# Patient Record
Sex: MALE | Race: WHITE | NOT HISPANIC OR LATINO
[De-identification: names, ages, dates, MRNs, and addresses within clinical notes are randomized per-mention and may not be internally consistent; named-entity substitution may affect disease eponyms.]

---

## 2017-05-20 ENCOUNTER — TRANSCRIPTION ENCOUNTER (OUTPATIENT)
Age: 57
End: 2017-05-20

## 2017-09-18 ENCOUNTER — RX RENEWAL (OUTPATIENT)
Age: 57
End: 2017-09-18

## 2017-09-19 ENCOUNTER — RX RENEWAL (OUTPATIENT)
Age: 57
End: 2017-09-19

## 2017-09-20 ENCOUNTER — TRANSCRIPTION ENCOUNTER (OUTPATIENT)
Age: 57
End: 2017-09-20

## 2017-09-23 ENCOUNTER — TRANSCRIPTION ENCOUNTER (OUTPATIENT)
Age: 57
End: 2017-09-23

## 2017-10-22 ENCOUNTER — TRANSCRIPTION ENCOUNTER (OUTPATIENT)
Age: 57
End: 2017-10-22

## 2018-01-31 DIAGNOSIS — Z84.0 FAMILY HISTORY OF DISEASES OF THE SKIN AND SUBCUTANEOUS TISSUE: ICD-10-CM

## 2018-01-31 DIAGNOSIS — Z80.8 FAMILY HISTORY OF MALIGNANT NEOPLASM OF OTHER ORGANS OR SYSTEMS: ICD-10-CM

## 2018-01-31 DIAGNOSIS — Z82.49 FAMILY HISTORY OF ISCHEMIC HEART DISEASE AND OTHER DISEASES OF THE CIRCULATORY SYSTEM: ICD-10-CM

## 2018-01-31 DIAGNOSIS — Z83.49 FAMILY HISTORY OF OTHER ENDOCRINE, NUTRITIONAL AND METABOLIC DISEASES: ICD-10-CM

## 2018-01-31 DIAGNOSIS — Z86.61 PERSONAL HISTORY OF INFECTIONS OF THE CENTRAL NERVOUS SYSTEM: ICD-10-CM

## 2018-01-31 DIAGNOSIS — Z82.0 FAMILY HISTORY OF EPILEPSY AND OTHER DISEASES OF THE NERVOUS SYSTEM: ICD-10-CM

## 2018-01-31 DIAGNOSIS — Z81.8 FAMILY HISTORY OF OTHER MENTAL AND BEHAVIORAL DISORDERS: ICD-10-CM

## 2018-01-31 DIAGNOSIS — Z86.19 PERSONAL HISTORY OF OTHER INFECTIOUS AND PARASITIC DISEASES: ICD-10-CM

## 2018-01-31 DIAGNOSIS — Z87.448 PERSONAL HISTORY OF OTHER DISEASES OF URINARY SYSTEM: ICD-10-CM

## 2018-01-31 DIAGNOSIS — R91.1 SOLITARY PULMONARY NODULE: ICD-10-CM

## 2018-01-31 RX ORDER — CETIRIZINE HYDROCHLORIDE 10 MG/1
10 TABLET, FILM COATED ORAL DAILY
Refills: 0 | Status: ACTIVE | COMMUNITY
Start: 2018-01-31

## 2018-01-31 RX ORDER — ASPIRIN 81 MG/1
81 TABLET ORAL DAILY
Qty: 90 | Refills: 3 | Status: ACTIVE | COMMUNITY
Start: 2018-01-31

## 2018-02-20 ENCOUNTER — NON-APPOINTMENT (OUTPATIENT)
Age: 58
End: 2018-02-20

## 2018-02-20 ENCOUNTER — APPOINTMENT (OUTPATIENT)
Dept: INTERNAL MEDICINE | Facility: CLINIC | Age: 58
End: 2018-02-20
Payer: COMMERCIAL

## 2018-02-20 VITALS
DIASTOLIC BLOOD PRESSURE: 78 MMHG | SYSTOLIC BLOOD PRESSURE: 120 MMHG | BODY MASS INDEX: 27.49 KG/M2 | HEIGHT: 70 IN | WEIGHT: 192 LBS

## 2018-02-20 DIAGNOSIS — R82.90 UNSPECIFIED ABNORMAL FINDINGS IN URINE: ICD-10-CM

## 2018-02-20 LAB
BILIRUB UR QL STRIP: NEGATIVE
CLARITY UR: CLEAR
COLLECTION METHOD: NORMAL
GLUCOSE UR-MCNC: NEGATIVE
HCG UR QL: 0.2 EU/DL
HGB UR QL STRIP.AUTO: ABNORMAL
KETONES UR-MCNC: NEGATIVE
LEUKOCYTE ESTERASE UR QL STRIP: NEGATIVE
NITRITE UR QL STRIP: NEGATIVE
PH UR STRIP: 5.5
PROT UR STRIP-MCNC: NEGATIVE
SP GR UR STRIP: 1.02

## 2018-02-20 PROCEDURE — 99396 PREV VISIT EST AGE 40-64: CPT | Mod: 25

## 2018-02-20 PROCEDURE — 90732 PPSV23 VACC 2 YRS+ SUBQ/IM: CPT

## 2018-02-20 PROCEDURE — G0009: CPT

## 2018-02-20 PROCEDURE — 82270 OCCULT BLOOD FECES: CPT

## 2018-02-20 PROCEDURE — 81003 URINALYSIS AUTO W/O SCOPE: CPT | Mod: QW

## 2018-02-20 PROCEDURE — 93000 ELECTROCARDIOGRAM COMPLETE: CPT

## 2018-02-20 RX ORDER — OSELTAMIVIR PHOSPHATE 75 MG/1
75 CAPSULE ORAL
Qty: 10 | Refills: 0 | Status: DISCONTINUED | COMMUNITY
Start: 2017-09-17 | End: 2018-02-20

## 2018-02-20 RX ORDER — LORAZEPAM 1 MG/1
1 TABLET ORAL
Refills: 0 | Status: ACTIVE | COMMUNITY
Start: 2018-01-31

## 2018-02-20 RX ORDER — DOXYCYCLINE HYCLATE 100 MG/1
100 CAPSULE ORAL
Qty: 14 | Refills: 0 | Status: DISCONTINUED | COMMUNITY
Start: 2017-10-22 | End: 2018-02-20

## 2018-02-20 RX ORDER — LAMOTRIGINE 100 MG/1
100 TABLET ORAL
Qty: 270 | Refills: 0 | Status: DISCONTINUED | COMMUNITY
Start: 2017-10-14 | End: 2018-02-20

## 2018-02-20 RX ORDER — PREDNISONE 50 MG/1
50 TABLET ORAL
Qty: 7 | Refills: 0 | Status: DISCONTINUED | COMMUNITY
Start: 2017-09-17 | End: 2018-02-20

## 2018-02-20 RX ORDER — METHYLPREDNISOLONE 4 MG/1
4 TABLET ORAL
Qty: 21 | Refills: 0 | Status: DISCONTINUED | COMMUNITY
Start: 2017-10-22 | End: 2018-02-20

## 2018-02-21 LAB
APPEARANCE: CLEAR
BACTERIA: NEGATIVE
BILIRUBIN URINE: NEGATIVE
BLOOD URINE: NEGATIVE
COLOR: YELLOW
GLUCOSE QUALITATIVE U: NEGATIVE MG/DL
KETONES URINE: NEGATIVE
LEUKOCYTE ESTERASE URINE: NEGATIVE
MICROSCOPIC-UA: NORMAL
NITRITE URINE: NEGATIVE
PH URINE: 5.5
PROTEIN URINE: NEGATIVE MG/DL
RED BLOOD CELLS URINE: 2 /HPF
SPECIFIC GRAVITY URINE: 1.02
SQUAMOUS EPITHELIAL CELLS: 0 /HPF
UROBILINOGEN URINE: NEGATIVE MG/DL
WHITE BLOOD CELLS URINE: 0 /HPF

## 2018-02-21 RX ORDER — FLUTICASONE PROPIONATE 50 UG/1
50 SPRAY, METERED NASAL
Qty: 1 | Refills: 1 | Status: ACTIVE | COMMUNITY
Start: 2017-10-22

## 2018-02-21 RX ORDER — ALBUTEROL SULFATE 90 UG/1
108 (90 BASE) AEROSOL, METERED RESPIRATORY (INHALATION)
Qty: 8.5 | Refills: 1 | Status: ACTIVE | COMMUNITY
Start: 2017-10-22

## 2018-02-21 RX ORDER — IPRATROPIUM BROMIDE AND ALBUTEROL SULFATE 2.5; .5 MG/3ML; MG/3ML
0.5-2.5 (3) SOLUTION RESPIRATORY (INHALATION)
Refills: 0 | Status: ACTIVE | COMMUNITY
Start: 2017-10-22

## 2018-02-22 ENCOUNTER — RX RENEWAL (OUTPATIENT)
Age: 58
End: 2018-02-22

## 2018-02-22 LAB — BACTERIA UR CULT: NORMAL

## 2018-04-19 ENCOUNTER — TRANSCRIPTION ENCOUNTER (OUTPATIENT)
Age: 58
End: 2018-04-19

## 2018-04-20 ENCOUNTER — APPOINTMENT (OUTPATIENT)
Dept: INTERNAL MEDICINE | Facility: CLINIC | Age: 58
End: 2018-04-20
Payer: COMMERCIAL

## 2018-04-20 VITALS
WEIGHT: 193 LBS | BODY MASS INDEX: 27.63 KG/M2 | HEART RATE: 69 BPM | SYSTOLIC BLOOD PRESSURE: 130 MMHG | OXYGEN SATURATION: 98 % | DIASTOLIC BLOOD PRESSURE: 80 MMHG | HEIGHT: 70 IN | TEMPERATURE: 98.1 F

## 2018-04-20 VITALS — TEMPERATURE: 98.4 F | SYSTOLIC BLOOD PRESSURE: 128 MMHG | DIASTOLIC BLOOD PRESSURE: 82 MMHG

## 2018-04-20 LAB
BASOPHILS # BLD AUTO: 0.03 K/UL
BASOPHILS NFR BLD AUTO: 0.5 %
EOSINOPHIL # BLD AUTO: 0.14 K/UL
EOSINOPHIL NFR BLD AUTO: 2.3 %
HCT VFR BLD CALC: 42.1 %
HGB BLD-MCNC: 13.5 G/DL
IMM GRANULOCYTES NFR BLD AUTO: 0.2 %
LYMPHOCYTES # BLD AUTO: 1.69 K/UL
LYMPHOCYTES NFR BLD AUTO: 28.1 %
MAN DIFF?: NORMAL
MCHC RBC-ENTMCNC: 29.7 PG
MCHC RBC-ENTMCNC: 32.1 GM/DL
MCV RBC AUTO: 92.7 FL
MONOCYTES # BLD AUTO: 0.54 K/UL
MONOCYTES NFR BLD AUTO: 9 %
NEUTROPHILS # BLD AUTO: 3.6 K/UL
NEUTROPHILS NFR BLD AUTO: 59.9 %
PLATELET # BLD AUTO: 313 K/UL
RBC # BLD: 4.54 M/UL
RBC # FLD: 13 %
WBC # FLD AUTO: 6.01 K/UL

## 2018-04-20 PROCEDURE — 99213 OFFICE O/P EST LOW 20 MIN: CPT | Mod: 25

## 2018-04-20 PROCEDURE — 36415 COLL VENOUS BLD VENIPUNCTURE: CPT

## 2018-04-23 LAB
ALBUMIN SERPL ELPH-MCNC: 4.6 G/DL
ALP BLD-CCNC: 66 U/L
ALT SERPL-CCNC: 18 U/L
ANION GAP SERPL CALC-SCNC: 14 MMOL/L
AST SERPL-CCNC: 18 U/L
BILIRUB SERPL-MCNC: 0.3 MG/DL
BUN SERPL-MCNC: 25 MG/DL
CALCIUM SERPL-MCNC: 9.7 MG/DL
CHLORIDE SERPL-SCNC: 104 MMOL/L
CO2 SERPL-SCNC: 24 MMOL/L
CREAT SERPL-MCNC: 1.28 MG/DL
CRP SERPL-MCNC: <0.2 MG/DL
ERYTHROCYTE [SEDIMENTATION RATE] IN BLOOD BY WESTERGREN METHOD: 12 MM/HR
GLUCOSE SERPL-MCNC: 94 MG/DL
POTASSIUM SERPL-SCNC: 4.7 MMOL/L
PROT SERPL-MCNC: 7.7 G/DL
SODIUM SERPL-SCNC: 142 MMOL/L
URATE SERPL-MCNC: 4.9 MG/DL

## 2018-07-22 PROBLEM — Z80.8 FAMILY HISTORY OF SKIN CANCER: Status: ACTIVE | Noted: 2018-01-31

## 2019-02-19 ENCOUNTER — RX RENEWAL (OUTPATIENT)
Age: 59
End: 2019-02-19

## 2019-02-19 ENCOUNTER — APPOINTMENT (OUTPATIENT)
Dept: INTERNAL MEDICINE | Facility: CLINIC | Age: 59
End: 2019-02-19
Payer: COMMERCIAL

## 2019-02-19 VITALS
DIASTOLIC BLOOD PRESSURE: 78 MMHG | WEIGHT: 198 LBS | HEIGHT: 70 IN | BODY MASS INDEX: 28.35 KG/M2 | SYSTOLIC BLOOD PRESSURE: 132 MMHG

## 2019-02-19 DIAGNOSIS — M79.644 PAIN IN RIGHT FINGER(S): ICD-10-CM

## 2019-02-19 PROCEDURE — 99396 PREV VISIT EST AGE 40-64: CPT | Mod: 25

## 2019-02-19 PROCEDURE — 82270 OCCULT BLOOD FECES: CPT

## 2019-02-19 RX ORDER — COLCHICINE 0.6 MG/1
0.6 TABLET ORAL
Qty: 30 | Refills: 1 | Status: COMPLETED | COMMUNITY
Start: 2018-04-20 | End: 2019-02-19

## 2019-02-19 RX ORDER — CEFADROXIL 500 MG/1
500 CAPSULE ORAL
Qty: 14 | Refills: 0 | Status: COMPLETED | COMMUNITY
Start: 2018-04-19 | End: 2019-02-19

## 2019-02-19 NOTE — HEALTH RISK ASSESSMENT
[Excellent] : ~his/her~ current health as excellent [Very Good] : ~his/her~  mood as very good [No falls in past year] : Patient reported no falls in the past year [0] : 2) Feeling down, depressed, or hopeless: Not at all (0) [None] : None [With Family] : lives with family [# of Members in Household ___] :  household currently consist of [unfilled] member(s) [Employed] : employed [Graduate School] : graduate school [] :  [# Of Children ___] : has [unfilled] children [Feels Safe at Home] : Feels safe at home [Neglect Or Abandonment] : neglect or abandonment [Fully functional (bathing, dressing, toileting, transferring, walking, feeding)] : Fully functional (bathing, dressing, toileting, transferring, walking, feeding) [Fully functional (using the telephone, shopping, preparing meals, housekeeping, doing laundry, using] : Fully functional and needs no help or supervision to perform IADLs (using the telephone, shopping, preparing meals, housekeeping, doing laundry, using transportation, managing medications and managing finances) [Smoke Detector] : smoke detector [Carbon Monoxide Detector] : carbon monoxide detector [Seat Belt] :  uses seat belt [] : No [Change in mental status noted] : No change in mental status noted [Reports changes in hearing] : Reports no changes in hearing [Reports changes in vision] : Reports no changes in vision [Reports changes in dental health] : Reports no changes in dental health [ColonoscopyDate] : 7/2014 [de-identified] : eye exam - 11/2018 [de-identified] : dentist - 7/2018 [AdvancecareDate] : 02/18

## 2019-02-19 NOTE — PHYSICAL EXAM
[No Acute Distress] : no acute distress [Well Nourished] : well nourished [Well Developed] : well developed [Normal Sclera/Conjunctiva] : normal sclera/conjunctiva [PERRL] : pupils equal round and reactive to light [EOMI] : extraocular movements intact [Normal Oropharynx] : the oropharynx was normal [Normal TMs] : both tympanic membranes were normal [Normal Nasal Mucosa] : the nasal mucosa was normal [No JVD] : no jugular venous distention [Supple] : supple [No Lymphadenopathy] : no lymphadenopathy [No Respiratory Distress] : no respiratory distress  [Clear to Auscultation] : lungs were clear to auscultation bilaterally [Normal Rate] : normal rate  [Regular Rhythm] : with a regular rhythm [Normal S1, S2] : normal S1 and S2 [No Carotid Bruits] : no carotid bruits [Pedal Pulses Present] : the pedal pulses are present [No Edema] : there was no peripheral edema [No Extremity Clubbing/Cyanosis] : no extremity clubbing/cyanosis [Normal Appearance] : normal in appearance [No Masses] : no palpable masses [No Nipple Discharge] : no nipple discharge [No Axillary Lymphadenopathy] : no axillary lymphadenopathy [Soft] : abdomen soft [Non Tender] : non-tender [Non-distended] : non-distended [Normal Bowel Sounds] : normal bowel sounds [Normal Sphincter Tone] : normal sphincter tone [No Mass] : no mass [Stool Occult Blood] : stool negative for occult blood [Prostate Enlargement] : the prostate was not enlarged [Prostate Tenderness] : the prostate was not tender [No Prostate Nodules] : no prostate nodules [Normal Posterior Cervical Nodes] : no posterior cervical lymphadenopathy [Normal Anterior Cervical Nodes] : no anterior cervical lymphadenopathy [No CVA Tenderness] : no CVA  tenderness [No Spinal Tenderness] : no spinal tenderness [No Joint Swelling] : no joint swelling [Grossly Normal Strength/Tone] : grossly normal strength/tone [No Rash] : no rash [Normal Gait] : normal gait [Coordination Grossly Intact] : coordination grossly intact [No Focal Deficits] : no focal deficits [Speech Grossly Normal] : speech grossly normal [Normal Affect] : the affect was normal [Alert and Oriented x3] : oriented to person, place, and time [Normal Mood] : the mood was normal [de-identified] : Overweight male in stated age,

## 2019-02-19 NOTE — ASSESSMENT
[FreeTextEntry1] : Patient is up-to-date with colonoscopy, but will be due for a repeat exam in a few months. He was advised to followup with GI for that. He was also reminded to have routine eye exam, dental care and skin exam with a dermatologist.

## 2019-02-19 NOTE — DATA REVIEWED
[FreeTextEntry1] : Derm - never,\par \par EKG - deferred, done with cardio every year, did it last spring, and has apt to go soon, he will have EKG forward here.\par \par Labs 2/9/2019 reviewed - \par * Chol - 208, HDL - 51, LD - 137, TG - 100,\par * vitamin D - 15\par * the rest of labs were unremarkable.

## 2019-02-19 NOTE — REVIEW OF SYSTEMS
[Fever] : fever [Discharge] : discharge [Dyspnea on Exertion] : dyspnea on exertion [Nocturia] : nocturia [Negative] : Heme/Lymph [Chills] : no chills [Fatigue] : no fatigue [Chest Pain] : no chest pain [Palpitations] : no palpitations [Lower Ext Edema] : no lower extremity edema [Shortness Of Breath] : no shortness of breath [Wheezing] : no wheezing [Cough] : no cough [Abdominal Pain] : no abdominal pain [Nausea] : no nausea [Constipation] : no constipation [Diarrhea] : no diarrhea [Vomiting] : no vomiting [Heartburn] : no heartburn [Melena] : no melena [Dysuria] : no dysuria [Incontinence] : no incontinence [Hematuria] : no hematuria [Joint Pain] : no joint pain [Joint Stiffness] : no joint stiffness [Muscle Pain] : no muscle pain [Back Pain] : no back pain [Joint Swelling] : no joint swelling [Itching] : no itching [Mole Changes] : no mole changes [Skin Rash] : no skin rash [Headache] : no headache [Dizziness] : no dizziness [Fainting] : no fainting [Unsteady Walk] : no ataxia [Insomnia] : no insomnia [Anxiety] : no anxiety [Depression] : no depression [Easy Bleeding] : no easy bleeding [Easy Bruising] : no easy bruising [Swollen Glands] : no swollen glands [FreeTextEntry2] : Gained 5-6 pounds in the past years, [FreeTextEntry3] : wears reading glasses, [FreeTextEntry6] : 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

## 2019-02-19 NOTE — HISTORY OF PRESENT ILLNESS
[Spouse] : spouse [de-identified] : Pt presented for PE.  Last PE was 2/2019.  His health was uneventful since his last visit, he has no new complaint.   Except.[t had pain on his finger in 4/2018, and was diagnosed with gout, but labs were unremarkable.  He was treated and resolved.  This occurred during a time when he was fasting to lose weight.\par \par Pt was not able to lose weight, but is very active.  He has not been compliant with a healthy diet.\par \par He got flu vaccine with allergist.

## 2019-03-18 ENCOUNTER — RX RENEWAL (OUTPATIENT)
Age: 59
End: 2019-03-18

## 2020-02-18 ENCOUNTER — APPOINTMENT (OUTPATIENT)
Dept: INTERNAL MEDICINE | Facility: CLINIC | Age: 60
End: 2020-02-18
Payer: COMMERCIAL

## 2020-02-18 ENCOUNTER — RX RENEWAL (OUTPATIENT)
Age: 60
End: 2020-02-18

## 2020-02-18 ENCOUNTER — NON-APPOINTMENT (OUTPATIENT)
Age: 60
End: 2020-02-18

## 2020-02-18 VITALS — SYSTOLIC BLOOD PRESSURE: 130 MMHG | DIASTOLIC BLOOD PRESSURE: 74 MMHG

## 2020-02-18 VITALS
HEART RATE: 77 BPM | HEIGHT: 70 IN | SYSTOLIC BLOOD PRESSURE: 140 MMHG | BODY MASS INDEX: 27.92 KG/M2 | DIASTOLIC BLOOD PRESSURE: 84 MMHG | OXYGEN SATURATION: 96 % | WEIGHT: 195 LBS | TEMPERATURE: 99 F

## 2020-02-18 DIAGNOSIS — E66.3 OVERWEIGHT: ICD-10-CM

## 2020-02-18 PROCEDURE — 93000 ELECTROCARDIOGRAM COMPLETE: CPT

## 2020-02-18 PROCEDURE — 82270 OCCULT BLOOD FECES: CPT

## 2020-02-18 PROCEDURE — 99396 PREV VISIT EST AGE 40-64: CPT | Mod: 25

## 2020-02-18 RX ORDER — MELOXICAM 15 MG/1
15 TABLET ORAL
Qty: 90 | Refills: 0 | Status: COMPLETED | COMMUNITY
Start: 2019-12-22 | End: 2020-02-18

## 2020-02-18 RX ORDER — LAMOTRIGINE 150 MG/1
150 TABLET ORAL TWICE DAILY
Refills: 0 | Status: COMPLETED | COMMUNITY
Start: 2018-01-31 | End: 2020-02-18

## 2020-02-18 NOTE — REVIEW OF SYSTEMS
[Dyspnea on Exertion] : dyspnea on exertion [Nocturia] : nocturia [Joint Pain] : joint pain [Negative] : Heme/Lymph [Fever] : no fever [Chills] : no chills [Fatigue] : no fatigue [Recent Change In Weight] : ~T no recent weight change [Chest Pain] : no chest pain [Palpitations] : no palpitations [Lower Ext Edema] : no lower extremity edema [Shortness Of Breath] : no shortness of breath [Wheezing] : no wheezing [Cough] : no cough [Abdominal Pain] : no abdominal pain [Nausea] : no nausea [Constipation] : no constipation [Diarrhea] : no diarrhea [Vomiting] : no vomiting [Heartburn] : no heartburn [Melena] : no melena [Dysuria] : no dysuria [Incontinence] : no incontinence [Hematuria] : no hematuria [Joint Stiffness] : no joint stiffness [Muscle Pain] : no muscle pain [Back Pain] : no back pain [Joint Swelling] : no joint swelling [Itching] : no itching [Mole Changes] : no mole changes [Skin Rash] : no skin rash [Headache] : no headache [Dizziness] : no dizziness [Fainting] : no fainting [Unsteady Walk] : no ataxia [Insomnia] : no insomnia [Anxiety] : no anxiety [Depression] : no depression [Easy Bleeding] : no easy bleeding [Easy Bruising] : no easy bruising [Swollen Glands] : no swollen glands [FreeTextEntry3] : wears reading glasses, [FreeTextEntry6] : 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

## 2020-02-18 NOTE — DATA REVIEWED
[FreeTextEntry1] : Derm - never\par \par EKG - NSR, R  - 68, axis - 0, no interval change. \par \par Labs 2/8/2020 reviewed - \par * Chol - 185, HDL - 50, LDL - 106, TG - 146, \par * uric acid - 5.2,\par * Vitamin D - 14.6,\par * the rest of labs were unremarkable.

## 2020-02-18 NOTE — HISTORY OF PRESENT ILLNESS
[de-identified] : Pt presented for PE.  Last PE was 1 year ago.  \par \par Pt had some groin pain on R since school started in the fall.  He saw ortho and was diagnosed with OA of the hip.  He was told he may need THR at some point.  Pt was treated with Meloxicam with some improvement.   He took it for about 2 months.  Pt also was recommended to have PT, but hasn't started it yet.\par \par Pt thinks it has a small umbilical hernia.  It hasn't caused any discomfort yet.  \par \par Pt got his flu vaccine with allergist.

## 2020-02-18 NOTE — PHYSICAL EXAM
[No Acute Distress] : no acute distress [Well Nourished] : well nourished [Well Developed] : well developed [Normal Sclera/Conjunctiva] : normal sclera/conjunctiva [PERRL] : pupils equal round and reactive to light [EOMI] : extraocular movements intact [Normal Outer Ear/Nose] : the outer ears and nose were normal in appearance [Normal Oropharynx] : the oropharynx was normal [Normal TMs] : both tympanic membranes were normal [Normal Nasal Mucosa] : the nasal mucosa was normal [No JVD] : no jugular venous distention [No Lymphadenopathy] : no lymphadenopathy [Supple] : supple [No Respiratory Distress] : no respiratory distress  [Clear to Auscultation] : lungs were clear to auscultation bilaterally [Normal Rate] : normal rate  [Regular Rhythm] : with a regular rhythm [Normal S1, S2] : normal S1 and S2 [No Carotid Bruits] : no carotid bruits [Pedal Pulses Present] : the pedal pulses are present [No Edema] : there was no peripheral edema [No Extremity Clubbing/Cyanosis] : no extremity clubbing/cyanosis [Normal Appearance] : normal in appearance [No Masses] : no palpable masses [No Nipple Discharge] : no nipple discharge [No Axillary Lymphadenopathy] : no axillary lymphadenopathy [Soft] : abdomen soft [Non Tender] : non-tender [Non-distended] : non-distended [Normal Bowel Sounds] : normal bowel sounds [Normal Sphincter Tone] : normal sphincter tone [No Mass] : no mass [Prostate Enlargement] : the prostate was not enlarged [Prostate Tenderness] : the prostate was not tender [No Prostate Nodules] : no prostate nodules [Normal Supraclavicular Nodes] : no supraclavicular lymphadenopathy [Normal Axillary Nodes] : no axillary lymphadenopathy [Normal Posterior Cervical Nodes] : no posterior cervical lymphadenopathy [Normal Anterior Cervical Nodes] : no anterior cervical lymphadenopathy [No CVA Tenderness] : no CVA  tenderness [No Spinal Tenderness] : no spinal tenderness [No Joint Swelling] : no joint swelling [Grossly Normal Strength/Tone] : grossly normal strength/tone [No Rash] : no rash [Coordination Grossly Intact] : coordination grossly intact [No Focal Deficits] : no focal deficits [Normal Gait] : normal gait [Speech Grossly Normal] : speech grossly normal [Normal Affect] : the affect was normal [Alert and Oriented x3] : oriented to person, place, and time [Normal Mood] : the mood was normal [Stool Occult Blood] : stool negative for occult blood [de-identified] : Overweight male in stated age,  [de-identified] : R hip was painful with external rotation,

## 2020-02-18 NOTE — HEALTH RISK ASSESSMENT
[Very Good] : ~his/her~  mood as very good [1 or 2 (0 pts)] : 1 or 2 (0 points) [Monthly or less (1 pt)] : Monthly or less (1 point) [Yes] : Yes [No] : In the past 12 months have you used drugs other than those required for medical reasons? No [No falls in past year] : Patient reported no falls in the past year [Never (0 pts)] : Never (0 points) [0] : 2) Feeling down, depressed, or hopeless: Not at all (0) [No Retinopathy] : No retinopathy [None] : None [With Family] : lives with family [# of Members in Household ___] :  household currently consist of [unfilled] member(s) [Employed] : employed [] :  [Graduate School] : graduate school [# Of Children ___] : has [unfilled] children [Feels Safe at Home] : Feels safe at home [Fully functional (using the telephone, shopping, preparing meals, housekeeping, doing laundry, using] : Fully functional and needs no help or supervision to perform IADLs (using the telephone, shopping, preparing meals, housekeeping, doing laundry, using transportation, managing medications and managing finances) [Fully functional (bathing, dressing, toileting, transferring, walking, feeding)] : Fully functional (bathing, dressing, toileting, transferring, walking, feeding) [Carbon Monoxide Detector] : carbon monoxide detector [Smoke Detector] : smoke detector [Seat Belt] :  uses seat belt [With Patient/Caregiver] : With Patient/Caregiver [] : No [de-identified] : No formal exercise, active at work, less since he has R hip pain, [[id number]] : 0 [EyeExamDate] : 11/19 [Change in mental status noted] : No change in mental status noted [Reports changes in hearing] : Reports no changes in hearing [Reports changes in vision] : Reports no changes in vision [Reports changes in dental health] : Reports no changes in dental health [ColonoscopyDate] : 07/14 [de-identified] : dentist - 02/19 [AdvancecareDate] : 02/20

## 2020-02-18 NOTE — ASSESSMENT
[FreeTextEntry1] : Patient is overdue for colonoscopy, he was advised to follow-up with GI.  He was also reminded to have routine eye exam, dental care and skin exam with dermatologist.\par \par Given that the patient is on polypharmacy, he was reminded to have 6 months follow-up with repeat labs.

## 2020-05-19 ENCOUNTER — APPOINTMENT (OUTPATIENT)
Dept: GASTROENTEROLOGY | Facility: CLINIC | Age: 60
End: 2020-05-19
Payer: COMMERCIAL

## 2020-05-19 DIAGNOSIS — Z80.7 FAMILY HISTORY OF OTHER MALIGNANT NEOPLASMS OF LYMPHOID, HEMATOPOIETIC AND RELATED TISSUES: ICD-10-CM

## 2020-05-19 DIAGNOSIS — Z83.71 ENCOUNTER FOR SCREENING FOR MALIGNANT NEOPLASM OF COLON: ICD-10-CM

## 2020-05-19 DIAGNOSIS — Z12.11 ENCOUNTER FOR SCREENING FOR MALIGNANT NEOPLASM OF COLON: ICD-10-CM

## 2020-05-19 DIAGNOSIS — Z83.71 FAMILY HISTORY OF COLONIC POLYPS: ICD-10-CM

## 2020-05-19 PROCEDURE — 99203 OFFICE O/P NEW LOW 30 MIN: CPT | Mod: 95

## 2020-05-19 RX ORDER — FLUOXETINE HYDROCHLORIDE 40 MG/1
40 CAPSULE ORAL
Qty: 90 | Refills: 1 | Status: DISCONTINUED | COMMUNITY
Start: 2018-01-31 | End: 2020-05-19

## 2020-05-19 NOTE — PHYSICAL EXAM
[General Appearance - Alert] : alert [Oriented To Time, Place, And Person] : oriented to person, place, and time [General Appearance - In No Acute Distress] : in no acute distress [Impaired Insight] : insight and judgment were intact [Affect] : the affect was normal

## 2020-05-19 NOTE — CONSULT LETTER
[FreeTextEntry1] : Dear Dr. Sofía Ewing,\[facility] \[facility] I had the pleasure of seeing your patient AMELIA SHARMA in the office today.  My office note is attached. PLEASE READ THE "ASSESSMENT" SECTION OF THE NOTE TO SEE MY IMPRESSION AND PLAN.\par \[facility] Thank you very much for allowing me to participate in the care of your patient.\[facility] \[facility] Sincerely,\[facility] \[facility] Yung Burns M.D., FAC, [facility] Director, Celiac Program at [facility]  of Medicine\[facility] [employer] [facility] Practice Director,[facility] - Gastroenterology/Internal Medicine at [place]\[facility] [address]\[place] [address]\[facility] Tel: [phone]\[facility] Email: [email or web address] \[facility] \[facility] The attached note has been created using a voice recognition system (Dragon).  There may be some misspellings and typos.  Please call my office if you have any issues or questions.

## 2020-05-19 NOTE — ASSESSMENT
[FreeTextEntry1] : Patient was brother had a precancerous colonic polyp who is in need of a screening colonoscopy.\par \par Once the COVID-19 pandemic allows, a colonoscopy will be scheduled. The risks, benefits, alternatives, and limitations of the procedure, including the possibility of missed lesions, were explained.

## 2020-07-20 ENCOUNTER — APPOINTMENT (OUTPATIENT)
Dept: DISASTER EMERGENCY | Facility: CLINIC | Age: 60
End: 2020-07-20

## 2020-07-20 LAB — SARS-COV-2 N GENE NPH QL NAA+PROBE: NOT DETECTED

## 2020-07-23 ENCOUNTER — APPOINTMENT (OUTPATIENT)
Dept: GASTROENTEROLOGY | Facility: AMBULATORY MEDICAL SERVICES | Age: 60
End: 2020-07-23
Payer: COMMERCIAL

## 2020-07-23 PROCEDURE — G0105: CPT

## 2020-12-23 PROBLEM — Z12.11 ENCOUNTER FOR COLONOSCOPY IN PATIENT WITH FAMILY HISTORY OF COLON POLYPS: Status: RESOLVED | Noted: 2020-05-19 | Resolved: 2020-12-23

## 2021-01-05 RX ORDER — SODIUM SULFATE, POTASSIUM SULFATE, MAGNESIUM SULFATE 17.5; 3.13; 1.6 G/ML; G/ML; G/ML
17.5-3.13-1.6 SOLUTION, CONCENTRATE ORAL
Qty: 1 | Refills: 0 | Status: COMPLETED | COMMUNITY
Start: 2020-05-19 | End: 2021-01-05

## 2021-02-08 ENCOUNTER — RX RENEWAL (OUTPATIENT)
Age: 61
End: 2021-02-08

## 2021-02-15 LAB
25(OH)D3 SERPL-MCNC: 40.4 NG/ML
ALBUMIN SERPL ELPH-MCNC: 4.5 G/DL
ALP BLD-CCNC: 77 U/L
ALT SERPL-CCNC: 16 U/L
ANION GAP SERPL CALC-SCNC: 12 MMOL/L
APPEARANCE: CLEAR
AST SERPL-CCNC: 15 U/L
BASOPHILS # BLD AUTO: 0.06 K/UL
BASOPHILS NFR BLD AUTO: 1.1 %
BILIRUB SERPL-MCNC: 0.6 MG/DL
BILIRUBIN URINE: NEGATIVE
BLOOD URINE: NEGATIVE
BUN SERPL-MCNC: 22 MG/DL
CALCIUM SERPL-MCNC: 9.9 MG/DL
CHLORIDE SERPL-SCNC: 104 MMOL/L
CHOLEST SERPL-MCNC: 206 MG/DL
CK SERPL-CCNC: 121 U/L
CO2 SERPL-SCNC: 23 MMOL/L
COLOR: NORMAL
CREAT SERPL-MCNC: 1.42 MG/DL
EOSINOPHIL # BLD AUTO: 0.16 K/UL
EOSINOPHIL NFR BLD AUTO: 3 %
ESTIMATED AVERAGE GLUCOSE: 105 MG/DL
GLUCOSE QUALITATIVE U: NEGATIVE
GLUCOSE SERPL-MCNC: 94 MG/DL
HBA1C MFR BLD HPLC: 5.3 %
HCT VFR BLD CALC: 43.5 %
HDLC SERPL-MCNC: 49 MG/DL
HGB BLD-MCNC: 14 G/DL
IMM GRANULOCYTES NFR BLD AUTO: 0.2 %
KETONES URINE: NEGATIVE
LDLC SERPL CALC-MCNC: 120 MG/DL
LEUKOCYTE ESTERASE URINE: NEGATIVE
LYMPHOCYTES # BLD AUTO: 1.41 K/UL
LYMPHOCYTES NFR BLD AUTO: 26.3 %
MAN DIFF?: NORMAL
MCHC RBC-ENTMCNC: 29.7 PG
MCHC RBC-ENTMCNC: 32.2 GM/DL
MCV RBC AUTO: 92.2 FL
MONOCYTES # BLD AUTO: 0.52 K/UL
MONOCYTES NFR BLD AUTO: 9.7 %
NEUTROPHILS # BLD AUTO: 3.21 K/UL
NEUTROPHILS NFR BLD AUTO: 59.7 %
NITRITE URINE: NEGATIVE
NONHDLC SERPL-MCNC: 157 MG/DL
PH URINE: 5.5
PLATELET # BLD AUTO: 295 K/UL
POTASSIUM SERPL-SCNC: 4.7 MMOL/L
PROT SERPL-MCNC: 6.7 G/DL
PROTEIN URINE: NORMAL
PSA SERPL-MCNC: 0.72 NG/ML
RBC # BLD: 4.72 M/UL
RBC # FLD: 12.3 %
SODIUM SERPL-SCNC: 139 MMOL/L
SPECIFIC GRAVITY URINE: 1.03
TRIGL SERPL-MCNC: 181 MG/DL
URATE SERPL-MCNC: 5.8 MG/DL
UROBILINOGEN URINE: NORMAL
WBC # FLD AUTO: 5.37 K/UL

## 2021-02-16 ENCOUNTER — APPOINTMENT (OUTPATIENT)
Dept: INTERNAL MEDICINE | Facility: CLINIC | Age: 61
End: 2021-02-16
Payer: COMMERCIAL

## 2021-02-16 VITALS
SYSTOLIC BLOOD PRESSURE: 134 MMHG | HEIGHT: 70 IN | OXYGEN SATURATION: 95 % | HEART RATE: 74 BPM | BODY MASS INDEX: 29.06 KG/M2 | WEIGHT: 203 LBS | DIASTOLIC BLOOD PRESSURE: 80 MMHG | TEMPERATURE: 99.3 F

## 2021-02-16 VITALS — DIASTOLIC BLOOD PRESSURE: 76 MMHG | SYSTOLIC BLOOD PRESSURE: 122 MMHG

## 2021-02-16 PROCEDURE — 99396 PREV VISIT EST AGE 40-64: CPT

## 2021-02-16 PROCEDURE — 99072 ADDL SUPL MATRL&STAF TM PHE: CPT

## 2021-02-16 RX ORDER — QUETIAPINE 50 MG/1
50 TABLET, FILM COATED, EXTENDED RELEASE ORAL
Qty: 90 | Refills: 0 | Status: COMPLETED | COMMUNITY
Start: 2020-11-17 | End: 2021-02-16

## 2021-02-16 RX ORDER — BUDESONIDE AND FORMOTEROL FUMARATE DIHYDRATE 160; 4.5 UG/1; UG/1
160-4.5 AEROSOL RESPIRATORY (INHALATION)
Qty: 1 | Refills: 3 | Status: COMPLETED | COMMUNITY
Start: 2017-09-23 | End: 2021-02-16

## 2021-02-16 RX ORDER — QUETIAPINE FUMARATE 50 MG/1
50 TABLET ORAL
Refills: 0 | Status: COMPLETED | COMMUNITY
Start: 2021-02-16 | End: 2021-02-16

## 2021-02-16 NOTE — REVIEW OF SYSTEMS
[Dyspnea on Exertion] : dyspnea on exertion [Nocturia] : nocturia [Negative] : Heme/Lymph [Heartburn] : heartburn [Joint Pain] : joint pain [Joint Stiffness] : joint stiffness [Fever] : no fever [Chills] : no chills [Fatigue] : no fatigue [Recent Change In Weight] : ~T no recent weight change [Chest Pain] : no chest pain [Palpitations] : no palpitations [Lower Ext Edema] : no lower extremity edema [Shortness Of Breath] : no shortness of breath [Wheezing] : no wheezing [Cough] : no cough [Abdominal Pain] : no abdominal pain [Nausea] : no nausea [Constipation] : no constipation [Diarrhea] : no diarrhea [Vomiting] : no vomiting [Melena] : no melena [Dysuria] : no dysuria [Incontinence] : no incontinence [Hematuria] : no hematuria [Muscle Pain] : no muscle pain [Back Pain] : no back pain [Joint Swelling] : no joint swelling [Itching] : no itching [Mole Changes] : no mole changes [Skin Rash] : no skin rash [Headache] : no headache [Dizziness] : no dizziness [Fainting] : no fainting [Unsteady Walk] : no ataxia [Insomnia] : no insomnia [Anxiety] : no anxiety [Depression] : no depression [Easy Bleeding] : no easy bleeding [Easy Bruising] : no easy bruising [Swollen Glands] : no swollen glands [FreeTextEntry2] : Gained a few lbs in the past year, [FreeTextEntry3] : wears reading glasses, [FreeTextEntry6] : 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

## 2021-02-16 NOTE — HEALTH RISK ASSESSMENT
[Never (0 pts)] : Never (0 points) [No] : In the past 12 months have you used drugs other than those required for medical reasons? No [No falls in past year] : Patient reported no falls in the past year [0] : 2) Feeling down, depressed, or hopeless: Not at all (0) [None] : None [With Family] : lives with family [# of Members in Household ___] :  household currently consist of [unfilled] member(s) [] :  [# Of Children ___] : has [unfilled] children [Feels Safe at Home] : Feels safe at home [Fully functional (bathing, dressing, toileting, transferring, walking, feeding)] : Fully functional (bathing, dressing, toileting, transferring, walking, feeding) [Fully functional (using the telephone, shopping, preparing meals, housekeeping, doing laundry, using] : Fully functional and needs no help or supervision to perform IADLs (using the telephone, shopping, preparing meals, housekeeping, doing laundry, using transportation, managing medications and managing finances) [Smoke Detector] : smoke detector [Carbon Monoxide Detector] : carbon monoxide detector [Seat Belt] :  uses seat belt [Very Good] : ~his/her~  mood as very good [Employed] : employed [Graduate School] : graduate school [With Patient/Caregiver] : With Patient/Caregiver [Relationship: ___] : Relationship: [unfilled] [] : No [Audit-CScore] : 0 [de-identified] : No formal exercise, still works, but is sedentary, [[id number]] : 0 [EyeExamDate] : 11/19 [Change in mental status noted] : No change in mental status noted [Reports changes in hearing] : Reports no changes in hearing [Reports changes in vision] : Reports no changes in vision [Reports changes in dental health] : Reports no changes in dental health [ColonoscopyDate] : 07/20 [de-identified] : dentist - 1/2020 [AdvancecareDate] : 02/21

## 2021-02-16 NOTE — PHYSICAL EXAM
[No Acute Distress] : no acute distress [Well Nourished] : well nourished [Well Developed] : well developed [Normal Sclera/Conjunctiva] : normal sclera/conjunctiva [PERRL] : pupils equal round and reactive to light [EOMI] : extraocular movements intact [Normal Outer Ear/Nose] : the outer ears and nose were normal in appearance [Normal Oropharynx] : the oropharynx was normal [No JVD] : no jugular venous distention [No Lymphadenopathy] : no lymphadenopathy [Supple] : supple [No Respiratory Distress] : no respiratory distress  [Clear to Auscultation] : lungs were clear to auscultation bilaterally [Normal Rate] : normal rate  [Regular Rhythm] : with a regular rhythm [Normal S1, S2] : normal S1 and S2 [No Carotid Bruits] : no carotid bruits [Pedal Pulses Present] : the pedal pulses are present [No Edema] : there was no peripheral edema [No Extremity Clubbing/Cyanosis] : no extremity clubbing/cyanosis [Soft] : abdomen soft [Non Tender] : non-tender [Non-distended] : non-distended [Normal Bowel Sounds] : normal bowel sounds [Normal Posterior Cervical Nodes] : no posterior cervical lymphadenopathy [Normal Anterior Cervical Nodes] : no anterior cervical lymphadenopathy [No CVA Tenderness] : no CVA  tenderness [No Spinal Tenderness] : no spinal tenderness [No Joint Swelling] : no joint swelling [Grossly Normal Strength/Tone] : grossly normal strength/tone [No Rash] : no rash [Coordination Grossly Intact] : coordination grossly intact [No Focal Deficits] : no focal deficits [Normal Gait] : normal gait [Normal Affect] : the affect was normal [Normal TMs] : both tympanic membranes were normal [Normal Nasal Mucosa] : the nasal mucosa was normal [No Masses] : no palpable masses [Normal Appearance] : normal in appearance [No Nipple Discharge] : no nipple discharge [No Axillary Lymphadenopathy] : no axillary lymphadenopathy [Declined Rectal Exam] : declined rectal exam [Normal Supraclavicular Nodes] : no supraclavicular lymphadenopathy [Normal Axillary Nodes] : no axillary lymphadenopathy [Speech Grossly Normal] : speech grossly normal [Alert and Oriented x3] : oriented to person, place, and time [Normal Mood] : the mood was normal [de-identified] : Overweight male in stated age,  [FreeTextEntry1] : deferred, had colonoscopy a few months ago,

## 2021-02-16 NOTE — DATA REVIEWED
[FreeTextEntry1] : Derm - never\par \par EKG - deferred, pt declined, he is considering follow up with cardiologist soon,

## 2021-02-16 NOTE — HISTORY OF PRESENT ILLNESS
[de-identified] : Pt presented for PE.  Last PE was 1 year ago.  His/Her health was uneventful since last visit, he/she has no new complaint. \par \par Pt was well and did not get sick throughout the COVID pandemic. \par \par Pt is followed by psych and was started on Seroquel a few months ago for anxiety with good response.\par \par His brother passed away in 4/2020 from COVID infection, pt is still missing and grieving for his brother.\par \par Pt got flu vaccine with allergist in 10/12/2020.

## 2022-02-24 ENCOUNTER — NON-APPOINTMENT (OUTPATIENT)
Age: 62
End: 2022-02-24

## 2022-02-24 ENCOUNTER — APPOINTMENT (OUTPATIENT)
Dept: INTERNAL MEDICINE | Facility: CLINIC | Age: 62
End: 2022-02-24
Payer: COMMERCIAL

## 2022-02-24 VITALS — SYSTOLIC BLOOD PRESSURE: 144 MMHG | DIASTOLIC BLOOD PRESSURE: 78 MMHG

## 2022-02-24 VITALS
DIASTOLIC BLOOD PRESSURE: 88 MMHG | HEIGHT: 70 IN | SYSTOLIC BLOOD PRESSURE: 155 MMHG | WEIGHT: 205 LBS | RESPIRATION RATE: 15 BRPM | HEART RATE: 70 BPM | TEMPERATURE: 98 F | BODY MASS INDEX: 29.35 KG/M2 | OXYGEN SATURATION: 98 %

## 2022-02-24 DIAGNOSIS — Z83.518 FAMILY HISTORY OF OTHER SPECIFIED EYE DISORDER: ICD-10-CM

## 2022-02-24 DIAGNOSIS — Z00.00 ENCOUNTER FOR GENERAL ADULT MEDICAL EXAMINATION W/OUT ABNORMAL FINDINGS: ICD-10-CM

## 2022-02-24 DIAGNOSIS — Z82.3 FAMILY HISTORY OF STROKE: ICD-10-CM

## 2022-02-24 PROCEDURE — 99396 PREV VISIT EST AGE 40-64: CPT | Mod: 25

## 2022-02-24 PROCEDURE — 93000 ELECTROCARDIOGRAM COMPLETE: CPT

## 2022-02-24 PROCEDURE — 82270 OCCULT BLOOD FECES: CPT

## 2022-02-24 RX ORDER — PRAVASTATIN SODIUM 40 MG/1
40 TABLET ORAL
Qty: 90 | Refills: 2 | Status: DISCONTINUED | COMMUNITY
Start: 2018-01-31 | End: 2022-02-24

## 2022-02-24 RX ORDER — QUETIAPINE 50 MG/1
50 TABLET, FILM COATED, EXTENDED RELEASE ORAL TWICE DAILY
Refills: 0 | Status: ACTIVE | COMMUNITY
Start: 2020-11-17

## 2022-02-24 NOTE — DATA REVIEWED
[FreeTextEntry1] : Derm - never\par \par EKG - SB, R - 58, axis - 15, no interval change. \par \par Labs from 2/17/2022 reviewed - \par * Chol - 227, HDL - 43, LDL - 149, TG - 173, \par * Glucose - 87, HbA1c - 5.3,\par * the rest of labs were unremarkable.

## 2022-02-24 NOTE — HEALTH RISK ASSESSMENT
[Very Good] : ~his/her~  mood as very good [Never] : Never [No] : In the past 12 months have you used drugs other than those required for medical reasons? No [One fall no injury in past year] : Patient reported one fall in the past year without injury [0] : 2) Feeling down, depressed, or hopeless: Not at all (0) [PHQ-2 Negative - No further assessment needed] : PHQ-2 Negative - No further assessment needed [HIV test declined] : HIV test declined [Hepatitis C test declined] : Hepatitis C test declined [None] : None [With Family] : lives with family [# of Members in Household ___] :  household currently consist of [unfilled] member(s) [Retired] : retired [Graduate School] : graduate school [] :  [# Of Children ___] : has [unfilled] children [Feels Safe at Home] : Feels safe at home [Fully functional (bathing, dressing, toileting, transferring, walking, feeding)] : Fully functional (bathing, dressing, toileting, transferring, walking, feeding) [Fully functional (using the telephone, shopping, preparing meals, housekeeping, doing laundry, using] : Fully functional and needs no help or supervision to perform IADLs (using the telephone, shopping, preparing meals, housekeeping, doing laundry, using transportation, managing medications and managing finances) [Smoke Detector] : smoke detector [Carbon Monoxide Detector] : carbon monoxide detector [Seat Belt] :  uses seat belt [With Patient/Caregiver] : , with patient/caregiver [Relationship: ___] : Relationship: [unfilled] [de-identified] : sedentary, no formal exercise since senior living, [[id number]] : 0 [EyeExamDate] : 04/21 [Change in mental status noted] : No change in mental status noted [Reports changes in hearing] : Reports no changes in hearing [Reports changes in vision] : Reports no changes in vision [Reports changes in dental health] : Reports no changes in dental health [ColonoscopyDate] : 07/21 [de-identified] : dentist - 2/2019 [AdvancecareDate] : 02/22

## 2022-02-24 NOTE — REVIEW OF SYSTEMS
[Dyspnea on Exertion] : dyspnea on exertion [Heartburn] : heartburn [Nocturia] : nocturia [Joint Pain] : joint pain [Joint Stiffness] : joint stiffness [Negative] : Heme/Lymph [Fever] : no fever [Chills] : no chills [Fatigue] : no fatigue [Recent Change In Weight] : ~T no recent weight change [Chest Pain] : no chest pain [Palpitations] : no palpitations [Lower Ext Edema] : no lower extremity edema [Shortness Of Breath] : no shortness of breath [Wheezing] : no wheezing [Cough] : no cough [Abdominal Pain] : no abdominal pain [Nausea] : no nausea [Constipation] : no constipation [Diarrhea] : no diarrhea [Vomiting] : no vomiting [Melena] : no melena [Dysuria] : no dysuria [Incontinence] : no incontinence [Hematuria] : no hematuria [Muscle Pain] : no muscle pain [Back Pain] : no back pain [Joint Swelling] : no joint swelling [Itching] : no itching [Mole Changes] : no mole changes [Skin Rash] : no skin rash [Headache] : no headache [Dizziness] : no dizziness [Fainting] : no fainting [Unsteady Walk] : no ataxia [Insomnia] : no insomnia [Anxiety] : no anxiety [Depression] : no depression [Easy Bleeding] : no easy bleeding [Easy Bruising] : no easy bruising [Swollen Glands] : no swollen glands [FreeTextEntry2] : Gained another 2 lbs in the past year, [FreeTextEntry3] : wears reading glasses, [FreeTextEntry6] : chronic XIE with no change, [FreeTextEntry7] : gets heartburn at night occ,  [FreeTextEntry8] : Nocturia of 0-1 X per night, [FreeTextEntry9] : Has joint stiffness and pain in hips and knees, but not interfering with function,

## 2022-02-24 NOTE — PHYSICAL EXAM
[No Acute Distress] : no acute distress [Well Nourished] : well nourished [Well Developed] : well developed [Normal Sclera/Conjunctiva] : normal sclera/conjunctiva [PERRL] : pupils equal round and reactive to light [EOMI] : extraocular movements intact [Normal Outer Ear/Nose] : the outer ears and nose were normal in appearance [Normal Oropharynx] : the oropharynx was normal [Normal TMs] : both tympanic membranes were normal [Normal Nasal Mucosa] : the nasal mucosa was normal [No Lymphadenopathy] : no lymphadenopathy [No JVD] : no jugular venous distention [Supple] : supple [No Respiratory Distress] : no respiratory distress  [Clear to Auscultation] : lungs were clear to auscultation bilaterally [Normal Rate] : normal rate  [Regular Rhythm] : with a regular rhythm [Normal S1, S2] : normal S1 and S2 [No Carotid Bruits] : no carotid bruits [Pedal Pulses Present] : the pedal pulses are present [No Edema] : there was no peripheral edema [No Extremity Clubbing/Cyanosis] : no extremity clubbing/cyanosis [Normal Appearance] : normal in appearance [No Masses] : no palpable masses [No Nipple Discharge] : no nipple discharge [No Axillary Lymphadenopathy] : no axillary lymphadenopathy [Soft] : abdomen soft [Non Tender] : non-tender [Non-distended] : non-distended [Normal Bowel Sounds] : normal bowel sounds [Normal Supraclavicular Nodes] : no supraclavicular lymphadenopathy [Normal Axillary Nodes] : no axillary lymphadenopathy [Normal Posterior Cervical Nodes] : no posterior cervical lymphadenopathy [Normal Anterior Cervical Nodes] : no anterior cervical lymphadenopathy [No CVA Tenderness] : no CVA  tenderness [No Spinal Tenderness] : no spinal tenderness [No Joint Swelling] : no joint swelling [No Rash] : no rash [Grossly Normal Strength/Tone] : grossly normal strength/tone [Coordination Grossly Intact] : coordination grossly intact [No Focal Deficits] : no focal deficits [Normal Gait] : normal gait [Speech Grossly Normal] : speech grossly normal [Normal Affect] : the affect was normal [Alert and Oriented x3] : oriented to person, place, and time [Normal Mood] : the mood was normal [Normal Sphincter Tone] : normal sphincter tone [No Mass] : no mass [Prostate Enlargement] : the prostate was not enlarged [No Prostate Nodules] : no prostate nodules [Prostate Tenderness] : the prostate was not tender [Stool Occult Blood] : stool negative for occult blood [de-identified] : Overweight male in stated age,

## 2022-02-24 NOTE — HISTORY OF PRESENT ILLNESS
[FreeTextEntry1] : Pt presented for PE.  Last PE was 1 year ago. [de-identified] : His health was uneventful since last visit, he has no new complaint. \johan chambers Pt was well and did not get sick throughout the COVID pandemic.  He had 3 doses of COVID vaccine.  He already had flu vaccine at the pharmacy.\johan chambers Pt retired over a year ago.  He keeps busy helping his father an elderly cousin with doctor's appointment.  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  However, there is very little exercise and he admitted to dietary indiscretion, ice cream every night, etc.  He's been gaining a couple of lbs per years, but at  least 10 lbs over the past 4 years.\par \par He is concerned that his father is in the hospital now for a CVA.\johan chambers Pt is still seein psych every 2 months, and Seroquel doses increased to 50 mg BID.\johan cahmbers Pt stopped his statin since 9/2021, and he felt that it was causing muscle pain.  He felt better off Pravastatin.  He is willing to try a different statin.  He also admitted to dietary indiscretion.

## 2022-06-09 ENCOUNTER — APPOINTMENT (OUTPATIENT)
Dept: INTERNAL MEDICINE | Facility: CLINIC | Age: 62
End: 2022-06-09
Payer: COMMERCIAL

## 2022-06-09 VITALS — DIASTOLIC BLOOD PRESSURE: 82 MMHG | SYSTOLIC BLOOD PRESSURE: 138 MMHG

## 2022-06-09 VITALS
HEIGHT: 70 IN | WEIGHT: 203 LBS | OXYGEN SATURATION: 97 % | DIASTOLIC BLOOD PRESSURE: 78 MMHG | TEMPERATURE: 97.8 F | BODY MASS INDEX: 29.06 KG/M2 | HEART RATE: 74 BPM | SYSTOLIC BLOOD PRESSURE: 144 MMHG

## 2022-06-09 PROCEDURE — 99214 OFFICE O/P EST MOD 30 MIN: CPT

## 2022-06-09 NOTE — PHYSICAL EXAM
[No Acute Distress] : no acute distress [Well Nourished] : well nourished [Well Developed] : well developed [Supple] : supple [No Respiratory Distress] : no respiratory distress  [Clear to Auscultation] : lungs were clear to auscultation bilaterally [Normal Rate] : normal rate  [Regular Rhythm] : with a regular rhythm [Normal S1, S2] : normal S1 and S2 [No Edema] : there was no peripheral edema [Soft] : abdomen soft [Non Tender] : non-tender [Normal Bowel Sounds] : normal bowel sounds [No CVA Tenderness] : no CVA  tenderness [No Spinal Tenderness] : no spinal tenderness [No Joint Swelling] : no joint swelling [Grossly Normal Strength/Tone] : grossly normal strength/tone [Normal Gait] : normal gait [Speech Grossly Normal] : speech grossly normal [Normal Affect] : the affect was normal [Alert and Oriented x3] : oriented to person, place, and time [Normal Mood] : the mood was normal [de-identified] : overweight male in stated age,

## 2022-06-09 NOTE — DATA REVIEWED
[FreeTextEntry1] : Labs from 5/31/2022 reviewed - \par * Chol - 149, HDL - 51, LDL - 63, TG - 174, \par * CPK - 86,\par * Cr - 1.32, BUN - 21, the rest of CMP were normal.

## 2022-06-09 NOTE — HISTORY OF PRESENT ILLNESS
[FreeTextEntry1] : Pt presented for f/u of HLD & HTN. [de-identified] : Pt c/o pain in multiple joints.worse in the morning.  The joint pain is worse in the morning, and worse when he first get up after he sits for a while.  He has seen ortho and advised that he may need joint replacement, but he has been busy with taking care of his father over the past 2 years.  He has not taken time to care for him.  His father just passed away last month.

## 2022-07-25 ENCOUNTER — RX RENEWAL (OUTPATIENT)
Age: 62
End: 2022-07-25

## 2023-01-25 ENCOUNTER — RX RENEWAL (OUTPATIENT)
Age: 63
End: 2023-01-25

## 2023-03-02 ENCOUNTER — APPOINTMENT (OUTPATIENT)
Dept: INTERNAL MEDICINE | Facility: CLINIC | Age: 63
End: 2023-03-02
Payer: COMMERCIAL

## 2023-03-02 ENCOUNTER — NON-APPOINTMENT (OUTPATIENT)
Age: 63
End: 2023-03-02

## 2023-03-02 VITALS
OXYGEN SATURATION: 97 % | HEIGHT: 70 IN | WEIGHT: 200 LBS | DIASTOLIC BLOOD PRESSURE: 80 MMHG | HEART RATE: 77 BPM | SYSTOLIC BLOOD PRESSURE: 150 MMHG | BODY MASS INDEX: 28.63 KG/M2 | TEMPERATURE: 98 F

## 2023-03-02 VITALS — SYSTOLIC BLOOD PRESSURE: 148 MMHG | DIASTOLIC BLOOD PRESSURE: 84 MMHG

## 2023-03-02 DIAGNOSIS — U07.1 COVID-19: ICD-10-CM

## 2023-03-02 DIAGNOSIS — R74.8 ABNORMAL LEVELS OF OTHER SERUM ENZYMES: ICD-10-CM

## 2023-03-02 DIAGNOSIS — J30.9 ALLERGIC RHINITIS, UNSPECIFIED: ICD-10-CM

## 2023-03-02 PROCEDURE — 82270 OCCULT BLOOD FECES: CPT

## 2023-03-02 PROCEDURE — 99396 PREV VISIT EST AGE 40-64: CPT | Mod: 25

## 2023-03-02 PROCEDURE — 93000 ELECTROCARDIOGRAM COMPLETE: CPT | Mod: 59

## 2023-03-02 NOTE — HEALTH RISK ASSESSMENT
[Good] : ~his/her~ current health as good [Very Good] : ~his/her~  mood as very good [No] : In the past 12 months have you used drugs other than those required for medical reasons? No [No falls in past year] : Patient reported no falls in the past year [0] : 2) Feeling down, depressed, or hopeless: Not at all (0) [PHQ-2 Negative - No further assessment needed] : PHQ-2 Negative - No further assessment needed [HIV test declined] : HIV test declined [Hepatitis C test declined] : Hepatitis C test declined [None] : None [With Family] : lives with family [# of Members in Household ___] :  household currently consist of [unfilled] member(s) [Retired] : retired [Graduate School] : graduate school [] :  [# Of Children ___] : has [unfilled] children [Feels Safe at Home] : Feels safe at home [Fully functional (bathing, dressing, toileting, transferring, walking, feeding)] : Fully functional (bathing, dressing, toileting, transferring, walking, feeding) [Fully functional (using the telephone, shopping, preparing meals, housekeeping, doing laundry, using] : Fully functional and needs no help or supervision to perform IADLs (using the telephone, shopping, preparing meals, housekeeping, doing laundry, using transportation, managing medications and managing finances) [Smoke Detector] : smoke detector [Carbon Monoxide Detector] : carbon monoxide detector [Seat Belt] :  uses seat belt [With Patient/Caregiver] : , with patient/caregiver [Relationship: ___] : Relationship: [unfilled] [Never] : Never [Audit-CScore] : 0 [de-identified] : sedentary, no formal exercise since intermediate, some walking 2x per week, [[id number]] : 0 [EyeExamDate] : 04/21 [Change in mental status noted] : No change in mental status noted [Reports changes in hearing] : Reports no changes in hearing [Reports changes in vision] : Reports no changes in vision [Reports changes in dental health] : Reports no changes in dental health [ColonoscopyDate] : 07/20 [de-identified] : dentist - 1/2023 [AdvancecareDate] : 03/23

## 2023-03-02 NOTE — REVIEW OF SYSTEMS
[Dyspnea on Exertion] : dyspnea on exertion [Heartburn] : heartburn [Nocturia] : nocturia [Joint Pain] : joint pain [Joint Stiffness] : joint stiffness [Negative] : Heme/Lymph [Fever] : no fever [Chills] : no chills [Fatigue] : no fatigue [Recent Change In Weight] : ~T no recent weight change [Chest Pain] : no chest pain [Palpitations] : no palpitations [Lower Ext Edema] : no lower extremity edema [Shortness Of Breath] : no shortness of breath [Wheezing] : no wheezing [Cough] : no cough [Abdominal Pain] : no abdominal pain [Nausea] : no nausea [Constipation] : no constipation [Diarrhea] : no diarrhea [Vomiting] : no vomiting [Melena] : no melena [Dysuria] : no dysuria [Incontinence] : no incontinence [Hematuria] : no hematuria [Muscle Pain] : no muscle pain [Back Pain] : no back pain [Joint Swelling] : no joint swelling [Itching] : no itching [Mole Changes] : no mole changes [Skin Rash] : no skin rash [Headache] : no headache [Dizziness] : no dizziness [Fainting] : no fainting [Unsteady Walk] : no ataxia [Insomnia] : no insomnia [Anxiety] : no anxiety [Depression] : no depression [Easy Bleeding] : no easy bleeding [Easy Bruising] : no easy bruising [Swollen Glands] : no swollen glands [FreeTextEntry2] : Lost about 5 lbs in the past year, [FreeTextEntry3] : wears reading glasses, [FreeTextEntry6] : chronic XIE with no change, [FreeTextEntry7] : gets heartburn at night occ,  [FreeTextEntry9] : R hip and L knee pain as in HPI,  [FreeTextEntry8] : Nocturia of 0-1 X per night,

## 2023-03-02 NOTE — HISTORY OF PRESENT ILLNESS
[FreeTextEntry1] : Pt presented for PE.  Last PE was 1 year ago. [de-identified] : Pt c/o worsening R hip pain, he is ready to see ortho to discuss hip surgery.  He also has L knee pain probably due to favoring the RLE.\par \par Pt also stopped low dose ASA, and last cardiac eval was 5 years ago.  He is very sedentary, not doing exercise, not able to climb stairs due to hip pain.\par \par Pt c/o increase heartburn lately and occ reflux when he lies down at night.\par \par He had COVID infection in 8/2022, it was mild and he recovered completely.  He also had stomach virus a couple of weeks ago.\par \par He had 3 doses of COVID vaccine.  He got flu vaccine at the immunologist.

## 2023-03-02 NOTE — PHYSICAL EXAM
[No Acute Distress] : no acute distress [Well Nourished] : well nourished [Well Developed] : well developed [Normal Sclera/Conjunctiva] : normal sclera/conjunctiva [PERRL] : pupils equal round and reactive to light [EOMI] : extraocular movements intact [Normal Outer Ear/Nose] : the outer ears and nose were normal in appearance [Normal Oropharynx] : the oropharynx was normal [Normal TMs] : both tympanic membranes were normal [Normal Nasal Mucosa] : the nasal mucosa was normal [No JVD] : no jugular venous distention [No Lymphadenopathy] : no lymphadenopathy [Supple] : supple [No Respiratory Distress] : no respiratory distress  [Clear to Auscultation] : lungs were clear to auscultation bilaterally [Normal Rate] : normal rate  [Regular Rhythm] : with a regular rhythm [Normal S1, S2] : normal S1 and S2 [No Carotid Bruits] : no carotid bruits [Pedal Pulses Present] : the pedal pulses are present [No Edema] : there was no peripheral edema [No Extremity Clubbing/Cyanosis] : no extremity clubbing/cyanosis [Normal Appearance] : normal in appearance [No Masses] : no palpable masses [No Nipple Discharge] : no nipple discharge [No Axillary Lymphadenopathy] : no axillary lymphadenopathy [Soft] : abdomen soft [Non Tender] : non-tender [Non-distended] : non-distended [Normal Bowel Sounds] : normal bowel sounds [Normal Sphincter Tone] : normal sphincter tone [No Mass] : no mass [Prostate Enlargement] : the prostate was not enlarged [Prostate Tenderness] : the prostate was not tender [No Prostate Nodules] : no prostate nodules [No CVA Tenderness] : no CVA  tenderness [No Spinal Tenderness] : no spinal tenderness [No Joint Swelling] : no joint swelling [Grossly Normal Strength/Tone] : grossly normal strength/tone [No Rash] : no rash [Coordination Grossly Intact] : coordination grossly intact [No Focal Deficits] : no focal deficits [Normal Gait] : normal gait [Speech Grossly Normal] : speech grossly normal [Normal Affect] : the affect was normal [Alert and Oriented x3] : oriented to person, place, and time [Normal Mood] : the mood was normal [Normal Supraclavicular Nodes] : no supraclavicular lymphadenopathy [Normal Axillary Nodes] : no axillary lymphadenopathy [Normal Posterior Cervical Nodes] : no posterior cervical lymphadenopathy [Normal Anterior Cervical Nodes] : no anterior cervical lymphadenopathy [Stool Occult Blood] : stool negative for occult blood [de-identified] : Overweight male in stated age,  [de-identified] : R hip with liimitied ROM due to pain,

## 2023-03-02 NOTE — DATA REVIEWED
[FreeTextEntry1] : Derm - never\par \par EKG - NSR, R - 62, axis - 10, no interval change. \par \par Labs from 2/23/2023 reviewed - \par * Chol - 145, HDL - 42, LDL - 67, TG - 179,\par * AST/ALT - 74/245, AP - 237, TB - 0.7,\par * Glucose - 95, BUN/Cr - 20/1.29,\par * the rest of labs were unremarkable.

## 2023-04-06 ENCOUNTER — APPOINTMENT (OUTPATIENT)
Dept: GASTROENTEROLOGY | Facility: CLINIC | Age: 63
End: 2023-04-06
Payer: COMMERCIAL

## 2023-04-06 VITALS
TEMPERATURE: 96.9 F | DIASTOLIC BLOOD PRESSURE: 80 MMHG | WEIGHT: 198 LBS | HEIGHT: 70 IN | BODY MASS INDEX: 28.35 KG/M2 | OXYGEN SATURATION: 98 % | HEART RATE: 84 BPM | SYSTOLIC BLOOD PRESSURE: 130 MMHG

## 2023-04-06 DIAGNOSIS — K64.4 RESIDUAL HEMORRHOIDAL SKIN TAGS: ICD-10-CM

## 2023-04-06 DIAGNOSIS — K64.8 OTHER HEMORRHOIDS: ICD-10-CM

## 2023-04-06 PROCEDURE — 99214 OFFICE O/P EST MOD 30 MIN: CPT

## 2023-04-07 PROBLEM — K64.8 HEMORRHOIDS, INTERNAL: Status: ACTIVE | Noted: 2023-04-07

## 2023-04-07 PROBLEM — K64.4 HEMORRHOIDS, EXTERNAL: Status: ACTIVE | Noted: 2023-04-07

## 2023-04-07 NOTE — ASSESSMENT
[FreeTextEntry1] : Patient with complaints of heartburn, belching, and bloating.  Multiple etiologies need to be considered.\par \par An EGD has been scheduled. The risks, benefits, alternatives, and limitations of the procedure were explained.\par \par A list of dietary and lifestyle modifications in the treatment of GERD was given to the patient.  We discussed this in depth.\par \par Patient is due for colonoscopy in July 2025 given the family history of a brother with an advanced colonic polyp.\par \par \par Plan from 5/19/2020 - Patient was brother had a precancerous colonic polyp who is in need of a screening colonoscopy.\par \par Once the COVID-19 pandemic allows, a colonoscopy will be scheduled. The risks, benefits, alternatives, and limitations of the procedure, including the possibility of missed lesions, were explained.

## 2023-04-07 NOTE — HISTORY OF PRESENT ILLNESS
[FreeTextEntry1] : The patient is seen for the first time since he underwent colonoscopy on 2020.  The exam was normal other than internal and external hemorrhoids which are asymptomatic.  The patient now complains of heartburn and belching that occurs about twice a week over the past year.  He denies dysphagia, nausea, vomiting.  He notes abdominal bloating with associated discomfort.  He reports 1-3 solid bowel movements a day without melena or bright red blood per rectum.  The patient has had mild weight gain.  The patient has not been admitted to the hospital in the past year and denies any cardiac issues.  The patient has a family history of a brother with advanced colonic polyp.\par \par \par Note from 2020 - The patient is a 59-year-old man whose brother had precancerous colonic polyp which could not be removed by colonoscopy.  Unfortunately, the brother recently  of COVID-19 before the polyp could be resected.  The patient feels well denying abdominal pain, diarrhea, constipation.  He has 2-3 solid bowel movements a day without melena or bright red blood per rectum.  He denies heartburn or dysphasia.  The patient's weight is stable.  The patient has not been admitted to the hospital in the past year and denies any cardiac issues.

## 2023-04-07 NOTE — CONSULT LETTER
[FreeTextEntry1] : Dear Dr. Sofía Ewing,\par \par I had the pleasure of seeing your patient AMELIA SHARMA in the office today.  My office note is attached. PLEASE READ THE "ASSESSMENT" SECTION OF THE NOTE TO SEE MY IMPRESSION AND PLAN.\par \par Thank you very much for allowing me to participate in the care of your patient.\par \par Sincerely,\par \par Yung Burns M.D., FAC, FACP\par Director, Celiac Program at [facility]\par  of Medicine, [facility] [employer] at [facility] Adjunct  of Medicine, [facility] Practice Director,  [facility] [place]\[facility] [address]\[place] [address]\par Tel: [phone]\par Email: [email or web address]\par \par \par The attached note has been created using a voice recognition system (Dragon).  There may be some misspellings and typos.  Please call my office if you have any issues or questions.

## 2023-04-24 ENCOUNTER — RX RENEWAL (OUTPATIENT)
Age: 63
End: 2023-04-24

## 2023-04-28 ENCOUNTER — APPOINTMENT (OUTPATIENT)
Dept: GASTROENTEROLOGY | Facility: AMBULATORY MEDICAL SERVICES | Age: 63
End: 2023-04-28
Payer: COMMERCIAL

## 2023-04-28 PROCEDURE — 43239 EGD BIOPSY SINGLE/MULTIPLE: CPT

## 2023-06-02 ENCOUNTER — APPOINTMENT (OUTPATIENT)
Dept: GASTROENTEROLOGY | Facility: CLINIC | Age: 63
End: 2023-06-02
Payer: COMMERCIAL

## 2023-06-02 VITALS
SYSTOLIC BLOOD PRESSURE: 128 MMHG | HEART RATE: 77 BPM | DIASTOLIC BLOOD PRESSURE: 70 MMHG | TEMPERATURE: 98.6 F | HEIGHT: 70 IN | OXYGEN SATURATION: 96 % | WEIGHT: 204.6 LBS | BODY MASS INDEX: 29.29 KG/M2

## 2023-06-02 PROCEDURE — 99213 OFFICE O/P EST LOW 20 MIN: CPT

## 2023-06-04 NOTE — CONSULT LETTER
[FreeTextEntry1] : Dear Dr. Sofía Ewing,\par \par I had the pleasure of seeing your patient AMELIA SHARMA in the office today.  My office note is attached. PLEASE READ THE "ASSESSMENT" SECTION OF THE NOTE TO SEE MY IMPRESSION AND PLAN.\par \par Thank you very much for allowing me to participate in the care of your patient.\par \par Sincerely,\par \par Yung Burns M.D., FAC, FACP\par Director, Celiac Program at [facility]\par  of Medicine, [facility] [employer] [employer] [employer] at [facility] Adjunct  of Medicine, [facility] Practice Director, [facility] at [place]\[facility] [address]\[place] [address]\par Tel: [phone]\par Email: [email or web address]\par \par \par The attached note has been created using a voice recognition system (Dragon).  There may be some misspellings and typos.  Please call my office if you have any issues or questions.

## 2023-06-04 NOTE — ASSESSMENT
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  He is doing well on pantoprazole 40 mg a day.\par \par Patient will continue pantoprazole 40 mg a day.\par \par We will repeat an EGD in April 2026.  We will also perform a colonoscopy at that time given the patient's family history of a brother with an advanced polyp.\par \par Patient will return to see me in 1 year or sooner if needed.\par \par \par Plan from 4/6/2023 - Patient with complaints of heartburn, belching, and bloating.  Multiple etiologies need to be considered.\par \par An EGD has been scheduled. The risks, benefits, alternatives, and limitations of the procedure were explained.\par \par A list of dietary and lifestyle modifications in the treatment of GERD was given to the patient.  We discussed this in depth.\par \par Patient is due for colonoscopy in July 2025 given the family history of a brother with an advanced colonic polyp.\par \par \par Plan from 5/19/2020 - Patient was brother had a precancerous colonic polyp who is in need of a screening colonoscopy.\par \par Once the COVID-19 pandemic allows, a colonoscopy will be scheduled. The risks, benefits, alternatives, and limitations of the procedure, including the possibility of missed lesions, were explained.

## 2023-06-04 NOTE — HISTORY OF PRESENT ILLNESS
[FreeTextEntry1] : We reviewed the patient's EGD performed on 2023.  The exam was significant for a 2 cm hiatal hernia with grade 3 reflux esophagitis and an irregular Z-line.  Biopsies showed intestinal metaplasia consistent with Spivey's esophagus.  The patient also has a large diverticulum in the second portion of the duodenum.  Patient is on pantoprazole 40 mg a day.  He feels much better with no heartburn, belching, nausea, vomiting, dysphagia, abdominal pain.  Bowel movements are normal without melena or bright red blood per rectum.\par \par \par Note from 2023 - The patient is seen for the first time since he underwent colonoscopy on 2020.  The exam was normal other than internal and external hemorrhoids which are asymptomatic.  The patient now complains of heartburn and belching that occurs about twice a week over the past year.  He denies dysphagia, nausea, vomiting.  He notes abdominal bloating with associated discomfort.  He reports 1-3 solid bowel movements a day without melena or bright red blood per rectum.  The patient has had mild weight gain.  The patient has not been admitted to the hospital in the past year and denies any cardiac issues.  The patient has a family history of a brother with advanced colonic polyp.\par \par \par Note from 2020 - The patient is a 59-year-old man whose brother had precancerous colonic polyp which could not be removed by colonoscopy.  Unfortunately, the brother recently  of COVID-19 before the polyp could be resected.  The patient feels well denying abdominal pain, diarrhea, constipation.  He has 2-3 solid bowel movements a day without melena or bright red blood per rectum.  He denies heartburn or dysphasia.  The patient's weight is stable.  The patient has not been admitted to the hospital in the past year and denies any cardiac issues.

## 2023-06-14 ENCOUNTER — INPATIENT (INPATIENT)
Facility: HOSPITAL | Age: 63
LOS: 5 days | Discharge: HOME | DRG: 444 | End: 2023-06-20
Attending: INTERNAL MEDICINE | Admitting: INTERNAL MEDICINE
Payer: COMMERCIAL

## 2023-06-14 VITALS
OXYGEN SATURATION: 99 % | HEART RATE: 101 BPM | DIASTOLIC BLOOD PRESSURE: 100 MMHG | SYSTOLIC BLOOD PRESSURE: 185 MMHG | RESPIRATION RATE: 25 BRPM | WEIGHT: 160.06 LBS | TEMPERATURE: 98 F

## 2023-06-14 DIAGNOSIS — R50.9 FEVER, UNSPECIFIED: ICD-10-CM

## 2023-06-14 LAB
ALBUMIN SERPL ELPH-MCNC: 4.2 G/DL — SIGNIFICANT CHANGE UP (ref 3.3–5)
ALP SERPL-CCNC: 208 U/L — HIGH (ref 40–120)
ALT FLD-CCNC: 486 U/L — HIGH (ref 12–78)
ANION GAP SERPL CALC-SCNC: 7 MMOL/L — SIGNIFICANT CHANGE UP (ref 5–17)
APPEARANCE UR: CLEAR — SIGNIFICANT CHANGE UP
APTT BLD: 26 SEC — LOW (ref 27.5–35.5)
AST SERPL-CCNC: 787 U/L — HIGH (ref 15–37)
BASOPHILS # BLD AUTO: 0.03 K/UL — SIGNIFICANT CHANGE UP (ref 0–0.2)
BASOPHILS NFR BLD AUTO: 0.3 % — SIGNIFICANT CHANGE UP (ref 0–2)
BILIRUB SERPL-MCNC: 1.7 MG/DL — HIGH (ref 0.2–1.2)
BILIRUB UR-MCNC: NEGATIVE — SIGNIFICANT CHANGE UP
BUN SERPL-MCNC: 24 MG/DL — HIGH (ref 7–23)
CALCIUM SERPL-MCNC: 10.4 MG/DL — HIGH (ref 8.5–10.1)
CHLORIDE SERPL-SCNC: 107 MMOL/L — SIGNIFICANT CHANGE UP (ref 96–108)
CO2 SERPL-SCNC: 25 MMOL/L — SIGNIFICANT CHANGE UP (ref 22–31)
COLOR SPEC: SIGNIFICANT CHANGE UP
CREAT SERPL-MCNC: 1.5 MG/DL — HIGH (ref 0.5–1.3)
DIFF PNL FLD: NEGATIVE — SIGNIFICANT CHANGE UP
EGFR: 52 ML/MIN/1.73M2 — LOW
EOSINOPHIL # BLD AUTO: 0.03 K/UL — SIGNIFICANT CHANGE UP (ref 0–0.5)
EOSINOPHIL NFR BLD AUTO: 0.3 % — SIGNIFICANT CHANGE UP (ref 0–6)
GLUCOSE SERPL-MCNC: 153 MG/DL — HIGH (ref 70–99)
GLUCOSE UR QL: NEGATIVE MG/DL — SIGNIFICANT CHANGE UP
HCT VFR BLD CALC: 42.1 % — SIGNIFICANT CHANGE UP (ref 39–50)
HGB BLD-MCNC: 14.1 G/DL — SIGNIFICANT CHANGE UP (ref 13–17)
IMM GRANULOCYTES NFR BLD AUTO: 0.4 % — SIGNIFICANT CHANGE UP (ref 0–0.9)
INR BLD: 1.14 RATIO — SIGNIFICANT CHANGE UP (ref 0.88–1.16)
KETONES UR-MCNC: NEGATIVE MG/DL — SIGNIFICANT CHANGE UP
LACTATE SERPL-SCNC: 2 MMOL/L — SIGNIFICANT CHANGE UP (ref 0.7–2)
LACTATE SERPL-SCNC: 3 MMOL/L — HIGH (ref 0.7–2)
LEUKOCYTE ESTERASE UR-ACNC: NEGATIVE — SIGNIFICANT CHANGE UP
LIDOCAIN IGE QN: 280 U/L — SIGNIFICANT CHANGE UP (ref 73–393)
LYMPHOCYTES # BLD AUTO: 0.81 K/UL — LOW (ref 1–3.3)
LYMPHOCYTES # BLD AUTO: 6.9 % — LOW (ref 13–44)
MCHC RBC-ENTMCNC: 30.2 PG — SIGNIFICANT CHANGE UP (ref 27–34)
MCHC RBC-ENTMCNC: 33.5 GM/DL — SIGNIFICANT CHANGE UP (ref 32–36)
MCV RBC AUTO: 90.1 FL — SIGNIFICANT CHANGE UP (ref 80–100)
MONOCYTES # BLD AUTO: 0.64 K/UL — SIGNIFICANT CHANGE UP (ref 0–0.9)
MONOCYTES NFR BLD AUTO: 5.5 % — SIGNIFICANT CHANGE UP (ref 2–14)
NEUTROPHILS # BLD AUTO: 10.15 K/UL — HIGH (ref 1.8–7.4)
NEUTROPHILS NFR BLD AUTO: 86.6 % — HIGH (ref 43–77)
NITRITE UR-MCNC: NEGATIVE — SIGNIFICANT CHANGE UP
NRBC # BLD: 0 /100 WBCS — SIGNIFICANT CHANGE UP (ref 0–0)
NT-PROBNP SERPL-SCNC: 207 PG/ML — HIGH (ref 0–125)
PH UR: 8.5 (ref 5–8)
PLATELET # BLD AUTO: 300 K/UL — SIGNIFICANT CHANGE UP (ref 150–400)
POTASSIUM SERPL-MCNC: 4.2 MMOL/L — SIGNIFICANT CHANGE UP (ref 3.5–5.3)
POTASSIUM SERPL-SCNC: 4.2 MMOL/L — SIGNIFICANT CHANGE UP (ref 3.5–5.3)
PROT SERPL-MCNC: 7.9 G/DL — SIGNIFICANT CHANGE UP (ref 6–8.3)
PROT UR-MCNC: SIGNIFICANT CHANGE UP MG/DL
PROTHROM AB SERPL-ACNC: 13.3 SEC — SIGNIFICANT CHANGE UP (ref 10.5–13.4)
RBC # BLD: 4.67 M/UL — SIGNIFICANT CHANGE UP (ref 4.2–5.8)
RBC # FLD: 12.5 % — SIGNIFICANT CHANGE UP (ref 10.3–14.5)
SODIUM SERPL-SCNC: 139 MMOL/L — SIGNIFICANT CHANGE UP (ref 135–145)
SP GR SPEC: 1.02 — SIGNIFICANT CHANGE UP (ref 1–1.03)
TROPONIN I, HIGH SENSITIVITY RESULT: 10.3 NG/L — SIGNIFICANT CHANGE UP
TROPONIN I, HIGH SENSITIVITY RESULT: 6 NG/L — SIGNIFICANT CHANGE UP
UROBILINOGEN FLD QL: 1 MG/DL — SIGNIFICANT CHANGE UP (ref 0.2–1)
WBC # BLD: 11.71 K/UL — HIGH (ref 3.8–10.5)
WBC # FLD AUTO: 11.71 K/UL — HIGH (ref 3.8–10.5)

## 2023-06-14 PROCEDURE — 99285 EMERGENCY DEPT VISIT HI MDM: CPT

## 2023-06-14 PROCEDURE — 71045 X-RAY EXAM CHEST 1 VIEW: CPT | Mod: 26

## 2023-06-14 PROCEDURE — 93010 ELECTROCARDIOGRAM REPORT: CPT

## 2023-06-14 PROCEDURE — 76705 ECHO EXAM OF ABDOMEN: CPT | Mod: 26

## 2023-06-14 PROCEDURE — 74177 CT ABD & PELVIS W/CONTRAST: CPT | Mod: 26,MA

## 2023-06-14 PROCEDURE — 71260 CT THORAX DX C+: CPT | Mod: 26,MA

## 2023-06-14 RX ORDER — ACETAMINOPHEN 500 MG
1000 TABLET ORAL ONCE
Refills: 0 | Status: COMPLETED | OUTPATIENT
Start: 2023-06-14 | End: 2023-06-14

## 2023-06-14 RX ORDER — PIPERACILLIN AND TAZOBACTAM 4; .5 G/20ML; G/20ML
3.38 INJECTION, POWDER, LYOPHILIZED, FOR SOLUTION INTRAVENOUS ONCE
Refills: 0 | Status: COMPLETED | OUTPATIENT
Start: 2023-06-14 | End: 2023-06-14

## 2023-06-14 RX ORDER — SODIUM CHLORIDE 9 MG/ML
500 INJECTION INTRAMUSCULAR; INTRAVENOUS; SUBCUTANEOUS ONCE
Refills: 0 | Status: COMPLETED | OUTPATIENT
Start: 2023-06-14 | End: 2023-06-14

## 2023-06-14 RX ORDER — FAMOTIDINE 10 MG/ML
20 INJECTION INTRAVENOUS ONCE
Refills: 0 | Status: COMPLETED | OUTPATIENT
Start: 2023-06-14 | End: 2023-06-14

## 2023-06-14 RX ORDER — SODIUM CHLORIDE 9 MG/ML
1000 INJECTION INTRAMUSCULAR; INTRAVENOUS; SUBCUTANEOUS ONCE
Refills: 0 | Status: COMPLETED | OUTPATIENT
Start: 2023-06-14 | End: 2023-06-14

## 2023-06-14 RX ADMIN — PIPERACILLIN AND TAZOBACTAM 3.38 GRAM(S): 4; .5 INJECTION, POWDER, LYOPHILIZED, FOR SOLUTION INTRAVENOUS at 16:56

## 2023-06-14 RX ADMIN — SODIUM CHLORIDE 1000 MILLILITER(S): 9 INJECTION INTRAMUSCULAR; INTRAVENOUS; SUBCUTANEOUS at 16:38

## 2023-06-14 RX ADMIN — Medication 1000 MILLIGRAM(S): at 16:56

## 2023-06-14 RX ADMIN — SODIUM CHLORIDE 500 MILLILITER(S): 9 INJECTION INTRAMUSCULAR; INTRAVENOUS; SUBCUTANEOUS at 19:43

## 2023-06-14 RX ADMIN — Medication 1000 MILLIGRAM(S): at 17:31

## 2023-06-14 RX ADMIN — Medication 400 MILLIGRAM(S): at 16:56

## 2023-06-14 RX ADMIN — FAMOTIDINE 20 MILLIGRAM(S): 10 INJECTION INTRAVENOUS at 16:10

## 2023-06-14 RX ADMIN — PIPERACILLIN AND TAZOBACTAM 200 GRAM(S): 4; .5 INJECTION, POWDER, LYOPHILIZED, FOR SOLUTION INTRAVENOUS at 16:56

## 2023-06-14 RX ADMIN — SODIUM CHLORIDE 500 MILLILITER(S): 9 INJECTION INTRAMUSCULAR; INTRAVENOUS; SUBCUTANEOUS at 19:00

## 2023-06-14 RX ADMIN — Medication 1 MILLIGRAM(S): at 15:45

## 2023-06-14 NOTE — ED ADULT NURSE NOTE - OBJECTIVE STATEMENT
Spoke w/ patient A & O x 4. Patient came to ED w/ daughter @ bedside for chest pain that feels like pressure in the mid chest that started around 11AM. Patient states he takes baby aspirin for pain and has history of HTN, anxiety & takes ativan for his temper as per daughter. Patient presents weepy eyed, w/ tears & shaking @ bedside, no temp (oral 98). Patient states he doesn't know if he took his HTN meds this AM. Patient states he also has gastro history and had an endoscopy where they told him he has cher's esophagus & that his diverticula was very large. Patient declines any alcohol or drug use.

## 2023-06-14 NOTE — H&P ADULT - PROBLEM SELECTOR PLAN 4
Presenting Cr 1.50   - Per outpatient records, most recent Cr 1.29, with prior levels at 1.32   - Technically no an KIRA at this time, however more elevated likely due to decreased PO intake of fluids   - S/p 1.5L NS in ED  - Encouraged to drink more fluids   - Follow up AM labs Presenting Cr 1.50   - Per outpatient records, most recent Cr 1.29, with prior levels at 1.32   - Technically no an KIRA at this time, however more elevated likely due to decreased PO intake of fluids   - S/p 1.5L NS in ED  - Continue IVF NS @ 75cc/hr for 24 hr   - Encouraged PO intake   - Follow up AM labs Presenting with epigastric pain worse after eating   - Known hx of GERD with recent diagnosis of Jhon's Esophagus   - EGD performed 4/28/23: significant for a 2 cm hiatal hernia with grade 3 reflux esophagitis and an irregular Z-line intestinal metaplasia consistent with Spivey's esophagus. No dysplasia. Large diverticulum in the second portion of the duodenum  - Home dose of Pantoprazole 40 mg qD   - S/p Pepcid in ED  - Continue Pantoprazole 40 mg BID while inpatient   - Troponin 103  - Continue to monitor   - GI consulted, Dr. Mclaughlin, follow up recommendations

## 2023-06-14 NOTE — H&P ADULT - PROBLEM SELECTOR PLAN 7
Hx of Transaminitis since 2/23: AST/ALT - 74/245, AP - 237  -   Alkphos 208  - No hx of alcohol abuse  - Prior elevations attributed to recent viral illness  - Hepatitis C sent, follow up results   - US Abdomen: Focal hyperechoic right hepatic lobe lesion measures 1.0 x 0.9 x 0.8 cm, possibility is a hepatic hemangioma  - Continue to monitor   - GI consulted, Dr. Mclaughlin, follow up recommendations Noted prior intolerance to unspecified statin in outpatient notes   - Transitioned to Rosuvastatin 20 mg qD with improvement of myalgias   - Will hold statin given worsening LFTs and hx of myalgias on statin other than Rosuvastatin -HOLD statin   - Monitor for signs of myalgias   - AM Lipid panel

## 2023-06-14 NOTE — ED PROVIDER NOTE - CLINICAL SUMMARY MEDICAL DECISION MAKING FREE TEXT BOX
61yo M ho anxiety, bipolar do, htn, hld, pw chest pain x 4-5 hours. midsternal chest prssue and discomfort assoc with nausea. pt unabel tor provide history is breathing heavily and crying, when asked if pain is severe or radiating says no and he is unsure why he is crying. ekg with no acute ischemic changes, abd tender in ruq, will chck labs, lipase, ro acs, reassess 61yo M ho anxiety, bipolar do, htn, hld, pw chest pain x 4-5 hours. midsternal chest pressure and discomfort assoc with nausea. pt unabel tor provide history is breathing heavily and crying, when asked if pain is severe or radiating says no and he is unsure why he is crying. ekg with no acute ischemic changes, abd tender in ruq, will chck labs, lipase, ro acs, reassess    pt reevaluated after getting ativan (home med prn) pt states he was in bathroom earlier and felt epigastric discomfort, nausea and then had  bowel movement and then felt lightheaded, diaphoretic, later in day had another episode fo vomiting, still feels epigastric discomfrot. pt states hes had this in past and had endoscopy showing barretts esophaus and a large diverticulum, abd nontender

## 2023-06-14 NOTE — H&P ADULT - PROBLEM SELECTOR PLAN 6
Noted prior intolerance to unspecified statin in outpatient notes   - Transitioned to Rosuvastatin 20 mg qD with improvement of myalgias   - Therapeutic interchange of Atorvastatin 80mg   - Monitor for signs of myalgias   - AM Lipid panel Noted prior intolerance to unspecified statin in outpatient notes   - Transitioned to Rosuvastatin 20 mg qD with improvement of myalgias   - Will hold statin given worsening LFTs and hx of myalgias on statin other than Rosuvastatin   - Monitor for signs of myalgias   - AM Lipid panel Well controlled on outpatient regimen  - Presenting with elevated pressure of 185/100, likely reactive to anxiety  - S/p Ativan 1mg with drop in BP to 157/75  - Continue home medications of Losartan and Doxazosin with hold parameter

## 2023-06-14 NOTE — H&P ADULT - PROBLEM SELECTOR PLAN 5
Well controlled on outpatient regimen  - Presenting with elevated pressure of 185/100, likely reactive to anxiety  - S/p Ativan 1mg with drop in BP to 157/75  - Continue home medications of Losartan and Doxazosin with hold parameter Well controlled at on current regimen   - Home medications of Lamictal, Seroquel, Ativan and Prozac   - Continue home medications

## 2023-06-14 NOTE — H&P ADULT - PROBLEM SELECTOR PLAN 3
Well controlled at on current regimen   - Home medications of Lamictal, Serquel, Ativan and Prozac   - Continue home medications Well controlled at on current regimen   - Home medications of Lamictal, Seroquel, Ativan and Prozac   - Continue home medications Presenting Cr 1.50   - Per outpatient records, most recent Cr 1.29, with prior levels at 1.32   - Technically no an KIRA at this time, however more elevated likely due to decreased PO intake of fluids   - S/p 1.5L NS in ED  - Continue IVF NS @ 75cc/hr for 24 hr   - Encouraged PO intake   - Follow up AM labs-BMP

## 2023-06-14 NOTE — H&P ADULT - PROBLEM SELECTOR PLAN 2
Temperature 100.6   - Found to be tachycardic to 101, tachypneic to 25   - WBC 11.71 and Lactate 2.0 < 3.0   - EKG: NSR 85 bpm with prolonged /509   - UA: Negative   - CXR: Grossly normal as per personal read, official read pending   - CT: Minimal periportal edema, nonspecific. Correlate with liver function tests. Otherwise, no acute abdominopelvic findings. No acute thoracic findings. Right lower lobe pulmonary nodule measuring 4 mm.  - US Abdomen: Layering gallbladder sludge. No wall thickening or pericholecystic fluid. Focal hyperechoic right hepatic lobe lesion measures 1.0 x 0.9 x 0.8 cm, possibility is a hepatic hemangioma  - Blood and urine cultures sent, follow up results   - S/p 1.5 L NS bolus and Zosyn in ED   - Blood and urine cultures sent, follow up results   - Infectious Disease consulted, Dr. Bahena, follow up recommendations Temperature 100.6   - Found to be tachycardic to 101, tachypneic to 25   - WBC 11.71 and Lactate 2.0 < 3.0   - EKG: NSR 85 bpm with prolonged /509   - UA: Negative   - CXR: Grossly normal as per personal read, official read pending   - CT: Minimal periportal edema, nonspecific. Correlate with liver function tests. Otherwise, no acute abdominopelvic findings. No acute thoracic findings. Right lower lobe pulmonary nodule measuring 4 mm.  - US Abdomen: Layering gallbladder sludge. No wall thickening or pericholecystic fluid. Focal hyperechoic right hepatic lobe lesion measures 1.0 x 0.9 x 0.8 cm, possibility is a hepatic hemangioma  - Blood and urine cultures sent, follow up results   - S/p 1.5 L NS bolus and Zosyn in ED   - Blood and urine cultures sent, follow up results   - Will monitor off Abx, but if pt spikes additional fever restart Zosyn   - Continue IVF NS @ 75cc/hr for 24 hr   - Infectious Disease consulted, Dr. Bahena, follow up recommendations Hx of Transaminitis since 2/23: AST/ALT - 74/245, AP - 237  -   Alkphos 208  - No hx of alcohol abuse  - Prior elevations attributed to recent viral illness  - Hepatitis C sent, follow up results   -CT A/P with IV Contrast  -periportal edema, No Ac findings   - US Abdomen: Focal hyperechoic right hepatic lobe lesion measures 1.0 x 0.9 x 0.8 cm, possibility is a hepatic hemangioma  - Continue to monitor   - GI consulted, Dr. Mclaughlin, follow up recommendations  HOLD Statin, LFT's in AM

## 2023-06-14 NOTE — H&P ADULT - ASSESSMENT
61 yo M with PMHx of HTN, HLD, Bipolar, GERD, Jhon's Esphagous, Asthma, and Abnormal LFTs presents to the ED with 1x day hx of epigastric pain and new onset fever, admitted for workup.      63 yo M with PMHx of HTN, HLD, Bipolar, GERD, Jhon's  Esphagous, Asthma, and Abnormal LFTs presents to the ED with 1x day hx of epigastric pain , abnormal LFT's  and  fever, admitted for workup.

## 2023-06-14 NOTE — ED ADULT NURSE NOTE - NSFALLRISKFACTORS_ED_ALL_ED
baby aspirin/Coagulation: Bleeding disorder either through use of anticoagulants or underlying clinical condition(s)

## 2023-06-14 NOTE — H&P ADULT - GASTROINTESTINAL
details… Epigastric discomfort,/normal/soft/nontender/nondistended/normal active bowel sounds/no guarding/no rigidity/no palpable dory/no masses palpable

## 2023-06-14 NOTE — H&P ADULT - PROBLEM SELECTOR PLAN 1
Presenting with epigastric pain worse after eating   - Known hx of GERD with recent diagnosis of Jhon's Esophagus   - EGD performed 4/28/23: significant for a 2 cm hiatal hernia with grade 3 reflux esophagitis and an irregular Z-line intestinal metaplasia consistent with Spivey's esophagus. No dysplasia. Large diverticulum in the second portion of the duodenum  - Home dose of Pantoprazole 40 mg qD   - S/p Pepcid in ED  - Continue Pantoprazole 40 mg BID while inpatient   - Troponin 10.3  - Continue to monitor   - GI consulted, Dr. Mclaughlin, follow up recommendations Temperature 100.6   - Found to be tachycardic to 101,   - WBC 11.71 and Lactate 2.0 < 3.0   - EKG: NSR 85 bpm with prolonged /509   - UA: Negative   - CXR: Grossly normal as per personal read, official read pending   - CT: Minimal periportal edema, nonspecific. Correlate with liver function tests. Otherwise, no acute abdominopelvic findings. No acute thoracic findings. Right lower lobe pulmonary nodule measuring 4 mm.  - US Abdomen: Layering gallbladder sludge. No wall thickening or pericholecystic fluid. Focal hyperechoic right hepatic lobe lesion measures 1.0 x 0.9 x 0.8 cm, possibility is a hepatic hemangioma  - Blood and urine cultures sent, follow up results  Follow RVP   - S/p 1.5 L NS bolus and Zosyn in ED   - Blood c/s x 2  and urine cultures sent, follow up results   - Will monitor off Abx, but if pt spikes additional fever restart Zosyn q 8 hrs   - Continue IVF NS @ 75cc/hr for 24 hr   - Infectious Disease consulted, Dr. Bahena, follow up recommendations

## 2023-06-14 NOTE — ED PROVIDER NOTE - OBJECTIVE STATEMENT
62-year-old male with history of hypertension, hyperlipidemia, bipolar disorder, anxiety presents with complaint of epigastric/midsternal chest discomfort since this morning.  States that he started having nonradiating midsternal chest pressure/epigastric pressure since 10:30 AM this morning with associated nausea.  States that he had diarrhea this morning and when he was in the bathroom, he felt nauseous and lightheaded, became diaphoretic.  States that he felt nauseous again later in the day and still has epigastric discomfort.  States that he has had similar symptoms in the past and had endoscopy which showed Spivey's esophagus and large diverticulum.  Denies fever, back pain, recent travel, sick contacts, syncope, leg pain/swelling or other symptoms.

## 2023-06-14 NOTE — H&P ADULT - HISTORY OF PRESENT ILLNESS
61 yo M with PMHx of HTN, HLD, Bipolar, GERD, Jhon's Esphagous, Asthma, and Abnormal LFTs presents to the ED with 1x day hx of epigastric pain and new onset fever. Pt endorses feeling ache and heaviness in the epigastric region after he ate breakfast this morning. Noted laying down on his left side with improvement of symptoms, but persisted throughout the day. Pt states that pain worsened when he had a nonbloody bowel movement, with associated nausea, lightheadedness, and diaphoresis. Following this event pt was brought to the ED by his daughter in fear he was having a heart attack. In the ED, pt was found to be tachypneic, tachycardic, and febrile to 100.6. Pt was given IVF, Ativan, and Ofirmev with improvement of symptoms. Pt states that he no longer has the epigastric pain, but has experienced these symptoms in the past. Recently diagnosed with Jhon's Esophagus following EGD due to worsening acid reflux. Pt also noted to have large duodenal diverticulum during EGD. Endorses improving headache, dizziness, SOB, nausea  and distended abdomen throughout event. Pt has no complaints at this time, though appears anxious throughout discussion. Denies any recent sick contacts.     Of note, pt found to have elevated LFTs and Alkphos in outpatient workup since February. Dr. Ewing (PCP) attributed this to recent viral illness, but subsequent labs not checked.     ED Course:   Vitals: BP: 157/75, HR: 77, Temp: 100.6, RR: 20, SpO2: 97% on RA    Labs:  WBC 11.71, Lactate 2.0 < 3.0, Cr 1.50, Alkphos 208, ,    UA: Negative   CXR: Grossly normal as per personal read, official read pending   CT: Minimal periportal edema, nonspecific. Correlate with liver function tests. Otherwise, no acute abdominopelvic findings. No acute thoracic findings. Right lower lobe pulmonary nodule measuring 4 mm.  US Abdomen: Layering gallbladder sludge. No wall thickening or pericholecystic fluid. Focal hyperechoic right hepatic lobe lesion measures 1.0 x 0.9 x 0.8 cm, possibility is a hepatic hemangioma  EKG: NSR 85 bpm with prolonged /509   Received in the ED: Ofirmev, Pepcid 20 mg IVP, Ativan 1mg IVP, Zosyn, 1.5L NS bolus    61 yo M with PMHx of HTN, HLD, Bipolar, GERD, Jhon's Esophagus Asthma, and Abnormal LFTs presents to the ED with 1x day hx of epigastric pain and new onset fever. Pt endorses feeling ache and heaviness in the epigastric region after he ate breakfast this morning. Noted laying down on his left side with improvement of symptoms, but persisted throughout the day. Pt states that pain worsened when he had a nonbloody bowel movement, with associated nausea, lightheadedness, and diaphoresis. Following this event pt was brought to the ED by his daughter in fear he was having a heart attack. In the ED, pt was found to be tachypneic, tachycardic, and febrile to 100.6. Pt was given IVF, Ativan, and Ofirmev with improvement of symptoms. Pt states that he no longer has the epigastric pain, but has experienced these symptoms in the past. Recently diagnosed with Jhon's Esophagus following EGD due to worsening acid reflux. Pt also noted to have large duodenal diverticulum during EGD. Endorses improving headache, dizziness, SOB, nausea  and distended abdomen throughout event. Pt has no complaints at this time, though appears anxious throughout discussion. Denies any recent sick contacts.     Of note, pt found to have elevated LFTs and Alkphos in outpatient workup since February. Dr. Ewing (PCP) attributed this to recent viral illness, but subsequent labs not checked.     ED Course:   Vitals: BP: 157/75, HR: 77, Temp: 100.6, RR: 20, SpO2: 97% on RA    Labs:  WBC 11.71, Lactate 2.0 < 3.0, Cr 1.50, Alkphos 208, ,    UA: Negative   CXR: Grossly normal as per personal read, official read pending   CT: Minimal periportal edema, nonspecific. Correlate with liver function tests. Otherwise, no acute abdominopelvic findings. No acute thoracic findings. Right lower lobe pulmonary nodule measuring 4 mm.  US Abdomen: Layering gallbladder sludge. No wall thickening or pericholecystic fluid. Focal hyperechoic right hepatic lobe lesion measures 1.0 x 0.9 x 0.8 cm, possibility is a hepatic hemangioma  EKG: NSR 85 bpm with prolonged /509   Received in the ED: Ofirmev, Pepcid 20 mg IVP, Ativan 1mg IVP, Zosyn, 1.5L NS bolus

## 2023-06-14 NOTE — H&P ADULT - NSICDXFAMILYHX_GEN_ALL_CORE_FT
FAMILY HISTORY:  Father  Still living? No  FH: CVA (cerebrovascular accident), Age at diagnosis: Age Unknown  FH: prostate cancer, Age at diagnosis: Age Unknown    Mother  Still living? Unknown  FH: Alzheimers disease, Age at diagnosis: Age Unknown

## 2023-06-14 NOTE — ED PROVIDER NOTE - NONTENDER LOCATION
no rebound or guarding/left upper quadrant/left lower quadrant/right lower quadrant/periumbilical/umbilical/suprapubic/left costovertebral angle/right costovertebral angle

## 2023-06-14 NOTE — H&P ADULT - ATTENDING COMMENTS
62 y male with PMHx HTN, Dyslipidemia, Anxiety , GERD- Spivey's esophagus   came to Hospital with epigastric / Abdominal pain after eating food this Breakfast/  Pt seen, examined, case & care plan d/w pt , residents at detail.  Consults:  GI -Dr Mclaughlin- Abdominal pain , elevated LFT's  ID Dr le for fever   IVF,   SCD,   AM labs 62 y male with PMHx HTN, Dyslipidemia, Anxiety , GERD- Spivey's esophagus   came to Hospital with epigastric / Abdominal pain after eating food this Breakfast/ with fever, Abnormal LFT's   Pt seen, examined, case & care plan d/w pt , residents at detail.  Consults:  GI -Dr Mclaughlin- Abdominal pain , elevated LFT's  ID Dr le for fever - Follow Blood c/sx 2 , Urine c/s   IVF, PO diet   SCD,   AM labs  -OOB to  chair assist

## 2023-06-14 NOTE — H&P ADULT - NSICDXPASTMEDICALHX_GEN_ALL_CORE_FT
PAST MEDICAL HISTORY:  KIRA (acute kidney injury)     Asthma     Bipolar disorder     GERD (gastroesophageal reflux disease)     HLD (hyperlipidemia)     HTN (hypertension)     Need for prophylactic measure     Transaminitis

## 2023-06-14 NOTE — ED PROVIDER NOTE - PROGRESS NOTE DETAILS
Pt re-assessed after ativan, breathing improved and denies cp, states that he still has epigastric discomfort. Spoke to Dr. Moises Lopez who accepted admission.

## 2023-06-14 NOTE — H&P ADULT - NSHPSOCIALHISTORY_GEN_ALL_CORE
Lives: At home with Wife and Daughter   ADLs: Independent, walks independently however becoming more painful given osteoarthritis   Diet: Avoid carbonated beverages, spicy foods, and salt   Vaccination: COVID vaccinatedx3   Alcohol Use: Denies   Tobacco Use: Denies   Recreational Drug Use: Denies

## 2023-06-15 ENCOUNTER — NON-APPOINTMENT (OUTPATIENT)
Age: 63
End: 2023-06-15

## 2023-06-15 DIAGNOSIS — K21.9 GASTRO-ESOPHAGEAL REFLUX DISEASE WITHOUT ESOPHAGITIS: ICD-10-CM

## 2023-06-15 DIAGNOSIS — Z78.9 OTHER SPECIFIED HEALTH STATUS: Chronic | ICD-10-CM

## 2023-06-15 DIAGNOSIS — R50.9 FEVER, UNSPECIFIED: ICD-10-CM

## 2023-06-15 DIAGNOSIS — J45.909 UNSPECIFIED ASTHMA, UNCOMPLICATED: ICD-10-CM

## 2023-06-15 DIAGNOSIS — Z29.9 ENCOUNTER FOR PROPHYLACTIC MEASURES, UNSPECIFIED: ICD-10-CM

## 2023-06-15 DIAGNOSIS — F31.9 BIPOLAR DISORDER, UNSPECIFIED: ICD-10-CM

## 2023-06-15 DIAGNOSIS — N17.9 ACUTE KIDNEY FAILURE, UNSPECIFIED: ICD-10-CM

## 2023-06-15 DIAGNOSIS — E78.5 HYPERLIPIDEMIA, UNSPECIFIED: ICD-10-CM

## 2023-06-15 DIAGNOSIS — R74.01 ELEVATION OF LEVELS OF LIVER TRANSAMINASE LEVELS: ICD-10-CM

## 2023-06-15 DIAGNOSIS — I10 ESSENTIAL (PRIMARY) HYPERTENSION: ICD-10-CM

## 2023-06-15 LAB
ALBUMIN SERPL ELPH-MCNC: 3.3 G/DL — SIGNIFICANT CHANGE UP (ref 3.3–5)
ALBUMIN SERPL ELPH-MCNC: 3.9 G/DL — SIGNIFICANT CHANGE UP (ref 3.3–5)
ALP SERPL-CCNC: 201 U/L — HIGH (ref 40–120)
ALP SERPL-CCNC: 228 U/L — HIGH (ref 40–120)
ALT FLD-CCNC: 804 U/L — HIGH (ref 12–78)
ALT FLD-CCNC: 868 U/L — HIGH (ref 12–78)
ANION GAP SERPL CALC-SCNC: 4 MMOL/L — LOW (ref 5–17)
ANION GAP SERPL CALC-SCNC: 8 MMOL/L — SIGNIFICANT CHANGE UP (ref 5–17)
APTT BLD: 31.9 SEC — SIGNIFICANT CHANGE UP (ref 27.5–35.5)
AST SERPL-CCNC: 415 U/L — HIGH (ref 15–37)
AST SERPL-CCNC: 690 U/L — HIGH (ref 15–37)
BASOPHILS # BLD AUTO: 0.03 K/UL — SIGNIFICANT CHANGE UP (ref 0–0.2)
BASOPHILS NFR BLD AUTO: 0.4 % — SIGNIFICANT CHANGE UP (ref 0–2)
BILIRUB SERPL-MCNC: 1.7 MG/DL — HIGH (ref 0.2–1.2)
BILIRUB SERPL-MCNC: 3.3 MG/DL — HIGH (ref 0.2–1.2)
BUN SERPL-MCNC: 18 MG/DL — SIGNIFICANT CHANGE UP (ref 7–23)
BUN SERPL-MCNC: 21 MG/DL — SIGNIFICANT CHANGE UP (ref 7–23)
CALCIUM SERPL-MCNC: 8.5 MG/DL — SIGNIFICANT CHANGE UP (ref 8.5–10.1)
CALCIUM SERPL-MCNC: 9.8 MG/DL — SIGNIFICANT CHANGE UP (ref 8.5–10.1)
CHLORIDE SERPL-SCNC: 106 MMOL/L — SIGNIFICANT CHANGE UP (ref 96–108)
CHLORIDE SERPL-SCNC: 110 MMOL/L — HIGH (ref 96–108)
CHOLEST SERPL-MCNC: 92 MG/DL — SIGNIFICANT CHANGE UP
CO2 SERPL-SCNC: 24 MMOL/L — SIGNIFICANT CHANGE UP (ref 22–31)
CO2 SERPL-SCNC: 25 MMOL/L — SIGNIFICANT CHANGE UP (ref 22–31)
CREAT SERPL-MCNC: 1.3 MG/DL — SIGNIFICANT CHANGE UP (ref 0.5–1.3)
CREAT SERPL-MCNC: 1.4 MG/DL — HIGH (ref 0.5–1.3)
EGFR: 56 ML/MIN/1.73M2 — LOW
EGFR: 61 ML/MIN/1.73M2 — SIGNIFICANT CHANGE UP
EOSINOPHIL # BLD AUTO: 0.06 K/UL — SIGNIFICANT CHANGE UP (ref 0–0.5)
EOSINOPHIL NFR BLD AUTO: 0.7 % — SIGNIFICANT CHANGE UP (ref 0–6)
GLUCOSE SERPL-MCNC: 104 MG/DL — HIGH (ref 70–99)
GLUCOSE SERPL-MCNC: 90 MG/DL — SIGNIFICANT CHANGE UP (ref 70–99)
HCT VFR BLD CALC: 35.6 % — LOW (ref 39–50)
HCT VFR BLD CALC: 38.7 % — LOW (ref 39–50)
HCV AB S/CO SERPL IA: 0.12 S/CO — SIGNIFICANT CHANGE UP (ref 0–0.99)
HCV AB SERPL-IMP: SIGNIFICANT CHANGE UP
HDLC SERPL-MCNC: 45 MG/DL — SIGNIFICANT CHANGE UP
HGB BLD-MCNC: 12.1 G/DL — LOW (ref 13–17)
HGB BLD-MCNC: 13.1 G/DL — SIGNIFICANT CHANGE UP (ref 13–17)
IMM GRANULOCYTES NFR BLD AUTO: 0.5 % — SIGNIFICANT CHANGE UP (ref 0–0.9)
INR BLD: 1.17 RATIO — HIGH (ref 0.88–1.16)
LACTATE SERPL-SCNC: 2 MMOL/L — SIGNIFICANT CHANGE UP (ref 0.7–2)
LIPID PNL WITH DIRECT LDL SERPL: 32 MG/DL — SIGNIFICANT CHANGE UP
LYMPHOCYTES # BLD AUTO: 0.75 K/UL — LOW (ref 1–3.3)
LYMPHOCYTES # BLD AUTO: 9.1 % — LOW (ref 13–44)
MCHC RBC-ENTMCNC: 30.3 PG — SIGNIFICANT CHANGE UP (ref 27–34)
MCHC RBC-ENTMCNC: 30.7 PG — SIGNIFICANT CHANGE UP (ref 27–34)
MCHC RBC-ENTMCNC: 33.9 GM/DL — SIGNIFICANT CHANGE UP (ref 32–36)
MCHC RBC-ENTMCNC: 34 GM/DL — SIGNIFICANT CHANGE UP (ref 32–36)
MCV RBC AUTO: 89.6 FL — SIGNIFICANT CHANGE UP (ref 80–100)
MCV RBC AUTO: 90.4 FL — SIGNIFICANT CHANGE UP (ref 80–100)
MONOCYTES # BLD AUTO: 0.75 K/UL — SIGNIFICANT CHANGE UP (ref 0–0.9)
MONOCYTES NFR BLD AUTO: 9.1 % — SIGNIFICANT CHANGE UP (ref 2–14)
NEUTROPHILS # BLD AUTO: 6.57 K/UL — SIGNIFICANT CHANGE UP (ref 1.8–7.4)
NEUTROPHILS NFR BLD AUTO: 80.2 % — HIGH (ref 43–77)
NON HDL CHOLESTEROL: 47 MG/DL — SIGNIFICANT CHANGE UP
NRBC # BLD: 0 /100 WBCS — SIGNIFICANT CHANGE UP (ref 0–0)
NRBC # BLD: 0 /100 WBCS — SIGNIFICANT CHANGE UP (ref 0–0)
PLATELET # BLD AUTO: 228 K/UL — SIGNIFICANT CHANGE UP (ref 150–400)
PLATELET # BLD AUTO: 261 K/UL — SIGNIFICANT CHANGE UP (ref 150–400)
POTASSIUM SERPL-MCNC: 3.5 MMOL/L — SIGNIFICANT CHANGE UP (ref 3.5–5.3)
POTASSIUM SERPL-MCNC: 3.7 MMOL/L — SIGNIFICANT CHANGE UP (ref 3.5–5.3)
POTASSIUM SERPL-SCNC: 3.5 MMOL/L — SIGNIFICANT CHANGE UP (ref 3.5–5.3)
POTASSIUM SERPL-SCNC: 3.7 MMOL/L — SIGNIFICANT CHANGE UP (ref 3.5–5.3)
PROT SERPL-MCNC: 6.3 G/DL — SIGNIFICANT CHANGE UP (ref 6–8.3)
PROT SERPL-MCNC: 7.3 G/DL — SIGNIFICANT CHANGE UP (ref 6–8.3)
PROTHROM AB SERPL-ACNC: 13.7 SEC — HIGH (ref 10.5–13.4)
RAPID RVP RESULT: SIGNIFICANT CHANGE UP
RBC # BLD: 3.94 M/UL — LOW (ref 4.2–5.8)
RBC # BLD: 4.32 M/UL — SIGNIFICANT CHANGE UP (ref 4.2–5.8)
RBC # FLD: 12.6 % — SIGNIFICANT CHANGE UP (ref 10.3–14.5)
RBC # FLD: 12.6 % — SIGNIFICANT CHANGE UP (ref 10.3–14.5)
SARS-COV-2 RNA SPEC QL NAA+PROBE: SIGNIFICANT CHANGE UP
SODIUM SERPL-SCNC: 138 MMOL/L — SIGNIFICANT CHANGE UP (ref 135–145)
SODIUM SERPL-SCNC: 139 MMOL/L — SIGNIFICANT CHANGE UP (ref 135–145)
TRIGL SERPL-MCNC: 75 MG/DL — SIGNIFICANT CHANGE UP
WBC # BLD: 7.97 K/UL — SIGNIFICANT CHANGE UP (ref 3.8–10.5)
WBC # BLD: 8.2 K/UL — SIGNIFICANT CHANGE UP (ref 3.8–10.5)
WBC # FLD AUTO: 7.97 K/UL — SIGNIFICANT CHANGE UP (ref 3.8–10.5)
WBC # FLD AUTO: 8.2 K/UL — SIGNIFICANT CHANGE UP (ref 3.8–10.5)

## 2023-06-15 PROCEDURE — 74018 RADEX ABDOMEN 1 VIEW: CPT | Mod: 26

## 2023-06-15 PROCEDURE — 74174 CTA ABD&PLVS W/CONTRAST: CPT | Mod: 26

## 2023-06-15 RX ORDER — FLUOXETINE HCL 10 MG
40 CAPSULE ORAL
Refills: 0 | Status: DISCONTINUED | OUTPATIENT
Start: 2023-06-15 | End: 2023-06-15

## 2023-06-15 RX ORDER — SODIUM CHLORIDE 9 MG/ML
1000 INJECTION INTRAMUSCULAR; INTRAVENOUS; SUBCUTANEOUS
Refills: 0 | Status: DISCONTINUED | OUTPATIENT
Start: 2023-06-15 | End: 2023-06-19

## 2023-06-15 RX ORDER — ATORVASTATIN CALCIUM 80 MG/1
80 TABLET, FILM COATED ORAL AT BEDTIME
Refills: 0 | Status: DISCONTINUED | OUTPATIENT
Start: 2023-06-15 | End: 2023-06-15

## 2023-06-15 RX ORDER — LANOLIN ALCOHOL/MO/W.PET/CERES
3 CREAM (GRAM) TOPICAL AT BEDTIME
Refills: 0 | Status: DISCONTINUED | OUTPATIENT
Start: 2023-06-15 | End: 2023-06-20

## 2023-06-15 RX ORDER — PIPERACILLIN AND TAZOBACTAM 4; .5 G/20ML; G/20ML
3.38 INJECTION, POWDER, LYOPHILIZED, FOR SOLUTION INTRAVENOUS EVERY 8 HOURS
Refills: 0 | Status: DISCONTINUED | OUTPATIENT
Start: 2023-06-16 | End: 2023-06-17

## 2023-06-15 RX ORDER — DOXAZOSIN MESYLATE 4 MG
4 TABLET ORAL DAILY
Refills: 0 | Status: DISCONTINUED | OUTPATIENT
Start: 2023-06-15 | End: 2023-06-20

## 2023-06-15 RX ORDER — ASPIRIN/CALCIUM CARB/MAGNESIUM 324 MG
81 TABLET ORAL DAILY
Refills: 0 | Status: DISCONTINUED | OUTPATIENT
Start: 2023-06-15 | End: 2023-06-20

## 2023-06-15 RX ORDER — ONDANSETRON 8 MG/1
4 TABLET, FILM COATED ORAL EVERY 8 HOURS
Refills: 0 | Status: DISCONTINUED | OUTPATIENT
Start: 2023-06-15 | End: 2023-06-15

## 2023-06-15 RX ORDER — HYDROMORPHONE HYDROCHLORIDE 2 MG/ML
0.5 INJECTION INTRAMUSCULAR; INTRAVENOUS; SUBCUTANEOUS ONCE
Refills: 0 | Status: DISCONTINUED | OUTPATIENT
Start: 2023-06-15 | End: 2023-06-15

## 2023-06-15 RX ORDER — PANTOPRAZOLE SODIUM 20 MG/1
40 TABLET, DELAYED RELEASE ORAL ONCE
Refills: 0 | Status: COMPLETED | OUTPATIENT
Start: 2023-06-15 | End: 2023-06-15

## 2023-06-15 RX ORDER — PANTOPRAZOLE SODIUM 20 MG/1
40 TABLET, DELAYED RELEASE ORAL
Refills: 0 | Status: DISCONTINUED | OUTPATIENT
Start: 2023-06-16 | End: 2023-06-20

## 2023-06-15 RX ORDER — PIPERACILLIN AND TAZOBACTAM 4; .5 G/20ML; G/20ML
3.38 INJECTION, POWDER, LYOPHILIZED, FOR SOLUTION INTRAVENOUS ONCE
Refills: 0 | Status: COMPLETED | OUTPATIENT
Start: 2023-06-15 | End: 2023-06-15

## 2023-06-15 RX ORDER — FLUTICASONE PROPIONATE 50 MCG
1 SPRAY, SUSPENSION NASAL
Refills: 0 | Status: DISCONTINUED | OUTPATIENT
Start: 2023-06-15 | End: 2023-06-20

## 2023-06-15 RX ORDER — FLUOXETINE HCL 10 MG
40 CAPSULE ORAL DAILY
Refills: 0 | Status: DISCONTINUED | OUTPATIENT
Start: 2023-06-15 | End: 2023-06-15

## 2023-06-15 RX ORDER — CHOLECALCIFEROL (VITAMIN D3) 125 MCG
2000 CAPSULE ORAL DAILY
Refills: 0 | Status: DISCONTINUED | OUTPATIENT
Start: 2023-06-15 | End: 2023-06-20

## 2023-06-15 RX ORDER — LAMOTRIGINE 25 MG/1
150 TABLET, ORALLY DISINTEGRATING ORAL
Refills: 0 | Status: DISCONTINUED | OUTPATIENT
Start: 2023-06-15 | End: 2023-06-20

## 2023-06-15 RX ORDER — LOSARTAN POTASSIUM 100 MG/1
100 TABLET, FILM COATED ORAL DAILY
Refills: 0 | Status: DISCONTINUED | OUTPATIENT
Start: 2023-06-15 | End: 2023-06-20

## 2023-06-15 RX ORDER — ENOXAPARIN SODIUM 100 MG/ML
40 INJECTION SUBCUTANEOUS EVERY 24 HOURS
Refills: 0 | Status: DISCONTINUED | OUTPATIENT
Start: 2023-06-15 | End: 2023-06-18

## 2023-06-15 RX ORDER — PANTOPRAZOLE SODIUM 20 MG/1
40 TABLET, DELAYED RELEASE ORAL
Refills: 0 | Status: DISCONTINUED | OUTPATIENT
Start: 2023-06-15 | End: 2023-06-15

## 2023-06-15 RX ORDER — PIPERACILLIN AND TAZOBACTAM 4; .5 G/20ML; G/20ML
3.38 INJECTION, POWDER, LYOPHILIZED, FOR SOLUTION INTRAVENOUS ONCE
Refills: 0 | Status: COMPLETED | OUTPATIENT
Start: 2023-06-16 | End: 2023-06-16

## 2023-06-15 RX ORDER — QUETIAPINE FUMARATE 200 MG/1
50 TABLET, FILM COATED ORAL
Refills: 0 | Status: DISCONTINUED | OUTPATIENT
Start: 2023-06-15 | End: 2023-06-20

## 2023-06-15 RX ORDER — ALBUTEROL 90 UG/1
2 AEROSOL, METERED ORAL EVERY 6 HOURS
Refills: 0 | Status: DISCONTINUED | OUTPATIENT
Start: 2023-06-15 | End: 2023-06-20

## 2023-06-15 RX ORDER — ACETAMINOPHEN 500 MG
650 TABLET ORAL EVERY 6 HOURS
Refills: 0 | Status: DISCONTINUED | OUTPATIENT
Start: 2023-06-15 | End: 2023-06-20

## 2023-06-15 RX ADMIN — SODIUM CHLORIDE 75 MILLILITER(S): 9 INJECTION INTRAMUSCULAR; INTRAVENOUS; SUBCUTANEOUS at 02:21

## 2023-06-15 RX ADMIN — ENOXAPARIN SODIUM 40 MILLIGRAM(S): 100 INJECTION SUBCUTANEOUS at 06:18

## 2023-06-15 RX ADMIN — Medication 2000 UNIT(S): at 11:18

## 2023-06-15 RX ADMIN — PANTOPRAZOLE SODIUM 40 MILLIGRAM(S): 20 TABLET, DELAYED RELEASE ORAL at 06:18

## 2023-06-15 RX ADMIN — Medication 1 MILLIGRAM(S): at 18:22

## 2023-06-15 RX ADMIN — Medication 650 MILLIGRAM(S): at 02:21

## 2023-06-15 RX ADMIN — Medication 1 MILLIGRAM(S): at 06:18

## 2023-06-15 RX ADMIN — LAMOTRIGINE 150 MILLIGRAM(S): 25 TABLET, ORALLY DISINTEGRATING ORAL at 19:38

## 2023-06-15 RX ADMIN — QUETIAPINE FUMARATE 50 MILLIGRAM(S): 200 TABLET, FILM COATED ORAL at 06:19

## 2023-06-15 RX ADMIN — PANTOPRAZOLE SODIUM 40 MILLIGRAM(S): 20 TABLET, DELAYED RELEASE ORAL at 18:23

## 2023-06-15 RX ADMIN — HYDROMORPHONE HYDROCHLORIDE 0.5 MILLIGRAM(S): 2 INJECTION INTRAMUSCULAR; INTRAVENOUS; SUBCUTANEOUS at 14:26

## 2023-06-15 RX ADMIN — Medication 30 MILLILITER(S): at 14:26

## 2023-06-15 RX ADMIN — QUETIAPINE FUMARATE 50 MILLIGRAM(S): 200 TABLET, FILM COATED ORAL at 18:23

## 2023-06-15 RX ADMIN — PIPERACILLIN AND TAZOBACTAM 200 GRAM(S): 4; .5 INJECTION, POWDER, LYOPHILIZED, FOR SOLUTION INTRAVENOUS at 22:17

## 2023-06-15 RX ADMIN — Medication 650 MILLIGRAM(S): at 17:06

## 2023-06-15 RX ADMIN — PANTOPRAZOLE SODIUM 40 MILLIGRAM(S): 20 TABLET, DELAYED RELEASE ORAL at 14:23

## 2023-06-15 NOTE — PROGRESS NOTE ADULT - PROBLEM SELECTOR PLAN 6
Noted prior intolerance to unspecified statin in outpatient notes   - Transitioned to Rosuvastatin 20 mg qD with improvement of myalgias   - Will hold statin given worsening LFTs and hx of myalgias on statin other than Rosuvastatin   - Monitor for signs of myalgias   - AM Lipid panel Well controlled at on current regimen   - Home medications of Lamictal, Seroquel, Ativan and Prozac   - Continue home medications-wife to bring all meds from Home d/w clinical Pharmacy  - patient refusing some inpatient meds including Lamictal as he does not take generic because they don't work  reports he will have his wife bring in home meds to be verified

## 2023-06-15 NOTE — CONSULT NOTE ADULT - SUBJECTIVE AND OBJECTIVE BOX
[facility]  Osman Chao PA-C  [address] [place] [id number]  [phone]      Chief Complaint:  Patient is a 62y old  Male who presents with a chief complaint of Fever workup (15 Robert 2023 12:56)      HPI: 61 yo M with PMHx of HTN, HLD, Bipolar, GERD, Jhon's Esophagus Asthma, and Abnormal LFTs presents to the ED with 1x day hx of epigastric pain and new onset fever. Pt endorses feeling ache and heaviness in the epigastric region after he ate breakfast this morning. Noted laying down on his left side with improvement of symptoms, but persisted throughout the day. Pt states that pain worsened when he had a nonbloody bowel movement, with associated nausea, lightheadedness, and diaphoresis. Following this event pt was brought to the ED by his daughter in fear he was having a heart attack. In the ED, pt was found to be tachypneic, tachycardic, and febrile to 100.6. Pt was given IVF, Ativan, and Ofirmev with improvement of symptoms. Pt states that he no longer has the epigastric pain, but has experienced these symptoms in the past. Recently diagnosed with Jhon's Esophagus following EGD due to worsening acid reflux. Pt also noted to have large duodenal diverticulum during EGD. Endorses improving headache, dizziness, SOB, nausea  and distended abdomen throughout event. Pt has no complaints at this time, though appears anxious throughout discussion. Denies any recent sick contacts    Allergies:  No Known Allergies      Medications:  acetaminophen     Tablet .. 650 milliGRAM(s) Oral every 6 hours PRN  albuterol    90 MICROgram(s) HFA Inhaler 2 Puff(s) Inhalation every 6 hours PRN  aluminum hydroxide/magnesium hydroxide/simethicone Suspension 30 milliLiter(s) Oral once PRN  aluminum hydroxide/magnesium hydroxide/simethicone Suspension 30 milliLiter(s) Oral every 4 hours PRN  aspirin  chewable 81 milliGRAM(s) Oral daily  cholecalciferol 2000 Unit(s) Oral daily  doxazosin 4 milliGRAM(s) Oral daily  enoxaparin Injectable 40 milliGRAM(s) SubCutaneous every 24 hours  FLUoxetine 40 milliGRAM(s) Oral <User Schedule>  FLUoxetine 40 milliGRAM(s) Oral <User Schedule>  fluticasone propionate 50 MICROgram(s)/spray Nasal Spray 1 Spray(s) Both Nostrils two times a day PRN  lamoTRIgine 150 milliGRAM(s) Oral two times a day  LORazepam     Tablet 1 milliGRAM(s) Oral two times a day  losartan 100 milliGRAM(s) Oral daily  melatonin 3 milliGRAM(s) Oral at bedtime PRN  pantoprazole   Suspension 40 milliGRAM(s) Oral two times a day  pantoprazole  Injectable 40 milliGRAM(s) IV Push once  QUEtiapine 50 milliGRAM(s) Oral two times a day  sodium chloride 0.9%. 1000 milliLiter(s) IV Continuous <Continuous>      PMHX/PSHX:  GERD (gastroesophageal reflux disease)    Spivey's esophagus with esophagitis    HTN (hypertension)    HLD (hyperlipidemia)    Transaminitis    Bipolar disorder    Asthma    Need for prophylactic measure    Fever of unknown origin    KIRA (acute kidney injury)    No pertinent past surgical history        Family history:  FH: Alzheimers disease (Mother)    FH: prostate cancer (Father)    FH: CVA (cerebrovascular accident) (Father)        Social History:     ROS:     General:  no fevers, chills, night sweats, fatigue,   Eyes:  Good vision, no reported pain  ENT:  No sore throat, pain, runny nose, dysphagia  CV:  No pain, palpitations, hypo/hypertension  Resp:  No dyspnea, cough, tachypnea, wheezing  GI:  No pain, No nausea, No vomiting, No diarrhea, No constipation, No weight loss, No fever, No pruritis, No rectal bleeding, No tarry stools, No dysphagia,  :  No pain, bleeding, incontinence, nocturia  Muscle:  No pain, weakness  Neuro:  No weakness, tingling, memory problems  Psych:  No fatigue, insomnia, mood problems, depression  Endocrine:  No polyuria, polydipsia, cold/heat intolerance  Heme:  No petechiae, ecchymosis, easy bruisability  Skin:  No rash, tattoos, scars, edema      PHYSICAL EXAM:   Vital Signs:  Vital Signs Last 24 Hrs  T(C): 36.8 (15 Robert 2023 11:00), Max: 38.1 (2023 18:10)  T(F): 98.3 (15 Robert 2023 11:00), Max: 100.6 (2023 18:10)  HR: 57 (15 Robert 2023 11:00) (57 - 77)  BP: 119/75 (15 Robert 2023 11:00) (119/75 - 157/75)  BP(mean): --  RR: 18 (15 Robert 2023 10:15) (17 - 20)  SpO2: 95% (15 Robert 2023 11:00) (93% - 97%)    Parameters below as of 15 Robert 2023 11:00  Patient On (Oxygen Delivery Method): room air      Daily     Daily     GENERAL:  Appears stated age,   HEENT:  NC/AT,    CHEST:  Full & symmetric excursion,   HEART:  Regular rhythm  ABDOMEN:  Soft, non-tender, non-distended,   EXTEREMITIES:  no cyanosis,clubbing or edema  SKIN:  No rash  NEURO:  Alert,    LABS:                        13.1   7.97  )-----------( 261      ( 15 Robert 2023 14:45 )             38.7     06-15    138  |  106  |  18  ----------------------------<  104<H>  3.5   |  24  |  1.40<H>    Ca    9.8      15 Robert 2023 14:45    TPro  7.3  /  Alb  3.9  /  TBili  3.3<H>  /  DBili  x   /  AST  415<H>  /  ALT  804<H>  /  AlkPhos  228<H>  -15    LIVER FUNCTIONS - ( 15 Robert 2023 14:45 )  Alb: 3.9 g/dL / Pro: 7.3 g/dL / ALK PHOS: 228 U/L / ALT: 804 U/L / AST: 415 U/L / GGT: x           PT/INR - ( 15 Robert 2023 14:45 )   PT: 13.7 sec;   INR: 1.17 ratio         PTT - ( 15 Robert 2023 14:45 )  PTT:31.9 sec  Urinalysis Basic - ( 2023 18:00 )    Color: Dark Yellow / Appearance: Clear / S.019 / pH: x  Gluc: x / Ketone: Negative mg/dL  / Bili: Negative / Urobili: 1.0 mg/dL   Blood: x / Protein: Trace mg/dL / Nitrite: Negative   Leuk Esterase: Negative / RBC: x / WBC x   Sq Epi: x / Non Sq Epi: x / Bacteria: x          Imaging:          
[facility]  Loyd Bahena MD PhD, Dede Pina MD, Briana Lopez MD, Diogenes Valentin MD, Devaughn Becerril MD  and providing coverage with Han Bowser MD  Providing Infectious Disease Consultations at [facility], [facility]    Office# [phone] to schedule follow up appointments  Answering Service for urgent calls or New Consults [phone]  Cell# to text for urgent issues Loyd Josef [phone]     HPI:  63 yo M with PMHx of HTN, HLD, Bipolar, GERD, Spivey's Esophagus Asthma, and Abnormal LFTs presented to the ED with 1x day hx of epigastric pain and new onset fever. Pt endorses feeling ache and heaviness in the epigastric region after he ate. Noted laying down on his left side with improvement of symptoms, but persisted throughout the day. Pt states that pain worsened when he had a nonbloody bowel movement, with associated nausea, lightheadedness, and diaphoresis. Following this event pt was brought to the ED by his daughter in fear he was having a heart attack. In the ED, pt was found to be tachypneic, tachycardic, and febrile to 100.6. Pt was given IVF, Ativan, and Ofirmev with improvement of symptoms. Recently diagnosed with Spivey's Esophagus following EGD due to worsening acid reflux. Pt also noted to have large duodenal diverticulum during EGD.     Of note, pt found to have elevated LFTs and Alk phos in outpatient workup since February. Dr. Ewing (PCP) attributed this to recent viral illness, but subsequent labs not checked.     ED Course:   Vitals: BP: 157/75, HR: 77, Temp: 100.6, RR: 20, SpO2: 97% on RA    Labs:  WBC 11.71, Lactate 2.0 < 3.0, Cr 1.50, Alk phos 208, ,    CXR: Grossly normal as per personal read, official read pending   CT: Minimal periportal edema, nonspecific. Correlate with liver function tests. Otherwise, no acute abdominopelvic findings. No acute thoracic findings. Right lower lobe pulmonary nodule measuring 4 mm.  US Abdomen: Layering gallbladder sludge. No wall thickening or pericholecystic fluid. Focal hyperechoic right hepatic lobe lesion measures 1.0 x 0.9 x 0.8 cm, possibility is a hepatic hemangioma      PAST MEDICAL & SURGICAL HISTORY:  GERD (gastroesophageal reflux disease)  HTN (hypertension)  HLD (hyperlipidemia)  Transaminitis  Bipolar disorder  Asthma  KIRA (acute kidney injury)      No pertinent past surgical history      Antimicrobials      Immunological      Other  acetaminophen     Tablet .. 650 milliGRAM(s) Oral every 6 hours PRN  albuterol    90 MICROgram(s) HFA Inhaler 2 Puff(s) Inhalation every 6 hours PRN  aluminum hydroxide/magnesium hydroxide/simethicone Suspension 30 milliLiter(s) Oral every 4 hours PRN  aspirin  chewable 81 milliGRAM(s) Oral daily  cholecalciferol 2000 Unit(s) Oral daily  doxazosin 4 milliGRAM(s) Oral daily  enoxaparin Injectable 40 milliGRAM(s) SubCutaneous every 24 hours  FLUoxetine 40 milliGRAM(s) Oral <User Schedule>  FLUoxetine 40 milliGRAM(s) Oral <User Schedule>  fluticasone propionate 50 MICROgram(s)/spray Nasal Spray 1 Spray(s) Both Nostrils two times a day PRN  lamoTRIgine 150 milliGRAM(s) Oral two times a day  LORazepam     Tablet 1 milliGRAM(s) Oral two times a day  losartan 100 milliGRAM(s) Oral daily  melatonin 3 milliGRAM(s) Oral at bedtime PRN  pantoprazole   Suspension 40 milliGRAM(s) Oral two times a day  QUEtiapine 50 milliGRAM(s) Oral two times a day  sodium chloride 0.9%. 1000 milliLiter(s) IV Continuous <Continuous>      Allergies    No Known Allergies    Intolerances        SOCIAL HISTORY:  Lives: At home with Wife and Daughter   ADLs: Independent, walks independently however becoming more painful given osteoarthritis   Diet: Avoid carbonated beverages, spicy foods, and salt   Vaccination: COVID vaccinated x 3   Alcohol Use: Denies   Tobacco Use: Denies   Recreational Drug Use: Denies (2023 23:41)      FAMILY HISTORY:  FH: Alzheimers disease (Mother)  FH: prostate cancer (Father)  FH: CVA (cerebrovascular accident) (Father)        ROS:    EYES:  Negative  blurry vision or double vision  GASTROINTESTINAL:  Negative for nausea, vomiting, diarrhea  -otherwise negative except for subjective    Vital Signs Last 24 Hrs  T(C): 36.8 (15 Robert 2023 11:00), Max: 38.1 (2023 18:10)  T(F): 98.3 (15 Robert 2023 11:00), Max: 100.6 (2023 18:10)  HR: 57 (15 Robert 2023 11:00) (57 - 101)  BP: 119/75 (15 Robert 2023 11:00) (119/75 - 185/100)  BP(mean): 122 (2023 15:37) (122 - 122)  RR: 18 (15 Robert 2023 10:15) (17 - 25)  SpO2: 95% (15 Robert 2023 11:00) (93% - 100%)    Parameters below as of 15 Robert 2023 11:00  Patient On (Oxygen Delivery Method): room air        PE:  In no distress  HEENT:  NC, PERRL, sclerae anicteric, conjunctivae clear, EOMI.  Sinuses nontender, no nasal exudate.  No buccal or pharyngeal lesions, erythema or exudate  Neck:  Supple, no adenopathy  Lungs:  No accessory muscle use, bilaterally clear to auscultation  Cor:  distant  Abd:  Symmetric, normoactive BS.  Soft, epigastric area is tender, no masses, guarding or rebound.  Liver and spleen not enlarged  Extrem:  No cyanosis or edema  Skin:  No rashes.  Neuro: grossly intact  Musc: moving all limbs freely, no focal deficits        LABS:                        12.1   8.20  )-----------( 228      ( 15 Robert 2023 05:15 )             35.6       WBC Count: 8.20 K/uL (06-15-23 @ 05:15)  WBC Count: 11.71 K/uL (23 @ 15:33)      06-15    139  |  110<H>  |  21  ----------------------------<  90  3.7   |  25  |  1.30    Ca    8.5      15 Robert 2023 05:15    TPro  6.3  /  Alb  3.3  /  TBili  1.7<H>  /  DBili  x   /  AST  690<H>  /  ALT  868<H>  /  AlkPhos  201<H>  06-15        Creatinine, Serum: 1.30 mg/dL (06-15-23 @ 05:15)  Creatinine, Serum: 1.50 mg/dL (23 @ 15:33)      Urinalysis Basic - ( 2023 18:00 )    Color: Dark Yellow / Appearance: Clear / S.019 / pH: x  Gluc: x / Ketone: Negative mg/dL  / Bili: Negative / Urobili: 1.0 mg/dL   Blood: x / Protein: Trace mg/dL / Nitrite: Negative   Leuk Esterase: Negative / RBC: x / WBC x   Sq Epi: x / Non Sq Epi: x / Bacteria: x    MICROBIOLOGY:      RADIOLOGY & ADDITIONAL STUDIES:    --< from: CT Chest w/ IV Cont (23 @ 19:34) >    ACC: [id number] EXAM:  CT ABDOMEN AND PELVIS IC   ORDERED BY: KEDAR TAYLOR     ACC: [id number] EXAM:  CT CHEST IC   ORDERED BY: KEDAR TAYLOR     PROCEDURE DATE:  2023          INTERPRETATION:  CLINICAL INFORMATION: Chest and abdominal pain. Diarrhea.    COMPARISON: None.    CONTRAST/COMPLICATIONS:  IV Contrast: Omnipaque 350  90 cc administered   10 cc discarded  Oral Contrast: NONE  Complications: None reported at time of study completion    PROCEDURE:  CT of the Chest, Abdomen and Pelvis was performed.  Sagittal and coronal reformats were performed.    FINDINGS:  CHEST:  LUNGS AND LARGE AIRWAYS: Patent central airways. Right lower lobe   pulmonary nodule measuring 4 mm (series 4 image 97).  PLEURA: No pleural effusion.  VESSELS: Mild atherosclerotic changes.  HEART: Heart size is normal. No pericardial effusion.  MEDIASTINUM AND TENISHA: No lymphadenopathy.  CHEST WALL AND LOWER NECK: Right infraspinatus intramuscular lipoma.   Bilateral gynecomastia.    ABDOMEN AND PELVIS:  LIVER: Minimalperiportal edema.  BILE DUCTS: Normal caliber.  GALLBLADDER: Within normal limits.  SPLEEN: Within normal limits.  PANCREAS: Within normal limits.  ADRENALS: Within normal limits.  KIDNEYS/URETERS: Bilateral renal cortical and renal sinus cysts. No   hydronephrosis.    BLADDER: Small right posterior bladder diverticulum.  REPRODUCTIVE ORGANS: Borderline enlarged prostate.    BOWEL: Duodenal diverticulum. Fat density focus measuring 1.3 cm in a   loop of small bowel in the midabdomen, likely a small bowel lipoma. No   bowel obstruction. The appendix is not definitively visualized. No   evidence of inflammation in the pericecal region.  PERITONEUM: No ascites.  VESSELS: Mild atherosclerotic changes.  RETROPERITONEUM/LYMPH NODES: No lymphadenopathy.  ABDOMINAL WALL: Small fat-containing umbilical hernia.  BONES: Degenerative changes. Multilevel vertebral body hemangiomas.    IMPRESSION:  Minimal periportal edema, nonspecific. Correlate with liver function   tests. Otherwise, no acute abdominopelvic findings.    No acute thoracic findings.    Right lower lobe pulmonary nodule measuring 4 mm.

## 2023-06-15 NOTE — PHYSICAL THERAPY INITIAL EVALUATION ADULT - GENERAL OBSERVATIONS, REHAB EVAL
Patient chart reviewed, events noted. Patient received semi-reclined in bed, +urinary catheter, +IV L hand, in NAD. Agreeable to PT at this time.

## 2023-06-15 NOTE — PROGRESS NOTE ADULT - PROBLEM SELECTOR PLAN 1
Presenting with epigastric pain worse after eating   - Known hx of GERD with recent diagnosis of Jhon's Esophagus   - EGD performed 4/28/23: significant for a 2 cm hiatal hernia with grade 3 reflux esophagitis and an irregular Z-line intestinal metaplasia consistent with Spivey's esophagus. No dysplasia. Large diverticulum in the second portion of the duodenum  - Home dose of Pantoprazole 40 mg qD   - S/p Pepcid in ED  - Continue Pantoprazole 40 mg BID while inpatient   - Troponin 10.3  - Continue to monitor   - GI consulted, Dr. Mclaughlin, follow up recommendations Presenting with epigastric pain worse after eating   - Known hx of GERD with recent diagnosis of Jhon's Esophagus   - EGD performed 4/28/23: significant for a 2 cm hiatal hernia with grade 3 reflux esophagitis and an irregular Z-line intestinal metaplasia consistent with Spivey's esophagus. No dysplasia. Large diverticulum in the second portion of the duodenum  - Continue Pantoprazole 40 mg BID while inpatient   - GI consulted, Dr. Mclaughlin, follow up recommendations fever in ER  100.6, ---> 101 F   - No obvious signs/source of infection - WBC WNL    - Blood  c/s x 2 and urine cultures to  follow  - Will monitor off Abx as per Dr le ,Viral serology sent   - if pt spikes additional fever restart Zosyn q 8 hrs   - Continue IVF NS @ 75cc/hr for 24 hr   - Infectious Disease consulted, Dr. Le, D/W at detail.  Pt had temp 103.6   Repeat Blood c/sx 2 , IVF, CT Angio A/P -Neg , NL Lactate, Nl WBC  Plan for MRCP -R/O Cholangitis/ CBD stone

## 2023-06-15 NOTE — PATIENT PROFILE ADULT - FUNCTIONAL ASSESSMENT - BASIC MOBILITY 6.
2-calculated by average/Not able to assess (calculate score using [facility] averaging method)

## 2023-06-15 NOTE — CHART NOTE - NSCHARTNOTEFT_GEN_A_CORE
Resident Rapid Response Note    Patient is a 62y old  Male who presents with a chief complaint of Fever workup (15 Robert 2023 12:56)      Rapid response was called at on this 62y Male patient for abdominal pain.     Patient was seen and examined at the bedside by the rapid response team and primary team. Dr. Alcantar and ICU NP Demetri Gudino at bedside. Patient reports that he was eating salad and fruits brought by his wife for lunch and 15-20 minutes later began to have severe midepigastric pain. Patient reported the pain feels like "pressure" and wishes to have a BM. Denies chest pain, HA, dizziness. During rapid response, patient reported his GI outpatient is Dr. Diallo and he had an EGD 2023. Patient speaking in full sentences during RRT call and expressed frustration that he has not been getting his "brand name medications". Patient was tearful and reported that he is supposed be getting his Lamictal, Prozac, Quetiapine. Reported having 2 normal BM yesterday. Patient has been passing gas. His initial blood pressure systolic 190s --> 200/100 --> 138/48---> 201/98. Patient received STAT dose of Dilaudid 0.5mg IVP x1.     Rapid Response Vital Signs:  BP: systolic 190s --> 200/100 --> 138/48---> 201/98  HR: 72  RR: 20  SpO2: 95% on 4L   Temp: 97.8F  FS: 95    Vital Signs Last 24 Hrs  T(C): 36.8 (15 Robert 2023 11:00), Max: 38.1 (2023 18:10)  T(F): 98.3 (15 Robert 2023 11:00), Max: 100.6 (2023 18:10)  HR: 57 (15 Robert 2023 11:00) (57 - 101)  BP: 119/75 (15 Robert 2023 11:00) (119/75 - 185/100)  BP(mean): 122 (2023 15:37) (122 - 122)  RR: 18 (15 Robert 2023 10:15) (17 - 25)  SpO2: 95% (15 Robert 2023 11:00) (93% - 100%)    Parameters below as of 15 Robert 2023 11:00  Patient On (Oxygen Delivery Method): room air        Physical Exam:  General: anxious appearing male in distress, speaking in full sentences,   HEENT: NCAT, moist mucous membranes   Neck: Supple  Neurology: A&Ox3, nonfocal, sensation intact, no focal deficits  Respiratory: CTA B/L, on 4L NC, No wheezing, rales, or rhonchi  CV: RRR, S1/S2 present, no murmurs, rubs, or gallops  Abdominal: Soft, +distended, +TTP epigastric region  Extremities: No C/C/E, peripheral pulses present  MSK: Normal ROM, no joint erythema or warmth, no joint swelling   Skin: warm, dry, normal color, no obvious rash or abnormal lesions    LABS:                        12.1   8.20  )-----------( 228      ( 15 Robert 2023 05:15 )             35.6     15 Robert 2023 05:15    139    |  110    |  21     ----------------------------<  90     3.7     |  25     |  1.30     Ca    8.5        15 Robert 2023 05:15    TPro  6.3    /  Alb  3.3    /  TBili  1.7    /  DBili  x      /  AST  690    /  ALT  868    /  AlkPhos  201    15 Robert 2023 05:15    PT/INR - ( 2023 19:11 )   PT: 13.3 sec;   INR: 1.14 ratio         PTT - ( 2023 19:11 )  PTT:26.0 sec  Urinalysis Basic - ( 2023 18:00 )    Color: Dark Yellow / Appearance: Clear / S.019 / pH: x  Gluc: x / Ketone: Negative mg/dL  / Bili: Negative / Urobili: 1.0 mg/dL   Blood: x / Protein: Trace mg/dL / Nitrite: Negative   Leuk Esterase: Negative / RBC: x / WBC x   Sq Epi: x / Non Sq Epi: x / Bacteria: x      CAPILLARY BLOOD GLUCOSE      POCT Blood Glucose.: 95 mg/dL (15 Robert 2023 14:24)        RADIOLOGY & ADDITIONAL TESTS:      Assessment/Plan: 61 yo M with PMHx of HTN, HLD, Bipolar, GERD, Spivey's Esphagous, Asthma, and Abnormal LFTs presents to the ED with 1x day hx of epigastric pain and new onset fever, admitted for workup.   #R/o bowel ischemia  -GI Dr. Mclaughlin at bedside. ICU Intensivist Dr. Alcantar and NP Demetri Gudino present  -CT Angio STAT with IV contrast  -XR Abdomen STAT  -CMP, CBC, Lactate  -After 30 mins, unremarkable abdominal exam, no tenderness to palpation in all regions  -Will continue to follow, RN to call if any changes.  -D/w Dr. Lopez, agrees with plan  -Wife updated, via phone. Will bring in patient's specific medications from home: Lamictal, Prozac, Quetiapine Resident Rapid Response Note    Patient is a 62y old  Male who presents with a chief complaint of Fever workup (15 Robert 2023 12:56)      Rapid response was called at on this 62y Male patient for abdominal pain.     Patient was seen and examined at the bedside by the rapid response team and primary team. Dr. Alcantar and ICU NP Demetri Gudino at bedside. Patient reports that he was eating salad and fruits brought by his wife for lunch and 15-20 minutes later began to have severe midepigastric pain. Patient reported the pain feels like "pressure" and wishes to have a BM. Denies chest pain, HA, dizziness. During rapid response, patient reported his GI outpatient is Dr. Diallo and he had an EGD 2023. Patient speaking in full sentences during RRT call and expressed frustration that he has not been getting his "brand name medications". Patient was tearful and reported that he is supposed be getting his Lamictal, Prozac, Quetiapine. Reported having 2 normal BM yesterday. Patient has been passing gas. His initial blood pressure systolic 190s --> 200/100 --> 138/48---> 201/98. Patient received STAT dose of Dilaudid 0.5mg IVP x1.     Rapid Response Vital Signs:  BP: systolic 190s --> 200/100 --> 138/48---> 201/98  HR: 72  RR: 20  SpO2: 95% on 4L   Temp: 97.8F  FS: 95    Vital Signs Last 24 Hrs  T(C): 36.8 (15 Robert 2023 11:00), Max: 38.1 (2023 18:10)  T(F): 98.3 (15 Robert 2023 11:00), Max: 100.6 (2023 18:10)  HR: 57 (15 Robert 2023 11:00) (57 - 101)  BP: 119/75 (15 Robert 2023 11:00) (119/75 - 185/100)  BP(mean): 122 (2023 15:37) (122 - 122)  RR: 18 (15 Robert 2023 10:15) (17 - 25)  SpO2: 95% (15 Robert 2023 11:00) (93% - 100%)    Parameters below as of 15 Robert 2023 11:00  Patient On (Oxygen Delivery Method): room air        Physical Exam:  General: anxious appearing male in distress, speaking in full sentences,   HEENT: NCAT, moist mucous membranes   Neck: Supple  Neurology: A&Ox3, nonfocal, sensation intact, no focal deficits  Respiratory: CTA B/L, on 4L NC, No wheezing, rales, or rhonchi  CV: RRR, S1/S2 present, no murmurs, rubs, or gallops  Abdominal: Soft, +distended, +TTP epigastric region  Extremities: No C/C/E, peripheral pulses present  MSK: Normal ROM, no joint erythema or warmth, no joint swelling   Skin: warm, dry, normal color, no obvious rash or abnormal lesions    LABS:                        12.1   8.20  )-----------( 228      ( 15 Robert 2023 05:15 )             35.6     15 Robert 2023 05:15    139    |  110    |  21     ----------------------------<  90     3.7     |  25     |  1.30     Ca    8.5        15 Robert 2023 05:15    TPro  6.3    /  Alb  3.3    /  TBili  1.7    /  DBili  x      /  AST  690    /  ALT  868    /  AlkPhos  201    15 Robert 2023 05:15    PT/INR - ( 2023 19:11 )   PT: 13.3 sec;   INR: 1.14 ratio         PTT - ( 2023 19:11 )  PTT:26.0 sec  Urinalysis Basic - ( 2023 18:00 )    Color: Dark Yellow / Appearance: Clear / S.019 / pH: x  Gluc: x / Ketone: Negative mg/dL  / Bili: Negative / Urobili: 1.0 mg/dL   Blood: x / Protein: Trace mg/dL / Nitrite: Negative   Leuk Esterase: Negative / RBC: x / WBC x   Sq Epi: x / Non Sq Epi: x / Bacteria: x      CAPILLARY BLOOD GLUCOSE      POCT Blood Glucose.: 95 mg/dL (15 Robert 2023 14:24)        RADIOLOGY & ADDITIONAL TESTS:      Assessment/Plan: 63 yo M with PMHx of HTN, HLD, Bipolar, GERD, Spivey's Esphagous, Asthma, and Abnormal LFTs presents to the ED with 1x day hx of epigastric pain and new onset fever, admitted for workup.   #R/o bowel ischemia  -GI Dr. Mclaughlin at bedside. ICU Intensivist Dr. Alcantar and NP Demetri Gudino present  -CT Angio STAT with IV contrast  -XR Abdomen STAT  -CMP, CBC, Lactate  - STAT IV Protonix , 1x Maalox   -After 30 mins, unremarkable abdominal exam, no tenderness to palpation in all regions  -Will continue to follow, RN to call if any changes.  -D/w Dr. Lopez, agrees with plan  -Wife updated, via phone. Will bring in patient's specific medications from home: Lamictal, Prozac, Quetiapine Resident Rapid Response Note    Patient is a 62y old  Male who presents with a chief complaint of Fever workup (15 Robert 2023 12:56)      Rapid response was called at on this 62y Male patient for abdominal pain.     Patient was seen and examined at the bedside by the rapid response team and primary team. Dr. Alcantar and ICU NP Demetri Gudino at bedside. Patient reports that he was eating salad and fruits brought by his wife for lunch and 15-20 minutes later began to have severe midepigastric pain. Patient reported the pain feels like "pressure" and wishes to have a BM. Denies chest pain, HA, dizziness. During rapid response, patient reported his GI outpatient is Dr. Diallo and he had an EGD 2023. Patient speaking in full sentences during RRT call and expressed frustration that he has not been getting his "brand name medications". Patient was tearful and reported that he is supposed be getting his Lamictal, Prozac, Quetiapine. Reported having 2 normal BM yesterday. Patient has been passing gas. His initial blood pressure systolic 190s --> 200/100 --> 138/48---> 201/98. Patient received STAT dose of Dilaudid 0.5mg IVP x1.     Rapid Response Vital Signs:  BP: systolic 190s --> 200/100 --> 138/48---> 201/98  HR: 72  RR: 20  SpO2: 95% on 4L   Temp: 97.8F  FS: 95    Vital Signs Last 24 Hrs  T(C): 36.8 (15 Robert 2023 11:00), Max: 38.1 (2023 18:10)  T(F): 98.3 (15 Robert 2023 11:00), Max: 100.6 (2023 18:10)  HR: 57 (15 Robert 2023 11:00) (57 - 101)  BP: 119/75 (15 Robert 2023 11:00) (119/75 - 185/100)  BP(mean): 122 (2023 15:37) (122 - 122)  RR: 18 (15 Robert 2023 10:15) (17 - 25)  SpO2: 95% (15 Robert 2023 11:00) (93% - 100%)    Parameters below as of 15 Robert 2023 11:00  Patient On (Oxygen Delivery Method): room air        Physical Exam:  General: anxious appearing male in distress, speaking in full sentences,   HEENT: NCAT, moist mucous membranes   Neck: Supple  Neurology: A&Ox3, nonfocal, sensation intact, no focal deficits  Respiratory: CTA B/L, on 4L NC, No wheezing, rales, or rhonchi  CV: RRR, S1/S2 present, no murmurs, rubs, or gallops  Abdominal: Soft, +distended, +TTP epigastric region  Extremities: No C/C/E, peripheral pulses present  MSK: Normal ROM, no joint erythema or warmth, no joint swelling   Skin: warm, dry, normal color, no obvious rash or abnormal lesions    LABS:                        12.1   8.20  )-----------( 228      ( 15 Robert 2023 05:15 )             35.6     15 Robert 2023 05:15    139    |  110    |  21     ----------------------------<  90     3.7     |  25     |  1.30     Ca    8.5        15 Robert 2023 05:15    TPro  6.3    /  Alb  3.3    /  TBili  1.7    /  DBili  x      /  AST  690    /  ALT  868    /  AlkPhos  201    15 Robert 2023 05:15    PT/INR - ( 2023 19:11 )   PT: 13.3 sec;   INR: 1.14 ratio         PTT - ( 2023 19:11 )  PTT:26.0 sec  Urinalysis Basic - ( 2023 18:00 )    Color: Dark Yellow / Appearance: Clear / S.019 / pH: x  Gluc: x / Ketone: Negative mg/dL  / Bili: Negative / Urobili: 1.0 mg/dL   Blood: x / Protein: Trace mg/dL / Nitrite: Negative   Leuk Esterase: Negative / RBC: x / WBC x   Sq Epi: x / Non Sq Epi: x / Bacteria: x      CAPILLARY BLOOD GLUCOSE      POCT Blood Glucose.: 95 mg/dL (15 Robert 2023 14:24)        RADIOLOGY & ADDITIONAL TESTS:      Assessment/Plan: 61 yo M with PMHx of HTN, HLD, Bipolar, GERD, Spivey's Esphagous, Asthma, and Abnormal LFTs presents to the ED with 1x day hx of epigastric pain and new onset fever, admitted for workup.   #R/o bowel ischemia  -GI Dr. Mclaughlin at bedside. ICU Intensivist Dr. Alcantar and NP Demetri Gudino present  -CT Angio STAT with IV contrast  -XR Abdomen STAT  -CMP, CBC, Lactate  - STAT IV Protonix , 1x Maalox   -After 30 mins, unremarkable abdominal exam, no tenderness to palpation in all regions  -Will continue to follow, RN to call if any changes.  -D/w Dr. Lopez, agrees with plan  -Wife updated, via phone. Will bring in patient's specific medications from home: Lamictal, Prozac, Quetiapine      2 hour follow up: Pt with improved pain, now with fever >103. Stat cultures and zosyn ordered, MRCP for morning. NPO at midnight. John in agreement. day team to follow

## 2023-06-15 NOTE — CARE COORDINATION ASSESSMENT. - NSCAREPROVIDERS_GEN_ALL_CORE_FT
CARE PROVIDERS:  Accepting Physician: Moises Lopez  Administration: Silvano Gavin  Administration: Mey Posada  Admitting: Moises Lopez  Attending: Moises Lopez  Covering Team: Jermaine Fairbanks  ED ACP: Elena Bauer  ED Attending: Lois Galo  ED Attending2: Tee Lyman  ED Nurse: Catrachito Jackson  Nurse: Cheryl Joseph  Nurse: Yumi Beaulieu  Nurse: Celia Moody  Nurse: Juancarlos Singh  Nurse: Eduin Mcduffie  Nurse: Corine Aldana  Ordered: ADM, User  Ordered: ServiceAccount, SCMMLM  Override: Corine Aldana  PCA/Nursing Assistant: Kerline Verdin  Physical Therapy: Chester Brewer  Primary Team: Loyd Shaikh  Primary Team: Maribeth Puentes  Primary Team: Demetri Garcia  Registered Dietitian: Bess Martini  Respiratory Therapy: Wyatt Sanabria  : Sharonda Robin  Student: Linda Bear// Supp. Assoc.: Patricio Hobbs   CARE PROVIDERS:  Accepting Physician: Moises Lopez  Administration: Silvano Gavin  Administration: Mey Posada  Admitting: Moises Lopez  Attending: Moises Lopez  Consultant: Ben Mclaughlin  Consultant: Loyd Bahena  Covering Team: Jermaine Fairbanks  ED ACP: Elena Bauer  ED Attending: Lois Galo  ED Attending2: Tee Lyman  ED Nurse: Catrachito Jackson  Nurse: Corine Aldana  Nurse: Eduin Mcduffie  Nurse: Juancarlos Singh  Nurse: Cheryl Joseph  Nurse: Yumi Beaulieu  Nurse: Celia Moody  Ordered: ADM, User  Ordered: ServiceAccount, SCMMLM  Override: Corine Aldana  PCA/Nursing Assistant: Kerline Verdin  Physical Therapy: Chester Brewer  Primary Team: Loyd Shiakh  Primary Team: Maribeth Puentes  Primary Team: Demetri Garcia  Registered Dietitian: Bess Martini  Respiratory Therapy: Wyatt Sanabria  : Sharonda Robin  Student: Linda Bear// Supp. Assoc.: Patricio Hobbs

## 2023-06-15 NOTE — PROGRESS NOTE ADULT - PROBLEM SELECTOR PLAN 2
Temperature 100.6   - Found to be tachycardic to 101, tachypneic to 25   - WBC 11.71 and Lactate 2.0 < 3.0   - EKG: NSR 85 bpm with prolonged /509   - UA: Negative   - CXR: Grossly normal as per personal read, official read pending   - CT: Minimal periportal edema, nonspecific. Correlate with liver function tests. Otherwise, no acute abdominopelvic findings. No acute thoracic findings. Right lower lobe pulmonary nodule measuring 4 mm.  - US Abdomen: Layering gallbladder sludge. No wall thickening or pericholecystic fluid. Focal hyperechoic right hepatic lobe lesion measures 1.0 x 0.9 x 0.8 cm, possibility is a hepatic hemangioma  - Blood and urine cultures sent, follow up results   - S/p 1.5 L NS bolus and Zosyn in ED   - Blood and urine cultures sent, follow up results   - Will monitor off Abx, but if pt spikes additional fever restart Zosyn   - Continue IVF NS @ 75cc/hr for 24 hr   - Infectious Disease consulted, Dr. Bahena, follow up recommendations Temperature 100.6, Found to be tachycardic to 101, tachypneic to 25   - No obvious signs/source of infection - WBC WNL    - Blood and urine cultures sent, follow up results   - Will monitor off Abx, but if pt spikes additional fever restart Zosyn   - Continue IVF NS @ 75cc/hr for 24 hr   - Infectious Disease consulted, Dr. Bahena, follow up recommendations Hx of Transaminitis since 2/23: AST/ALT - 74/245, AP - 237-   Alkphos 208  - US Abdomen: Focal hyperechoic right hepatic lobe lesion measures 1.0 x 0.9 x 0.8 cm, possibility is a hepatic hemangioma  -CT A/P with IVC - periportal edema   - Plan for MRCP -R/O Cholangitis/ CBD stone   - GI , Dr. Mclaughlin, d/w at detail.

## 2023-06-15 NOTE — PHYSICAL THERAPY INITIAL EVALUATION ADULT - TRANSFER TRAINING, PT EVAL
Patient will perform stand<>sit independently to be able to get up and use the bathroom, within 3 to 5 sessions.

## 2023-06-15 NOTE — PATIENT PROFILE ADULT - FALL HARM RISK - HARM RISK INTERVENTIONS

## 2023-06-15 NOTE — ED ADULT NURSE REASSESSMENT NOTE - NS ED NURSE REASSESS COMMENT FT1
Patient is a hard stick. Multiple attempts made. Still pending 2nd troponin & PT/INR labs. Lab made aware.
Spoke w/ lab. Patient is a hard stick. Stuck multiple times earlier to get labs, w/o success from RN. Lab came down to get some labs earlier. Patient does not want to be stuck again & requesting phlebotomy. Lab to come down to draw repeat lactic.
Received Pt resting in bed no signs of acute distress noted, plan of care reviewed with Pt verbalizing understanding.

## 2023-06-15 NOTE — PROGRESS NOTE ADULT - PROBLEM SELECTOR PLAN 4
Presenting Cr 1.50   - Per outpatient records, most recent Cr 1.29, with prior levels at 1.32   - Technically no an KIRA at this time, however more elevated likely due to decreased PO intake of fluids   - S/p 1.5L NS in ED  - Continue IVF NS @ 75cc/hr for 24 hr   - Encouraged PO intake   - Follow up AM labs Presenting Cr 1.50 - 1.3 this AM at baseline - RESOLVED   - Per outpatient records, most recent Cr 1.29, with prior levels at 1.32   - Encouraged PO intake Presenting with epigastric pain worse after eating   - Known hx of GERD with recent diagnosis of Jhon's Esophagus   - EGD performed 4/28/23: significant for a 2 cm hiatal hernia with grade 3 reflux esophagitis and an irregular Z-line intestinal metaplasia consistent with Spivey's esophagus. No dysplasia. Large diverticulum in the second portion of the duodenum  - Continue Pantoprazole 40 mg BID while inpatient   - GI consulted, Dr. Mclaughlin,   Repeat CT scan angio  A/P-    IMPRESSION:  No evidence of acute mesenteric ischemia.

## 2023-06-15 NOTE — PROGRESS NOTE ADULT - SUBJECTIVE AND OBJECTIVE BOX
Patient is a 62y old  Male who presents with a chief complaint of Fever workup (2023 23:41)    HPI:  61 yo M with PMHx of HTN, HLD, Bipolar, GERD, Jhon's Esophagus Asthma, and Abnormal LFTs presents to the ED with 1x day hx of epigastric pain and new onset fever. Pt endorses feeling ache and heaviness in the epigastric region after he ate breakfast this morning. Noted laying down on his left side with improvement of symptoms, but persisted throughout the day. Pt states that pain worsened when he had a nonbloody bowel movement, with associated nausea, lightheadedness, and diaphoresis. Following this event pt was brought to the ED by his daughter in fear he was having a heart attack. In the ED, pt was found to be tachypneic, tachycardic, and febrile to 100.6. Pt was given IVF, Ativan, and Ofirmev with improvement of symptoms. Pt states that he no longer has the epigastric pain, but has experienced these symptoms in the past. Recently diagnosed with Jhon's Esophagus following EGD due to worsening acid reflux. Pt also noted to have large duodenal diverticulum during EGD. Endorses improving headache, dizziness, SOB, nausea  and distended abdomen throughout event. Pt has no complaints at this time, though appears anxious throughout discussion. Denies any recent sick contacts.     Of note, pt found to have elevated LFTs and Alkphos in outpatient workup since February. Dr. Ewing (PCP) attributed this to recent viral illness, but subsequent labs not checked.     ED Course:   Vitals: BP: 157/75, HR: 77, Temp: 100.6, RR: 20, SpO2: 97% on RA    Labs:  WBC 11.71, Lactate 2.0 < 3.0, Cr 1.50, Alkphos 208, ,    UA: Negative   CXR: Grossly normal as per personal read, official read pending   CT: Minimal periportal edema, nonspecific. Correlate with liver function tests. Otherwise, no acute abdominopelvic findings. No acute thoracic findings. Right lower lobe pulmonary nodule measuring 4 mm.  US Abdomen: Layering gallbladder sludge. No wall thickening or pericholecystic fluid. Focal hyperechoic right hepatic lobe lesion measures 1.0 x 0.9 x 0.8 cm, possibility is a hepatic hemangioma  EKG: NSR 85 bpm with prolonged /509   Received in the ED: Ofirmev, Pepcid 20 mg IVP, Ativan 1mg IVP, Zosyn, 1.5L NS bolus    (2023 23:41)    INTERVAL HPI:  ________  TODAY- Pt was seen and examined at bedside. Pt states that ___. Pt denies headache, dizziness, lightheadedness, fever, chills, body aches, CP, SOB, palpitations, abdominal pain, n/v, numbness/tingling. No other complaints at this time.     OVERNIGHT EVENTS: No acute overnight events.     Home Medications:  aspirin 81 mg oral capsule: 1 orally (15 Robert 2023 00:)  Ativan 1 mg oral tablet: 1 orally 2 times a day (15 Robert 2023 00:43)  cholecalciferol 50 mcg (2000 intl units) oral tablet: 1 orally once a day (15 Robert 2023 00:)  Cozaar 100 mg oral tablet: 1 orally once a day (15 Robert 2023 00:43)  doxazosin 4 mg oral tablet: 1 orally (15 Robert 2023 00:43)  fluticasone 50 mcg/inh nasal spray: 2 spray(s) in each nostril once a day as needed for  congestion (15 Robert 2023 01:45)  ipratropium-albuterol 0.5 mg-2.5 mg/3 mL inhalation solution: 1 unit(s) by nebulizer every 4 hours as needed for  bronchospasm (15 Robert 2023 01:45)  lamoTRIgine 200 mg oral tablet: 0.75 tab(s) orally 2 times a day (15 Robert 2023 01:45)  ProAir HFA 90 mcg/inh inhalation aerosol: 2 inhaled every 6 hours as needed for  bronchospasm (15 Robert 2023 01:45)  Protonix 40 mg oral delayed release tablet: 1 orally once a day (15 Robert 2023 01:41)  PROzac 40 mg oral capsule: 1 orally once a day Alternates qD and BID every other day (15 Robert 2023 01:45)  QUEtiapine 50 mg oral tablet, extended release: 1 orally 2 times a day (15 Robert 2023 01:45)  rosuvastatin 20 mg oral tablet: 1 orally once a day (15 Robert 2023 01:45)  ZyrTEC 10 mg oral tablet: 1 orally once a day (15 Roebrt 2023 01:45)      MEDICATIONS  (STANDING):  aspirin  chewable 81 milliGRAM(s) Oral daily  cholecalciferol 2000 Unit(s) Oral daily  doxazosin 4 milliGRAM(s) Oral daily  enoxaparin Injectable 40 milliGRAM(s) SubCutaneous every 24 hours  FLUoxetine 40 milliGRAM(s) Oral <User Schedule>  FLUoxetine 40 milliGRAM(s) Oral <User Schedule>  lamoTRIgine 150 milliGRAM(s) Oral two times a day  LORazepam     Tablet 1 milliGRAM(s) Oral two times a day  losartan 100 milliGRAM(s) Oral daily  pantoprazole   Suspension 40 milliGRAM(s) Oral two times a day  QUEtiapine 50 milliGRAM(s) Oral two times a day  sodium chloride 0.9%. 1000 milliLiter(s) (75 mL/Hr) IV Continuous <Continuous>    MEDICATIONS  (PRN):  acetaminophen     Tablet .. 650 milliGRAM(s) Oral every 6 hours PRN Temp greater or equal to 38C (100.4F), Mild Pain (1 - 3)  albuterol    90 MICROgram(s) HFA Inhaler 2 Puff(s) Inhalation every 6 hours PRN for bronchospasm  aluminum hydroxide/magnesium hydroxide/simethicone Suspension 30 milliLiter(s) Oral every 4 hours PRN Dyspepsia  fluticasone propionate 50 MICROgram(s)/spray Nasal Spray 1 Spray(s) Both Nostrils two times a day PRN Congestion  melatonin 3 milliGRAM(s) Oral at bedtime PRN Insomnia      No Known Allergies      Social History:  Lives: At home with Wife and Daughter   ADLs: Independent, walks independently however becoming more painful given osteoarthritis   Diet: Avoid carbonated beverages, spicy foods, and salt   Vaccination: COVID vaccinatedx3   Alcohol Use: Denies   Tobacco Use: Denies   Recreational Drug Use: Denies (2023 23:41)      REVIEW OF SYSTEMS:  CONSTITUTIONAL: No fever, No chills, No fatigue, No myalgia, No Body ache, No Weakness  EYES: No eye pain,  No visual disturbances, No discharge, No Redness  ENMT: No ear pain, No nose bleed, No vertigo; No sinus pain, No throat pain, No Congestion  NECK: No pain, No stiffness  RESPIRATORY: No cough, No wheezing, No hemoptysis, No shortness of breath  CARDIOVASCULAR: No chest pain, No palpitations  GASTROINTESTINAL: No abdominal pain, No epigastric pain. No nausea, No vomiting, No diarrhea, No constipation; [ x ] BM-  GENITOURINARY: No dysuria, No frequency, No urgency, No hematuria, No incontinence  NEUROLOGICAL: No headaches, No dizziness, No numbness, No tingling, No tremors, No weakness  EXTREMITIES: No Swelling, No Pain, No Edema  SKIN: [ x ] No itching, burning, rashes, or lesions   MUSCULOSKELETAL: No joint pain, No joint swelling; No muscle pain, No back pain, No extremity pain  PSYCHIATRIC: No depression, No anxiety, No mood swings, No difficulty sleeping at night  PAIN SCALE: [  ] None  [  ] Other-  ROS Unable to obtain due to: [  ] Dementia  [  ] Lethargy  [  ] Sedated  [  ] Non verbal  REST OF REVIEW OF SYSTEMS: [ x ] Normal     Vital Signs Last 24 Hrs  T(C): 37.1 (15 Robert 2023 04:15), Max: 38.1 (2023 18:10)  T(F): 98.8 (15 Robert 2023 04:15), Max: 100.6 (2023 18:10)  HR: 64 (15 Robert 2023 06:15) (59 - 101)  BP: 147/74 (15 Robert 2023 06:15) (133/72 - 185/100)  BP(mean): 122 (2023 15:37) (122 - 122)  RR: 18 (15 Robert 2023 06:15) (17 - 25)  SpO2: 93% (15 Robert 2023 06:15) (93% - 100%)    Parameters below as of 15 Robert 2023 06:15  Patient On (Oxygen Delivery Method): room air        CAPILLARY BLOOD GLUCOSE          I&O's Summary    PHYSICAL EXAM:  GENERAL:  [ x ] NAD, [ x ] Well appearing, [  ] Agitated, [  ] Drowsy, [  ] Lethargy, [  ] Confused   HEAD:  [ x ] Normal, [  ] Other  EYES:  [ x ] EOMI, [ x ] PERRLA, [ x ] Conjunctiva and sclera clear normal, [  ] Other, [  ] Pallor, [  ] Discharge  ENMT:  [ x ] Normal, [ x ] Moist mucous membranes, [  ] Good dentition, [  ] No thrush  NECK:  [ x ] Supple, [  ] No JVD, [ x ] Normal thyroid, [  ] Lymphadenopathy, [  ] Other  CHEST/LUNG:  [ x ] Clear to auscultation bilaterally, [ x ] Breath Sounds equal B/L / decreased, [  ] Poor effort, [ x ] No rales, [ x ] No rhonchi, [ x ] No wheezing  HEART:  [ x ] Regular rate and rhythm, [  ] Tachycardia, [  ] Bradycardia, [  ] Irregular, [ x ] No murmurs, No rubs, No gallops, [  ] PPM in place (Mfr:  )  ABDOMEN:  [ x ] Soft, [ x ] Nontender, [ x ] Nondistended, [ x ] No mass, [ x ] Bowel sounds present, [  ] Obese  NERVOUS SYSTEM:  [ x ] Alert & Oriented x3__, [ x ] Nonfocal, [  ] Confusion, [  ] Encephalopathic, [  ] Sedated, [  ] Unable to assess, [  ] Dementia, [  ] Other-  EXTREMITIES:  [ x ] 2+ Peripheral Pulses, No clubbing, No cyanosis,  [  ] Edema B/L lower EXT, [  ] PVD stasis skin changes B/L lower EXT, [  ] Wound  LYMPH:  No lymphadenopathy noted  SKIN:  [ x ] No rashes or lesions, [  ] Pressure ulcers, [  ] Ecchymosis, [  ] Skin tears, [  ] Other    DIET: Diet, Regular:   Low Sodium (06-15-23 @ 00:09)      LABS:                        12.1   8.20  )-----------( 228      ( 15 Robert 2023 05:15 )             35.6     15 Robert 2023 05:15    139    |  110    |  21     ----------------------------<  90     3.7     |  25     |  1.30     Ca    8.5        15 Robert 2023 05:15    TPro  6.3    /  Alb  3.3    /  TBili  1.7    /  DBili  x      /  AST  690    /  ALT  868    /  AlkPhos  201    15 Robert 2023 05:15    PT/INR - ( 2023 19:11 )   PT: 13.3 sec;   INR: 1.14 ratio         PTT - ( 2023 19:11 )  PTT:26.0 sec  Urinalysis Basic - ( 2023 18:00 )    Color: Dark Yellow / Appearance: Clear / S.019 / pH: x  Gluc: x / Ketone: Negative mg/dL  / Bili: Negative / Urobili: 1.0 mg/dL   Blood: x / Protein: Trace mg/dL / Nitrite: Negative   Leuk Esterase: Negative / RBC: x / WBC x   Sq Epi: x / Non Sq Epi: x / Bacteria: x                 Anemia Panel:      Thyroid Panel:        Lipase, Serum: 280 U/L (23 @ 15:33)          RADIOLOGY & ADDITIONAL TESTS: _______      HEALTH ISSUES - PROBLEM Dx:  Fever of unknown origin    Bipolar disorder    GERD (gastroesophageal reflux disease)    KIRA (acute kidney injury)    HTN (hypertension)    HLD (hyperlipidemia)    Transaminitis    Asthma    Need for prophylactic measure          Consultant(s) Notes Reviewed:  [ x ] YES     Care Discussed with [ x ] Consultants, [ x ] Patient, [ x ] Family, [  ] HCP, [ x ] , [  ] Social Service, [ x ] RN, [  ] Physical Therapy, [  ] Palliative Care Team  DVT PPX: [  ] Lovenox, [  ] SC Heparin, [  ] Coumadin, [  ] Xarelto, [  ] Eliquis, [  ] Pradaxa, [  ] IV Heparin drip, [  ] SCD, [  ] Ambulation, [  ] Contraindicated 2/2 GI Bleed, [  ] Contraindicated 2/2  Bleed, [  ] Contraindicated 2/2 Brain Bleed  Advanced Directive: [  ] None, [  ] DNR/DNI Patient is a 62y old  Male who presents with a chief complaint of Fever workup (2023 23:41)    HPI:  61 yo M with PMHx of HTN, HLD, Bipolar, GERD, Jhon's Esophagus Asthma, and Abnormal LFTs presents to the ED with 1x day hx of epigastric pain and new onset fever. Pt endorses feeling ache and heaviness in the epigastric region after he ate breakfast this morning. Noted laying down on his left side with improvement of symptoms, but persisted throughout the day. Pt states that pain worsened when he had a nonbloody bowel movement, with associated nausea, lightheadedness, and diaphoresis. Following this event pt was brought to the ED by his daughter in fear he was having a heart attack. In the ED, pt was found to be tachypneic, tachycardic, and febrile to 100.6. Pt was given IVF, Ativan, and Ofirmev with improvement of symptoms. Pt states that he no longer has the epigastric pain, but has experienced these symptoms in the past. Recently diagnosed with Jhon's Esophagus following EGD due to worsening acid reflux. Pt also noted to have large duodenal diverticulum during EGD. Endorses improving headache, dizziness, SOB, nausea  and distended abdomen throughout event. Pt has no complaints at this time, though appears anxious throughout discussion. Denies any recent sick contacts.     Of note, pt found to have elevated LFTs and Alkphos in outpatient workup since February. Dr. Ewing (PCP) attributed this to recent viral illness, but subsequent labs not checked.     ED Course:   Vitals: BP: 157/75, HR: 77, Temp: 100.6, RR: 20, SpO2: 97% on RA    Labs:  WBC 11.71, Lactate 2.0 < 3.0, Cr 1.50, Alkphos 208, ,    UA: Negative   CXR: Grossly normal as per personal read, official read pending   CT: Minimal periportal edema, nonspecific. Correlate with liver function tests. Otherwise, no acute abdominopelvic findings. No acute thoracic findings. Right lower lobe pulmonary nodule measuring 4 mm.  US Abdomen: Layering gallbladder sludge. No wall thickening or pericholecystic fluid. Focal hyperechoic right hepatic lobe lesion measures 1.0 x 0.9 x 0.8 cm, possibility is a hepatic hemangioma  EKG: NSR 85 bpm with prolonged /509   Received in the ED: Ofirmev, Pepcid 20 mg IVP, Ativan 1mg IVP, Zosyn, 1.5L NS bolus    (2023 23:41)    INTERVAL HPI:  Patient refusing meds this AM that are generic, wants to have them brought in from home. reports feeling lethargic but otherwise no other complaints at this time         Home Medications:  aspirin 81 mg oral capsule: 1 orally (15 Robert 2023 00:43)  Ativan 1 mg oral tablet: 1 orally 2 times a day (15 Robert 2023 00:43)  cholecalciferol 50 mcg (2000 intl units) oral tablet: 1 orally once a day (15 Robert 2023 00:43)  Cozaar 100 mg oral tablet: 1 orally once a day (15 Robert 2023 00:43)  doxazosin 4 mg oral tablet: 1 orally (15 Robert 2023 00:43)  fluticasone 50 mcg/inh nasal spray: 2 spray(s) in each nostril once a day as needed for  congestion (15 Robert 2023 01:45)  ipratropium-albuterol 0.5 mg-2.5 mg/3 mL inhalation solution: 1 unit(s) by nebulizer every 4 hours as needed for  bronchospasm (15 Robert 2023 01:45)  lamoTRIgine 200 mg oral tablet: 0.75 tab(s) orally 2 times a day (15 Robert 2023 01:45)  ProAir HFA 90 mcg/inh inhalation aerosol: 2 inhaled every 6 hours as needed for  bronchospasm (15 Robert 2023 01:45)  Protonix 40 mg oral delayed release tablet: 1 orally once a day (15 Robert 2023 01:41)  PROzac 40 mg oral capsule: 1 orally once a day Alternates qD and BID every other day (15 Robert 2023 01:45)  QUEtiapine 50 mg oral tablet, extended release: 1 orally 2 times a day (15 Robert 2023 01:45)  rosuvastatin 20 mg oral tablet: 1 orally once a day (15 Robert 2023 01:45)  ZyrTEC 10 mg oral tablet: 1 orally once a day (15 Robert 2023 01:45)      MEDICATIONS  (STANDING):  aspirin  chewable 81 milliGRAM(s) Oral daily  cholecalciferol 2000 Unit(s) Oral daily  doxazosin 4 milliGRAM(s) Oral daily  enoxaparin Injectable 40 milliGRAM(s) SubCutaneous every 24 hours  FLUoxetine 40 milliGRAM(s) Oral <User Schedule>  FLUoxetine 40 milliGRAM(s) Oral <User Schedule>  lamoTRIgine 150 milliGRAM(s) Oral two times a day  LORazepam     Tablet 1 milliGRAM(s) Oral two times a day  losartan 100 milliGRAM(s) Oral daily  pantoprazole   Suspension 40 milliGRAM(s) Oral two times a day  QUEtiapine 50 milliGRAM(s) Oral two times a day  sodium chloride 0.9%. 1000 milliLiter(s) (75 mL/Hr) IV Continuous <Continuous>    MEDICATIONS  (PRN):  acetaminophen     Tablet .. 650 milliGRAM(s) Oral every 6 hours PRN Temp greater or equal to 38C (100.4F), Mild Pain (1 - 3)  albuterol    90 MICROgram(s) HFA Inhaler 2 Puff(s) Inhalation every 6 hours PRN for bronchospasm  aluminum hydroxide/magnesium hydroxide/simethicone Suspension 30 milliLiter(s) Oral every 4 hours PRN Dyspepsia  fluticasone propionate 50 MICROgram(s)/spray Nasal Spray 1 Spray(s) Both Nostrils two times a day PRN Congestion  melatonin 3 milliGRAM(s) Oral at bedtime PRN Insomnia      No Known Allergies      Social History:  Lives: At home with Wife and Daughter   ADLs: Independent, walks independently however becoming more painful given osteoarthritis   Diet: Avoid carbonated beverages, spicy foods, and salt   Vaccination: COVID vaccinatedx3   Alcohol Use: Denies   Tobacco Use: Denies   Recreational Drug Use: Denies (2023 23:41)      REVIEW OF SYSTEMS:  CONSTITUTIONAL:+ fatigue   EYES: No eye pain,  No visual disturbances, No discharge, No Redness  ENMT: No ear pain, No nose bleed, No vertigo; No sinus pain, No throat pain, No Congestion  NECK: No pain, No stiffness  RESPIRATORY: No cough, No wheezing, No hemoptysis, No shortness of breath  CARDIOVASCULAR: No chest pain, No palpitations  GASTROINTESTINAL: No abdominal pain, No epigastric pain. No nausea, No vomiting, No diarrhea, No constipation; [ x ] BM-  GENITOURINARY: No dysuria, No frequency, No urgency, No hematuria, No incontinence  NEUROLOGICAL: No headaches, No dizziness, No numbness, No tingling, No tremors, No weakness  EXTREMITIES: No Swelling, No Pain, No Edema  SKIN: [ x ] No itching, burning, rashes, or lesions   MUSCULOSKELETAL: No joint pain, No joint swelling; No muscle pain, No back pain, No extremity pain  PSYCHIATRIC: No depression, No anxiety, No mood swings, No difficulty sleeping at night  PAIN SCALE: [  ] None  [  ] Other-  ROS Unable to obtain due to: [  ] Dementia  [  ] Lethargy  [  ] Sedated  [  ] Non verbal  REST OF REVIEW OF SYSTEMS: [ x ] Normal     Vital Signs Last 24 Hrs  T(C): 37.1 (15 Robert 2023 04:15), Max: 38.1 (2023 18:10)  T(F): 98.8 (15 Robert 2023 04:15), Max: 100.6 (2023 18:10)  HR: 64 (15 Robert 2023 06:15) (59 - 101)  BP: 147/74 (15 Robert 2023 06:15) (133/72 - 185/100)  BP(mean): 122 (2023 15:37) (122 - 122)  RR: 18 (15 Robert 2023 06:15) (17 - 25)  SpO2: 93% (15 Robert 2023 06:15) (93% - 100%)    Parameters below as of 15 Robert 2023 06:15  Patient On (Oxygen Delivery Method): room air        CAPILLARY BLOOD GLUCOSE          I&O's Summary    PHYSICAL EXAM:  GENERAL:  [ x ] NAD, [ x ] Well appearing, [  ] Agitated, [  ] Drowsy, [  ] Lethargy, [  ] Confused   HEAD:  [ x ] Normal, [  ] Other  EYES:  [ x ] EOMI, [ x ] PERRLA, [ x ] Conjunctiva and sclera clear normal, [  ] Other, [  ] Pallor, [  ] Discharge  ENMT:  [ x ] Normal, [ x ] Moist mucous membranes, [  ] Good dentition, [  ] No thrush  NECK:  [ x ] Supple, [  ] No JVD, [ x ] Normal thyroid, [  ] Lymphadenopathy, [  ] Other  CHEST/LUNG:  [ x ] Clear to auscultation bilaterally, [ x ] Breath Sounds equal B/L / decreased, [  ] Poor effort, [ x ] No rales, [ x ] No rhonchi, [ x ] No wheezing  HEART:  [ x ] Regular rate and rhythm, [  ] Tachycardia, [  ] Bradycardia, [  ] Irregular, [ x ] No murmurs, No rubs, No gallops, [  ] PPM in place (Mfr:  )  ABDOMEN:  [ x ] Soft, [ x ] Nontender, [ x ] Nondistended, [ x ] No mass, [ x ] Bowel sounds present, [  ] Obese  NERVOUS SYSTEM:  [ x ] Alert & Oriented x3__, [ x ] Nonfocal, [  ] Confusion, [  ] Encephalopathic, [  ] Sedated, [  ] Unable to assess, [  ] Dementia, [  ] Other-  EXTREMITIES:  [ x ] 2+ Peripheral Pulses, No clubbing, No cyanosis,  [  ] Edema B/L lower EXT, [  ] PVD stasis skin changes B/L lower EXT, [  ] Wound  LYMPH:  No lymphadenopathy noted  SKIN:  [ x ] No rashes or lesions, [  ] Pressure ulcers, [  ] Ecchymosis, [  ] Skin tears, [  ] Other    DIET: Diet, Regular:   Low Sodium (06-15-23 @ 00:09)      LABS:                        12.1   8.20  )-----------( 228      ( 15 Robert 2023 05:15 )             35.6     15 Robert 2023 05:15    139    |  110    |  21     ----------------------------<  90     3.7     |  25     |  1.30     Ca    8.5        15 Robert 2023 05:15    TPro  6.3    /  Alb  3.3    /  TBili  1.7    /  DBili  x      /  AST  690    /  ALT  868    /  AlkPhos  201    15 Robert 2023 05:15    PT/INR - ( 2023 19:11 )   PT: 13.3 sec;   INR: 1.14 ratio         PTT - ( 2023 19:11 )  PTT:26.0 sec  Urinalysis Basic - ( 2023 18:00 )    Color: Dark Yellow / Appearance: Clear / S.019 / pH: x  Gluc: x / Ketone: Negative mg/dL  / Bili: Negative / Urobili: 1.0 mg/dL   Blood: x / Protein: Trace mg/dL / Nitrite: Negative   Leuk Esterase: Negative / RBC: x / WBC x   Sq Epi: x / Non Sq Epi: x / Bacteria: x                 Anemia Panel:      Thyroid Panel:        Lipase, Serum: 280 U/L (23 @ 15:33)          RADIOLOGY & ADDITIONAL TESTS: _______      HEALTH ISSUES - PROBLEM Dx:  Fever of unknown origin    Bipolar disorder    GERD (gastroesophageal reflux disease)    KIRA (acute kidney injury)    HTN (hypertension)    HLD (hyperlipidemia)    Transaminitis    Asthma    Need for prophylactic measure          Consultant(s) Notes Reviewed:  [ x ] YES     Care Discussed with [ x ] Consultants, [ x ] Patient, [ x ] Family, [  ] HCP, [ x ] , [  ] Social Service, [ x ] RN, [  ] Physical Therapy, [  ] Palliative Care Team  DVT PPX: [  ] Lovenox, [  ] SC Heparin, [  ] Coumadin, [  ] Xarelto, [  ] Eliquis, [  ] Pradaxa, [  ] IV Heparin drip, [  ] SCD, [  ] Ambulation, [  ] Contraindicated 2/2 GI Bleed, [  ] Contraindicated 2/2  Bleed, [  ] Contraindicated 2/2 Brain Bleed  Advanced Directive: [  ] None, [  ] DNR/DNI

## 2023-06-15 NOTE — PROGRESS NOTE ADULT - PROBLEM SELECTOR PLAN 3
Well controlled at on current regimen   - Home medications of Lamictal, Seroquel, Ativan and Prozac   - Continue home medications Well controlled at on current regimen   - Home medications of Lamictal, Seroquel, Ativan and Prozac   - Continue home medications  - patient refusing some inpatient meds including lamictel as he does not take generic because they don't work  reports he will have his wife bring in home meds to be verified Presenting Cr 1.50 - 1.3 ---> s/p IV Contrast in ER   - Per outpatient records, most recent Cr 1.29, with prior levels at 1.32   - Encouraged PO intake  -IVF to continue -BMP in AM   Avoid Nephrotoxics   -Renal dose meds

## 2023-06-15 NOTE — PHYSICAL THERAPY INITIAL EVALUATION ADULT - MANUAL MUSCLE TESTING RESULTS, REHAB EVAL
B UE and B LE grossly 3+/5 via functional assessment, no resistance applied./grossly assessed due to

## 2023-06-15 NOTE — CONSULT NOTE ADULT - ASSESSMENT
63 yo M with PMHx of HTN, HLD, Bipolar, GERD, Spivey's Esophagus Asthma, and Abnormal LFTs presented to the ED with 1x day hx of epigastric pain and new onset fever. In the ED, pt was found to be tachypneic, tachycardic, and febrile to 100.6. pt found to have elevated LFTs and Alk phos in outpatient workup since February. Dr. Ewing (PCP) attributed this to recent viral illness, but subsequent labs not checked.   Temp: 100.6, RR: 20, SpO2: 97% on RA    Labs:  WBC 11.71, Lactate 2.0 < 3.0, Cr 1.50, Alk phos 208, ,      RECOMMENDATIONS  Very complicated story with what appears to be a chronic elevation of serum aminotransferases (LFTs), now with fever, tachycardia, elevated lactic acid   Immediate issue is regarding an acute bacterial process on top of what appears to be a chronic issue. PT is clinically stable, only slight wbc elevation and minimal temp elevation, for now monitor off abx pending any new micro data.    From a chronic issue several viral, immune, metabolic issues so will order testing to try to identify etiology    Thank you for consulting us and involving us in the management of this most interesting and challenging case.  We will follow along in the care of this patient. Please call us at [phone] or text me directly on my cell# at [phone] with any concerns.  
elevated lfts  fever  abdominal pain    patient s/p RRT for abdominal pain  CT angio pending  if negative, will need mrcp  suspect transient biliary obstruction 2/2 duodenal diverticulum  npo for now  will follow

## 2023-06-15 NOTE — PROGRESS NOTE ADULT - PROBLEM SELECTOR PLAN 5
Well controlled on outpatient regimen  - Presenting with elevated pressure of 185/100, likely reactive to anxiety  - S/p Ativan 1mg with drop in BP to 157/75  - Continue home medications of Losartan and Doxazosin with hold parameter Well controlled on outpatient regimen  - Continue home medications of Losartan and Doxazosin with hold parameter

## 2023-06-15 NOTE — CARE COORDINATION ASSESSMENT. - NSPASTMEDSURGHISTORY_GEN_ALL_CORE_FT
PAST MEDICAL & SURGICAL HISTORY:  KIRA (acute kidney injury)      Need for prophylactic measure      Asthma      Bipolar disorder      Transaminitis      HLD (hyperlipidemia)      HTN (hypertension)      GERD (gastroesophageal reflux disease)      No pertinent past surgical history

## 2023-06-15 NOTE — ED ADULT NURSE REASSESSMENT NOTE - NSFALLUNIVINTERV_ED_ALL_ED
Bed/Stretcher in lowest position, wheels locked, appropriate side rails in place/Call bell, personal items and telephone in reach/Instruct patient to call for assistance before getting out of bed/chair/stretcher/Non-slip footwear applied when patient is off stretcher/[place] to call system/Physically safe environment - no spills, clutter or unnecessary equipment/Purposeful proactive rounding/Room/bathroom lighting operational, light cord in reach

## 2023-06-15 NOTE — PROGRESS NOTE ADULT - PROBLEM SELECTOR PLAN 7
Hx of Transaminitis since 2/23: AST/ALT - 74/245, AP - 237  -   Alkphos 208  - No hx of alcohol abuse  - Prior elevations attributed to recent viral illness  - Hepatitis C sent, follow up results   - US Abdomen: Focal hyperechoic right hepatic lobe lesion measures 1.0 x 0.9 x 0.8 cm, possibility is a hepatic hemangioma  - Continue to monitor   - GI consulted, Dr. Mclaughlin, follow up recommendations Noted prior intolerance to unspecified statin in outpatient notes   - Transitioned to Rosuvastatin 20 mg qD with improvement of myalgias   - Will hold statin given worsening LFTs and hx of myalgias on statin other than Rosuvastatin   - Monitor for signs of myalgias   - AM Lipid panel

## 2023-06-15 NOTE — PHYSICAL THERAPY INITIAL EVALUATION ADULT - PERTINENT HX OF CURRENT PROBLEM, REHAB EVAL
As per EMR, "63 yo M with PMHx of HTN, HLD, Bipolar, GERD, Jhon's Esophagus Asthma, and Abnormal LFTs presents to the ED with 1x day hx of epigastric pain and new onset fever."

## 2023-06-15 NOTE — PROGRESS NOTE ADULT - ASSESSMENT
61 yo M with PMHx of HTN, HLD, Bipolar, GERD, Jhon's Esphagous, Asthma, and Abnormal LFTs presents to the ED with 1x day hx of epigastric pain and new onset fever, admitted for workup.

## 2023-06-15 NOTE — PHYSICAL THERAPY INITIAL EVALUATION ADULT - BALANCE TRAINING, PT EVAL
Patient will demonstrate good minus balance to be able to perform functional mobility effectively, within 3 to 5 sessions.

## 2023-06-15 NOTE — CARE COORDINATION ASSESSMENT. - OTHER PERTINENT DISCHARGE PLANNING INFORMATION:
CM met with the patient at the bedside and explained role of CM and transition planning. Patient verbalized understanding. CM provided direct contact/resource folder and remains available. Pt  resides in a house with spouse, has 5 steps to enter, 13 to upstairs and 13 to basement. Pt stated that he is independent with negotiating stairs, uses rails for safety.   CM met with the patient at the bedside and explained role of CM and transition planning. Patient verbalized understanding. CM provided direct contact/resource folder and remains available. Pt  resides in a house with spouse, has 5 steps to enter, 13 to upstairs and 13 to basement. Pt stated that he is independent with negotiating stairs, uses rails for safety. Pt stated that he is independent with ADL's and managing his health and does not have a caregiver.  Denies any DME or prior home care services. Pharmacy [facility], [address], [place].

## 2023-06-16 LAB
ALBUMIN SERPL ELPH-MCNC: 3 G/DL — LOW (ref 3.3–5)
ALP SERPL-CCNC: 264 U/L — HIGH (ref 40–120)
ALT FLD-CCNC: 566 U/L — HIGH (ref 12–78)
ANION GAP SERPL CALC-SCNC: 4 MMOL/L — LOW (ref 5–17)
AST SERPL-CCNC: 262 U/L — HIGH (ref 15–37)
BILIRUB DIRECT SERPL-MCNC: 2.4 MG/DL — HIGH (ref 0–0.3)
BILIRUB INDIRECT FLD-MCNC: 0.6 MG/DL — SIGNIFICANT CHANGE UP (ref 0.2–1)
BILIRUB SERPL-MCNC: 3 MG/DL — HIGH (ref 0.2–1.2)
BUN SERPL-MCNC: 18 MG/DL — SIGNIFICANT CHANGE UP (ref 7–23)
CALCIUM SERPL-MCNC: 8.8 MG/DL — SIGNIFICANT CHANGE UP (ref 8.5–10.1)
CHLORIDE SERPL-SCNC: 110 MMOL/L — HIGH (ref 96–108)
CMV IGG FLD QL: <0.2 U/ML — SIGNIFICANT CHANGE UP
CMV IGG SERPL-IMP: NEGATIVE — SIGNIFICANT CHANGE UP
CMV IGM FLD-ACNC: <8 AU/ML — SIGNIFICANT CHANGE UP
CMV IGM SERPL QL: NEGATIVE — SIGNIFICANT CHANGE UP
CO2 SERPL-SCNC: 27 MMOL/L — SIGNIFICANT CHANGE UP (ref 22–31)
CREAT SERPL-MCNC: 1.1 MG/DL — SIGNIFICANT CHANGE UP (ref 0.5–1.3)
CULTURE RESULTS: SIGNIFICANT CHANGE UP
EBV EA AB SER IA-ACNC: 7.3 U/ML — SIGNIFICANT CHANGE UP
EBV EA AB TITR SER IF: POSITIVE
EBV EA IGG SER-ACNC: NEGATIVE — SIGNIFICANT CHANGE UP
EBV NA IGG SER IA-ACNC: 108 U/ML — HIGH
EBV PATRN SPEC IB-IMP: SIGNIFICANT CHANGE UP
EBV VCA IGG AVIDITY SER QL IA: POSITIVE
EBV VCA IGM SER IA-ACNC: 96.3 U/ML — HIGH
EBV VCA IGM SER IA-ACNC: <10 U/ML — SIGNIFICANT CHANGE UP
EBV VCA IGM TITR FLD: NEGATIVE — SIGNIFICANT CHANGE UP
EGFR: 75 ML/MIN/1.73M2 — SIGNIFICANT CHANGE UP
GLUCOSE SERPL-MCNC: 103 MG/DL — HIGH (ref 70–99)
HAV IGM SER-ACNC: SIGNIFICANT CHANGE UP
HBV CORE IGM SER-ACNC: SIGNIFICANT CHANGE UP
HBV DNA # SERPL NAA+PROBE: SIGNIFICANT CHANGE UP
HBV DNA SERPL NAA+PROBE-LOG#: SIGNIFICANT CHANGE UP LOGIU/ML
HBV SURFACE AG SER-ACNC: SIGNIFICANT CHANGE UP
HBV SURFACE AG SER-ACNC: SIGNIFICANT CHANGE UP
HCT VFR BLD CALC: 36 % — LOW (ref 39–50)
HCV AB S/CO SERPL IA: 0.13 S/CO — SIGNIFICANT CHANGE UP (ref 0–0.99)
HCV AB SERPL-IMP: SIGNIFICANT CHANGE UP
HCV RNA FLD QL NAA+PROBE: SIGNIFICANT CHANGE UP
HCV RNA SPEC QL PROBE+SIG AMP: SIGNIFICANT CHANGE UP
HGB BLD-MCNC: 11.8 G/DL — LOW (ref 13–17)
IRON SATN MFR SERPL: 20 UG/DL — LOW (ref 45–165)
IRON SATN MFR SERPL: 7 % — LOW (ref 16–55)
MCHC RBC-ENTMCNC: 30.1 PG — SIGNIFICANT CHANGE UP (ref 27–34)
MCHC RBC-ENTMCNC: 32.8 GM/DL — SIGNIFICANT CHANGE UP (ref 32–36)
MCV RBC AUTO: 91.8 FL — SIGNIFICANT CHANGE UP (ref 80–100)
NRBC # BLD: 0 /100 WBCS — SIGNIFICANT CHANGE UP (ref 0–0)
PLATELET # BLD AUTO: 218 K/UL — SIGNIFICANT CHANGE UP (ref 150–400)
POTASSIUM SERPL-MCNC: 3.6 MMOL/L — SIGNIFICANT CHANGE UP (ref 3.5–5.3)
POTASSIUM SERPL-SCNC: 3.6 MMOL/L — SIGNIFICANT CHANGE UP (ref 3.5–5.3)
PROT SERPL-MCNC: 6.2 G/DL — SIGNIFICANT CHANGE UP (ref 6–8.3)
RBC # BLD: 3.92 M/UL — LOW (ref 4.2–5.8)
RBC # FLD: 12.9 % — SIGNIFICANT CHANGE UP (ref 10.3–14.5)
SODIUM SERPL-SCNC: 141 MMOL/L — SIGNIFICANT CHANGE UP (ref 135–145)
SPECIMEN SOURCE: SIGNIFICANT CHANGE UP
TIBC SERPL-MCNC: 288 UG/DL — SIGNIFICANT CHANGE UP (ref 220–430)
UIBC SERPL-MCNC: 268 UG/DL — SIGNIFICANT CHANGE UP (ref 110–370)
WBC # BLD: 7.77 K/UL — SIGNIFICANT CHANGE UP (ref 3.8–10.5)
WBC # FLD AUTO: 7.77 K/UL — SIGNIFICANT CHANGE UP (ref 3.8–10.5)

## 2023-06-16 PROCEDURE — 74181 MRI ABDOMEN W/O CONTRAST: CPT | Mod: 26

## 2023-06-16 RX ADMIN — PANTOPRAZOLE SODIUM 40 MILLIGRAM(S): 20 TABLET, DELAYED RELEASE ORAL at 05:48

## 2023-06-16 RX ADMIN — LAMOTRIGINE 150 MILLIGRAM(S): 25 TABLET, ORALLY DISINTEGRATING ORAL at 05:51

## 2023-06-16 RX ADMIN — QUETIAPINE FUMARATE 50 MILLIGRAM(S): 200 TABLET, FILM COATED ORAL at 18:02

## 2023-06-16 RX ADMIN — PIPERACILLIN AND TAZOBACTAM 25 GRAM(S): 4; .5 INJECTION, POWDER, LYOPHILIZED, FOR SOLUTION INTRAVENOUS at 17:58

## 2023-06-16 RX ADMIN — PIPERACILLIN AND TAZOBACTAM 25 GRAM(S): 4; .5 INJECTION, POWDER, LYOPHILIZED, FOR SOLUTION INTRAVENOUS at 02:30

## 2023-06-16 RX ADMIN — LAMOTRIGINE 150 MILLIGRAM(S): 25 TABLET, ORALLY DISINTEGRATING ORAL at 18:02

## 2023-06-16 RX ADMIN — Medication 1 MILLIGRAM(S): at 18:01

## 2023-06-16 RX ADMIN — PANTOPRAZOLE SODIUM 40 MILLIGRAM(S): 20 TABLET, DELAYED RELEASE ORAL at 18:01

## 2023-06-16 RX ADMIN — LOSARTAN POTASSIUM 100 MILLIGRAM(S): 100 TABLET, FILM COATED ORAL at 05:48

## 2023-06-16 RX ADMIN — Medication 1 MILLIGRAM(S): at 05:51

## 2023-06-16 RX ADMIN — PIPERACILLIN AND TAZOBACTAM 25 GRAM(S): 4; .5 INJECTION, POWDER, LYOPHILIZED, FOR SOLUTION INTRAVENOUS at 12:49

## 2023-06-16 RX ADMIN — Medication 4 MILLIGRAM(S): at 05:48

## 2023-06-16 RX ADMIN — ENOXAPARIN SODIUM 40 MILLIGRAM(S): 100 INJECTION SUBCUTANEOUS at 05:52

## 2023-06-16 RX ADMIN — QUETIAPINE FUMARATE 50 MILLIGRAM(S): 200 TABLET, FILM COATED ORAL at 05:48

## 2023-06-16 NOTE — PROGRESS NOTE ADULT - ASSESSMENT
63 yo M with PMHx of HTN, HLD, Bipolar, GERD, Jhon's Esphagous, Asthma, and Abnormal LFTs presents to the ED with 1x day hx of epigastric pain and new onset fever, admitted for workup.

## 2023-06-16 NOTE — PROGRESS NOTE ADULT - PROBLEM SELECTOR PLAN 2
Hx of Transaminitis since 2/23: AST/ALT - 74/245, AP - 237-   AlkPhos 208  - US Abdomen: Focal hyperechoic right hepatic lobe lesion measures 1.0 x 0.9 x 0.8 cm, possibility is a hepatic hemangioma  -CT A/P with IVC - periportal edema  - MRCP performed -R/O Cholangitis/ CBD stone   - GI , Dr. Mclaughlin, d/w at detail. Hx of Transaminitis since 2/23: AST/ALT - 74/245, AP - 237-   AlkPhos 208  - US Abdomen: Focal hyperechoic right hepatic lobe lesion measures 1.0 x 0.9 x 0.8 cm, possibility is a hepatic hemangioma  -CT A/P with IVC - periportal edema  - MRCP performed - will plan for ERCP monday   - GI , Dr. Mclaughlin, d/w at detail. Hx of Transaminitis since 2/23: AST/ALT - 74/245, AP - 237-   AlkPhos 208  - US Abdomen: Focal hyperechoic right hepatic lobe lesion measures 1.0 x 0.9 x 0.8 cm, possibility is a hepatic hemangioma  -CT A/P with IVC - periportal edema  - MRCP performed - will plan for ERCP Monday   - GI , Dr. Mclaughlin, d/w at detail.-Clear liquid

## 2023-06-16 NOTE — PROVIDER CONTACT NOTE (OTHER) - BACKGROUND
Patient admitted with fever.
Pt admitted for epigastric & ABD pain. Pt has been NPO since Midnight for MRCP procedure today.

## 2023-06-16 NOTE — PROGRESS NOTE ADULT - PROBLEM SELECTOR PLAN 4
Presenting with epigastric pain worse after eating   - Known hx of GERD with recent diagnosis of Jhon's Esophagus   - EGD performed 4/28/23: significant for a 2 cm hiatal hernia with grade 3 reflux esophagitis and an irregular Z-line intestinal metaplasia consistent with Spivey's esophagus. No dysplasia. Large diverticulum in the second portion of the duodenum  - Continue Pantoprazole 40 mg BID while inpatient   Repeat CT scan angio  A/P-No evidence of acute mesenteric ischemia.

## 2023-06-16 NOTE — PROGRESS NOTE ADULT - PROBLEM SELECTOR PLAN 1
fever in ER  100.6 ---> 101 F; - RRT yesterday for abdominal pain and episode of Tmax 103    - No obvious signs/source of infection - WBC WNL    - Blood  c/s x 2 and urine cultures to  follow - NGTD   - CTA neg for acute mesenteric ischemia  - started zosyn   - Infectious Disease consulted, Dr. Bahena,   MRCP performed; will f/u final read fever in ER  100.6 ---> 101 F; - RRT yesterday for abdominal pain and episode of Tmax 103    - No obvious signs/source of infection - WBC WNL    - Blood  c/s x 2 and urine cultures to  follow - NGTD   - CTA neg for acute mesenteric ischemia  - started zosyn   - Infectious Disease consulted, Dr. Bahena,   MRCP performed; gallbladder edema, possible pancreatitis/cholecystitis w/ CBD dilation to 9mm.   Clear liquid diet, plan for ERCP Monday fever in ER  100.6 ---> 101 F; - RRT yesterday for abdominal pain and episode of Tmax 103    - No obvious signs/source of infection - WBC WNL    - Blood  c/s x 2 and urine cultures to  follow - NGTD   - CTA neg for acute mesenteric ischemia  -on IV  zosyn q 8 hrs   - ID -Dr. CHERI Lopez -ON Abx   MRCP performed; gallbladder edema, possible pancreatitis/cholecystitis w/ CBD dilation to 9mm.   Clear liquid diet, plan for ERCP Monday fever in ER  100.6 ---> 101 F; - RRT yesterday for abdominal pain and episode of Tmax 103    - Likely 2/2 CBD stone obstruction ? Cholangitis - WBC WNL    -? Gall stone Pancreatitis   - Blood  c/s x 2 and urine cultures to  follow - NGTD   - CTA neg for acute mesenteric ischemia  -on IV  zosyn q 8 hrs   - ID -Dr. CHERI Lopez -ON Abx   MRCP performed; gallbladder edema, possible pancreatitis/cholecystitis w/ CBD dilation to 9mm.   Clear liquid diet, plan for ERCP Monday

## 2023-06-16 NOTE — PROVIDER CONTACT NOTE (OTHER) - SITUATION
Patient refused most of his medications stating he only takes the brand meds. VS: BP: 147/74, HR: 64, OxSat: 93%. Temp: 98.8.
Pt requesting to drink something because mouth is dry

## 2023-06-16 NOTE — PROGRESS NOTE ADULT - PROBLEM SELECTOR PLAN 6
Well controlled at on current regimen   - Home medications of Lamictal, Seroquel, Ativan and Prozac   - Continue home medications-wife to bring all meds from Home d/w clinical Pharmacy

## 2023-06-16 NOTE — PROGRESS NOTE ADULT - PROBLEM SELECTOR PLAN 5
Well controlled on outpatient regimen  - Continue home medications of Losartan and Doxazosin with hold parameter

## 2023-06-16 NOTE — CASE MANAGEMENT PROGRESS NOTE - NSCMPROGRESSNOTE_GEN_ALL_CORE
Discussed patient in AM rounds. Patient is NPO today and scheduled for MRCP today. PT recommended RW and HCPT. RW has been delivered to bedside by [facility] Sx rep today and HC referral initiated. Reviewed [facility] list with patient but patient states he wants to wait and see if he really needs PT when he is ready for DC. CM will remain available.

## 2023-06-16 NOTE — PROGRESS NOTE ADULT - PROBLEM SELECTOR PLAN 7
Noted prior intolerance to unspecified statin in outpatient notes   - Transitioned to Rosuvastatin 20 mg qD with improvement of myalgias   - Will hold statin given worsening LFTs and hx of myalgias on statin other than Rosuvastatin   - Monitor for signs of myalgias   - AM Lipid panel

## 2023-06-16 NOTE — PROGRESS NOTE ADULT - PROBLEM SELECTOR PLAN 3
Presenting Cr 1.50 - 1.1 this AM ---> s/p IV Contrast in ER   - Encouraged PO intake  -IVF to continue -BMP in AM   Avoid Nephrotoxics   -Renal dose meds Presenting Cr 1.50 - 1.1 this AM ---> s/p IV Contrast in ER   - Encouraged PO intake  -IVF to continue -BMP in AM   Avoid Nephrotoxics   -Renal dose meds, IVF

## 2023-06-16 NOTE — PROGRESS NOTE ADULT - SUBJECTIVE AND OBJECTIVE BOX
[facility], Division of Infectious Diseases  ELIOT He Y. Patel, S. Shah, G. [facility]  [phone]    Name: AMELIA SHARMA  Age: 62y  Gender: Male  MRN: [id number]    Interval History:  RRT yesterday PM for severe midepigastric pain. Patient reported the pain feels like "pressure" and wishes to have a BM.   His initial blood pressure systolic 190s --> 200/100 --> 138/48---> 201/98. Patient received STAT dose of Dilaudid 0.5mg IVP x1.   Pt also noted to have rectal temp 103.4F. Was started on zosyn  Pt seen this AM  Feeling much better after having BM  Notes reviewed    Antibiotics:  piperacillin/tazobactam IVPB.. 3.375 Gram(s) IV Intermittent every 8 hours      Medications:  acetaminophen     Tablet .. 650 milliGRAM(s) Oral every 6 hours PRN  albuterol    90 MICROgram(s) HFA Inhaler 2 Puff(s) Inhalation every 6 hours PRN  aluminum hydroxide/magnesium hydroxide/simethicone Suspension 30 milliLiter(s) Oral every 4 hours PRN  aspirin  chewable 81 milliGRAM(s) Oral daily  cholecalciferol 2000 Unit(s) Oral daily  doxazosin 4 milliGRAM(s) Oral daily  enoxaparin Injectable 40 milliGRAM(s) SubCutaneous every 24 hours  Fluoxetine 40 milliGRAM(s) 40 milliGRAM(s) Oral <User Schedule>  fluticasone propionate 50 MICROgram(s)/spray Nasal Spray 1 Spray(s) Both Nostrils two times a day PRN  lamoTRIgine 150 milliGRAM(s) Oral two times a day  LORazepam     Tablet 1 milliGRAM(s) Oral two times a day  losartan 100 milliGRAM(s) Oral daily  melatonin 3 milliGRAM(s) Oral at bedtime PRN  pantoprazole    Tablet 40 milliGRAM(s) Oral two times a day  piperacillin/tazobactam IVPB.. 3.375 Gram(s) IV Intermittent every 8 hours  QUEtiapine 50 milliGRAM(s) Oral two times a day  sodium chloride 0.9%. 1000 milliLiter(s) IV Continuous <Continuous>      Review of Systems:  A 10-point review of systems was obtained.   Review of systems otherwise negative except as previously noted.    Allergies: No Known Allergies    For details regarding the patient's past medical history, social history, family history, and other miscellaneous elements, please refer the initial infectious diseases consultation and/or the admitting history and physical examination for this admission.    Objective:  Vitals:   T(C): 36.5 (23 @ 04:58), Max: 39.7 (06-15-23 @ 17:10)  HR: 57 (23 @ 10:55) (54 - 67)  BP: 133/73 (23 @ 10:55) (133/73 - 165/78)  RR: 18 (23 @ 10:55) (18 - 18)  SpO2: 95% (23 @ 10:55) (95% - 98%)    Physical Examination:  General: no acute distress  HEENT: NC/AT, EOMI,   Cardio: S1, S2 heard, RRR, no murmurs  Resp: decreased breath sounds on NC  Abd: soft, NT, ND,  Ext: no edema or cyanosis  Skin: warm, dry, no visible rash      Laboratory Studies:  CBC:                       11.8   7.77  )-----------( 218      ( 2023 08:43 )             36.0     CMP:     141  |  110<H>  |  18  ----------------------------<  103<H>  3.6   |  27  |  1.10    Ca    8.8      2023 08:43    TPro  6.2  /  Alb  3.0<L>  /  TBili  3.0<H>  /  DBili  2.4<H>  /  AST  262<H>  /  ALT  566<H>  /  AlkPhos  264<H>      LIVER FUNCTIONS - ( 2023 08:43 )  Alb: 3.0 g/dL / Pro: 6.2 g/dL / ALK PHOS: 264 U/L / ALT: 566 U/L / AST: 262 U/L / GGT: x           Urinalysis Basic - ( 2023 18:00 )    Color: Dark Yellow / Appearance: Clear / S.019 / pH: x  Gluc: x / Ketone: Negative mg/dL  / Bili: Negative / Urobili: 1.0 mg/dL   Blood: x / Protein: Trace mg/dL / Nitrite: Negative   Leuk Esterase: Negative / RBC: x / WBC x   Sq Epi: x / Non Sq Epi: x / Bacteria: x        Microbiology: reviewed    Culture - Urine (collected 23 @ 18:00)  Source: Clean Catch Clean Catch (Midstream)  Final Report (23 @ 07:25):    <10,000 CFU/mL Normal Urogenital Rebecca    Culture - Blood (collected 23 @ 16:57)  Source: .Blood Blood-Peripheral  Preliminary Report (23 @ 01:02):    No growth to date.    Culture - Blood (collected 23 @ 16:51)  Source: .Blood Blood-Peripheral  Preliminary Report (23 @ 01:02):    No growth to date.          Radiology: reviewed    < from: CT Angio Abdomen and Pelvis w/ IV Cont (06.15.23 @ 16:48) >    ACC: [id number] EXAM:  CT ANGIO ABD PELV (W)AW IC   ORDERED BY: ULICES SAWYER     PROCEDURE DATE:  06/15/2023          INTERPRETATION:  CLINICAL INFORMATION: Abdominal pain. Evaluate   mesenteric ischemia.    COMPARISON: CT abdomen pelvis 2023.    CONTRAST/COMPLICATIONS:  IV Contrast: Omnipaque 350  90 cc administered   10 cc discarded  Oral Contrast: NONE  Complications: None reported at time of study completion    PROCEDURE:  CT Angiography of the Abdomen and Pelvis was performed.  Arterial and venous phases were acquired.  Sagittal and coronal reformats were performed as well as 3D (MIP)   reconstructions.    FINDINGS:  LOWER CHEST: Within normal limits.    LIVER: Within normal limits.  BILE DUCTS: Normal caliber.  GALLBLADDER: Within normal limits.  SPLEEN: Within normal limits.  PANCREAS: Within normal limits.  ADRENALS: Within normal limits.  KIDNEYS/URETERS: No hydronephrosis. Bilateral renal cortical and   parapelvic cysts.    BLADDER: Small right posterior bladder diverticulum.  REPRODUCTIVE ORGANS: Prostate is enlarged.    BOWEL: No bowel obstruction. Appendix is normal. Periampullary duodenal   diverticulum. No abnormal bowel enhancement, pneumatosis, or portal   venous gas.  PERITONEUM: No ascites.  VESSELS: Mild atherosclerotic changes. Celiac axis, SMA, and SUGAR are   patent. Patent mesenteric veins.  RETROPERITONEUM/LYMPH NODES: No lymphadenopathy.  ABDOMINAL WALL: Small fat-containing umbilical hernia.  BONES: Degenerative changes. Multilevel vertebral hemangiomas.    IMPRESSION:  No evidence of acute mesenteric ischemia.        --- End of Report ---            DEB STEVENS MD; Attending Radiologist  This document has been electronically signed. Robert 15 2023  5:16PM    < end of copied text >

## 2023-06-16 NOTE — PROGRESS NOTE ADULT - SUBJECTIVE AND OBJECTIVE BOX
[facility]  Osman Chao PA-C  [address] [place] [address]  [phone]      INTERVAL HPI/OVERNIGHT EVENTS:  Pt s/e  Reports abdominal pain  Planned for MRCP    MEDICATIONS  (STANDING):  aspirin  chewable 81 milliGRAM(s) Oral daily  cholecalciferol 2000 Unit(s) Oral daily  doxazosin 4 milliGRAM(s) Oral daily  enoxaparin Injectable 40 milliGRAM(s) SubCutaneous every 24 hours  Fluoxetine 40 milliGRAM(s) 40 milliGRAM(s) Oral <User Schedule>  lamoTRIgine 150 milliGRAM(s) Oral two times a day  LORazepam     Tablet 1 milliGRAM(s) Oral two times a day  losartan 100 milliGRAM(s) Oral daily  pantoprazole    Tablet 40 milliGRAM(s) Oral two times a day  piperacillin/tazobactam IVPB.. 3.375 Gram(s) IV Intermittent every 8 hours  QUEtiapine 50 milliGRAM(s) Oral two times a day  sodium chloride 0.9%. 1000 milliLiter(s) (75 mL/Hr) IV Continuous <Continuous>    MEDICATIONS  (PRN):  acetaminophen     Tablet .. 650 milliGRAM(s) Oral every 6 hours PRN Temp greater or equal to 38C (100.4F), Mild Pain (1 - 3)  albuterol    90 MICROgram(s) HFA Inhaler 2 Puff(s) Inhalation every 6 hours PRN for bronchospasm  aluminum hydroxide/magnesium hydroxide/simethicone Suspension 30 milliLiter(s) Oral every 4 hours PRN Dyspepsia  fluticasone propionate 50 MICROgram(s)/spray Nasal Spray 1 Spray(s) Both Nostrils two times a day PRN Congestion  melatonin 3 milliGRAM(s) Oral at bedtime PRN Insomnia      Allergies    No Known Allergies      PHYSICAL EXAM:   Vital Signs:  Vital Signs Last 24 Hrs  T(C): 37.1 (2023 12:00), Max: 39.7 (15 Robert 2023 17:10)  T(F): 98.7 (2023 12:00), Max: 103.4 (15 Robert 2023 17:10)  HR: 58 (2023 12:00) (54 - 67)  BP: 133/70 (2023 12:00) (133/70 - 165/78)  BP(mean): --  RR: 18 (2023 12:00) (18 - 18)  SpO2: 98% (2023 12:00) (95% - 98%)    Parameters below as of 2023 12:00  Patient On (Oxygen Delivery Method): nasal cannula  O2 Flow (L/min): 2    Daily     Daily Weight in k (2023 04:58)    GENERAL:  Appears stated age  HEENT:  NC/AT  CHEST:  Full & symmetric excursion  HEART:  Regular rhythm  ABDOMEN:  Soft, +tender epigastric, non-distended  EXTEREMITIES:  no cyanosis  SKIN:  No rash  NEURO:  Alert      LABS:                        11.8   7.77  )-----------( 218      ( 2023 08:43 )             36.0     06-16    141  |  110<H>  |  18  ----------------------------<  103<H>  3.6   |  27  |  1.10    Ca    8.8      2023 08:43    TPro  6.2  /  Alb  3.0<L>  /  TBili  3.0<H>  /  DBili  2.4<H>  /  AST  262<H>  /  ALT  566<H>  /  AlkPhos  264<H>  06-16    PT/INR - ( 15 Robert 2023 14:45 )   PT: 13.7 sec;   INR: 1.17 ratio         PTT - ( 15 Robert 2023 14:45 )  PTT:31.9 sec  Urinalysis Basic - ( 2023 18:00 )    Color: Dark Yellow / Appearance: Clear / S.019 / pH: x  Gluc: x / Ketone: Negative mg/dL  / Bili: Negative / Urobili: 1.0 mg/dL   Blood: x / Protein: Trace mg/dL / Nitrite: Negative   Leuk Esterase: Negative / RBC: x / WBC x   Sq Epi: x / Non Sq Epi: x / Bacteria: x    IMAGING:  < from: CT Angio Abdomen and Pelvis w/ IV Cont (06.15.23 @ 16:48) >    ACC: [id number] EXAM:  CT ANGIO ABD PELV (W)AW IC   ORDERED BY: ULICES SAWYER     PROCEDURE DATE:  06/15/2023          INTERPRETATION:  CLINICAL INFORMATION: Abdominal pain. Evaluate   mesenteric ischemia.    COMPARISON: CT abdomen pelvis 2023.    CONTRAST/COMPLICATIONS:  IV Contrast: Omnipaque 350  90 cc administered   10 cc discarded  Oral Contrast: NONE  Complications: None reported at time of study completion    PROCEDURE:  CT Angiography of the Abdomen and Pelvis was performed.  Arterial and venous phases were acquired.  Sagittal and coronal reformats were performed as well as 3D (MIP)   reconstructions.    FINDINGS:  LOWER CHEST: Within normal limits.    LIVER: Within normal limits.  BILE DUCTS: Normal caliber.  GALLBLADDER: Within normal limits.  SPLEEN: Within normal limits.  PANCREAS: Within normal limits.  ADRENALS: Within normal limits.  KIDNEYS/URETERS: No hydronephrosis. Bilateral renal cortical and   parapelvic cysts.    BLADDER: Small right posterior bladder diverticulum.  REPRODUCTIVE ORGANS: Prostate is enlarged.    BOWEL: No bowel obstruction. Appendix is normal. Periampullary duodenal   diverticulum. No abnormal bowel enhancement, pneumatosis, or portal   venous gas.  PERITONEUM: No ascites.  VESSELS: Mild atherosclerotic changes. Celiac axis, SMA, and SUGAR are   patent. Patent mesenteric veins.  RETROPERITONEUM/LYMPH NODES: No lymphadenopathy.  ABDOMINAL WALL: Small fat-containing umbilical hernia.  BONES: Degenerative changes. Multilevel vertebral hemangiomas.    IMPRESSION:  No evidence of acute mesenteric ischemia.        --- End of Report ---            DEB STEVENS MD; Attending Radiologist  This document has been electronically signed. Robert 15 2023  5:16PM    < end of copied text >

## 2023-06-16 NOTE — PROGRESS NOTE ADULT - SUBJECTIVE AND OBJECTIVE BOX
Patient is a 62y old  Male who presents with a chief complaint of Fever workup (15 Robert 2023 15:36)    HPI:  63 yo M with PMHx of HTN, HLD, Bipolar, GERD, Jhon's Esophagus Asthma, and Abnormal LFTs presents to the ED with 1x day hx of epigastric pain and new onset fever. Pt endorses feeling ache and heaviness in the epigastric region after he ate breakfast this morning. Noted laying down on his left side with improvement of symptoms, but persisted throughout the day. Pt states that pain worsened when he had a nonbloody bowel movement, with associated nausea, lightheadedness, and diaphoresis. Following this event pt was brought to the ED by his daughter in fear he was having a heart attack. In the ED, pt was found to be tachypneic, tachycardic, and febrile to 100.6. Pt was given IVF, Ativan, and Ofirmev with improvement of symptoms. Pt states that he no longer has the epigastric pain, but has experienced these symptoms in the past. Recently diagnosed with Jhon's Esophagus following EGD due to worsening acid reflux. Pt also noted to have large duodenal diverticulum during EGD. Endorses improving headache, dizziness, SOB, nausea  and distended abdomen throughout event. Pt has no complaints at this time, though appears anxious throughout discussion. Denies any recent sick contacts.     Of note, pt found to have elevated LFTs and Alkphos in outpatient workup since February. Dr. Ewing (PCP) attributed this to recent viral illness, but subsequent labs not checked.     ED Course:   Vitals: BP: 157/75, HR: 77, Temp: 100.6, RR: 20, SpO2: 97% on RA    Labs:  WBC 11.71, Lactate 2.0 < 3.0, Cr 1.50, Alkphos 208, ,    UA: Negative   CXR: Grossly normal as per personal read, official read pending   CT: Minimal periportal edema, nonspecific. Correlate with liver function tests. Otherwise, no acute abdominopelvic findings. No acute thoracic findings. Right lower lobe pulmonary nodule measuring 4 mm.  US Abdomen: Layering gallbladder sludge. No wall thickening or pericholecystic fluid. Focal hyperechoic right hepatic lobe lesion measures 1.0 x 0.9 x 0.8 cm, possibility is a hepatic hemangioma  EKG: NSR 85 bpm with prolonged /509   Received in the ED: Ofirmev, Pepcid 20 mg IVP, Ativan 1mg IVP, Zosyn, 1.5L NS bolus    (2023 23:41)    INTERVAL HPI:  ________  TODAY- Pt was seen and examined at bedside. Pt states that ___. Pt denies headache, dizziness, lightheadedness, fever, chills, body aches, CP, SOB, palpitations, abdominal pain, n/v, numbness/tingling. No other complaints at this time.     OVERNIGHT EVENTS: No acute overnight events.     Home Medications:  aspirin 81 mg oral capsule: 1 orally (15 Robert 2023 00:)  Ativan 1 mg oral tablet: 1 orally 2 times a day (15 Robert 2023 00:43)  cholecalciferol 50 mcg (2000 intl units) oral tablet: 1 orally once a day (15 Robert 2023 00:)  Cozaar 100 mg oral tablet: 1 orally once a day (15 Robert 2023 00:43)  doxazosin 4 mg oral tablet: 1 orally (15 Robert 2023 00:43)  fluticasone 50 mcg/inh nasal spray: 2 spray(s) in each nostril once a day as needed for  congestion (15 Robert 2023 01:45)  ipratropium-albuterol 0.5 mg-2.5 mg/3 mL inhalation solution: 1 unit(s) by nebulizer every 4 hours as needed for  bronchospasm (15 Robert 2023 01:45)  lamoTRIgine 200 mg oral tablet: 0.75 tab(s) orally 2 times a day (15 Robert 2023 01:45)  ProAir HFA 90 mcg/inh inhalation aerosol: 2 inhaled every 6 hours as needed for  bronchospasm (15 Robert 2023 01:45)  Protonix 40 mg oral delayed release tablet: 1 orally once a day (15 Robert 2023 01:41)  PROzac 40 mg oral capsule: 1 orally once a day Alternates qD and BID every other day (15 Robert 2023 01:45)  QUEtiapine 50 mg oral tablet, extended release: 1 orally 2 times a day (15 Robert 2023 01:45)  rosuvastatin 20 mg oral tablet: 1 orally once a day (15 Robert 2023 01:45)  ZyrTEC 10 mg oral tablet: 1 orally once a day (15 Robert 2023 01:45)      MEDICATIONS  (STANDING):  aspirin  chewable 81 milliGRAM(s) Oral daily  cholecalciferol 2000 Unit(s) Oral daily  doxazosin 4 milliGRAM(s) Oral daily  enoxaparin Injectable 40 milliGRAM(s) SubCutaneous every 24 hours  Fluoxetine 40 milliGRAM(s) 40 milliGRAM(s) Oral <User Schedule>  lamoTRIgine 150 milliGRAM(s) Oral two times a day  LORazepam     Tablet 1 milliGRAM(s) Oral two times a day  losartan 100 milliGRAM(s) Oral daily  pantoprazole    Tablet 40 milliGRAM(s) Oral two times a day  piperacillin/tazobactam IVPB.. 3.375 Gram(s) IV Intermittent every 8 hours  QUEtiapine 50 milliGRAM(s) Oral two times a day  sodium chloride 0.9%. 1000 milliLiter(s) (75 mL/Hr) IV Continuous <Continuous>    MEDICATIONS  (PRN):  acetaminophen     Tablet .. 650 milliGRAM(s) Oral every 6 hours PRN Temp greater or equal to 38C (100.4F), Mild Pain (1 - 3)  albuterol    90 MICROgram(s) HFA Inhaler 2 Puff(s) Inhalation every 6 hours PRN for bronchospasm  aluminum hydroxide/magnesium hydroxide/simethicone Suspension 30 milliLiter(s) Oral every 4 hours PRN Dyspepsia  fluticasone propionate 50 MICROgram(s)/spray Nasal Spray 1 Spray(s) Both Nostrils two times a day PRN Congestion  melatonin 3 milliGRAM(s) Oral at bedtime PRN Insomnia      No Known Allergies      Social History:  Lives: At home with Wife and Daughter   ADLs: Independent, walks independently however becoming more painful given osteoarthritis   Diet: Avoid carbonated beverages, spicy foods, and salt   Vaccination: COVID vaccinatedx3   Alcohol Use: Denies   Tobacco Use: Denies   Recreational Drug Use: Denies (2023 23:41)      REVIEW OF SYSTEMS:  CONSTITUTIONAL: No fever, No chills, No fatigue, No myalgia, No Body ache, No Weakness  EYES: No eye pain,  No visual disturbances, No discharge, No Redness  ENMT: No ear pain, No nose bleed, No vertigo; No sinus pain, No throat pain, No Congestion  NECK: No pain, No stiffness  RESPIRATORY: No cough, No wheezing, No hemoptysis, No shortness of breath  CARDIOVASCULAR: No chest pain, No palpitations  GASTROINTESTINAL: No abdominal pain, No epigastric pain. No nausea, No vomiting, No diarrhea, No constipation; [ x ] BM-  GENITOURINARY: No dysuria, No frequency, No urgency, No hematuria, No incontinence  NEUROLOGICAL: No headaches, No dizziness, No numbness, No tingling, No tremors, No weakness  EXTREMITIES: No Swelling, No Pain, No Edema  SKIN: [ x ] No itching, burning, rashes, or lesions   MUSCULOSKELETAL: No joint pain, No joint swelling; No muscle pain, No back pain, No extremity pain  PSYCHIATRIC: No depression, No anxiety, No mood swings, No difficulty sleeping at night  PAIN SCALE: [  ] None  [  ] Other-  ROS Unable to obtain due to: [  ] Dementia  [  ] Lethargy  [  ] Sedated  [  ] Non verbal  REST OF REVIEW OF SYSTEMS: [ x ] Normal     Vital Signs Last 24 Hrs  T(C): 36.5 (2023 04:58), Max: 39.7 (15 Robert 2023 17:10)  T(F): 97.7 (2023 04:58), Max: 103.4 (15 Robert 2023 17:10)  HR: 54 (2023 04:58) (54 - 67)  BP: 149/75 (2023 04:58) (119/75 - 165/78)  BP(mean): --  RR: 18 (2023 04:58) (18 - 18)  SpO2: 98% (2023 04:58) (95% - 98%)    Parameters below as of 2023 04:58  Patient On (Oxygen Delivery Method): nasal cannula  O2 Flow (L/min): 2      CAPILLARY BLOOD GLUCOSE      POCT Blood Glucose.: 95 mg/dL (15 Robert 2023 14:24)      I&O's Summary    15 Robert 2023 07:01  -  2023 07:00  --------------------------------------------------------  IN: 450 mL / OUT: 925 mL / NET: -475 mL      PHYSICAL EXAM:  GENERAL:  [ x ] NAD, [ x ] Well appearing, [  ] Agitated, [  ] Drowsy, [  ] Lethargy, [  ] Confused   HEAD:  [ x ] Normal, [  ] Other  EYES:  [ x ] EOMI, [ x ] PERRLA, [ x ] Conjunctiva and sclera clear normal, [  ] Other, [  ] Pallor, [  ] Discharge  ENMT:  [ x ] Normal, [ x ] Moist mucous membranes, [  ] Good dentition, [  ] No thrush  NECK:  [ x ] Supple, [  ] No JVD, [ x ] Normal thyroid, [  ] Lymphadenopathy, [  ] Other  CHEST/LUNG:  [ x ] Clear to auscultation bilaterally, [ x ] Breath Sounds equal B/L / decreased, [  ] Poor effort, [ x ] No rales, [ x ] No rhonchi, [ x ] No wheezing  HEART:  [ x ] Regular rate and rhythm, [  ] Tachycardia, [  ] Bradycardia, [  ] Irregular, [ x ] No murmurs, No rubs, No gallops, [  ] PPM in place (Mfr:  )  ABDOMEN:  [ x ] Soft, [ x ] Nontender, [ x ] Nondistended, [ x ] No mass, [ x ] Bowel sounds present, [  ] Obese  NERVOUS SYSTEM:  [ x ] Alert & Oriented x3__, [ x ] Nonfocal, [  ] Confusion, [  ] Encephalopathic, [  ] Sedated, [  ] Unable to assess, [  ] Dementia, [  ] Other-  EXTREMITIES:  [ x ] 2+ Peripheral Pulses, No clubbing, No cyanosis,  [  ] Edema B/L lower EXT, [  ] PVD stasis skin changes B/L lower EXT, [  ] Wound  LYMPH:  No lymphadenopathy noted  SKIN:  [ x ] No rashes or lesions, [  ] Pressure ulcers, [  ] Ecchymosis, [  ] Skin tears, [  ] Other    DIET: Diet, NPO after Midnight:      NPO Start Date: 15-Robert-2023,   NPO Start Time: 23:59 (06-15-23 @ 17:48)  Diet, Regular:   Low Sodium (06-15-23 @ 00:09)      LABS:                        13.1   7.97  )-----------( 261      ( 15 Robert 2023 14:45 )             38.7     15 Robert 2023 14:45    138    |  106    |  18     ----------------------------<  104    3.5     |  24     |  1.40     Ca    9.8        15 Robert 2023 14:45    TPro  7.3    /  Alb  3.9    /  TBili  3.3    /  DBili  x      /  AST  415    /  ALT  804    /  AlkPhos  228    15 Robert 2023 14:45    PT/INR - ( 15 Robert 2023 14:45 )   PT: 13.7 sec;   INR: 1.17 ratio         PTT - ( 15 Robert 2023 14:45 )  PTT:31.9 sec  Urinalysis Basic - ( 2023 18:00 )    Color: Dark Yellow / Appearance: Clear / S.019 / pH: x  Gluc: x / Ketone: Negative mg/dL  / Bili: Negative / Urobili: 1.0 mg/dL   Blood: x / Protein: Trace mg/dL / Nitrite: Negative   Leuk Esterase: Negative / RBC: x / WBC x   Sq Epi: x / Non Sq Epi: x / Bacteria: x      Culture Results:   <10,000 CFU/mL Normal Urogenital Rebecca ( @ 18:00)  Culture Results:   No growth to date. ( @ 16:57)  Culture Results:   No growth to date. ( @ 16:51)    culture blood  -- Clean Catch Clean Catch (Midstream)  @ 18:00    culture urine  --   @ 18:00  culture blood  -- .Blood Blood-Peripheral  @ 16:57    culture urine  --   @ 16:57  culture blood  -- .Blood Blood-Peripheral  @ 16:51    culture urine  --   @ 16:51          Culture - Urine (collected 2023 18:00)  Source: Clean Catch Clean Catch (Midstream)  Final Report (2023 07:25):    <10,000 CFU/mL Normal Urogenital Rebecca    Culture - Blood (collected 2023 16:57)  Source: .Blood Blood-Peripheral  Preliminary Report (2023 01:02):    No growth to date.    Culture - Blood (collected 2023 16:51)  Source: .Blood Blood-Peripheral  Preliminary Report (2023 01:02):    No growth to date.       Anemia Panel:      Thyroid Panel:        Lipase, Serum: 280 U/L (23 @ 15:33)          RADIOLOGY & ADDITIONAL TESTS: _______      HEALTH ISSUES - PROBLEM Dx:  Bipolar disorder    GERD (gastroesophageal reflux disease)    KIRA (acute kidney injury)    HTN (hypertension)    HLD (hyperlipidemia)    Transaminitis    Asthma    Need for prophylactic measure    Fever          Consultant(s) Notes Reviewed:  [ x ] YES     Care Discussed with [ x ] Consultants, [ x ] Patient, [ x ] Family, [  ] HCP, [ x ] , [  ] Social Service, [ x ] RN, [  ] Physical Therapy, [  ] Palliative Care Team  DVT PPX: [  ] Lovenox, [  ] SC Heparin, [  ] Coumadin, [  ] Xarelto, [  ] Eliquis, [  ] Pradaxa, [  ] IV Heparin drip, [  ] SCD, [  ] Ambulation, [  ] Contraindicated 2/2 GI Bleed, [  ] Contraindicated 2/2  Bleed, [  ] Contraindicated 2/2 Brain Bleed  Advanced Directive: [  ] None, [  ] DNR/DNI Patient is a 62y old  Male who presents with a chief complaint of Fever workup (15 Robert 2023 15:36)    HPI:  61 yo M with PMHx of HTN, HLD, Bipolar, GERD, Jhon's Esophagus Asthma, and Abnormal LFTs presents to the ED with 1x day hx of epigastric pain and new onset fever. Pt endorses feeling ache and heaviness in the epigastric region after he ate breakfast this morning. Noted laying down on his left side with improvement of symptoms, but persisted throughout the day. Pt states that pain worsened when he had a nonbloody bowel movement, with associated nausea, lightheadedness, and diaphoresis. Following this event pt was brought to the ED by his daughter in fear he was having a heart attack. In the ED, pt was found to be tachypneic, tachycardic, and febrile to 100.6. Pt was given IVF, Ativan, and Ofirmev with improvement of symptoms. Pt states that he no longer has the epigastric pain, but has experienced these symptoms in the past. Recently diagnosed with Jhon's Esophagus following EGD due to worsening acid reflux. Pt also noted to have large duodenal diverticulum during EGD. Endorses improving headache, dizziness, SOB, nausea  and distended abdomen throughout event. Pt has no complaints at this time, though appears anxious throughout discussion. Denies any recent sick contacts.     Of note, pt found to have elevated LFTs and Alkphos in outpatient workup since February. Dr. Ewing (PCP) attributed this to recent viral illness, but subsequent labs not checked.     ED Course:   Vitals: BP: 157/75, HR: 77, Temp: 100.6, RR: 20, SpO2: 97% on RA    Labs:  WBC 11.71, Lactate 2.0 < 3.0, Cr 1.50, Alkphos 208, ,    UA: Negative   CXR: Grossly normal as per personal read, official read pending   CT: Minimal periportal edema, nonspecific. Correlate with liver function tests. Otherwise, no acute abdominopelvic findings. No acute thoracic findings. Right lower lobe pulmonary nodule measuring 4 mm.  US Abdomen: Layering gallbladder sludge. No wall thickening or pericholecystic fluid. Focal hyperechoic right hepatic lobe lesion measures 1.0 x 0.9 x 0.8 cm, possibility is a hepatic hemangioma  EKG: NSR 85 bpm with prolonged /509   Received in the ED: Ofirmev, Pepcid 20 mg IVP, Ativan 1mg IVP, Zosyn, 1.5L NS bolus    (2023 23:41)    INTERVAL HPI:  RRT called yesterday for abdominal pain and fever. CTA performed to rule out acute mesenteric ischemia - negative. Started on Zosyn due to fever of unknown origin. MRCP performed this afternoon 2/2 transaminitis and abdominal pain. Will f/u official read.        Home Medications:  aspirin 81 mg oral capsule: 1 orally (15 Robert 2023 00:43)  Ativan 1 mg oral tablet: 1 orally 2 times a day (15 Robert 2023 00:43)  cholecalciferol 50 mcg (2000 intl units) oral tablet: 1 orally once a day (15 Robert 2023 00:43)  Cozaar 100 mg oral tablet: 1 orally once a day (15 Robert 2023 00:43)  doxazosin 4 mg oral tablet: 1 orally (15 Robert 2023 00:43)  fluticasone 50 mcg/inh nasal spray: 2 spray(s) in each nostril once a day as needed for  congestion (15 Robert 2023 01:45)  ipratropium-albuterol 0.5 mg-2.5 mg/3 mL inhalation solution: 1 unit(s) by nebulizer every 4 hours as needed for  bronchospasm (15 Robert 2023 01:45)  lamoTRIgine 200 mg oral tablet: 0.75 tab(s) orally 2 times a day (15 Robert 2023 01:45)  ProAir HFA 90 mcg/inh inhalation aerosol: 2 inhaled every 6 hours as needed for  bronchospasm (15 Robert 2023 01:45)  Protonix 40 mg oral delayed release tablet: 1 orally once a day (15 Robert 2023 01:41)  PROzac 40 mg oral capsule: 1 orally once a day Alternates qD and BID every other day (15 Robert 2023 01:45)  QUEtiapine 50 mg oral tablet, extended release: 1 orally 2 times a day (15 Robert 2023 01:45)  rosuvastatin 20 mg oral tablet: 1 orally once a day (15 Robert 2023 01:45)  ZyrTEC 10 mg oral tablet: 1 orally once a day (15 Robert 2023 01:45)      MEDICATIONS  (STANDING):  aspirin  chewable 81 milliGRAM(s) Oral daily  cholecalciferol 2000 Unit(s) Oral daily  doxazosin 4 milliGRAM(s) Oral daily  enoxaparin Injectable 40 milliGRAM(s) SubCutaneous every 24 hours  Fluoxetine 40 milliGRAM(s) 40 milliGRAM(s) Oral <User Schedule>  lamoTRIgine 150 milliGRAM(s) Oral two times a day  LORazepam     Tablet 1 milliGRAM(s) Oral two times a day  losartan 100 milliGRAM(s) Oral daily  pantoprazole    Tablet 40 milliGRAM(s) Oral two times a day  piperacillin/tazobactam IVPB.. 3.375 Gram(s) IV Intermittent every 8 hours  QUEtiapine 50 milliGRAM(s) Oral two times a day  sodium chloride 0.9%. 1000 milliLiter(s) (75 mL/Hr) IV Continuous <Continuous>    MEDICATIONS  (PRN):  acetaminophen     Tablet .. 650 milliGRAM(s) Oral every 6 hours PRN Temp greater or equal to 38C (100.4F), Mild Pain (1 - 3)  albuterol    90 MICROgram(s) HFA Inhaler 2 Puff(s) Inhalation every 6 hours PRN for bronchospasm  aluminum hydroxide/magnesium hydroxide/simethicone Suspension 30 milliLiter(s) Oral every 4 hours PRN Dyspepsia  fluticasone propionate 50 MICROgram(s)/spray Nasal Spray 1 Spray(s) Both Nostrils two times a day PRN Congestion  melatonin 3 milliGRAM(s) Oral at bedtime PRN Insomnia      No Known Allergies      Social History:  Lives: At home with Wife and Daughter   ADLs: Independent, walks independently however becoming more painful given osteoarthritis   Diet: Avoid carbonated beverages, spicy foods, and salt   Vaccination: COVID vaccinatedx3   Alcohol Use: Denies   Tobacco Use: Denies   Recreational Drug Use: Denies (2023 23:41)      REVIEW OF SYSTEMS:  CONSTITUTIONAL: No fever, No chills, No fatigue, No myalgia, No Body ache, No Weakness  EYES: No eye pain,  No visual disturbances, No discharge, No Redness  ENMT: No ear pain, No nose bleed, No vertigo; No sinus pain, No throat pain, No Congestion  NECK: No pain, No stiffness  RESPIRATORY: No cough, No wheezing, No hemoptysis, No shortness of breath  CARDIOVASCULAR: No chest pain, No palpitations  GASTROINTESTINAL: No abdominal pain, No epigastric pain. No nausea, No vomiting, No diarrhea, No constipation; [ x ] BM-  GENITOURINARY: No dysuria, No frequency, No urgency, No hematuria, No incontinence  NEUROLOGICAL: No headaches, No dizziness, No numbness, No tingling, No tremors, No weakness  EXTREMITIES: No Swelling, No Pain, No Edema  SKIN: [ x ] No itching, burning, rashes, or lesions   MUSCULOSKELETAL: No joint pain, No joint swelling; No muscle pain, No back pain, No extremity pain  PSYCHIATRIC: No depression, No anxiety, No mood swings, No difficulty sleeping at night  PAIN SCALE: [  ] None  [  ] Other-  ROS Unable to obtain due to: [  ] Dementia  [  ] Lethargy  [  ] Sedated  [  ] Non verbal  REST OF REVIEW OF SYSTEMS: [ x ] Normal     Vital Signs Last 24 Hrs  T(C): 36.5 (2023 04:58), Max: 39.7 (15 Robert 2023 17:10)  T(F): 97.7 (2023 04:58), Max: 103.4 (15 Robert 2023 17:10)  HR: 54 (2023 04:58) (54 - 67)  BP: 149/75 (2023 04:58) (119/75 - 165/78)  RR: 18 (2023 04:58) (18 - 18)  SpO2: 98% (2023 04:58) (95% - 98%)    Parameters below as of 2023 04:58  Patient On (Oxygen Delivery Method): nasal cannula  O2 Flow (L/min): 2      CAPILLARY BLOOD GLUCOSE      POCT Blood Glucose.: 95 mg/dL (15 Robert 2023 14:24)      I&O's Summary    15 Robert 2023 07:01  -  2023 07:00  --------------------------------------------------------  IN: 450 mL / OUT: 925 mL / NET: -475 mL      PHYSICAL EXAM:  GENERAL:  [ x ] NAD, [ x ] Well appearing, [  ] Agitated, [  ] Drowsy, [  ] Lethargy, [  ] Confused   HEAD:  [ x ] Normal, [  ] Other  EYES:  [ x ] EOMI, [ x ] PERRLA, [ x ] Conjunctiva and sclera clear normal, [  ] Other, [  ] Pallor, [  ] Discharge  ENMT:  [ x ] Normal, [ x ] Moist mucous membranes, [  ] Good dentition, [  ] No thrush  NECK:  [ x ] Supple, [  ] No JVD, [ x ] Normal thyroid, [  ] Lymphadenopathy, [  ] Other  CHEST/LUNG:  [ x ] Clear to auscultation bilaterally, [ x ] Breath Sounds equal B/L / decreased, [  ] Poor effort, [ x ] No rales, [ x ] No rhonchi, [ x ] No wheezing  HEART:  [ x ] Regular rate and rhythm, [  ] Tachycardia, [  ] Bradycardia, [  ] Irregular, [ x ] No murmurs, No rubs, No gallops, [  ] PPM in place (Mfr:  )  ABDOMEN:  [ x ] Soft, [ x ] Nontender, [ x ] Nondistended, [ x ] No mass, [ x ] Bowel sounds present, [  ] Obese  NERVOUS SYSTEM:  [ x ] Alert & Oriented x3__, [ x ] Nonfocal, [  ] Confusion, [  ] Encephalopathic, [  ] Sedated, [  ] Unable to assess, [  ] Dementia, [  ] Other-  EXTREMITIES:  [ x ] 2+ Peripheral Pulses, No clubbing, No cyanosis,  [  ] Edema B/L lower EXT, [  ] PVD stasis skin changes B/L lower EXT, [  ] Wound  LYMPH:  No lymphadenopathy noted  SKIN:  [ x ] No rashes or lesions, [  ] Pressure ulcers, [  ] Ecchymosis, [  ] Skin tears, [  ] Other    DIET: Diet, NPO after Midnight:      NPO Start Date: 15-Robert-2023,   NPO Start Time: 23:59 (06-15-23 @ 17:48)  Diet, Regular:   Low Sodium (06-15-23 @ 00:09)      LABS:                        13.1   7.97  )-----------( 261      ( 15 Robert 2023 14:45 )             38.7     15 Robert 2023 14:45    138    |  106    |  18     ----------------------------<  104    3.5     |  24     |  1.40     Ca    9.8        15 Robert 2023 14:45    TPro  7.3    /  Alb  3.9    /  TBili  3.3    /  DBili  x      /  AST  415    /  ALT  804    /  AlkPhos  228    15 Robert 2023 14:45    PT/INR - ( 15 Robert 2023 14:45 )   PT: 13.7 sec;   INR: 1.17 ratio         PTT - ( 15 Robert 2023 14:45 )  PTT:31.9 sec  Urinalysis Basic - ( 2023 18:00 )    Color: Dark Yellow / Appearance: Clear / S.019 / pH: x  Gluc: x / Ketone: Negative mg/dL  / Bili: Negative / Urobili: 1.0 mg/dL   Blood: x / Protein: Trace mg/dL / Nitrite: Negative   Leuk Esterase: Negative / RBC: x / WBC x   Sq Epi: x / Non Sq Epi: x / Bacteria: x      Culture Results:   <10,000 CFU/mL Normal Urogenital Rebecca ( @ 18:00)  Culture Results:   No growth to date. ( @ 16:57)  Culture Results:   No growth to date. ( @ 16:51)    culture blood  -- Clean Catch Clean Catch (Midstream)  @ 18:00    culture urine  --   @ 18:00  culture blood  -- .Blood Blood-Peripheral  @ 16:57    culture urine  --   @ 16:57  culture blood  -- .Blood Blood-Peripheral  @ 16:51    culture urine  --   @ 16:51          Culture - Urine (collected 2023 18:00)  Source: Clean Catch Clean Catch (Midstream)  Final Report (2023 07:25):    <10,000 CFU/mL Normal Urogenital Rebecca    Culture - Blood (collected 2023 16:57)  Source: .Blood Blood-Peripheral  Preliminary Report (2023 01:02):    No growth to date.    Culture - Blood (collected 2023 16:51)  Source: .Blood Blood-Peripheral  Preliminary Report (2023 01:02):    No growth to date.       Anemia Panel:      Thyroid Panel:        Lipase, Serum: 280 U/L (23 @ 15:33)          RADIOLOGY & ADDITIONAL TESTS: _______      HEALTH ISSUES - PROBLEM Dx:  Bipolar disorder    GERD (gastroesophageal reflux disease)    KIRA (acute kidney injury)    HTN (hypertension)    HLD (hyperlipidemia)    Transaminitis    Asthma    Need for prophylactic measure    Fever          Consultant(s) Notes Reviewed:  [ x ] YES     Care Discussed with [ x ] Consultants, [ x ] Patient, [ x ] Family, [  ] HCP, [ x ] , [  ] Social Service, [ x ] RN, [  ] Physical Therapy, [  ] Palliative Care Team  DVT PPX: [  ] Lovenox, [  ] SC Heparin, [  ] Coumadin, [  ] Xarelto, [  ] Eliquis, [  ] Pradaxa, [  ] IV Heparin drip, [  ] SCD, [  ] Ambulation, [  ] Contraindicated 2/2 GI Bleed, [  ] Contraindicated 2/2  Bleed, [  ] Contraindicated 2/2 Brain Bleed  Advanced Directive: [  ] None, [  ] DNR/DNI Patient is a 62y old  Male who presents with a chief complaint of Fever workup (15 Robert 2023 15:36)    HPI:  63 yo M with PMHx of HTN, HLD, Bipolar, GERD, Jhon's Esophagus Asthma, and Abnormal LFTs presents to the ED with 1x day hx of epigastric pain and new onset fever. Pt endorses feeling ache and heaviness in the epigastric region after he ate breakfast this morning. Noted laying down on his left side with improvement of symptoms, but persisted throughout the day. Pt states that pain worsened when he had a nonbloody bowel movement, with associated nausea, lightheadedness, and diaphoresis. Following this event pt was brought to the ED by his daughter in fear he was having a heart attack. In the ED, pt was found to be tachypneic, tachycardic, and febrile to 100.6. Pt was given IVF, Ativan, and Ofirmev with improvement of symptoms. Pt states that he no longer has the epigastric pain, but has experienced these symptoms in the past. Recently diagnosed with Jhon's Esophagus following EGD due to worsening acid reflux. Pt also noted to have large duodenal diverticulum during EGD. Endorses improving headache, dizziness, SOB, nausea  and distended abdomen throughout event. Pt has no complaints at this time, though appears anxious throughout discussion. Denies any recent sick contacts.     Of note, pt found to have elevated LFTs and Alkphos in outpatient workup since February. Dr. Ewing (PCP) attributed this to recent viral illness, but subsequent labs not checked.     ED Course:   Vitals: BP: 157/75, HR: 77, Temp: 100.6, RR: 20, SpO2: 97% on RA    Labs:  WBC 11.71, Lactate 2.0 < 3.0, Cr 1.50, Alkphos 208, ,    UA: Negative   CXR: Grossly normal as per personal read, official read pending   CT: Minimal periportal edema, nonspecific. Correlate with liver function tests. Otherwise, no acute abdominopelvic findings. No acute thoracic findings. Right lower lobe pulmonary nodule measuring 4 mm.  US Abdomen: Layering gallbladder sludge. No wall thickening or pericholecystic fluid. Focal hyperechoic right hepatic lobe lesion measures 1.0 x 0.9 x 0.8 cm, possibility is a hepatic hemangioma  EKG: NSR 85 bpm with prolonged /509   Received in the ED: Ofirmev, Pepcid 20 mg IVP, Ativan 1mg IVP, Zosyn, 1.5L NS bolus    (2023 23:41)    INTERVAL HPI:  RRT called yesterday for abdominal pain and fever. CTA performed to rule out acute mesenteric ischemia - negative. Started on Zosyn due to fever of unknown origin. MRCP performed this afternoon 2/2 transaminitis and abdominal pain. Will f/u official read.        Home Medications:  aspirin 81 mg oral capsule: 1 orally (15 Robert 2023 00:43)  Ativan 1 mg oral tablet: 1 orally 2 times a day (15 Robert 2023 00:43)  cholecalciferol 50 mcg (2000 intl units) oral tablet: 1 orally once a day (15 Robert 2023 00:43)  Cozaar 100 mg oral tablet: 1 orally once a day (15 Robert 2023 00:43)  doxazosin 4 mg oral tablet: 1 orally (15 Robert 2023 00:43)  fluticasone 50 mcg/inh nasal spray: 2 spray(s) in each nostril once a day as needed for  congestion (15 Robert 2023 01:45)  ipratropium-albuterol 0.5 mg-2.5 mg/3 mL inhalation solution: 1 unit(s) by nebulizer every 4 hours as needed for  bronchospasm (15 Robert 2023 01:45)  lamoTRIgine 200 mg oral tablet: 0.75 tab(s) orally 2 times a day (15 Robert 2023 01:45)  ProAir HFA 90 mcg/inh inhalation aerosol: 2 inhaled every 6 hours as needed for  bronchospasm (15 Robert 2023 01:45)  Protonix 40 mg oral delayed release tablet: 1 orally once a day (15 Robert 2023 01:41)  PROzac 40 mg oral capsule: 1 orally once a day Alternates qD and BID every other day (15 Robert 2023 01:45)  QUEtiapine 50 mg oral tablet, extended release: 1 orally 2 times a day (15 Robert 2023 01:45)  rosuvastatin 20 mg oral tablet: 1 orally once a day (15 Robert 2023 01:45)  ZyrTEC 10 mg oral tablet: 1 orally once a day (15 Robert 2023 01:45)      MEDICATIONS  (STANDING):  aspirin  chewable 81 milliGRAM(s) Oral daily  cholecalciferol 2000 Unit(s) Oral daily  doxazosin 4 milliGRAM(s) Oral daily  enoxaparin Injectable 40 milliGRAM(s) SubCutaneous every 24 hours  Fluoxetine 40 milliGRAM(s) 40 milliGRAM(s) Oral <User Schedule>  lamoTRIgine 150 milliGRAM(s) Oral two times a day  LORazepam     Tablet 1 milliGRAM(s) Oral two times a day  losartan 100 milliGRAM(s) Oral daily  pantoprazole    Tablet 40 milliGRAM(s) Oral two times a day  piperacillin/tazobactam IVPB.. 3.375 Gram(s) IV Intermittent every 8 hours  QUEtiapine 50 milliGRAM(s) Oral two times a day  sodium chloride 0.9%. 1000 milliLiter(s) (75 mL/Hr) IV Continuous <Continuous>    MEDICATIONS  (PRN):  acetaminophen     Tablet .. 650 milliGRAM(s) Oral every 6 hours PRN Temp greater or equal to 38C (100.4F), Mild Pain (1 - 3)  albuterol    90 MICROgram(s) HFA Inhaler 2 Puff(s) Inhalation every 6 hours PRN for bronchospasm  aluminum hydroxide/magnesium hydroxide/simethicone Suspension 30 milliLiter(s) Oral every 4 hours PRN Dyspepsia  fluticasone propionate 50 MICROgram(s)/spray Nasal Spray 1 Spray(s) Both Nostrils two times a day PRN Congestion  melatonin 3 milliGRAM(s) Oral at bedtime PRN Insomnia      No Known Allergies      Social History:  Lives: At home with Wife and Daughter   ADLs: Independent, walks independently however becoming more painful given osteoarthritis   Diet: Avoid carbonated beverages, spicy foods, and salt   Vaccination: COVID vaccinatedx3   Alcohol Use: Denies   Tobacco Use: Denies   Recreational Drug Use: Denies (2023 23:41)      REVIEW OF SYSTEMS:  CONSTITUTIONAL: No fever, No chills, No fatigue, No myalgia, No Body ache, No Weakness  EYES: No eye pain,  No visual disturbances, No discharge, No Redness  ENMT: No ear pain, No nose bleed, No vertigo; No sinus pain, No throat pain, No Congestion  NECK: No pain, No stiffness  RESPIRATORY: No cough, No wheezing, No hemoptysis, No shortness of breath  CARDIOVASCULAR: No chest pain, No palpitations  GASTROINTESTINAL: No abdominal pain, No epigastric pain. No nausea, No vomiting, No diarrhea, No constipation; [ x ] BM-  GENITOURINARY: No dysuria, No frequency, No urgency, No hematuria, No incontinence  NEUROLOGICAL: No headaches, No dizziness, No numbness, No tingling, No tremors, No weakness  EXTREMITIES: No Swelling, No Pain, No Edema  SKIN: [ x ] No itching, burning, rashes, or lesions   MUSCULOSKELETAL: No joint pain, No joint swelling; No muscle pain, No back pain, No extremity pain  PSYCHIATRIC: No depression, No anxiety, No mood swings, No difficulty sleeping at night  PAIN SCALE: [  ] None  [  ] Other-  ROS Unable to obtain due to: [  ] Dementia  [  ] Lethargy  [  ] Sedated  [  ] Non verbal  REST OF REVIEW OF SYSTEMS: [ x ] Normal     Vital Signs Last 24 Hrs  T(C): 36.5 (2023 04:58), Max: 39.7 (15 Robert 2023 17:10)  T(F): 97.7 (2023 04:58), Max: 103.4 (15 Robert 2023 17:10)  HR: 54 (2023 04:58) (54 - 67)  BP: 149/75 (2023 04:58) (119/75 - 165/78)  RR: 18 (2023 04:58) (18 - 18)  SpO2: 98% (2023 04:58) (95% - 98%)    Parameters below as of 2023 04:58  Patient On (Oxygen Delivery Method): nasal cannula  O2 Flow (L/min): 2      CAPILLARY BLOOD GLUCOSE      POCT Blood Glucose.: 95 mg/dL (15 Robert 2023 14:24)      I&O's Summary    15 Robert 2023 07:01  -  2023 07:00  --------------------------------------------------------  IN: 450 mL / OUT: 925 mL / NET: -475 mL      PHYSICAL EXAM: i AM OK  GENERAL:  [ x ] NAD, [ x ] Well appearing, [  ] Agitated, [  ] Drowsy, [  ] Lethargy, [  ] Confused   HEAD:  [ x ] Normal, [  ] Other  EYES:  [ x ] EOMI, [ x ] PERRLA, [ x ] Conjunctiva and sclera clear normal, [  ] Other, [  ] Pallor, [  ] Discharge  ENMT:  [ x ] Normal, [ x ] Moist mucous membranes, [  ] Good dentition, [ x ] No thrush  NECK:  [ x ] Supple, [x  ] No JVD, [ x ] Normal thyroid, [  ] Lymphadenopathy, [  ] Other  CHEST/LUNG:  [ x ] Clear to auscultation bilaterally, [ x ] Breath Sounds equal B/L / decreased, [  ] Poor effort, [ x ] No rales, [ x ] No rhonchi, [ x ] No wheezing  HEART:  [ x ] Regular rate and rhythm, [  ] Tachycardia, [  ] Bradycardia, [  ] Irregular, [ x ] No murmurs, No rubs, No gallops, [  ] PPM in place (Mfr:  )  ABDOMEN:  [ x ] Soft, [ x ] Nontender, [ x ] Nondistended, [ x ] No mass, [ x ] Bowel sounds present, [ x ] Obese  NERVOUS SYSTEM:  [ x ] Alert & Oriented x3__, [ x ] Nonfocal, [  ] Confusion, [  ] Encephalopathic, [  ] Sedated, [  ] Unable to assess, [  ] Dementia, [  ] Other-  EXTREMITIES:  [ x ] 2+ Peripheral Pulses, No clubbing, No cyanosis,  [  ] Edema B/L lower EXT, [  ] PVD stasis skin changes B/L lower EXT, [  ] Wound  LYMPH:  No lymphadenopathy noted  SKIN:  [ x ] No rashes or lesions, [  ] Pressure ulcers, [  ] Ecchymosis, [  ] Skin tears, [  ] Other    DIET: Diet, NPO after Midnight:      NPO Start Date: 15-Robert-2023,   NPO Start Time: 23:59 (06-15-23 @ 17:48)  Diet, Regular:   Low Sodium (06-15-23 @ 00:09)      LABS:                        13.1   7.97  )-----------( 261      ( 15 Robert 2023 14:45 )             38.7     15 Robert 2023 14:45    138    |  106    |  18     ----------------------------<  104    3.5     |  24     |  1.40     Ca    9.8        15 Robert 2023 14:45    TPro  7.3    /  Alb  3.9    /  TBili  3.3    /  DBili  x      /  AST  415    /  ALT  804    /  AlkPhos  228    15 Robert 2023 14:45    PT/INR - ( 15 Robert 2023 14:45 )   PT: 13.7 sec;   INR: 1.17 ratio         PTT - ( 15 Robert 2023 14:45 )  PTT:31.9 sec  Urinalysis Basic - ( 2023 18:00 )    Color: Dark Yellow / Appearance: Clear / S.019 / pH: x  Gluc: x / Ketone: Negative mg/dL  / Bili: Negative / Urobili: 1.0 mg/dL   Blood: x / Protein: Trace mg/dL / Nitrite: Negative   Leuk Esterase: Negative / RBC: x / WBC x   Sq Epi: x / Non Sq Epi: x / Bacteria: x      Culture Results:   <10,000 CFU/mL Normal Urogenital Rebecca ( @ 18:00)  Culture Results:   No growth to date. ( @ 16:57)  Culture Results:   No growth to date. ( @ 16:51)    culture blood  -- Clean Catch Clean Catch (Midstream)  @ 18:00    culture urine  --   @ 18:00  culture blood  -- .Blood Blood-Peripheral  @ 16:57    culture urine  --   @ 16:57  culture blood  -- .Blood Blood-Peripheral  @ 16:51    culture urine  --   @ 16:51          Culture - Urine (collected 2023 18:00)  Source: Clean Catch Clean Catch (Midstream)  Final Report (2023 07:25):    <10,000 CFU/mL Normal Urogenital Rebecca    Culture - Blood (collected 2023 16:57)  Source: .Blood Blood-Peripheral  Preliminary Report (2023 01:02):    No growth to date.    Culture - Blood (collected 2023 16:51)  Source: .Blood Blood-Peripheral  Preliminary Report (2023 01:02):    No growth to date.      Lipase, Serum: 280 U/L (23 @ 15:33)    RADIOLOGY & ADDITIONAL TESTS:   < from: MR MRCP No Cont (23 @ 11:44) >  IMPRESSION:  Apparent 7 mm filling defect in the very distal common bile duct may   represent a stone or mass. Further differentiation is difficult without   IV contrast. If clinically indicated, ERCP/endoscopic ultrasound may be   pursued for further evaluation. Associated dilatation in the upstream   common bile duct.    No evidence for gallstones. Pericholecystic fluid/edemamay represent   acute cholecystitis. Clinical correlation is recommended.    Trace fluid or mild edema adjacent to the pancreatic body may represent   acute pancreatitis. Clinical correlation with pancreatic enzymes is   recommended. Apparent 6 mm brightly T2 hyperintense lesion in the   pancreatic head, suggestive of a nonspecific cystic lesion. Follow-up   abdominal MR may be pursued in 12 months to ensure stability.    Trace ascites.    Trace bilateral pleural effusions.    Nonspecific 1.9 cm T2 hyperintense lesion at T10 vertebra. Spinal MR   without and with IV contrast may be pursued for further evaluation.      < end of copied text >      HEALTH ISSUES - PROBLEM Dx:  Bipolar disorder    GERD (gastroesophageal reflux disease)    KIRA (acute kidney injury)    HTN (hypertension)    HLD (hyperlipidemia)    Transaminitis    Asthma    Need for prophylactic measure    Fever      Consultant(s) Notes Reviewed:  [ x ] YES     Care Discussed with [ x ] Consultants, [ x ] Patient, [ x ] Family, [  ] HCP, [ x ] , [  ] Social Service, [ x ] RN, [  ] Physical Therapy, [  ] Palliative Care Team  DVT PPX: [ x ] Lovenox, [  ] SC Heparin, [  ] Coumadin, [  ] Xarelto, [  ] Eliquis, [  ] Pradaxa, [  ] IV Heparin drip, [  ] SCD, [  ] Ambulation, [  ] Contraindicated 2/2 GI Bleed, [  ] Contraindicated 2/2  Bleed, [  ] Contraindicated 2/2 Brain Bleed  Advanced Directive: [ x ] None, [  ] DNR/DNI Patient is a 62y old  Male who presents with a chief complaint of Fever workup (15 Robert 2023 15:36)    HPI:  61 yo M with PMHx of HTN, HLD, Bipolar, GERD, Jhon's Esophagus Asthma, and Abnormal LFTs presents to the ED with 1x day hx of epigastric pain and new onset fever. Pt endorses feeling ache and heaviness in the epigastric region after he ate breakfast this morning. Noted laying down on his left side with improvement of symptoms, but persisted throughout the day. Pt states that pain worsened when he had a nonbloody bowel movement, with associated nausea, lightheadedness, and diaphoresis. Following this event pt was brought to the ED by his daughter in fear he was having a heart attack. In the ED, pt was found to be tachypneic, tachycardic, and febrile to 100.6. Pt was given IVF, Ativan, and Ofirmev with improvement of symptoms. Pt states that he no longer has the epigastric pain, but has experienced these symptoms in the past. Recently diagnosed with Jhon's Esophagus following EGD due to worsening acid reflux. Pt also noted to have large duodenal diverticulum during EGD. Endorses improving headache, dizziness, SOB, nausea  and distended abdomen throughout event. Pt has no complaints at this time, though appears anxious throughout discussion. Denies any recent sick contacts.     Of note, pt found to have elevated LFTs and Alkphos in outpatient workup since February. Dr. Ewing (PCP) attributed this to recent viral illness, but subsequent labs not checked.     ED Course:   Vitals: BP: 157/75, HR: 77, Temp: 100.6, RR: 20, SpO2: 97% on RA    Labs:  WBC 11.71, Lactate 2.0 < 3.0, Cr 1.50, Alkphos 208, ,    UA: Negative   CXR: Grossly normal as per personal read, official read pending   CT: Minimal periportal edema, nonspecific. Correlate with liver function tests. Otherwise, no acute abdominopelvic findings. No acute thoracic findings. Right lower lobe pulmonary nodule measuring 4 mm.  US Abdomen: Layering gallbladder sludge. No wall thickening or pericholecystic fluid. Focal hyperechoic right hepatic lobe lesion measures 1.0 x 0.9 x 0.8 cm, possibility is a hepatic hemangioma  EKG: NSR 85 bpm with prolonged /509   Received in the ED: Ofirmev, Pepcid 20 mg IVP, Ativan 1mg IVP, Zosyn, 1.5L NS bolus    (2023 23:41)    INTERVAL HPI:  RRT called yesterday for abdominal pain and fever. CTA performed to rule out acute mesenteric ischemia - negative. Started on Zosyn due to fever of unknown origin. MRCP performed this afternoon 2/2 transaminitis and abdominal pain. Will f/u official read.        Home Medications:  aspirin 81 mg oral capsule: 1 orally (15 Robert 2023 00:43)  Ativan 1 mg oral tablet: 1 orally 2 times a day (15 Robert 2023 00:43)  cholecalciferol 50 mcg (2000 intl units) oral tablet: 1 orally once a day (15 Robert 2023 00:43)  Cozaar 100 mg oral tablet: 1 orally once a day (15 Robert 2023 00:43)  doxazosin 4 mg oral tablet: 1 orally (15 Robert 2023 00:43)  fluticasone 50 mcg/inh nasal spray: 2 spray(s) in each nostril once a day as needed for  congestion (15 Robert 2023 01:45)  ipratropium-albuterol 0.5 mg-2.5 mg/3 mL inhalation solution: 1 unit(s) by nebulizer every 4 hours as needed for  bronchospasm (15 Robert 2023 01:45)  lamoTRIgine 200 mg oral tablet: 0.75 tab(s) orally 2 times a day (15 Robert 2023 01:45)  ProAir HFA 90 mcg/inh inhalation aerosol: 2 inhaled every 6 hours as needed for  bronchospasm (15 Robert 2023 01:45)  Protonix 40 mg oral delayed release tablet: 1 orally once a day (15 Robert 2023 01:41)  PROzac 40 mg oral capsule: 1 orally once a day Alternates qD and BID every other day (15 Robert 2023 01:45)  QUEtiapine 50 mg oral tablet, extended release: 1 orally 2 times a day (15 Robert 2023 01:45)  rosuvastatin 20 mg oral tablet: 1 orally once a day (15 Robert 2023 01:45)  ZyrTEC 10 mg oral tablet: 1 orally once a day (15 Robert 2023 01:45)      MEDICATIONS  (STANDING):  aspirin  chewable 81 milliGRAM(s) Oral daily  cholecalciferol 2000 Unit(s) Oral daily  doxazosin 4 milliGRAM(s) Oral daily  enoxaparin Injectable 40 milliGRAM(s) SubCutaneous every 24 hours  Fluoxetine 40 milliGRAM(s) 40 milliGRAM(s) Oral <User Schedule>  lamoTRIgine 150 milliGRAM(s) Oral two times a day  LORazepam     Tablet 1 milliGRAM(s) Oral two times a day  losartan 100 milliGRAM(s) Oral daily  pantoprazole    Tablet 40 milliGRAM(s) Oral two times a day  piperacillin/tazobactam IVPB.. 3.375 Gram(s) IV Intermittent every 8 hours  QUEtiapine 50 milliGRAM(s) Oral two times a day  sodium chloride 0.9%. 1000 milliLiter(s) (75 mL/Hr) IV Continuous <Continuous>    MEDICATIONS  (PRN):  acetaminophen     Tablet .. 650 milliGRAM(s) Oral every 6 hours PRN Temp greater or equal to 38C (100.4F), Mild Pain (1 - 3)  albuterol    90 MICROgram(s) HFA Inhaler 2 Puff(s) Inhalation every 6 hours PRN for bronchospasm  aluminum hydroxide/magnesium hydroxide/simethicone Suspension 30 milliLiter(s) Oral every 4 hours PRN Dyspepsia  fluticasone propionate 50 MICROgram(s)/spray Nasal Spray 1 Spray(s) Both Nostrils two times a day PRN Congestion  melatonin 3 milliGRAM(s) Oral at bedtime PRN Insomnia      No Known Allergies      Social History:  Lives: At home with Wife and Daughter   ADLs: Independent, walks independently however becoming more painful given osteoarthritis   Diet: Avoid carbonated beverages, spicy foods, and salt   Vaccination: COVID vaccinatedx3   Alcohol Use: Denies   Tobacco Use: Denies   Recreational Drug Use: Denies (2023 23:41)      REVIEW OF SYSTEMS:  CONSTITUTIONAL: No fever, No chills, No fatigue, No myalgia, No Body ache, No Weakness  EYES: No eye pain,  No visual disturbances, No discharge, No Redness  ENMT: No ear pain, No nose bleed, No vertigo; No sinus pain, No throat pain, No Congestion  NECK: No pain, No stiffness  RESPIRATORY: No cough, No wheezing, No hemoptysis, No shortness of breath  CARDIOVASCULAR: No chest pain, No palpitations  GASTROINTESTINAL: No abdominal pain, No epigastric pain. No nausea, No vomiting, No diarrhea, No constipation; [ x ] BM-  GENITOURINARY: No dysuria, No frequency, No urgency, No hematuria, No incontinence  NEUROLOGICAL: No headaches, No dizziness, No numbness, No tingling, No tremors, No weakness  EXTREMITIES: No Swelling, No Pain, No Edema  SKIN: [ x ] No itching, burning, rashes, or lesions   MUSCULOSKELETAL: No joint pain, No joint swelling; No muscle pain, No back pain, No extremity pain  PSYCHIATRIC: No depression, No anxiety, No mood swings, No difficulty sleeping at night  PAIN SCALE: [  ] None  [  ] Other-  ROS Unable to obtain due to: [  ] Dementia  [  ] Lethargy  [  ] Sedated  [  ] Non verbal  REST OF REVIEW OF SYSTEMS: [ x ] Normal     Vital Signs Last 24 Hrs  T(C): 36.5 (2023 04:58), Max: 39.7 (15 Robert 2023 17:10)  T(F): 97.7 (2023 04:58), Max: 103.4 (15 Robert 2023 17:10)  HR: 54 (2023 04:58) (54 - 67)  BP: 149/75 (2023 04:58) (119/75 - 165/78)  RR: 18 (2023 04:58) (18 - 18)  SpO2: 98% (2023 04:58) (95% - 98%)    Parameters below as of 2023 04:58  Patient On (Oxygen Delivery Method): nasal cannula  O2 Flow (L/min): 2      CAPILLARY BLOOD GLUCOSE      POCT Blood Glucose.: 95 mg/dL (15 Robert 2023 14:24)      I&O's Summary    15 Robert 2023 07:01  -  2023 07:00  --------------------------------------------------------  IN: 450 mL / OUT: 925 mL / NET: -475 mL      PHYSICAL EXAM: i AM OK  GENERAL:  [ x ] NAD, [ x ] Well appearing, [  ] Agitated, [  ] Drowsy, [  ] Lethargy, [  ] Confused   HEAD:  [ x ] Normal, [  ] Other  EYES:  [ x ] EOMI, [ x ] PERRLA, [ x ] Conjunctiva and sclera clear normal, [  ] Other, [  ] Pallor, [  ] Discharge  ENMT:  [ x ] Normal, [ x ] Moist mucous membranes, [  ] Good dentition, [ x ] No thrush  NECK:  [ x ] Supple, [x  ] No JVD, [ x ] Normal thyroid, [  ] Lymphadenopathy, [  ] Other  CHEST/LUNG:  [ x ] Clear to auscultation bilaterally, [ x ] Breath Sounds equal B/L / decreased, [  ] Poor effort, [ x ] No rales, [ x ] No rhonchi, [ x ] No wheezing  HEART:  [ x ] Regular rate and rhythm, [  ] Tachycardia, [  ] Bradycardia, [  ] Irregular, [ x ] No murmurs, No rubs, No gallops, [  ] PPM in place (Mfr:  )  ABDOMEN:  [ x ] Soft, [ x ] Nontender, [ x ] Nondistended, [ x ] No mass, [ x ] Bowel sounds present, [ x ] Obese  NERVOUS SYSTEM:  [ x ] Alert & Oriented x3__, [ x ] Nonfocal, [  ] Confusion, [  ] Encephalopathic, [  ] Sedated, [  ] Unable to assess, [  ] Dementia, [  ] Other-  EXTREMITIES:  [ x ] 2+ Peripheral Pulses, No clubbing, No cyanosis,  [  ] Edema B/L lower EXT, [  ] PVD stasis skin changes B/L lower EXT, [  ] Wound  LYMPH:  No lymphadenopathy noted  SKIN:  [ x ] No rashes or lesions, [  ] Pressure ulcers, [  ] Ecchymosis, [  ] Skin tears, [  ] Other    DIET: Diet, NPO after Midnight:      NPO Start Date: 15-Robert-2023,   NPO Start Time: 23:59 (06-15-23 @ 17:48)  Diet, Regular:   Low Sodium (06-15-23 @ 00:09)      LABS:                        11.8   7.77  )-----------( 218      ( 2023 08:43 )             36.0     2023 08:43    141    |  110    |  18     ----------------------------<  103    3.6     |  27     |  1.10     Ca    8.8        2023 08:43    TPro  6.2    /  Alb  3.0    /  TBili  3.0    /  DBili  2.4    /  AST  262    /  ALT  566    /  AlkPhos  264    2023 08:43                          13.1   7.97  )-----------( 261      ( 15 Robert 2023 14:45 )             38.7     15 Robert 2023 14:45    138    |  106    |  18     ----------------------------<  104    3.5     |  24     |  1.40     Ca    9.8        15 Robert 2023 14:45    TPro  7.3    /  Alb  3.9    /  TBili  3.3    /  DBili  x      /  AST  415    /  ALT  804    /  AlkPhos  228    15 Robert 2023 14:45    PT/INR - ( 15 Robert 2023 14:45 )   PT: 13.7 sec;   INR: 1.17 ratio         PTT - ( 15 Robert 2023 14:45 )  PTT:31.9 sec  Urinalysis Basic - ( 2023 18:00 )    Color: Dark Yellow / Appearance: Clear / S.019 / pH: x  Gluc: x / Ketone: Negative mg/dL  / Bili: Negative / Urobili: 1.0 mg/dL   Blood: x / Protein: Trace mg/dL / Nitrite: Negative   Leuk Esterase: Negative / RBC: x / WBC x   Sq Epi: x / Non Sq Epi: x / Bacteria: x      Culture Results:   <10,000 CFU/mL Normal Urogenital Rebecca ( @ 18:00)  Culture Results:   No growth to date. ( @ 16:57)  Culture Results:   No growth to date. ( @ 16:51)    culture blood  -- Clean Catch Clean Catch (Midstream)  @ 18:00    culture urine  --   @ 18:00  culture blood  -- .Blood Blood-Peripheral  @ 16:57    culture urine  --   @ 16:57  culture blood  -- .Blood Blood-Peripheral  @ 16:51    culture urine  --   @ 16:51          Culture - Urine (collected 2023 18:00)  Source: Clean Catch Clean Catch (Midstream)  Final Report (2023 07:25):    <10,000 CFU/mL Normal Urogenital Rebecca    Culture - Blood (collected 2023 16:57)  Source: .Blood Blood-Peripheral  Preliminary Report (2023 01:02):    No growth to date.    Culture - Blood (collected 2023 16:51)  Source: .Blood Blood-Peripheral  Preliminary Report (2023 01:02):    No growth to date.      Lipase, Serum: 280 U/L (23 @ 15:33)    RADIOLOGY & ADDITIONAL TESTS:   < from: MR MRCP No Cont (23 @ 11:44) >  IMPRESSION:  Apparent 7 mm filling defect in the very distal common bile duct may   represent a stone or mass. Further differentiation is difficult without   IV contrast. If clinically indicated, ERCP/endoscopic ultrasound may be   pursued for further evaluation. Associated dilatation in the upstream   common bile duct.    No evidence for gallstones. Pericholecystic fluid/edemamay represent   acute cholecystitis. Clinical correlation is recommended.    Trace fluid or mild edema adjacent to the pancreatic body may represent   acute pancreatitis. Clinical correlation with pancreatic enzymes is   recommended. Apparent 6 mm brightly T2 hyperintense lesion in the   pancreatic head, suggestive of a nonspecific cystic lesion. Follow-up   abdominal MR may be pursued in 12 months to ensure stability.    Trace ascites.    Trace bilateral pleural effusions.    Nonspecific 1.9 cm T2 hyperintense lesion at T10 vertebra. Spinal MR   without and with IV contrast may be pursued for further evaluation.      < end of copied text >      HEALTH ISSUES - PROBLEM Dx:  Bipolar disorder    GERD (gastroesophageal reflux disease)    KIRA (acute kidney injury)    HTN (hypertension)    HLD (hyperlipidemia)    Transaminitis    Asthma    Need for prophylactic measure    Fever      Consultant(s) Notes Reviewed:  [ x ] YES     Care Discussed with [ x ] Consultants, [ x ] Patient, [ x ] Family, [  ] HCP, [ x ] , [  ] Social Service, [ x ] RN, [  ] Physical Therapy, [  ] Palliative Care Team  DVT PPX: [ x ] Lovenox, [  ] SC Heparin, [  ] Coumadin, [  ] Xarelto, [  ] Eliquis, [  ] Pradaxa, [  ] IV Heparin drip, [  ] SCD, [  ] Ambulation, [  ] Contraindicated 2/2 GI Bleed, [  ] Contraindicated 2/2  Bleed, [  ] Contraindicated 2/2 Brain Bleed  Advanced Directive: [ x ] None, [  ] DNR/DNI

## 2023-06-16 NOTE — PROGRESS NOTE ADULT - ASSESSMENT
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 I discussed the care with the patient and their family  Differential diagnosis and plan of care discussed with patient after the evaluation  40 minutes spent on total encounter of which more than fifty percent of the encounter was spent counseling and/or coordinating care by the attending physician.  Advanced care planning was discussed with patient and family.  Advanced care planning forms were reviewed and discussed.  Risks, benefits and alternatives of gastroenterologic procedures were discussed in detail and all questions were answered.

## 2023-06-17 LAB
ALBUMIN SERPL ELPH-MCNC: 2.9 G/DL — LOW (ref 3.3–5)
ALP SERPL-CCNC: 249 U/L — HIGH (ref 40–120)
ALT FLD-CCNC: 387 U/L — HIGH (ref 12–78)
ANA TITR SER: NEGATIVE — SIGNIFICANT CHANGE UP
ANION GAP SERPL CALC-SCNC: 6 MMOL/L — SIGNIFICANT CHANGE UP (ref 5–17)
AST SERPL-CCNC: 83 U/L — HIGH (ref 15–37)
BASOPHILS # BLD AUTO: 0.03 K/UL — SIGNIFICANT CHANGE UP (ref 0–0.2)
BASOPHILS NFR BLD AUTO: 0.4 % — SIGNIFICANT CHANGE UP (ref 0–2)
BILIRUB DIRECT SERPL-MCNC: 0.5 MG/DL — HIGH (ref 0–0.3)
BILIRUB INDIRECT FLD-MCNC: 0.5 MG/DL — SIGNIFICANT CHANGE UP (ref 0.2–1)
BILIRUB SERPL-MCNC: 1 MG/DL — SIGNIFICANT CHANGE UP (ref 0.2–1.2)
BUN SERPL-MCNC: 20 MG/DL — SIGNIFICANT CHANGE UP (ref 7–23)
CALCIUM SERPL-MCNC: 8.9 MG/DL — SIGNIFICANT CHANGE UP (ref 8.5–10.1)
CHLORIDE SERPL-SCNC: 108 MMOL/L — SIGNIFICANT CHANGE UP (ref 96–108)
CO2 SERPL-SCNC: 26 MMOL/L — SIGNIFICANT CHANGE UP (ref 22–31)
CREAT SERPL-MCNC: 1.2 MG/DL — SIGNIFICANT CHANGE UP (ref 0.5–1.3)
EGFR: 68 ML/MIN/1.73M2 — SIGNIFICANT CHANGE UP
EOSINOPHIL # BLD AUTO: 0.44 K/UL — SIGNIFICANT CHANGE UP (ref 0–0.5)
EOSINOPHIL NFR BLD AUTO: 5.9 % — SIGNIFICANT CHANGE UP (ref 0–6)
GLUCOSE SERPL-MCNC: 91 MG/DL — SIGNIFICANT CHANGE UP (ref 70–99)
HCT VFR BLD CALC: 36.2 % — LOW (ref 39–50)
HGB BLD-MCNC: 11.8 G/DL — LOW (ref 13–17)
IMM GRANULOCYTES NFR BLD AUTO: 0.3 % — SIGNIFICANT CHANGE UP (ref 0–0.9)
LYMPHOCYTES # BLD AUTO: 0.99 K/UL — LOW (ref 1–3.3)
LYMPHOCYTES # BLD AUTO: 13.3 % — SIGNIFICANT CHANGE UP (ref 13–44)
MCHC RBC-ENTMCNC: 30.3 PG — SIGNIFICANT CHANGE UP (ref 27–34)
MCHC RBC-ENTMCNC: 32.6 GM/DL — SIGNIFICANT CHANGE UP (ref 32–36)
MCV RBC AUTO: 93.1 FL — SIGNIFICANT CHANGE UP (ref 80–100)
MITOCHONDRIA AB SER-ACNC: SIGNIFICANT CHANGE UP
MONOCYTES # BLD AUTO: 0.77 K/UL — SIGNIFICANT CHANGE UP (ref 0–0.9)
MONOCYTES NFR BLD AUTO: 10.3 % — SIGNIFICANT CHANGE UP (ref 2–14)
NEUTROPHILS # BLD AUTO: 5.21 K/UL — SIGNIFICANT CHANGE UP (ref 1.8–7.4)
NEUTROPHILS NFR BLD AUTO: 69.8 % — SIGNIFICANT CHANGE UP (ref 43–77)
NRBC # BLD: 0 /100 WBCS — SIGNIFICANT CHANGE UP (ref 0–0)
PLATELET # BLD AUTO: 229 K/UL — SIGNIFICANT CHANGE UP (ref 150–400)
POTASSIUM SERPL-MCNC: 3.6 MMOL/L — SIGNIFICANT CHANGE UP (ref 3.5–5.3)
POTASSIUM SERPL-SCNC: 3.6 MMOL/L — SIGNIFICANT CHANGE UP (ref 3.5–5.3)
PROT SERPL-MCNC: 6.1 G/DL — SIGNIFICANT CHANGE UP (ref 6–8.3)
RBC # BLD: 3.89 M/UL — LOW (ref 4.2–5.8)
RBC # FLD: 12.9 % — SIGNIFICANT CHANGE UP (ref 10.3–14.5)
SMOOTH MUSCLE AB SER-ACNC: SIGNIFICANT CHANGE UP
SODIUM SERPL-SCNC: 140 MMOL/L — SIGNIFICANT CHANGE UP (ref 135–145)
WBC # BLD: 7.46 K/UL — SIGNIFICANT CHANGE UP (ref 3.8–10.5)
WBC # FLD AUTO: 7.46 K/UL — SIGNIFICANT CHANGE UP (ref 3.8–10.5)

## 2023-06-17 RX ORDER — POTASSIUM CHLORIDE 20 MEQ
40 PACKET (EA) ORAL ONCE
Refills: 0 | Status: COMPLETED | OUTPATIENT
Start: 2023-06-17 | End: 2023-06-17

## 2023-06-17 RX ADMIN — PIPERACILLIN AND TAZOBACTAM 25 GRAM(S): 4; .5 INJECTION, POWDER, LYOPHILIZED, FOR SOLUTION INTRAVENOUS at 02:00

## 2023-06-17 RX ADMIN — Medication 1 MILLIGRAM(S): at 05:08

## 2023-06-17 RX ADMIN — Medication 4 MILLIGRAM(S): at 05:09

## 2023-06-17 RX ADMIN — PANTOPRAZOLE SODIUM 40 MILLIGRAM(S): 20 TABLET, DELAYED RELEASE ORAL at 05:08

## 2023-06-17 RX ADMIN — LAMOTRIGINE 150 MILLIGRAM(S): 25 TABLET, ORALLY DISINTEGRATING ORAL at 05:06

## 2023-06-17 RX ADMIN — QUETIAPINE FUMARATE 50 MILLIGRAM(S): 200 TABLET, FILM COATED ORAL at 05:08

## 2023-06-17 RX ADMIN — LOSARTAN POTASSIUM 100 MILLIGRAM(S): 100 TABLET, FILM COATED ORAL at 05:08

## 2023-06-17 RX ADMIN — QUETIAPINE FUMARATE 50 MILLIGRAM(S): 200 TABLET, FILM COATED ORAL at 17:13

## 2023-06-17 RX ADMIN — PIPERACILLIN AND TAZOBACTAM 25 GRAM(S): 4; .5 INJECTION, POWDER, LYOPHILIZED, FOR SOLUTION INTRAVENOUS at 10:21

## 2023-06-17 RX ADMIN — LAMOTRIGINE 150 MILLIGRAM(S): 25 TABLET, ORALLY DISINTEGRATING ORAL at 17:14

## 2023-06-17 RX ADMIN — PANTOPRAZOLE SODIUM 40 MILLIGRAM(S): 20 TABLET, DELAYED RELEASE ORAL at 17:13

## 2023-06-17 RX ADMIN — ENOXAPARIN SODIUM 40 MILLIGRAM(S): 100 INJECTION SUBCUTANEOUS at 05:53

## 2023-06-17 RX ADMIN — Medication 2000 UNIT(S): at 12:20

## 2023-06-17 RX ADMIN — Medication 40 MILLIEQUIVALENT(S): at 13:11

## 2023-06-17 RX ADMIN — Medication 1 MILLIGRAM(S): at 17:13

## 2023-06-17 RX ADMIN — PIPERACILLIN AND TAZOBACTAM 25 GRAM(S): 4; .5 INJECTION, POWDER, LYOPHILIZED, FOR SOLUTION INTRAVENOUS at 17:14

## 2023-06-17 NOTE — PROGRESS NOTE ADULT - PROBLEM SELECTOR PLAN 3
Presenting Cr 1.50 - 1.1 this AM ---> s/p IV Contrast in ER   - Encouraged PO intake  -IVF to continue -BMP in AM   Avoid Nephrotoxics   -Renal dose meds, IVF Presenting Cr 1.50 - 1.2 this AM ---> s/p IV Contrast in ER   - Encouraged PO intake  -IVF to continue -BMP in AM   Avoid Nephrotoxics   -Renal dose meds, IVF

## 2023-06-17 NOTE — PROGRESS NOTE ADULT - ASSESSMENT
61 yo M with PMHx of HTN, HLD, Bipolar, GERD, Spivey's Esophagus Asthma, and Abnormal LFTs presented to the ED with 1x day hx of epigastric pain and new onset fever. In the ED, pt was found to be tachypneic, tachycardic, and febrile to 100.6. pt found to have elevated LFTs and Alk phos in outpatient workup since February. Dr. Ewing (PCP) attributed this to recent viral illness, but subsequent labs not checked.   Temp: 100.6, RR: 20, SpO2: 97% on RA    Labs:  WBC 11.71, Lactate 2.0 < 3.0, Cr 1.50, Alk phos 208, ,      Fever/Transaminitis, - cholangitis ruled out   cb dilitation - pancreatitis/cholecystitis /biliary obstruction without obvious stone   - 6/15 RRT for severe epigastric pain, febrile to 103F  - admission cx NGTD, repeat pending  - CT w/o mesenteric ischemia  fever resolved     Recommendations:   blood cx neg to date  hep c and hep b  neg   cmv neg  ebv- consistent with old disease  mrcp- cbd dilated, cholecystitis and  pancreatitis and small cyst no necrosis or abscess     zosyn day 3 -- will stop today as cx negative and symptoms resolving and   lft trending down   diet as tolerated per gi   - supportive care, pain control and additional management per primary team

## 2023-06-17 NOTE — PROGRESS NOTE ADULT - ASSESSMENT
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 I discussed the care with the patient and their family  Differential diagnosis and plan of care discussed with patient after the evaluation  40 minutes spent on total encounter of which more than fifty percent of the encounter was spent counseling and/or coordinating care by the attending physician.  Advanced care planning was discussed with patient and family.  Advanced care planning forms were reviewed and discussed.  Risks, benefits and alternatives of gastroenterologic procedures were discussed in detail and all questions were answered.

## 2023-06-17 NOTE — PROGRESS NOTE ADULT - SUBJECTIVE AND OBJECTIVE BOX
[facility], Division of Infectious   Dr Bahena, Dr Pina, Dr Lopez, JESUS Sharam Joen  [phone]  after hours and weekends [phone]    Name: AMELIA SHARMA  Age: 62y  Gender: Male  MRN: [id number]    Interval History--  Notes reviewed  feels much better  tolerating liquids        Allergies    No Known Allergies    Intolerances        Medications--  Antibiotics:  piperacillin/tazobactam IVPB.. 3.375 Gram(s) IV Intermittent every 8 hours    Immunologic:    Other:  acetaminophen     Tablet .. PRN  albuterol    90 MICROgram(s) HFA Inhaler PRN  aluminum hydroxide/magnesium hydroxide/simethicone Suspension PRN  aspirin  chewable  cholecalciferol  doxazosin  enoxaparin Injectable  Fluoxetine 40 milliGRAM(s)  Fluoxetine 40mg 1 Capsule(s)  fluticasone propionate 50 MICROgram(s)/spray Nasal Spray PRN  lamoTRIgine  LORazepam     Tablet  losartan  melatonin PRN  pantoprazole    Tablet  QUEtiapine  sodium chloride 0.9%.      Review of Systems--  A 10-point review of systems was obtained.     Pertinent positives and negatives--  Constitutional: No fevers. No Chills. No Rigors.   Cardiovascular: No chest pain. No palpitations.  Respiratory: No shortness of breath. No cough.  Gastrointestinal: No nausea or vomiting. No diarrhea or constipation.   Psychiatric: Pleasant. Appropriate affect.    Review of systems otherwise negative except as previously noted.    Physical Examination--  Vital Signs: T(F): 97.8 (06-17-23 @ 11:08), Max: 98.7 (06-16-23 @ 20:13)  HR: 55 (06-17-23 @ 11:08)  BP: 134/79 (06-17-23 @ 11:08)  RR: 18 (06-17-23 @ 11:08)  SpO2: 99% (06-17-23 @ 11:08)  Wt(kg): --  General: Nontoxic-appearing Male in no acute distress.  HEENT: AT/NC.   Neck: Not rigid. No sense of mass.  Nodes: None palpable.  Lungs: Clear bilaterally without rales, wheezing or rhonchi  Heart: Regular rate and rhythm.   Abdomen: Bowel sounds present and normoactive. Soft. Nondistended.  Back: No spinal tenderness. No costovertebral angle tenderness.   Extremities: No cyanosis or clubbing. No edema.   Skin: Warm. Dry. Good turgor. No rash. No vasculitic stigmata.  Psychiatric: Appropriate affect and mood for situation.         Laboratory Studies--  CBC                        11.8   7.46  )-----------( 229      ( 17 Jun 2023 06:59 )             36.2       Chemistries  06-17    140  |  108  |  20  ----------------------------<  91  3.6   |  26  |  1.20    Ca    8.9      17 Jun 2023 06:59    TPro  6.1  /  Alb  2.9<L>  /  TBili  1.0  /  DBili  0.5<H>  /  AST  83<H>  /  ALT  387<H>  /  AlkPhos  249<H>  06-17      Culture Data    Culture - Blood (collected 16 Jun 2023 07:55)  Source: .Blood Blood-Venous  Preliminary Report (17 Jun 2023 14:02):    No growth to date.    Culture - Blood (collected 16 Jun 2023 07:50)  Source: .Blood Blood-Peripheral  Preliminary Report (17 Jun 2023 14:02):    No growth to date.    Culture - Blood (collected 15 Robert 2023 19:05)  Source: .Blood Blood-Peripheral  Preliminary Report (17 Jun 2023 01:02):    No growth to date.    Culture - Blood (collected 15 Robert 2023 19:00)  Source: .Blood Blood-Peripheral  Preliminary Report (17 Jun 2023 01:02):    No growth to date.    Culture - Urine (collected 14 Jun 2023 18:00)  Source: Clean Catch Clean Catch (Midstream)  Final Report (16 Jun 2023 07:25):    <10,000 CFU/mL Normal Urogenital Rebecca    Culture - Blood (collected 14 Jun 2023 16:57)  Source: .Blood Blood-Peripheral  Preliminary Report (16 Jun 2023 01:02):    No growth to date.    Culture - Blood (collected 14 Jun 2023 16:51)  Source: .Blood Blood-Peripheral  Preliminary Report (16 Jun 2023 01:02):    No growth to date.

## 2023-06-17 NOTE — PROGRESS NOTE ADULT - SUBJECTIVE AND OBJECTIVE BOX
Patient is a 62y old  Male who presents with a chief complaint of Fever workup (16 Jun 2023 12:09)    HPI:  63 yo M with PMHx of HTN, HLD, Bipolar, GERD, Jhon's Esophagus Asthma, and Abnormal LFTs presents to the ED with 1x day hx of epigastric pain and new onset fever. Pt endorses feeling ache and heaviness in the epigastric region after he ate breakfast this morning. Noted laying down on his left side with improvement of symptoms, but persisted throughout the day. Pt states that pain worsened when he had a nonbloody bowel movement, with associated nausea, lightheadedness, and diaphoresis. Following this event pt was brought to the ED by his daughter in fear he was having a heart attack. In the ED, pt was found to be tachypneic, tachycardic, and febrile to 100.6. Pt was given IVF, Ativan, and Ofirmev with improvement of symptoms. Pt states that he no longer has the epigastric pain, but has experienced these symptoms in the past. Recently diagnosed with Jhon's Esophagus following EGD due to worsening acid reflux. Pt also noted to have large duodenal diverticulum during EGD. Endorses improving headache, dizziness, SOB, nausea  and distended abdomen throughout event. Pt has no complaints at this time, though appears anxious throughout discussion. Denies any recent sick contacts.     Of note, pt found to have elevated LFTs and Alkphos in outpatient workup since February. Dr. Ewing (PCP) attributed this to recent viral illness, but subsequent labs not checked.     ED Course:   Vitals: BP: 157/75, HR: 77, Temp: 100.6, RR: 20, SpO2: 97% on RA    Labs:  WBC 11.71, Lactate 2.0 < 3.0, Cr 1.50, Alkphos 208, ,    UA: Negative   CXR: Grossly normal as per personal read, official read pending   CT: Minimal periportal edema, nonspecific. Correlate with liver function tests. Otherwise, no acute abdominopelvic findings. No acute thoracic findings. Right lower lobe pulmonary nodule measuring 4 mm.  US Abdomen: Layering gallbladder sludge. No wall thickening or pericholecystic fluid. Focal hyperechoic right hepatic lobe lesion measures 1.0 x 0.9 x 0.8 cm, possibility is a hepatic hemangioma  EKG: NSR 85 bpm with prolonged /509   Received in the ED: Ofirmev, Pepcid 20 mg IVP, Ativan 1mg IVP, Zosyn, 1.5L NS bolus    (14 Jun 2023 23:41)    INTERVAL HPI:  ________  TODAY- Pt was seen and examined at bedside. Pt states that ___. Pt denies headache, dizziness, lightheadedness, fever, chills, body aches, CP, SOB, palpitations, abdominal pain, n/v, numbness/tingling. No other complaints at this time.     OVERNIGHT EVENTS: No acute overnight events.     Home Medications:  aspirin 81 mg oral capsule: 1 orally (15 Robert 2023 00:43)  Ativan 1 mg oral tablet: 1 orally 2 times a day (15 Robert 2023 00:43)  cholecalciferol 50 mcg (2000 intl units) oral tablet: 1 orally once a day (15 Robert 2023 00:43)  Cozaar 100 mg oral tablet: 1 orally once a day (15 Robert 2023 00:43)  doxazosin 4 mg oral tablet: 1 orally (15 Robert 2023 00:43)  fluticasone 50 mcg/inh nasal spray: 2 spray(s) in each nostril once a day as needed for  congestion (15 Robert 2023 01:45)  ipratropium-albuterol 0.5 mg-2.5 mg/3 mL inhalation solution: 1 unit(s) by nebulizer every 4 hours as needed for  bronchospasm (15 Robert 2023 01:45)  lamoTRIgine 200 mg oral tablet: 0.75 tab(s) orally 2 times a day (15 Robert 2023 01:45)  ProAir HFA 90 mcg/inh inhalation aerosol: 2 inhaled every 6 hours as needed for  bronchospasm (15 Robert 2023 01:45)  Protonix 40 mg oral delayed release tablet: 1 orally once a day (15 Robert 2023 01:41)  PROzac 40 mg oral capsule: 1 orally once a day Alternates qD and BID every other day (15 Robert 2023 01:45)  QUEtiapine 50 mg oral tablet, extended release: 1 orally 2 times a day (15 Robert 2023 01:45)  rosuvastatin 20 mg oral tablet: 1 orally once a day (15 Robert 2023 01:45)  ZyrTEC 10 mg oral tablet: 1 orally once a day (15 Robert 2023 01:45)      MEDICATIONS  (STANDING):  aspirin  chewable 81 milliGRAM(s) Oral daily  cholecalciferol 2000 Unit(s) Oral daily  doxazosin 4 milliGRAM(s) Oral daily  enoxaparin Injectable 40 milliGRAM(s) SubCutaneous every 24 hours  Fluoxetine 40 milliGRAM(s) 40 milliGRAM(s) Oral <User Schedule>  Fluoxetine 40mg 1 Capsule(s) 1 Capsule(s) Oral <User Schedule>  lamoTRIgine 150 milliGRAM(s) Oral two times a day  LORazepam     Tablet 1 milliGRAM(s) Oral two times a day  losartan 100 milliGRAM(s) Oral daily  pantoprazole    Tablet 40 milliGRAM(s) Oral two times a day  piperacillin/tazobactam IVPB.. 3.375 Gram(s) IV Intermittent every 8 hours  QUEtiapine 50 milliGRAM(s) Oral two times a day  sodium chloride 0.9%. 1000 milliLiter(s) (75 mL/Hr) IV Continuous <Continuous>    MEDICATIONS  (PRN):  acetaminophen     Tablet .. 650 milliGRAM(s) Oral every 6 hours PRN Temp greater or equal to 38C (100.4F), Mild Pain (1 - 3)  albuterol    90 MICROgram(s) HFA Inhaler 2 Puff(s) Inhalation every 6 hours PRN for bronchospasm  aluminum hydroxide/magnesium hydroxide/simethicone Suspension 30 milliLiter(s) Oral every 4 hours PRN Dyspepsia  fluticasone propionate 50 MICROgram(s)/spray Nasal Spray 1 Spray(s) Both Nostrils two times a day PRN Congestion  melatonin 3 milliGRAM(s) Oral at bedtime PRN Insomnia      No Known Allergies      Social History:  Lives: At home with Wife and Daughter   ADLs: Independent, walks independently however becoming more painful given osteoarthritis   Diet: Avoid carbonated beverages, spicy foods, and salt   Vaccination: COVID vaccinatedx3   Alcohol Use: Denies   Tobacco Use: Denies   Recreational Drug Use: Denies (14 Jun 2023 23:41)      REVIEW OF SYSTEMS:  CONSTITUTIONAL: No fever, No chills, No fatigue, No myalgia, No Body ache, No Weakness  EYES: No eye pain,  No visual disturbances, No discharge, No Redness  ENMT: No ear pain, No nose bleed, No vertigo; No sinus pain, No throat pain, No Congestion  NECK: No pain, No stiffness  RESPIRATORY: No cough, No wheezing, No hemoptysis, No shortness of breath  CARDIOVASCULAR: No chest pain, No palpitations  GASTROINTESTINAL: No abdominal pain, No epigastric pain. No nausea, No vomiting, No diarrhea, No constipation; [ x ] BM-  GENITOURINARY: No dysuria, No frequency, No urgency, No hematuria, No incontinence  NEUROLOGICAL: No headaches, No dizziness, No numbness, No tingling, No tremors, No weakness  EXTREMITIES: No Swelling, No Pain, No Edema  SKIN: [ x ] No itching, burning, rashes, or lesions   MUSCULOSKELETAL: No joint pain, No joint swelling; No muscle pain, No back pain, No extremity pain  PSYCHIATRIC: No depression, No anxiety, No mood swings, No difficulty sleeping at night  PAIN SCALE: [  ] None  [  ] Other-  ROS Unable to obtain due to: [  ] Dementia  [  ] Lethargy  [  ] Sedated  [  ] Non verbal  REST OF REVIEW OF SYSTEMS: [ x ] Normal     Vital Signs Last 24 Hrs  T(C): 37.1 (17 Jun 2023 04:59), Max: 37.1 (16 Jun 2023 12:00)  T(F): 98.7 (17 Jun 2023 04:59), Max: 98.7 (16 Jun 2023 12:00)  HR: 70 (17 Jun 2023 04:59) (57 - 70)  BP: 146/80 (17 Jun 2023 04:59) (123/67 - 146/80)  BP(mean): --  RR: 18 (17 Jun 2023 04:59) (18 - 18)  SpO2: 93% (17 Jun 2023 04:59) (93% - 98%)    Parameters below as of 17 Jun 2023 04:59  Patient On (Oxygen Delivery Method): nasal cannula  O2 Flow (L/min): 2      CAPILLARY BLOOD GLUCOSE          I&O's Summary    PHYSICAL EXAM:  GENERAL:  [ x ] NAD, [ x ] Well appearing, [  ] Agitated, [  ] Drowsy, [  ] Lethargy, [  ] Confused   HEAD:  [ x ] Normal, [  ] Other  EYES:  [ x ] EOMI, [ x ] PERRLA, [ x ] Conjunctiva and sclera clear normal, [  ] Other, [  ] Pallor, [  ] Discharge  ENMT:  [ x ] Normal, [ x ] Moist mucous membranes, [  ] Good dentition, [  ] No thrush  NECK:  [ x ] Supple, [  ] No JVD, [ x ] Normal thyroid, [  ] Lymphadenopathy, [  ] Other  CHEST/LUNG:  [ x ] Clear to auscultation bilaterally, [ x ] Breath Sounds equal B/L / decreased, [  ] Poor effort, [ x ] No rales, [ x ] No rhonchi, [ x ] No wheezing  HEART:  [ x ] Regular rate and rhythm, [  ] Tachycardia, [  ] Bradycardia, [  ] Irregular, [ x ] No murmurs, No rubs, No gallops, [  ] PPM in place (Mfr:  )  ABDOMEN:  [ x ] Soft, [ x ] Nontender, [ x ] Nondistended, [ x ] No mass, [ x ] Bowel sounds present, [  ] Obese  NERVOUS SYSTEM:  [ x ] Alert & Oriented x3__, [ x ] Nonfocal, [  ] Confusion, [  ] Encephalopathic, [  ] Sedated, [  ] Unable to assess, [  ] Dementia, [  ] Other-  EXTREMITIES:  [ x ] 2+ Peripheral Pulses, No clubbing, No cyanosis,  [  ] Edema B/L lower EXT, [  ] PVD stasis skin changes B/L lower EXT, [  ] Wound  LYMPH:  No lymphadenopathy noted  SKIN:  [ x ] No rashes or lesions, [  ] Pressure ulcers, [  ] Ecchymosis, [  ] Skin tears, [  ] Other    DIET: Diet, Clear Liquid (06-16-23 @ 14:52)      LABS:                        11.8   7.77  )-----------( 218      ( 16 Jun 2023 08:43 )             36.0     16 Jun 2023 08:43    141    |  110    |  18     ----------------------------<  103    3.6     |  27     |  1.10     Ca    8.8        16 Jun 2023 08:43    TPro  6.2    /  Alb  3.0    /  TBili  3.0    /  DBili  2.4    /  AST  262    /  ALT  566    /  AlkPhos  264    16 Jun 2023 08:43    PT/INR - ( 15 Robert 2023 14:45 )   PT: 13.7 sec;   INR: 1.17 ratio         PTT - ( 15 Robert 2023 14:45 )  PTT:31.9 sec    Culture Results:   No growth to date. (06-15 @ 19:05)  Culture Results:   No growth to date. (06-15 @ 19:00)  Culture Results:   <10,000 CFU/mL Normal Urogenital Rebecca (06-14 @ 18:00)  Culture Results:   No growth to date. (06-14 @ 16:57)  Culture Results:   No growth to date. (06-14 @ 16:51)    culture blood  -- .Blood Blood-Peripheral 06-15 @ 19:05    culture urine  --  06-15 @ 19:05  culture blood  -- .Blood Blood-Peripheral 06-15 @ 19:00    culture urine  --  06-15 @ 19:00          Culture - Blood (collected 15 Robert 2023 19:05)  Source: .Blood Blood-Peripheral  Preliminary Report (17 Jun 2023 01:02):    No growth to date.    Culture - Blood (collected 15 Robert 2023 19:00)  Source: .Blood Blood-Peripheral  Preliminary Report (17 Jun 2023 01:02):    No growth to date.    Culture - Urine (collected 14 Jun 2023 18:00)  Source: Clean Catch Clean Catch (Midstream)  Final Report (16 Jun 2023 07:25):    <10,000 CFU/mL Normal Urogenital Rebecca    Culture - Blood (collected 14 Jun 2023 16:57)  Source: .Blood Blood-Peripheral  Preliminary Report (16 Jun 2023 01:02):    No growth to date.    Culture - Blood (collected 14 Jun 2023 16:51)  Source: .Blood Blood-Peripheral  Preliminary Report (16 Jun 2023 01:02):    No growth to date.       Anemia Panel:  Iron Total, Serum: 20 ug/dL (06-16-23 @ 07:50)  Iron - Total Binding Capacity.: 288 ug/dL (06-16-23 @ 07:50)      Thyroid Panel:        Lipase, Serum: 280 U/L (06-14-23 @ 15:33)          HEALTH ISSUES - PROBLEM Dx:  Bipolar disorder    GERD (gastroesophageal reflux disease)    KIRA (acute kidney injury)    HTN (hypertension)    HLD (hyperlipidemia)    Transaminitis    Asthma    Need for prophylactic measure    Fever          Consultant(s) Notes Reviewed:  [ x ] YES     Care Discussed with [ x ] Consultants, [ x ] Patient, [ x ] Family, [  ] HCP, [ x ] , [  ] Social Service, [ x ] RN, [  ] Physical Therapy, [  ] Palliative Care Team  DVT PPX: [  ] Lovenox, [  ] SC Heparin, [  ] Coumadin, [  ] Xarelto, [  ] Eliquis, [  ] Pradaxa, [  ] IV Heparin drip, [  ] SCD, [  ] Ambulation, [  ] Contraindicated 2/2 GI Bleed, [  ] Contraindicated 2/2  Bleed, [  ] Contraindicated 2/2 Brain Bleed  Advanced Directive: [  ] None, [  ] DNR/DNI Patient is a 62y old  Male who presents with a chief complaint of Fever workup (16 Jun 2023 12:09)    HPI:  63 yo M with PMHx of HTN, HLD, Bipolar, GERD, Jhon's Esophagus Asthma, and Abnormal LFTs presents to the ED with 1x day hx of epigastric pain and new onset fever. Pt endorses feeling ache and heaviness in the epigastric region after he ate breakfast this morning. Noted laying down on his left side with improvement of symptoms, but persisted throughout the day. Pt states that pain worsened when he had a nonbloody bowel movement, with associated nausea, lightheadedness, and diaphoresis. Following this event pt was brought to the ED by his daughter in fear he was having a heart attack. In the ED, pt was found to be tachypneic, tachycardic, and febrile to 100.6. Pt was given IVF, Ativan, and Ofirmev with improvement of symptoms. Pt states that he no longer has the epigastric pain, but has experienced these symptoms in the past. Recently diagnosed with Jhon's Esophagus following EGD due to worsening acid reflux. Pt also noted to have large duodenal diverticulum during EGD. Endorses improving headache, dizziness, SOB, nausea  and distended abdomen throughout event. Pt has no complaints at this time, though appears anxious throughout discussion. Denies any recent sick contacts.     Of note, pt found to have elevated LFTs and Alkphos in outpatient workup since February. Dr. Ewing (PCP) attributed this to recent viral illness, but subsequent labs not checked.     ED Course:   Vitals: BP: 157/75, HR: 77, Temp: 100.6, RR: 20, SpO2: 97% on RA    Labs:  WBC 11.71, Lactate 2.0 < 3.0, Cr 1.50, Alkphos 208, ,    UA: Negative   CXR: Grossly normal as per personal read, official read pending   CT: Minimal periportal edema, nonspecific. Correlate with liver function tests. Otherwise, no acute abdominopelvic findings. No acute thoracic findings. Right lower lobe pulmonary nodule measuring 4 mm.  US Abdomen: Layering gallbladder sludge. No wall thickening or pericholecystic fluid. Focal hyperechoic right hepatic lobe lesion measures 1.0 x 0.9 x 0.8 cm, possibility is a hepatic hemangioma  EKG: NSR 85 bpm with prolonged /509   Received in the ED: Ofirmev, Pepcid 20 mg IVP, Ativan 1mg IVP, Zosyn, 1.5L NS bolus    (14 Jun 2023 23:41)    INTERVAL HPI:  No acute overnight events. MRCP yesterday significant for gallbladder edema, possible pancreatitis/cholecystitis w/ CBD dilation to 9mm. No pain or complaints at this time. C/w clear liquid diet and pain control. ERCP monday discussed w patient at the bedside.     Home Medications:  aspirin 81 mg oral capsule: 1 orally (15 Robert 2023 00:43)  Ativan 1 mg oral tablet: 1 orally 2 times a day (15 Robert 2023 00:43)  cholecalciferol 50 mcg (2000 intl units) oral tablet: 1 orally once a day (15 Robert 2023 00:43)  Cozaar 100 mg oral tablet: 1 orally once a day (15 Robert 2023 00:43)  doxazosin 4 mg oral tablet: 1 orally (15 Robert 2023 00:43)  fluticasone 50 mcg/inh nasal spray: 2 spray(s) in each nostril once a day as needed for  congestion (15 Robert 2023 01:45)  ipratropium-albuterol 0.5 mg-2.5 mg/3 mL inhalation solution: 1 unit(s) by nebulizer every 4 hours as needed for  bronchospasm (15 Robert 2023 01:45)  lamoTRIgine 200 mg oral tablet: 0.75 tab(s) orally 2 times a day (15 Robert 2023 01:45)  ProAir HFA 90 mcg/inh inhalation aerosol: 2 inhaled every 6 hours as needed for  bronchospasm (15 Robert 2023 01:45)  Protonix 40 mg oral delayed release tablet: 1 orally once a day (15 Robert 2023 01:41)  PROzac 40 mg oral capsule: 1 orally once a day Alternates qD and BID every other day (15 Robert 2023 01:45)  QUEtiapine 50 mg oral tablet, extended release: 1 orally 2 times a day (15 Robert 2023 01:45)  rosuvastatin 20 mg oral tablet: 1 orally once a day (15 Robert 2023 01:45)  ZyrTEC 10 mg oral tablet: 1 orally once a day (15 Robert 2023 01:45)      MEDICATIONS  (STANDING):  aspirin  chewable 81 milliGRAM(s) Oral daily  cholecalciferol 2000 Unit(s) Oral daily  doxazosin 4 milliGRAM(s) Oral daily  enoxaparin Injectable 40 milliGRAM(s) SubCutaneous every 24 hours  Fluoxetine 40 milliGRAM(s) 40 milliGRAM(s) Oral <User Schedule>  Fluoxetine 40mg 1 Capsule(s) 1 Capsule(s) Oral <User Schedule>  lamoTRIgine 150 milliGRAM(s) Oral two times a day  LORazepam     Tablet 1 milliGRAM(s) Oral two times a day  losartan 100 milliGRAM(s) Oral daily  pantoprazole    Tablet 40 milliGRAM(s) Oral two times a day  piperacillin/tazobactam IVPB.. 3.375 Gram(s) IV Intermittent every 8 hours  QUEtiapine 50 milliGRAM(s) Oral two times a day  sodium chloride 0.9%. 1000 milliLiter(s) (75 mL/Hr) IV Continuous <Continuous>    MEDICATIONS  (PRN):  acetaminophen     Tablet .. 650 milliGRAM(s) Oral every 6 hours PRN Temp greater or equal to 38C (100.4F), Mild Pain (1 - 3)  albuterol    90 MICROgram(s) HFA Inhaler 2 Puff(s) Inhalation every 6 hours PRN for bronchospasm  aluminum hydroxide/magnesium hydroxide/simethicone Suspension 30 milliLiter(s) Oral every 4 hours PRN Dyspepsia  fluticasone propionate 50 MICROgram(s)/spray Nasal Spray 1 Spray(s) Both Nostrils two times a day PRN Congestion  melatonin 3 milliGRAM(s) Oral at bedtime PRN Insomnia      No Known Allergies      Social History:  Lives: At home with Wife and Daughter   ADLs: Independent, walks independently however becoming more painful given osteoarthritis   Diet: Avoid carbonated beverages, spicy foods, and salt   Vaccination: COVID vaccinatedx3   Alcohol Use: Denies   Tobacco Use: Denies   Recreational Drug Use: Denies (14 Jun 2023 23:41)      REVIEW OF SYSTEMS:  CONSTITUTIONAL: No fever, No chills, No fatigue, No myalgia, No Body ache, No Weakness  EYES: No eye pain,  No visual disturbances, No discharge, No Redness  ENMT: No ear pain, No nose bleed, No vertigo; No sinus pain, No throat pain, No Congestion  NECK: No pain, No stiffness  RESPIRATORY: No cough, No wheezing, No hemoptysis, No shortness of breath  CARDIOVASCULAR: No chest pain, No palpitations  GASTROINTESTINAL: No abdominal pain, No epigastric pain. No nausea, No vomiting, No diarrhea, No constipation; [ x ] BM-  GENITOURINARY: No dysuria, No frequency, No urgency, No hematuria, No incontinence  NEUROLOGICAL: No headaches, No dizziness, No numbness, No tingling, No tremors, No weakness  EXTREMITIES: No Swelling, No Pain, No Edema  SKIN: [ x ] No itching, burning, rashes, or lesions   MUSCULOSKELETAL: No joint pain, No joint swelling; No muscle pain, No back pain, No extremity pain  PSYCHIATRIC: No depression, No anxiety, No mood swings, No difficulty sleeping at night  PAIN SCALE: [  ] None  [  ] Other-  ROS Unable to obtain due to: [  ] Dementia  [  ] Lethargy  [  ] Sedated  [  ] Non verbal  REST OF REVIEW OF SYSTEMS: [ x ] Normal     Vital Signs Last 24 Hrs  T(C): 37.1 (17 Jun 2023 04:59), Max: 37.1 (16 Jun 2023 12:00)  T(F): 98.7 (17 Jun 2023 04:59), Max: 98.7 (16 Jun 2023 12:00)  HR: 70 (17 Jun 2023 04:59) (57 - 70)  BP: 146/80 (17 Jun 2023 04:59) (123/67 - 146/80)  BP(mean): --  RR: 18 (17 Jun 2023 04:59) (18 - 18)  SpO2: 93% (17 Jun 2023 04:59) (93% - 98%)    Parameters below as of 17 Jun 2023 04:59  Patient On (Oxygen Delivery Method): nasal cannula  O2 Flow (L/min): 2      CAPILLARY BLOOD GLUCOSE          I&O's Summary    PHYSICAL EXAM:  GENERAL:  [ x ] NAD, [ x ] Well appearing, [  ] Agitated, [  ] Drowsy, [  ] Lethargy, [  ] Confused   HEAD:  [ x ] Normal, [  ] Other  EYES:  [ x ] EOMI, [ x ] PERRLA, [ x ] Conjunctiva and sclera clear normal, [  ] Other, [  ] Pallor, [  ] Discharge  ENMT:  [ x ] Normal, [ x ] Moist mucous membranes, [  ] Good dentition, [  ] No thrush  NECK:  [ x ] Supple, [  ] No JVD, [ x ] Normal thyroid, [  ] Lymphadenopathy, [  ] Other  CHEST/LUNG:  [ x ] Clear to auscultation bilaterally, [ x ] Breath Sounds equal B/L / decreased, [  ] Poor effort, [ x ] No rales, [ x ] No rhonchi, [ x ] No wheezing  HEART:  [ x ] Regular rate and rhythm, [  ] Tachycardia, [  ] Bradycardia, [  ] Irregular, [ x ] No murmurs, No rubs, No gallops, [  ] PPM in place (Mfr:  )  ABDOMEN:  [ x ] Soft, [ x ] Nontender, [ x ] Nondistended, [ x ] No mass, [ x ] Bowel sounds present, [  ] Obese  NERVOUS SYSTEM:  [ x ] Alert & Oriented x3__, [ x ] Nonfocal, [  ] Confusion, [  ] Encephalopathic, [  ] Sedated, [  ] Unable to assess, [  ] Dementia, [  ] Other-  EXTREMITIES:  [ x ] 2+ Peripheral Pulses, No clubbing, No cyanosis,  [  ] Edema B/L lower EXT, [  ] PVD stasis skin changes B/L lower EXT, [  ] Wound  LYMPH:  No lymphadenopathy noted  SKIN:  [ x ] No rashes or lesions, [  ] Pressure ulcers, [  ] Ecchymosis, [  ] Skin tears, [  ] Other    DIET: Diet, Clear Liquid (06-16-23 @ 14:52)      LABS:                        11.8   7.77  )-----------( 218      ( 16 Jun 2023 08:43 )             36.0     16 Jun 2023 08:43    141    |  110    |  18     ----------------------------<  103    3.6     |  27     |  1.10     Ca    8.8        16 Jun 2023 08:43    TPro  6.2    /  Alb  3.0    /  TBili  3.0    /  DBili  2.4    /  AST  262    /  ALT  566    /  AlkPhos  264    16 Jun 2023 08:43    PT/INR - ( 15 Robert 2023 14:45 )   PT: 13.7 sec;   INR: 1.17 ratio         PTT - ( 15 Robert 2023 14:45 )  PTT:31.9 sec    Culture Results:   No growth to date. (06-15 @ 19:05)  Culture Results:   No growth to date. (06-15 @ 19:00)  Culture Results:   <10,000 CFU/mL Normal Urogenital Rebecca (06-14 @ 18:00)  Culture Results:   No growth to date. (06-14 @ 16:57)  Culture Results:   No growth to date. (06-14 @ 16:51)    culture blood  -- .Blood Blood-Peripheral 06-15 @ 19:05    culture urine  --  06-15 @ 19:05  culture blood  -- .Blood Blood-Peripheral 06-15 @ 19:00    culture urine  --  06-15 @ 19:00          Culture - Blood (collected 15 Robert 2023 19:05)  Source: .Blood Blood-Peripheral  Preliminary Report (17 Jun 2023 01:02):    No growth to date.    Culture - Blood (collected 15 Robert 2023 19:00)  Source: .Blood Blood-Peripheral  Preliminary Report (17 Jun 2023 01:02):    No growth to date.    Culture - Urine (collected 14 Jun 2023 18:00)  Source: Clean Catch Clean Catch (Midstream)  Final Report (16 Jun 2023 07:25):    <10,000 CFU/mL Normal Urogenital Rebecca    Culture - Blood (collected 14 Jun 2023 16:57)  Source: .Blood Blood-Peripheral  Preliminary Report (16 Jun 2023 01:02):    No growth to date.    Culture - Blood (collected 14 Jun 2023 16:51)  Source: .Blood Blood-Peripheral  Preliminary Report (16 Jun 2023 01:02):    No growth to date.       Anemia Panel:  Iron Total, Serum: 20 ug/dL (06-16-23 @ 07:50)  Iron - Total Binding Capacity.: 288 ug/dL (06-16-23 @ 07:50)      Thyroid Panel:        Lipase, Serum: 280 U/L (06-14-23 @ 15:33)          HEALTH ISSUES - PROBLEM Dx:  Bipolar disorder    GERD (gastroesophageal reflux disease)    KIRA (acute kidney injury)    HTN (hypertension)    HLD (hyperlipidemia)    Transaminitis    Asthma    Need for prophylactic measure    Fever          Consultant(s) Notes Reviewed:  [ x ] YES     Care Discussed with [ x ] Consultants, [ x ] Patient, [ x ] Family, [  ] HCP, [ x ] , [  ] Social Service, [ x ] RN, [  ] Physical Therapy, [  ] Palliative Care Team  DVT PPX: [  ] Lovenox, [  ] SC Heparin, [  ] Coumadin, [  ] Xarelto, [  ] Eliquis, [  ] Pradaxa, [  ] IV Heparin drip, [  ] SCD, [  ] Ambulation, [  ] Contraindicated 2/2 GI Bleed, [  ] Contraindicated 2/2  Bleed, [  ] Contraindicated 2/2 Brain Bleed  Advanced Directive: [  ] None, [  ] DNR/DNI Patient is a 62y old  Male who presents with a chief complaint of Fever workup     HPI:  61 yo M with PMHx of HTN, HLD, Bipolar, GERD, Jhon's Esophagus Asthma, and Abnormal LFTs presents to the ED with 1x day hx of epigastric pain and new onset fever. Pt endorses feeling ache and heaviness in the epigastric region after he ate breakfast this morning. Noted laying down on his left side with improvement of symptoms, but persisted throughout the day. Pt states that pain worsened when he had a nonbloody bowel movement, with associated nausea, lightheadedness, and diaphoresis. Following this event pt was brought to the ED by his daughter in fear he was having a heart attack. In the ED, pt was found to be tachypneic, tachycardic, and febrile to 100.6. Pt was given IVF, Ativan, and Ofirmev with improvement of symptoms. Pt states that he no longer has the epigastric pain, but has experienced these symptoms in the past. Recently diagnosed with Jhon's Esophagus following EGD due to worsening acid reflux. Pt also noted to have large duodenal diverticulum during EGD. Endorses improving headache, dizziness, SOB, nausea  and distended abdomen throughout event. Pt has no complaints at this time, though appears anxious throughout discussion. Denies any recent sick contacts.     Of note, pt found to have elevated LFTs and Alkphos in outpatient workup since February. Dr. Ewing (PCP) attributed this to recent viral illness, but subsequent labs not checked.     ED Course:   Vitals: BP: 157/75, HR: 77, Temp: 100.6, RR: 20, SpO2: 97% on RA    Labs:  WBC 11.71, Lactate 2.0 < 3.0, Cr 1.50, Alkphos 208, ,    UA: Negative   CXR: Grossly normal as per personal read, official read pending   CT: Minimal periportal edema, nonspecific. Correlate with liver function tests. Otherwise, no acute abdominopelvic findings. No acute thoracic findings. Right lower lobe pulmonary nodule measuring 4 mm.  US Abdomen: Layering gallbladder sludge. No wall thickening or pericholecystic fluid. Focal hyperechoic right hepatic lobe lesion measures 1.0 x 0.9 x 0.8 cm, possibility is a hepatic hemangioma  EKG: NSR 85 bpm with prolonged /509   Received in the ED: Román Pepcid 20 mg IVP, Ativan 1mg IVP, Zosyn, 1.5L NS bolus    (14 Jun 2023 23:41)    INTERVAL HPI:  -6/16:RRT called yesterday for abdominal pain and fever. CTA performed to rule out acute mesenteric ischemia - negative. Started on Zosyn due to fever, MRCP performed this afternoon 2/2 transaminitis and abdominal pain. Will f/u official read.     6/17: No acute overnight events. MRCP yesterday significant for gallbladder edema, possible pancreatitis/cholecystitis w/ CBD dilation to 9mm. No pain or complaints at this time. C/w clear liquid diet and pain control. ERCP Monday discussed w patient at the bedside. IV Zosyn , LFT improving.     acute overnight events. -NONE    Home Medications:  aspirin 81 mg oral capsule: 1 orally (15 Robert 2023 00:43)  Ativan 1 mg oral tablet: 1 orally 2 times a day (15 Robert 2023 00:43)  cholecalciferol 50 mcg (2000 intl units) oral tablet: 1 orally once a day (15 Robert 2023 00:43)  Cozaar 100 mg oral tablet: 1 orally once a day (15 Robert 2023 00:43)  doxazosin 4 mg oral tablet: 1 orally (15 Robert 2023 00:43)  fluticasone 50 mcg/inh nasal spray: 2 spray(s) in each nostril once a day as needed for  congestion (15 Robert 2023 01:45)  ipratropium-albuterol 0.5 mg-2.5 mg/3 mL inhalation solution: 1 unit(s) by nebulizer every 4 hours as needed for  bronchospasm (15 Robert 2023 01:45)  lamoTRIgine 200 mg oral tablet: 0.75 tab(s) orally 2 times a day (15 Robert 2023 01:45)  ProAir HFA 90 mcg/inh inhalation aerosol: 2 inhaled every 6 hours as needed for  bronchospasm (15 Robert 2023 01:45)  Protonix 40 mg oral delayed release tablet: 1 orally once a day (15 Robert 2023 01:41)  PROzac 40 mg oral capsule: 1 orally once a day Alternates qD and BID every other day (15 Robert 2023 01:45)  QUEtiapine 50 mg oral tablet, extended release: 1 orally 2 times a day (15 Robert 2023 01:45)  rosuvastatin 20 mg oral tablet: 1 orally once a day (15 Robert 2023 01:45)  ZyrTEC 10 mg oral tablet: 1 orally once a day (15 Robert 2023 01:45)      MEDICATIONS  (STANDING):  aspirin  chewable 81 milliGRAM(s) Oral daily  cholecalciferol 2000 Unit(s) Oral daily  doxazosin 4 milliGRAM(s) Oral daily  enoxaparin Injectable 40 milliGRAM(s) SubCutaneous every 24 hours  Fluoxetine 40 milliGRAM(s) 40 milliGRAM(s) Oral <User Schedule>  Fluoxetine 40mg 1 Capsule(s) 1 Capsule(s) Oral <User Schedule>  lamoTRIgine 150 milliGRAM(s) Oral two times a day  LORazepam     Tablet 1 milliGRAM(s) Oral two times a day  losartan 100 milliGRAM(s) Oral daily  pantoprazole    Tablet 40 milliGRAM(s) Oral two times a day  piperacillin/tazobactam IVPB.. 3.375 Gram(s) IV Intermittent every 8 hours  QUEtiapine 50 milliGRAM(s) Oral two times a day  sodium chloride 0.9%. 1000 milliLiter(s) (75 mL/Hr) IV Continuous <Continuous>    MEDICATIONS  (PRN):  acetaminophen     Tablet .. 650 milliGRAM(s) Oral every 6 hours PRN Temp greater or equal to 38C (100.4F), Mild Pain (1 - 3)  albuterol    90 MICROgram(s) HFA Inhaler 2 Puff(s) Inhalation every 6 hours PRN for bronchospasm  aluminum hydroxide/magnesium hydroxide/simethicone Suspension 30 milliLiter(s) Oral every 4 hours PRN Dyspepsia  fluticasone propionate 50 MICROgram(s)/spray Nasal Spray 1 Spray(s) Both Nostrils two times a day PRN Congestion  melatonin 3 milliGRAM(s) Oral at bedtime PRN Insomnia      No Known Allergies      Social History:  Lives: At home with Wife and Daughter   ADLs: Independent, walks independently however becoming more painful given osteoarthritis   Diet: Avoid carbonated beverages, spicy foods, and salt   Vaccination: COVID vaccinatedx3   Alcohol Use: Denies   Tobacco Use: Denies   Recreational Drug Use: Denies (14 Jun 2023 23:41)      REVIEW OF SYSTEMS:  CONSTITUTIONAL: No fever, No chills, No fatigue, No myalgia, No Body ache, No Weakness  EYES: No eye pain,  No visual disturbances, No discharge, No Redness  ENMT: No ear pain, No nose bleed, No vertigo; No sinus pain, No throat pain, No Congestion  NECK: No pain, No stiffness  RESPIRATORY: No cough, No wheezing, No hemoptysis, No shortness of breath  CARDIOVASCULAR: No chest pain, No palpitations  GASTROINTESTINAL: No abdominal pain, No epigastric pain. No nausea, No vomiting, No diarrhea, No constipation; [ x ] BM-  GENITOURINARY: No dysuria, No frequency, No urgency, No hematuria, No incontinence  NEUROLOGICAL: No headaches, No dizziness, No numbness, No tingling, No tremors, No weakness  EXTREMITIES: No Swelling, No Pain, No Edema  SKIN: [ x ] No itching, burning, rashes, or lesions   MUSCULOSKELETAL: No joint pain, No joint swelling; No muscle pain, No back pain, No extremity pain  PSYCHIATRIC: No depression, No anxiety, No mood swings, No difficulty sleeping at night  PAIN SCALE: [ x ] None  [  ] Other-  ROS Unable to obtain due to: [  ] Dementia  [  ] Lethargy  [  ] Sedated  [  ] Non verbal  REST OF REVIEW OF SYSTEMS: [ x ] Normal     Vital Signs Last 24 Hrs  T(C): 37.1 (17 Jun 2023 04:59), Max: 37.1 (16 Jun 2023 12:00)  T(F): 98.7 (17 Jun 2023 04:59), Max: 98.7 (16 Jun 2023 12:00)  HR: 70 (17 Jun 2023 04:59) (57 - 70)  BP: 146/80 (17 Jun 2023 04:59) (123/67 - 146/80)  BP(mean): --  RR: 18 (17 Jun 2023 04:59) (18 - 18)  SpO2: 93% (17 Jun 2023 04:59) (93% - 98%)    Parameters below as of 17 Jun 2023 04:59  Patient On (Oxygen Delivery Method): nasal cannula  O2 Flow (L/min): 2      CAPILLARY BLOOD GLUCOSE          I&O's Summary    PHYSICAL EXAM:  GENERAL:  [ x ] NAD, [ x ] Well appearing, [  ] Agitated, [  ] Drowsy, [  ] Lethargy, [  ] Confused   HEAD:  [ x ] Normal, [  ] Other  EYES:  [ x ] EOMI, [ x ] PERRLA, [ x ] Conjunctiva and sclera clear normal, [  ] Other, [  ] Pallor, [  ] Discharge  ENMT:  [ x ] Normal, [ x ] Moist mucous membranes, [ x ] Good dentition, [ x ] No thrush  NECK:  [ x ] Supple, [ x ] No JVD, [ x ] Normal thyroid, [  ] Lymphadenopathy, [  ] Other  CHEST/LUNG:  [ x ] Clear to auscultation bilaterally, [ x ] Breath Sounds equal B/L / decreased, [  ] Poor effort, [ x ] No rales, [ x ] No rhonchi, [ x ] No wheezing  HEART:  [ x ] Regular rate and rhythm, [  ] Tachycardia, [  ] Bradycardia, [  ] Irregular, [ x ] No murmurs, No rubs, No gallops, [  ] PPM in place (Mfr:  )  ABDOMEN:  [ x ] Soft, [ x ] Nontender, [ x ] Nondistended, [ x ] No mass, [ x ] Bowel sounds present, [x  ] Obese  NERVOUS SYSTEM:  [ x ] Alert & Oriented x3__, [ x ] Nonfocal, [  ] Confusion, [  ] Encephalopathic, [  ] Sedated, [  ] Unable to assess, [  ] Dementia, [  ] Other-  EXTREMITIES:  [ x ] 2+ Peripheral Pulses, No clubbing, No cyanosis,  [  ] Edema B/L lower EXT, [  ] PVD stasis skin changes B/L lower EXT, [  ] Wound  LYMPH:  No lymphadenopathy noted  SKIN:  [ x ] No rashes or lesions, [  ] Pressure ulcers, [  ] Ecchymosis, [  ] Skin tears, [  ] Other    DIET: Diet, Clear Liquid (06-16-23 @ 14:52)      LABS:                            11.8   7.46  )-----------( 229      ( 17 Jun 2023 06:59 )             36.2     17 Jun 2023 06:59    140    |  108    |  20     ----------------------------<  91     3.6     |  26     |  1.20     Ca    8.9        17 Jun 2023 06:59    TPro  6.1    /  Alb  2.9    /  TBili  1.0    /  DBili  0.5    /  AST  83     /  ALT  387    /  AlkPhos  249    17 Jun 2023 06:59           Culture Results:   No growth to date. (06-15 @ 19:05)  Culture Results:   No growth to date. (06-15 @ 19:00)  Culture Results:   <10,000 CFU/mL Normal Urogenital Rebecca (06-14 @ 18:00)  Culture Results:   No growth to date. (06-14 @ 16:57)  Culture Results:   No growth to date. (06-14 @ 16:51)    culture blood  -- .Blood Blood-Peripheral 06-15 @ 19:05    culture urine  --  06-15 @ 19:05  culture blood  -- .Blood Blood-Peripheral 06-15 @ 19:00    culture urine  --  06-15 @ 19:00    Culture - Blood (collected 15 Robert 2023 19:05)  Source: .Blood Blood-Peripheral  Preliminary Report (17 Jun 2023 01:02):    No growth to date.    Culture - Blood (collected 15 Robert 2023 19:00)  Source: .Blood Blood-Peripheral  Preliminary Report (17 Jun 2023 01:02):    No growth to date.    Culture - Urine (collected 14 Jun 2023 18:00)  Source: Clean Catch Clean Catch (Midstream)  Final Report (16 Jun 2023 07:25):    <10,000 CFU/mL Normal Urogenital Rebecca    Culture - Blood (collected 14 Jun 2023 16:57)  Source: .Blood Blood-Peripheral  Preliminary Report (16 Jun 2023 01:02):    No growth to date.    Culture - Blood (collected 14 Jun 2023 16:51)  Source: .Blood Blood-Peripheral  Preliminary Report (16 Jun 2023 01:02):    No growth to date.       Anemia Panel:  Iron Total, Serum: 20 ug/dL (06-16-23 @ 07:50)  Iron - Total Binding Capacity.: 288 ug/dL (06-16-23 @ 07:50)      Thyroid Panel:        Lipase, Serum: 280 U/L (06-14-23 @ 15:33)          HEALTH ISSUES - PROBLEM Dx:  Bipolar disorder    GERD (gastroesophageal reflux disease)    KIRA (acute kidney injury)    HTN (hypertension)    HLD (hyperlipidemia)    Transaminitis    Asthma    Need for prophylactic measure    Fever          Consultant(s) Notes Reviewed:  [ x ] YES     Care Discussed with [ x ] Consultants, [ x ] Patient, [ x ] Family, [  ] HCP, [ x ] , [  ] Social Service, [ x ] RN, [  ] Physical Therapy, [  ] Palliative Care Team  DVT PPX: [x  ] Lovenox, [  ] SC Heparin, [  ] Coumadin, [  ] Xarelto, [  ] Eliquis, [  ] Pradaxa, [  ] IV Heparin drip, [  ] SCD, [  ] Ambulation, [  ] Contraindicated 2/2 GI Bleed, [  ] Contraindicated 2/2  Bleed, [  ] Contraindicated 2/2 Brain Bleed  Advanced Directive: [ x ] None, [  ] DNR/DNI

## 2023-06-17 NOTE — PROGRESS NOTE ADULT - SUBJECTIVE AND OBJECTIVE BOX
[facility]  Osman Chao PA-C  [address] [place] [address]  [phone]      INTERVAL HPI/OVERNIGHT EVENTS:  Pt s/e  abdominal pain improved  tolerating clears    MEDICATIONS  (STANDING):  aspirin  chewable 81 milliGRAM(s) Oral daily  cholecalciferol 2000 Unit(s) Oral daily  doxazosin 4 milliGRAM(s) Oral daily  enoxaparin Injectable 40 milliGRAM(s) SubCutaneous every 24 hours  Fluoxetine 40 milliGRAM(s) 40 milliGRAM(s) Oral <User Schedule>  lamoTRIgine 150 milliGRAM(s) Oral two times a day  LORazepam     Tablet 1 milliGRAM(s) Oral two times a day  losartan 100 milliGRAM(s) Oral daily  pantoprazole    Tablet 40 milliGRAM(s) Oral two times a day  piperacillin/tazobactam IVPB.. 3.375 Gram(s) IV Intermittent every 8 hours  QUEtiapine 50 milliGRAM(s) Oral two times a day  sodium chloride 0.9%. 1000 milliLiter(s) (75 mL/Hr) IV Continuous <Continuous>    MEDICATIONS  (PRN):  acetaminophen     Tablet .. 650 milliGRAM(s) Oral every 6 hours PRN Temp greater or equal to 38C (100.4F), Mild Pain (1 - 3)  albuterol    90 MICROgram(s) HFA Inhaler 2 Puff(s) Inhalation every 6 hours PRN for bronchospasm  aluminum hydroxide/magnesium hydroxide/simethicone Suspension 30 milliLiter(s) Oral every 4 hours PRN Dyspepsia  fluticasone propionate 50 MICROgram(s)/spray Nasal Spray 1 Spray(s) Both Nostrils two times a day PRN Congestion  melatonin 3 milliGRAM(s) Oral at bedtime PRN Insomnia      Allergies    No Known Allergies      PHYSICAL EXAM:   Vital Signs:  Vital Signs Last 24 Hrs  T(C): 37.1 (2023 12:00), Max: 39.7 (15 Robert 2023 17:10)  T(F): 98.7 (2023 12:00), Max: 103.4 (15 Robert 2023 17:10)  HR: 58 (2023 12:00) (54 - 67)  BP: 133/70 (2023 12:00) (133/70 - 165/78)  BP(mean): --  RR: 18 (2023 12:00) (18 - 18)  SpO2: 98% (2023 12:00) (95% - 98%)    Parameters below as of 2023 12:00  Patient On (Oxygen Delivery Method): nasal cannula  O2 Flow (L/min): 2    Daily     Daily Weight in k (2023 04:58)    GENERAL:  Appears stated age  HEENT:  NC/AT  CHEST:  Full & symmetric excursion  HEART:  Regular rhythm  ABDOMEN:  Soft, +tender epigastric, non-distended  EXTEREMITIES:  no cyanosis  SKIN:  No rash  NEURO:  Alert      LABS:                        11.8   7.77  )-----------( 218      ( 2023 08:43 )             36.0     06-16    141  |  110<H>  |  18  ----------------------------<  103<H>  3.6   |  27  |  1.10    Ca    8.8      2023 08:43    TPro  6.2  /  Alb  3.0<L>  /  TBili  3.0<H>  /  DBili  2.4<H>  /  AST  262<H>  /  ALT  566<H>  /  AlkPhos  264<H>  06-16    PT/INR - ( 15 Robert 2023 14:45 )   PT: 13.7 sec;   INR: 1.17 ratio         PTT - ( 15 Robert 2023 14:45 )  PTT:31.9 sec  Urinalysis Basic - ( 2023 18:00 )    Color: Dark Yellow / Appearance: Clear / S.019 / pH: x  Gluc: x / Ketone: Negative mg/dL  / Bili: Negative / Urobili: 1.0 mg/dL   Blood: x / Protein: Trace mg/dL / Nitrite: Negative   Leuk Esterase: Negative / RBC: x / WBC x   Sq Epi: x / Non Sq Epi: x / Bacteria: x    IMAGING:  < from: CT Angio Abdomen and Pelvis w/ IV Cont (06.15.23 @ 16:48) >    ACC: [id number] EXAM:  CT ANGIO ABD PELV (W)AW IC   ORDERED BY: ULICES SAWYER     PROCEDURE DATE:  06/15/2023          INTERPRETATION:  CLINICAL INFORMATION: Abdominal pain. Evaluate   mesenteric ischemia.    COMPARISON: CT abdomen pelvis 2023.    CONTRAST/COMPLICATIONS:  IV Contrast: Omnipaque 350  90 cc administered   10 cc discarded  Oral Contrast: NONE  Complications: None reported at time of study completion    PROCEDURE:  CT Angiography of the Abdomen and Pelvis was performed.  Arterial and venous phases were acquired.  Sagittal and coronal reformats were performed as well as 3D (MIP)   reconstructions.    FINDINGS:  LOWER CHEST: Within normal limits.    LIVER: Within normal limits.  BILE DUCTS: Normal caliber.  GALLBLADDER: Within normal limits.  SPLEEN: Within normal limits.  PANCREAS: Within normal limits.  ADRENALS: Within normal limits.  KIDNEYS/URETERS: No hydronephrosis. Bilateral renal cortical and   parapelvic cysts.    BLADDER: Small right posterior bladder diverticulum.  REPRODUCTIVE ORGANS: Prostate is enlarged.    BOWEL: No bowel obstruction. Appendix is normal. Periampullary duodenal   diverticulum. No abnormal bowel enhancement, pneumatosis, or portal   venous gas.  PERITONEUM: No ascites.  VESSELS: Mild atherosclerotic changes. Celiac axis, SMA, and SUGAR are   patent. Patent mesenteric veins.  RETROPERITONEUM/LYMPH NODES: No lymphadenopathy.  ABDOMINAL WALL: Small fat-containing umbilical hernia.  BONES: Degenerative changes. Multilevel vertebral hemangiomas.    IMPRESSION:  No evidence of acute mesenteric ischemia.        --- End of Report ---            DEB STEVENS MD; Attending Radiologist  This document has been electronically signed. Robert 15 2023  5:16PM    < end of copied text >

## 2023-06-17 NOTE — PROGRESS NOTE ADULT - PROBLEM SELECTOR PLAN 7
Noted prior intolerance to unspecified statin in outpatient notes   - Transitioned to Rosuvastatin 20 mg qD with improvement of myalgias   - Will hold statin given worsening LFTs and hx of myalgias on statin other than Rosuvastatin   - Monitor for signs of myalgias   - AM Lipid panel Noted prior intolerance to unspecified statin in outpatient notes   - Transitioned to Rosuvastatin 20 mg qD with improvement of myalgias   - Will hold statin given worsening LFTs and hx of myalgias on statin other than Rosuvastatin   - Monitor for signs of myalgias

## 2023-06-17 NOTE — PROGRESS NOTE ADULT - PROBLEM SELECTOR PLAN 1
fever in ER  100.6 ---> 101 F; - RRT yesterday for abdominal pain and episode of Tmax 103    - Likely 2/2 CBD stone obstruction ? Cholangitis - WBC WNL    -? Gall stone Pancreatitis   - Blood  c/s x 2 and urine cultures to  follow - NGTD   - CTA neg for acute mesenteric ischemia  -on IV  zosyn q 8 hrs   - ID -Dr. CHERI Lopez -ON Abx   MRCP performed; gallbladder edema, possible pancreatitis/cholecystitis w/ CBD dilation to 9mm.   Clear liquid diet, plan for ERCP Monday Afebrile for 24hrs - likely transiet fevers from biliary obstruction vs infection?   - Blood  c/s x 2 and urine cultures to  follow - NGTD   - CTA neg for acute mesenteric ischemia  - C/w  IV  zosyn q 8 hrs   - ID -Dr. CHERI Lopez  MRCP performed; gallbladder edema, possible pancreatitis/cholecystitis w/ CBD dilation to 9mm.   Clear liquid diet, plan for ERCP Monday Afebrile for 24hrs - likely transiet fevers from biliary obstruction , cholangitis ?  - Blood  c/s x 2 and urine cultures to  follow - NGTD   - CTA neg for acute mesenteric ischemia  - C/w  IV  zosyn q 8 hrs   - ID -Dr. CHERI Lopez/Dr souza follow up   MRCP performed; gallbladder edema, possible pancreatitis/cholecystitis w/ CBD dilation to 9mm.   -Clear liquid diet, plan for ERCP Monday

## 2023-06-18 LAB
ALBUMIN SERPL ELPH-MCNC: 3.1 G/DL — LOW (ref 3.3–5)
ALP SERPL-CCNC: 228 U/L — HIGH (ref 40–120)
ALT FLD-CCNC: 281 U/L — HIGH (ref 12–78)
ANION GAP SERPL CALC-SCNC: 6 MMOL/L — SIGNIFICANT CHANGE UP (ref 5–17)
AST SERPL-CCNC: 39 U/L — HIGH (ref 15–37)
BASOPHILS # BLD AUTO: 0.03 K/UL — SIGNIFICANT CHANGE UP (ref 0–0.2)
BASOPHILS NFR BLD AUTO: 0.5 % — SIGNIFICANT CHANGE UP (ref 0–2)
BILIRUB SERPL-MCNC: 0.8 MG/DL — SIGNIFICANT CHANGE UP (ref 0.2–1.2)
BUN SERPL-MCNC: 18 MG/DL — SIGNIFICANT CHANGE UP (ref 7–23)
CALCIUM SERPL-MCNC: 9.2 MG/DL — SIGNIFICANT CHANGE UP (ref 8.5–10.1)
CHLORIDE SERPL-SCNC: 110 MMOL/L — HIGH (ref 96–108)
CO2 SERPL-SCNC: 25 MMOL/L — SIGNIFICANT CHANGE UP (ref 22–31)
CREAT SERPL-MCNC: 1.1 MG/DL — SIGNIFICANT CHANGE UP (ref 0.5–1.3)
EGFR: 75 ML/MIN/1.73M2 — SIGNIFICANT CHANGE UP
EOSINOPHIL # BLD AUTO: 0.3 K/UL — SIGNIFICANT CHANGE UP (ref 0–0.5)
EOSINOPHIL NFR BLD AUTO: 4.9 % — SIGNIFICANT CHANGE UP (ref 0–6)
GLUCOSE SERPL-MCNC: 88 MG/DL — SIGNIFICANT CHANGE UP (ref 70–99)
HCT VFR BLD CALC: 37.4 % — LOW (ref 39–50)
HGB BLD-MCNC: 12.4 G/DL — LOW (ref 13–17)
IMM GRANULOCYTES NFR BLD AUTO: 0.2 % — SIGNIFICANT CHANGE UP (ref 0–0.9)
LYMPHOCYTES # BLD AUTO: 1.15 K/UL — SIGNIFICANT CHANGE UP (ref 1–3.3)
LYMPHOCYTES # BLD AUTO: 18.7 % — SIGNIFICANT CHANGE UP (ref 13–44)
MCHC RBC-ENTMCNC: 30 PG — SIGNIFICANT CHANGE UP (ref 27–34)
MCHC RBC-ENTMCNC: 33.2 GM/DL — SIGNIFICANT CHANGE UP (ref 32–36)
MCV RBC AUTO: 90.6 FL — SIGNIFICANT CHANGE UP (ref 80–100)
MONOCYTES # BLD AUTO: 0.6 K/UL — SIGNIFICANT CHANGE UP (ref 0–0.9)
MONOCYTES NFR BLD AUTO: 9.8 % — SIGNIFICANT CHANGE UP (ref 2–14)
NEUTROPHILS # BLD AUTO: 4.06 K/UL — SIGNIFICANT CHANGE UP (ref 1.8–7.4)
NEUTROPHILS NFR BLD AUTO: 65.9 % — SIGNIFICANT CHANGE UP (ref 43–77)
NRBC # BLD: 0 /100 WBCS — SIGNIFICANT CHANGE UP (ref 0–0)
PLATELET # BLD AUTO: 267 K/UL — SIGNIFICANT CHANGE UP (ref 150–400)
POTASSIUM SERPL-MCNC: 3.7 MMOL/L — SIGNIFICANT CHANGE UP (ref 3.5–5.3)
POTASSIUM SERPL-SCNC: 3.7 MMOL/L — SIGNIFICANT CHANGE UP (ref 3.5–5.3)
PROT SERPL-MCNC: 6.6 G/DL — SIGNIFICANT CHANGE UP (ref 6–8.3)
RBC # BLD: 4.13 M/UL — LOW (ref 4.2–5.8)
RBC # FLD: 12.8 % — SIGNIFICANT CHANGE UP (ref 10.3–14.5)
SODIUM SERPL-SCNC: 141 MMOL/L — SIGNIFICANT CHANGE UP (ref 135–145)
WBC # BLD: 6.15 K/UL — SIGNIFICANT CHANGE UP (ref 3.8–10.5)
WBC # FLD AUTO: 6.15 K/UL — SIGNIFICANT CHANGE UP (ref 3.8–10.5)

## 2023-06-18 RX ORDER — DEXTROSE MONOHYDRATE, SODIUM CHLORIDE, AND POTASSIUM CHLORIDE 50; .745; 4.5 G/1000ML; G/1000ML; G/1000ML
1000 INJECTION, SOLUTION INTRAVENOUS
Refills: 0 | Status: DISCONTINUED | OUTPATIENT
Start: 2023-06-18 | End: 2023-06-19

## 2023-06-18 RX ADMIN — PANTOPRAZOLE SODIUM 40 MILLIGRAM(S): 20 TABLET, DELAYED RELEASE ORAL at 17:16

## 2023-06-18 RX ADMIN — LOSARTAN POTASSIUM 100 MILLIGRAM(S): 100 TABLET, FILM COATED ORAL at 05:10

## 2023-06-18 RX ADMIN — LAMOTRIGINE 150 MILLIGRAM(S): 25 TABLET, ORALLY DISINTEGRATING ORAL at 17:17

## 2023-06-18 RX ADMIN — QUETIAPINE FUMARATE 50 MILLIGRAM(S): 200 TABLET, FILM COATED ORAL at 05:12

## 2023-06-18 RX ADMIN — LAMOTRIGINE 150 MILLIGRAM(S): 25 TABLET, ORALLY DISINTEGRATING ORAL at 05:48

## 2023-06-18 RX ADMIN — Medication 4 MILLIGRAM(S): at 05:10

## 2023-06-18 RX ADMIN — ENOXAPARIN SODIUM 40 MILLIGRAM(S): 100 INJECTION SUBCUTANEOUS at 05:18

## 2023-06-18 RX ADMIN — DEXTROSE MONOHYDRATE, SODIUM CHLORIDE, AND POTASSIUM CHLORIDE 70 MILLILITER(S): 50; .745; 4.5 INJECTION, SOLUTION INTRAVENOUS at 23:25

## 2023-06-18 RX ADMIN — Medication 2000 UNIT(S): at 11:11

## 2023-06-18 RX ADMIN — Medication 1 MILLIGRAM(S): at 21:39

## 2023-06-18 RX ADMIN — Medication 1 MILLIGRAM(S): at 05:07

## 2023-06-18 RX ADMIN — QUETIAPINE FUMARATE 50 MILLIGRAM(S): 200 TABLET, FILM COATED ORAL at 17:17

## 2023-06-18 RX ADMIN — PANTOPRAZOLE SODIUM 40 MILLIGRAM(S): 20 TABLET, DELAYED RELEASE ORAL at 05:10

## 2023-06-18 NOTE — PROGRESS NOTE ADULT - SUBJECTIVE AND OBJECTIVE BOX
[facility]  Osman Chao PA-C  [address] [place] [address]  [phone]      INTERVAL HPI/OVERNIGHT EVENTS:  Pt s/e  abdominal pain improved  tolerating clears    MEDICATIONS  (STANDING):  aspirin  chewable 81 milliGRAM(s) Oral daily  cholecalciferol 2000 Unit(s) Oral daily  doxazosin 4 milliGRAM(s) Oral daily  enoxaparin Injectable 40 milliGRAM(s) SubCutaneous every 24 hours  Fluoxetine 40 milliGRAM(s) 40 milliGRAM(s) Oral <User Schedule>  lamoTRIgine 150 milliGRAM(s) Oral two times a day  LORazepam     Tablet 1 milliGRAM(s) Oral two times a day  losartan 100 milliGRAM(s) Oral daily  pantoprazole    Tablet 40 milliGRAM(s) Oral two times a day  piperacillin/tazobactam IVPB.. 3.375 Gram(s) IV Intermittent every 8 hours  QUEtiapine 50 milliGRAM(s) Oral two times a day  sodium chloride 0.9%. 1000 milliLiter(s) (75 mL/Hr) IV Continuous <Continuous>    MEDICATIONS  (PRN):  acetaminophen     Tablet .. 650 milliGRAM(s) Oral every 6 hours PRN Temp greater or equal to 38C (100.4F), Mild Pain (1 - 3)  albuterol    90 MICROgram(s) HFA Inhaler 2 Puff(s) Inhalation every 6 hours PRN for bronchospasm  aluminum hydroxide/magnesium hydroxide/simethicone Suspension 30 milliLiter(s) Oral every 4 hours PRN Dyspepsia  fluticasone propionate 50 MICROgram(s)/spray Nasal Spray 1 Spray(s) Both Nostrils two times a day PRN Congestion  melatonin 3 milliGRAM(s) Oral at bedtime PRN Insomnia      Allergies    No Known Allergies      PHYSICAL EXAM:   Vital Signs:  Vital Signs Last 24 Hrs  T(C): 37.1 (2023 12:00), Max: 39.7 (15 Robert 2023 17:10)  T(F): 98.7 (2023 12:00), Max: 103.4 (15 Robert 2023 17:10)  HR: 58 (2023 12:00) (54 - 67)  BP: 133/70 (2023 12:00) (133/70 - 165/78)  BP(mean): --  RR: 18 (2023 12:00) (18 - 18)  SpO2: 98% (2023 12:00) (95% - 98%)    Parameters below as of 2023 12:00  Patient On (Oxygen Delivery Method): nasal cannula  O2 Flow (L/min): 2    Daily     Daily Weight in k (2023 04:58)    GENERAL:  Appears stated age  HEENT:  NC/AT  CHEST:  Full & symmetric excursion  HEART:  Regular rhythm  ABDOMEN:  Soft, +tender epigastric, non-distended  EXTEREMITIES:  no cyanosis  SKIN:  No rash  NEURO:  Alert      LABS:                        11.8   7.77  )-----------( 218      ( 2023 08:43 )             36.0     06-16    141  |  110<H>  |  18  ----------------------------<  103<H>  3.6   |  27  |  1.10    Ca    8.8      2023 08:43    TPro  6.2  /  Alb  3.0<L>  /  TBili  3.0<H>  /  DBili  2.4<H>  /  AST  262<H>  /  ALT  566<H>  /  AlkPhos  264<H>  06-16    PT/INR - ( 15 Robert 2023 14:45 )   PT: 13.7 sec;   INR: 1.17 ratio         PTT - ( 15 Robert 2023 14:45 )  PTT:31.9 sec  Urinalysis Basic - ( 2023 18:00 )    Color: Dark Yellow / Appearance: Clear / S.019 / pH: x  Gluc: x / Ketone: Negative mg/dL  / Bili: Negative / Urobili: 1.0 mg/dL   Blood: x / Protein: Trace mg/dL / Nitrite: Negative   Leuk Esterase: Negative / RBC: x / WBC x   Sq Epi: x / Non Sq Epi: x / Bacteria: x    IMAGING:  < from: CT Angio Abdomen and Pelvis w/ IV Cont (06.15.23 @ 16:48) >    ACC: [id number] EXAM:  CT ANGIO ABD PELV (W)AW IC   ORDERED BY: ULICES SAWYER     PROCEDURE DATE:  06/15/2023          INTERPRETATION:  CLINICAL INFORMATION: Abdominal pain. Evaluate   mesenteric ischemia.    COMPARISON: CT abdomen pelvis 2023.    CONTRAST/COMPLICATIONS:  IV Contrast: Omnipaque 350  90 cc administered   10 cc discarded  Oral Contrast: NONE  Complications: None reported at time of study completion    PROCEDURE:  CT Angiography of the Abdomen and Pelvis was performed.  Arterial and venous phases were acquired.  Sagittal and coronal reformats were performed as well as 3D (MIP)   reconstructions.    FINDINGS:  LOWER CHEST: Within normal limits.    LIVER: Within normal limits.  BILE DUCTS: Normal caliber.  GALLBLADDER: Within normal limits.  SPLEEN: Within normal limits.  PANCREAS: Within normal limits.  ADRENALS: Within normal limits.  KIDNEYS/URETERS: No hydronephrosis. Bilateral renal cortical and   parapelvic cysts.    BLADDER: Small right posterior bladder diverticulum.  REPRODUCTIVE ORGANS: Prostate is enlarged.    BOWEL: No bowel obstruction. Appendix is normal. Periampullary duodenal   diverticulum. No abnormal bowel enhancement, pneumatosis, or portal   venous gas.  PERITONEUM: No ascites.  VESSELS: Mild atherosclerotic changes. Celiac axis, SMA, and SUGAR are   patent. Patent mesenteric veins.  RETROPERITONEUM/LYMPH NODES: No lymphadenopathy.  ABDOMINAL WALL: Small fat-containing umbilical hernia.  BONES: Degenerative changes. Multilevel vertebral hemangiomas.    IMPRESSION:  No evidence of acute mesenteric ischemia.        --- End of Report ---            DEB STEVENS MD; Attending Radiologist  This document has been electronically signed. Robert 15 2023  5:16PM    < end of copied text >

## 2023-06-18 NOTE — PROGRESS NOTE ADULT - PROBLEM SELECTOR PLAN 1
Afebrile for 48 hrs - likely transient fevers from biliary obstruction , cholangitis ?  - Blood  c/s x 2 and urine cultures to  follow - NGTD   - CTA neg for acute mesenteric ischemia  - s/p IV zosyn, stopped per ID as cx negative and symptoms resolving and lft trending down   - Monitor off abx for now  - ID -Dr. CHERI Loepz/Dr souza follow up   - MRCP performed; gallbladder edema, possible pancreatitis/cholecystitis w/ CBD dilation to 9mm.   -Clear liquid diet, plan for ERCP Monday Afebrile for 48 hrs - likely transient fevers from biliary obstruction , cholangitis ?  - Blood  c/s x 2 and urine cultures to  follow - NGTD   - CTA neg for acute mesenteric ischemia.  - s/p IV zosyn, stopped per ID as cx negative and symptoms resolving and lft trending down   - Monitor off abx for now  - ID -Dr. CHERI Lopez/Dr souza follow up   - MRCP performed; gallbladder edema, possible pancreatitis/cholecystitis w/ CBD dilation to 9mm.   -Clear liquid diet, plan for ERCP Monday.

## 2023-06-18 NOTE — PROGRESS NOTE ADULT - PROBLEM SELECTOR PLAN 2
Hx of Transaminitis since 2/23: still downtrending today   - US Abdomen: Focal hyperechoic right hepatic lobe lesion measures 1.0 x 0.9 x 0.8 cm, possibility is a hepatic hemangioma  -CT A/P with IVC - periportal edema  - MRCP performed - gallbladder edema, possible pancreatitis/cholecystitis w/ CBD dilation to 9mm.   - GI , Dr. Mclaughlin, d/w at detail.-Clear liquid diet; ERCP Monday Hx of Transaminitis since 2/23: still downtrending today   - US Abdomen: Focal hyperechoic right hepatic lobe lesion measures 1.0 x 0.9 x 0.8 cm, possibility is a hepatic hemangioma  -CT A/P with IVC - periportal edema  - MRCP performed - gallbladder edema, possible pancreatitis/cholecystitis w/ CBD dilation to 9mm.   - GI , Dr. Mclaughlin, d/w at detail.-Clear liquid diet; ERCP Monday  NPO after mid night

## 2023-06-18 NOTE — PROGRESS NOTE ADULT - PROBLEM SELECTOR PLAN 7
Noted prior intolerance to unspecified statin in outpatient notes   - Transitioned to Rosuvastatin 20 mg qD with improvement of myalgias   - Will hold statin given worsening LFTs and hx of myalgias on statin other than Rosuvastatin   - Monitor for signs of myalgias

## 2023-06-18 NOTE — PROGRESS NOTE ADULT - PROBLEM SELECTOR PLAN 3
Presenting Cr 1.50 s/p IV Contrast in ER   - downtrending  - Encouraged PO intake  -IVF to continue -BMP in AM   Avoid Nephrotoxics   -Renal dose meds, IVF Presenting Cr 1.50 s/p IV Contrast in ER -RESOLVED  - downtrending  - Encouraged PO intake  -IVF to continue -BMP in AM   Avoid Nephrotoxics   -Renal dose meds, IVF

## 2023-06-18 NOTE — PROGRESS NOTE ADULT - SUBJECTIVE AND OBJECTIVE BOX
Patient is a 62y old  Male who presents with a chief complaint of Fever workup (17 Jun 2023 17:39)    HPI:  63 yo M with PMHx of HTN, HLD, Bipolar, GERD, Jhon's Esophagus Asthma, and Abnormal LFTs presents to the ED with 1x day hx of epigastric pain and new onset fever. Pt endorses feeling ache and heaviness in the epigastric region after he ate breakfast this morning. Noted laying down on his left side with improvement of symptoms, but persisted throughout the day. Pt states that pain worsened when he had a nonbloody bowel movement, with associated nausea, lightheadedness, and diaphoresis. Following this event pt was brought to the ED by his daughter in fear he was having a heart attack. In the ED, pt was found to be tachypneic, tachycardic, and febrile to 100.6. Pt was given IVF, Ativan, and Ofirmev with improvement of symptoms. Pt states that he no longer has the epigastric pain, but has experienced these symptoms in the past. Recently diagnosed with Jhon's Esophagus following EGD due to worsening acid reflux. Pt also noted to have large duodenal diverticulum during EGD. Endorses improving headache, dizziness, SOB, nausea  and distended abdomen throughout event. Pt has no complaints at this time, though appears anxious throughout discussion. Denies any recent sick contacts.     Of note, pt found to have elevated LFTs and Alkphos in outpatient workup since February. Dr. Ewing (PCP) attributed this to recent viral illness, but subsequent labs not checked.     ED Course:   Vitals: BP: 157/75, HR: 77, Temp: 100.6, RR: 20, SpO2: 97% on RA    Labs:  WBC 11.71, Lactate 2.0 < 3.0, Cr 1.50, Alkphos 208, ,    UA: Negative   CXR: Grossly normal as per personal read, official read pending   CT: Minimal periportal edema, nonspecific. Correlate with liver function tests. Otherwise, no acute abdominopelvic findings. No acute thoracic findings. Right lower lobe pulmonary nodule measuring 4 mm.  US Abdomen: Layering gallbladder sludge. No wall thickening or pericholecystic fluid. Focal hyperechoic right hepatic lobe lesion measures 1.0 x 0.9 x 0.8 cm, possibility is a hepatic hemangioma  EKG: NSR 85 bpm with prolonged /509   Received in the ED: Ofirmev, Pepcid 20 mg IVP, Ativan 1mg IVP, Zosyn, 1.5L NS bolus    (14 Jun 2023 23:41)    INTERVAL HPI:  -6/16:RRT called yesterday for abdominal pain and fever. CTA performed to rule out acute mesenteric ischemia - negative. Started on Zosyn due to fever, MRCP performed this afternoon 2/2 transaminitis and abdominal pain. Will f/u official read.     6/17: No acute overnight events. MRCP yesterday significant for gallbladder edema, possible pancreatitis/cholecystitis w/ CBD dilation to 9mm. No pain or complaints at this time. C/w clear liquid diet and pain control. ERCP Monday discussed w patient at the bedside. IV Zosyn , LFT improving.  6/18: Patient seen and examined at bedside. Denies abdominal pain. Tolerating liquid diet well without nausea. Complains of headache this AM. Off IV abx. ERCP Monday.       OVERNIGHT EVENTS: none    Home Medications:  aspirin 81 mg oral capsule: 1 orally (15 Robert 2023 00:43)  Ativan 1 mg oral tablet: 1 orally 2 times a day (15 Robert 2023 00:43)  cholecalciferol 50 mcg (2000 intl units) oral tablet: 1 orally once a day (15 Robert 2023 00:43)  Cozaar 100 mg oral tablet: 1 orally once a day (15 Robert 2023 00:43)  doxazosin 4 mg oral tablet: 1 orally (15 Robert 2023 00:43)  fluticasone 50 mcg/inh nasal spray: 2 spray(s) in each nostril once a day as needed for  congestion (15 Robert 2023 01:45)  ipratropium-albuterol 0.5 mg-2.5 mg/3 mL inhalation solution: 1 unit(s) by nebulizer every 4 hours as needed for  bronchospasm (15 Robert 2023 01:45)  lamoTRIgine 200 mg oral tablet: 0.75 tab(s) orally 2 times a day (15 Robert 2023 01:45)  ProAir HFA 90 mcg/inh inhalation aerosol: 2 inhaled every 6 hours as needed for  bronchospasm (15 Robert 2023 01:45)  Protonix 40 mg oral delayed release tablet: 1 orally once a day (15 Robert 2023 01:41)  PROzac 40 mg oral capsule: 1 orally once a day Alternates qD and BID every other day (15 Robert 2023 01:45)  QUEtiapine 50 mg oral tablet, extended release: 1 orally 2 times a day (15 Robert 2023 01:45)  rosuvastatin 20 mg oral tablet: 1 orally once a day (15 Robert 2023 01:45)  ZyrTEC 10 mg oral tablet: 1 orally once a day (15 Robert 2023 01:45)      MEDICATIONS  (STANDING):  aspirin  chewable 81 milliGRAM(s) Oral daily  cholecalciferol 2000 Unit(s) Oral daily  doxazosin 4 milliGRAM(s) Oral daily  enoxaparin Injectable 40 milliGRAM(s) SubCutaneous every 24 hours  Fluoxetine 40 milliGRAM(s) 40 milliGRAM(s) Oral <User Schedule>  Fluoxetine 40mg 1 Capsule(s) 1 Capsule(s) Oral <User Schedule>  lamoTRIgine 150 milliGRAM(s) Oral two times a day  LORazepam     Tablet 1 milliGRAM(s) Oral two times a day  losartan 100 milliGRAM(s) Oral daily  pantoprazole    Tablet 40 milliGRAM(s) Oral two times a day  QUEtiapine 50 milliGRAM(s) Oral two times a day  sodium chloride 0.9%. 1000 milliLiter(s) (75 mL/Hr) IV Continuous <Continuous>    MEDICATIONS  (PRN):  acetaminophen     Tablet .. 650 milliGRAM(s) Oral every 6 hours PRN Temp greater or equal to 38C (100.4F), Mild Pain (1 - 3)  albuterol    90 MICROgram(s) HFA Inhaler 2 Puff(s) Inhalation every 6 hours PRN for bronchospasm  aluminum hydroxide/magnesium hydroxide/simethicone Suspension 30 milliLiter(s) Oral every 4 hours PRN Dyspepsia  fluticasone propionate 50 MICROgram(s)/spray Nasal Spray 1 Spray(s) Both Nostrils two times a day PRN Congestion  melatonin 3 milliGRAM(s) Oral at bedtime PRN Insomnia      Allergies    No Known Allergies    Intolerances        Social History:  Lives: At home with Wife and Daughter   ADLs: Independent, walks independently however becoming more painful given osteoarthritis   Diet: Avoid carbonated beverages, spicy foods, and salt   Vaccination: COVID vaccinatedx3   Alcohol Use: Denies   Tobacco Use: Denies   Recreational Drug Use: Denies (14 Jun 2023 23:41)      REVIEW OF SYSTEMS:  CONSTITUTIONAL: No fever, No chills, No fatigue, No myalgia, No Body ache, No Weakness  EYES: No eye pain,  No visual disturbances, No discharge, No Redness  ENMT: No ear pain, No nose bleed, No vertigo; No sinus pain, No throat pain, No Congestion  NECK: No pain, No stiffness  RESPIRATORY: No cough, No wheezing, No hemoptysis, No shortness of breath  CARDIOVASCULAR: No chest pain, No palpitations  GASTROINTESTINAL: No abdominal pain, No epigastric pain. No nausea, No vomiting, No diarrhea, No constipation; [ x ] BM-  GENITOURINARY: No dysuria, No frequency, No urgency, No hematuria, No incontinence  NEUROLOGICAL: No headaches, No dizziness, No numbness, No tingling, No tremors, No weakness  EXTREMITIES: No Swelling, No Pain, No Edema  SKIN: [ x ] No itching, burning, rashes, or lesions   MUSCULOSKELETAL: No joint pain, No joint swelling; No muscle pain, No back pain, No extremity pain  PSYCHIATRIC: No depression, No anxiety, No mood swings, No difficulty sleeping at night  PAIN SCALE: [ x ] None  [  ] Other-  ROS Unable to obtain due to: [  ] Dementia  [  ] Lethargy  [  ] Sedated  [  ] Non verbal  REST OF REVIEW OF SYSTEMS: [ x ] Normal     Vital Signs Last 24 Hrs  T(C): 36.6 (18 Jun 2023 04:36), Max: 36.6 (17 Jun 2023 11:08)  T(F): 97.8 (18 Jun 2023 04:36), Max: 97.8 (17 Jun 2023 11:08)  HR: 66 (18 Jun 2023 05:10) (55 - 66)  BP: 173/88 (18 Jun 2023 04:36) (118/70 - 173/88)  BP(mean): --  RR: 18 (18 Jun 2023 04:36) (18 - 18)  SpO2: 96% (18 Jun 2023 04:36) (95% - 99%)    Parameters below as of 18 Jun 2023 04:36  Patient On (Oxygen Delivery Method): room air      Finger Stick        06-17 @ 07:01  -  06-18 @ 07:00  --------------------------------------------------------  IN: 0 mL / OUT: 1400 mL / NET: -1400 mL        PHYSICAL EXAM:  GENERAL:  [ x ] NAD, [ x ] Well appearing, [  ] Agitated, [  ] Drowsy, [  ] Lethargy, [  ] Confused   HEAD:  [ x ] Normal, [  ] Other  EYES:  [ x ] EOMI, [ x ] PERRLA, [ x ] Conjunctiva and sclera clear normal, [  ] Other, [  ] Pallor, [  ] Discharge  ENMT:  [ x ] Normal, [ x ] Moist mucous membranes, [ x ] Good dentition, [ x ] No thrush  NECK:  [ x ] Supple, [ x ] No JVD, [ x ] Normal thyroid, [  ] Lymphadenopathy, [  ] Other  CHEST/LUNG:  [ x ] Clear to auscultation bilaterally, [ x ] Breath Sounds equal B/L / decreased, [  ] Poor effort, [ x ] No rales, [ x ] No rhonchi, [ x ] No wheezing  HEART:  [ x ] Regular rate and rhythm, [  ] Tachycardia, [  ] Bradycardia, [  ] Irregular, [ x ] No murmurs, No rubs, No gallops, [  ] PPM in place (Mfr:  )  ABDOMEN:  [ x ] Soft, [ x ] Nontender, [ x ] Nondistended, [ x ] No mass, [ x ] Bowel sounds present, [x  ] Obese  NERVOUS SYSTEM:  [ x ] Alert & Oriented x3__, [ x ] Nonfocal, [  ] Confusion, [  ] Encephalopathic, [  ] Sedated, [  ] Unable to assess, [  ] Dementia, [  ] Other-  EXTREMITIES:  [ x ] 2+ Peripheral Pulses, No clubbing, No cyanosis,  [  ] Edema B/L lower EXT, [  ] PVD stasis skin changes B/L lower EXT, [  ] Wound  LYMPH:  No lymphadenopathy noted  SKIN:  [ x ] No rashes or lesions, [  ] Pressure ulcers, [  ] Ecchymosis, [  ] Skin tears, [  ] Other    DIET: Diet, Clear Liquid (06-16-23 @ 14:52)      LABS:      Ca    8.9        17 Jun 2023 06:59            Culture Results:   No growth to date. (06-16 @ 07:55)  Culture Results:   No growth to date. (06-16 @ 07:50)  Culture Results:   No growth to date. (06-15 @ 19:05)  Culture Results:   No growth to date. (06-15 @ 19:00)  Culture Results:   <10,000 CFU/mL Normal Urogenital Rebecca (06-14 @ 18:00)  Culture Results:   No growth to date. (06-14 @ 16:57)  Culture Results:   No growth to date. (06-14 @ 16:51)                  Culture - Blood (collected 16 Jun 2023 07:55)  Source: .Blood Blood-Venous  Preliminary Report (17 Jun 2023 14:02):    No growth to date.    Culture - Blood (collected 16 Jun 2023 07:50)  Source: .Blood Blood-Peripheral  Preliminary Report (17 Jun 2023 14:02):    No growth to date.    Culture - Blood (collected 15 Robert 2023 19:05)  Source: .Blood Blood-Peripheral  Preliminary Report (17 Jun 2023 01:02):    No growth to date.    Culture - Blood (collected 15 Robert 2023 19:00)  Source: .Blood Blood-Peripheral  Preliminary Report (17 Jun 2023 01:02):    No growth to date.    Culture - Urine (collected 14 Jun 2023 18:00)  Source: Clean Catch Clean Catch (Midstream)  Final Report (16 Jun 2023 07:25):    <10,000 CFU/mL Normal Urogenital Rebecca    Culture - Blood (collected 14 Jun 2023 16:57)  Source: .Blood Blood-Peripheral  Preliminary Report (16 Jun 2023 01:02):    No growth to date.    Culture - Blood (collected 14 Jun 2023 16:51)  Source: .Blood Blood-Peripheral  Preliminary Report (16 Jun 2023 01:02):    No growth to date.         Anemia Panel:  Iron Total, Serum: 20 ug/dL (06-16-23 @ 07:50)  Iron - Total Binding Capacity.: 288 ug/dL (06-16-23 @ 07:50)      Thyroid Panel:        Lipase, Serum: 280 U/L (06-14-23 @ 15:33)          RADIOLOGY & ADDITIONAL TESTS:      HEALTH ISSUES - PROBLEM Dx:  Bipolar disorder    GERD (gastroesophageal reflux disease)    KIRA (acute kidney injury)    HTN (hypertension)    HLD (hyperlipidemia)    Transaminitis    Asthma    Need for prophylactic measure    Fever            Consultant(s) Notes Reviewed:  [ x ] YES     Care Discussed with [ x ] Consultants, [ x ] Patient, [ x ] Family, [  ] HCP, [ x ] , [  ] Social Service, [ x ] RN, [  ] Physical Therapy, [  ] Palliative Care Team  DVT PPX: [x  ] Lovenox, [  ] SC Heparin, [  ] Coumadin, [  ] Xarelto, [  ] Eliquis, [  ] Pradaxa, [  ] IV Heparin drip, [  ] SCD, [  ] Ambulation, [  ] Contraindicated 2/2 GI Bleed, [  ] Contraindicated 2/2  Bleed, [  ] Contraindicated 2/2 Brain Bleed  Advanced Directive: [ x ] None, [  ] DNR/DNI Patient is a 62y old  Male who presents with a chief complaint of Fever workup (17 Jun 2023 17:39)    HPI:  63 yo M with PMHx of HTN, HLD, Bipolar, GERD, Jhon's Esophagus Asthma, and Abnormal LFTs presents to the ED with 1x day hx of epigastric pain and new onset fever. Pt endorses feeling ache and heaviness in the epigastric region after he ate breakfast this morning. Noted laying down on his left side with improvement of symptoms, but persisted throughout the day. Pt states that pain worsened when he had a nonbloody bowel movement, with associated nausea, lightheadedness, and diaphoresis. Following this event pt was brought to the ED by his daughter in fear he was having a heart attack. In the ED, pt was found to be tachypneic, tachycardic, and febrile to 100.6. Pt was given IVF, Ativan, and Ofirmev with improvement of symptoms. Pt states that he no longer has the epigastric pain, but has experienced these symptoms in the past. Recently diagnosed with Jhon's Esophagus following EGD due to worsening acid reflux. Pt also noted to have large duodenal diverticulum during EGD. Endorses improving headache, dizziness, SOB, nausea  and distended abdomen throughout event. Pt has no complaints at this time, though appears anxious throughout discussion. Denies any recent sick contacts.     Of note, pt found to have elevated LFTs and Alkphos in outpatient workup since February. Dr. Ewing (PCP) attributed this to recent viral illness, but subsequent labs not checked.     ED Course:   Vitals: BP: 157/75, HR: 77, Temp: 100.6, RR: 20, SpO2: 97% on RA    Labs:  WBC 11.71, Lactate 2.0 < 3.0, Cr 1.50, Alkphos 208, ,    UA: Negative   CXR: Grossly normal as per personal read, official read pending   CT: Minimal periportal edema, nonspecific. Correlate with liver function tests. Otherwise, no acute abdominopelvic findings. No acute thoracic findings. Right lower lobe pulmonary nodule measuring 4 mm.  US Abdomen: Layering gallbladder sludge. No wall thickening or pericholecystic fluid. Focal hyperechoic right hepatic lobe lesion measures 1.0 x 0.9 x 0.8 cm, possibility is a hepatic hemangioma  EKG: NSR 85 bpm with prolonged /509   Received in the ED: Ofirmev, Pepcid 20 mg IVP, Ativan 1mg IVP, Zosyn, 1.5L NS bolus    (14 Jun 2023 23:41)    INTERVAL HPI:  -6/16:RRT called yesterday for abdominal pain and fever. CTA performed to rule out acute mesenteric ischemia - negative. Started on Zosyn due to fever, MRCP performed this afternoon 2/2 transaminitis and abdominal pain. Will f/u official read.     6/17: No acute overnight events. MRCP yesterday significant for gallbladder edema, possible pancreatitis/cholecystitis w/ CBD dilation to 9mm. No pain or complaints at this time. C/w clear liquid diet and pain control. ERCP Monday discussed w patient at the bedside. IV Zosyn , LFT improving.  6/18: Patient seen and examined at bedside. Denies abdominal pain. Tolerating liquid diet well without nausea. Complains of headache this AM. Off IV abx. ERCP Monday. NPO after midnight , ON IV Abx       OVERNIGHT EVENTS: none    Home Medications:  aspirin 81 mg oral capsule: 1 orally (15 Robert 2023 00:43)  Ativan 1 mg oral tablet: 1 orally 2 times a day (15 Robert 2023 00:43)  cholecalciferol 50 mcg (2000 intl units) oral tablet: 1 orally once a day (15 Robert 2023 00:43)  Cozaar 100 mg oral tablet: 1 orally once a day (15 Robert 2023 00:43)  doxazosin 4 mg oral tablet: 1 orally (15 Robert 2023 00:43)  fluticasone 50 mcg/inh nasal spray: 2 spray(s) in each nostril once a day as needed for  congestion (15 Robert 2023 01:45)  ipratropium-albuterol 0.5 mg-2.5 mg/3 mL inhalation solution: 1 unit(s) by nebulizer every 4 hours as needed for  bronchospasm (15 Robert 2023 01:45)  lamoTRIgine 200 mg oral tablet: 0.75 tab(s) orally 2 times a day (15 Robert 2023 01:45)  ProAir HFA 90 mcg/inh inhalation aerosol: 2 inhaled every 6 hours as needed for  bronchospasm (15 Robert 2023 01:45)  Protonix 40 mg oral delayed release tablet: 1 orally once a day (15 Robert 2023 01:41)  PROzac 40 mg oral capsule: 1 orally once a day Alternates qD and BID every other day (15 Robert 2023 01:45)  QUEtiapine 50 mg oral tablet, extended release: 1 orally 2 times a day (15 Robert 2023 01:45)  rosuvastatin 20 mg oral tablet: 1 orally once a day (15 Robert 2023 01:45)  ZyrTEC 10 mg oral tablet: 1 orally once a day (15 Robert 2023 01:45)      MEDICATIONS  (STANDING):  aspirin  chewable 81 milliGRAM(s) Oral daily  cholecalciferol 2000 Unit(s) Oral daily  doxazosin 4 milliGRAM(s) Oral daily  enoxaparin Injectable 40 milliGRAM(s) SubCutaneous every 24 hours  Fluoxetine 40 milliGRAM(s) 40 milliGRAM(s) Oral <User Schedule>  Fluoxetine 40mg 1 Capsule(s) 1 Capsule(s) Oral <User Schedule>  lamoTRIgine 150 milliGRAM(s) Oral two times a day  LORazepam     Tablet 1 milliGRAM(s) Oral two times a day  losartan 100 milliGRAM(s) Oral daily  pantoprazole    Tablet 40 milliGRAM(s) Oral two times a day  QUEtiapine 50 milliGRAM(s) Oral two times a day  sodium chloride 0.9%. 1000 milliLiter(s) (75 mL/Hr) IV Continuous <Continuous>    MEDICATIONS  (PRN):  acetaminophen     Tablet .. 650 milliGRAM(s) Oral every 6 hours PRN Temp greater or equal to 38C (100.4F), Mild Pain (1 - 3)  albuterol    90 MICROgram(s) HFA Inhaler 2 Puff(s) Inhalation every 6 hours PRN for bronchospasm  aluminum hydroxide/magnesium hydroxide/simethicone Suspension 30 milliLiter(s) Oral every 4 hours PRN Dyspepsia  fluticasone propionate 50 MICROgram(s)/spray Nasal Spray 1 Spray(s) Both Nostrils two times a day PRN Congestion  melatonin 3 milliGRAM(s) Oral at bedtime PRN Insomnia      Allergies    No Known Allergies    Intolerances        Social History:  Lives: At home with Wife and Daughter   ADLs: Independent, walks independently however becoming more painful given osteoarthritis   Diet: Avoid carbonated beverages, spicy foods, and salt   Vaccination: COVID vaccinatedx3   Alcohol Use: Denies   Tobacco Use: Denies   Recreational Drug Use: Denies (14 Jun 2023 23:41)      REVIEW OF SYSTEMS: i am OK  CONSTITUTIONAL: No fever, No chills, No fatigue, No myalgia, No Body ache, No Weakness  EYES: No eye pain,  No visual disturbances, No discharge, No Redness  ENMT: No ear pain, No nose bleed, No vertigo; No sinus pain, No throat pain, No Congestion  NECK: No pain, No stiffness  RESPIRATORY: No cough, No wheezing, No hemoptysis, No shortness of breath  CARDIOVASCULAR: No chest pain, No palpitations  GASTROINTESTINAL: No abdominal pain, No epigastric pain. No nausea, No vomiting, No diarrhea, No constipation; [  ] BM-  GENITOURINARY: No dysuria, No frequency, No urgency, No hematuria, No incontinence  NEUROLOGICAL: No headaches, No dizziness, No numbness, No tingling, No tremors, No weakness  EXTREMITIES: No Swelling, No Pain, No Edema  SKIN: [ x ] No itching, burning, rashes, or lesions   MUSCULOSKELETAL: No joint pain, No joint swelling; No muscle pain, No back pain, No extremity pain  PSYCHIATRIC: No depression, No anxiety, No mood swings, No difficulty sleeping at night  PAIN SCALE: [ x ] None  [  ] Other-  ROS Unable to obtain due to: [  ] Dementia  [  ] Lethargy  [  ] Sedated  [  ] Non verbal  REST OF REVIEW OF SYSTEMS: [ x ] Normal     Vital Signs Last 24 Hrs  T(C): 36.6 (18 Jun 2023 04:36), Max: 36.6 (17 Jun 2023 11:08)  T(F): 97.8 (18 Jun 2023 04:36), Max: 97.8 (17 Jun 2023 11:08)  HR: 66 (18 Jun 2023 05:10) (55 - 66)  BP: 173/88 (18 Jun 2023 04:36) (118/70 - 173/88)  BP(mean): --  RR: 18 (18 Jun 2023 04:36) (18 - 18)  SpO2: 96% (18 Jun 2023 04:36) (95% - 99%)    Parameters below as of 18 Jun 2023 04:36  Patient On (Oxygen Delivery Method): room air      Finger Stick        06-17 @ 07:01  -  06-18 @ 07:00  --------------------------------------------------------  IN: 0 mL / OUT: 1400 mL / NET: -1400 mL        PHYSICAL EXAM:  GENERAL:  [ x ] NAD, [ x ] Well appearing, [  ] Agitated, [  ] Drowsy, [  ] Lethargy, [  ] Confused   HEAD:  [ x ] Normal, [  ] Other  EYES:  [ x ] EOMI, [ x ] PERRLA, [ x ] Conjunctiva and sclera clear normal, [  ] Other, [  ] Pallor, [  ] Discharge  ENMT:  [ x ] Normal, [ x ] Moist mucous membranes, [ x ] Good dentition, [ x ] No thrush  NECK:  [ x ] Supple, [ x ] No JVD, [ x ] Normal thyroid, [  ] Lymphadenopathy, [  ] Other  CHEST/LUNG:  [ x ] Clear to auscultation bilaterally, [ x ] Breath Sounds equal B/L / decreased, [  ] Poor effort, [ x ] No rales, [ x ] No rhonchi, [ x ] No wheezing  HEART:  [ x ] Regular rate and rhythm, [  ] Tachycardia, [  ] Bradycardia, [  ] Irregular, [ x ] No murmurs, No rubs, No gallops, [  ] PPM in place (Mfr:  )  ABDOMEN:  [ x ] Soft, [ x ] Nontender, [ x ] Nondistended, [ x ] No mass, [ x ] Bowel sounds present, [x  ] Obese  NERVOUS SYSTEM:  [ x ] Alert & Oriented x3__, [ x ] Nonfocal, [  ] Confusion, [  ] Encephalopathic, [  ] Sedated, [  ] Unable to assess, [  ] Dementia, [  ] Other-  EXTREMITIES:  [ x ] 2+ Peripheral Pulses, No clubbing, No cyanosis,  [  ] Edema B/L lower EXT, [  ] PVD stasis skin changes B/L lower EXT, [  ] Wound  LYMPH:  No lymphadenopathy noted  SKIN:  [ x ] No rashes or lesions, [  ] Pressure ulcers, [  ] Ecchymosis, [  ] Skin tears, [  ] Other    DIET: Diet, Clear Liquid (06-16-23 @ 14:52)      LABS:                        12.4   6.15  )-----------( 267      ( 18 Jun 2023 09:10 )             37.4     18 Jun 2023 09:10    141    |  110    |  18     ----------------------------<  88     3.7     |  25     |  1.10     Ca    9.2        18 Jun 2023 09:10    TPro  6.6    /  Alb  3.1    /  TBili  0.8    /  DBili  x      /  AST  39     /  ALT  281    /  AlkPhos  228    18 Jun 2023 09:10      Ca    8.9        17 Jun 2023 06:59      Culture Results:   No growth to date. (06-16 @ 07:55)  Culture Results:   No growth to date. (06-16 @ 07:50)  Culture Results:   No growth to date. (06-15 @ 19:05)  Culture Results:   No growth to date. (06-15 @ 19:00)  Culture Results:   <10,000 CFU/mL Normal Urogenital Rebecca (06-14 @ 18:00)  Culture Results:   No growth to date. (06-14 @ 16:57)  Culture Results:   No growth to date. (06-14 @ 16:51)      Culture - Blood (collected 16 Jun 2023 07:55)  Source: .Blood Blood-Venous  Preliminary Report (17 Jun 2023 14:02):    No growth to date.    Culture - Blood (collected 16 Jun 2023 07:50)  Source: .Blood Blood-Peripheral  Preliminary Report (17 Jun 2023 14:02):    No growth to date.    Culture - Blood (collected 15 Robert 2023 19:05)  Source: .Blood Blood-Peripheral  Preliminary Report (17 Jun 2023 01:02):    No growth to date.    Culture - Blood (collected 15 Robert 2023 19:00)  Source: .Blood Blood-Peripheral  Preliminary Report (17 Jun 2023 01:02):    No growth to date.    Culture - Urine (collected 14 Jun 2023 18:00)  Source: Clean Catch Clean Catch (Midstream)  Final Report (16 Jun 2023 07:25):    <10,000 CFU/mL Normal Urogenital Rebecca    Culture - Blood (collected 14 Jun 2023 16:57)  Source: .Blood Blood-Peripheral  Preliminary Report (16 Jun 2023 01:02):    No growth to date.    Culture - Blood (collected 14 Jun 2023 16:51)  Source: .Blood Blood-Peripheral  Preliminary Report (16 Jun 2023 01:02):    No growth to date.         Anemia Panel:  Iron Total, Serum: 20 ug/dL (06-16-23 @ 07:50)  Iron - Total Binding Capacity.: 288 ug/dL (06-16-23 @ 07:50)      Thyroid Panel:    Lipase, Serum: 280 U/L (06-14-23 @ 15:33)    RADIOLOGY & ADDITIONAL TESTS: NONE      HEALTH ISSUES - PROBLEM Dx:  Bipolar disorder    GERD (gastroesophageal reflux disease)    KIRA (acute kidney injury)    HTN (hypertension)    HLD (hyperlipidemia)    Transaminitis    Asthma    Need for prophylactic measure    Fever        Consultant(s) Notes Reviewed:  [ x ] YES     Care Discussed with [ x ] Consultants, [ x ] Patient, [ x ] Family, [  ] HCP, [ x ] , [  ] Social Service, [ x ] RN, [  ] Physical Therapy, [  ] Palliative Care Team  DVT PPX: [x  ] Lovenox, [  ] SC Heparin, [  ] Coumadin, [  ] Xarelto, [  ] Eliquis, [  ] Pradaxa, [  ] IV Heparin drip, [  ] SCD, [  ] Ambulation, [  ] Contraindicated 2/2 GI Bleed, [  ] Contraindicated 2/2  Bleed, [  ] Contraindicated 2/2 Brain Bleed  Advanced Directive: [ x ] None, [  ] DNR/DNI

## 2023-06-19 ENCOUNTER — TRANSCRIPTION ENCOUNTER (OUTPATIENT)
Age: 63
End: 2023-06-19

## 2023-06-19 LAB
ALBUMIN SERPL ELPH-MCNC: 3.3 G/DL — SIGNIFICANT CHANGE UP (ref 3.3–5)
ALP SERPL-CCNC: 230 U/L — HIGH (ref 40–120)
ALT FLD-CCNC: 232 U/L — HIGH (ref 12–78)
ANION GAP SERPL CALC-SCNC: 6 MMOL/L — SIGNIFICANT CHANGE UP (ref 5–17)
AST SERPL-CCNC: 49 U/L — HIGH (ref 15–37)
BILIRUB SERPL-MCNC: 0.8 MG/DL — SIGNIFICANT CHANGE UP (ref 0.2–1.2)
BUN SERPL-MCNC: 15 MG/DL — SIGNIFICANT CHANGE UP (ref 7–23)
CALCIUM SERPL-MCNC: 9.4 MG/DL — SIGNIFICANT CHANGE UP (ref 8.5–10.1)
CHLORIDE SERPL-SCNC: 107 MMOL/L — SIGNIFICANT CHANGE UP (ref 96–108)
CMV DNA CSF QL NAA+PROBE: SIGNIFICANT CHANGE UP IU/ML
CMV DNA SPEC NAA+PROBE-LOG#: SIGNIFICANT CHANGE UP LOG10IU/ML
CO2 SERPL-SCNC: 27 MMOL/L — SIGNIFICANT CHANGE UP (ref 22–31)
CREAT SERPL-MCNC: 1.2 MG/DL — SIGNIFICANT CHANGE UP (ref 0.5–1.3)
EBV DNA SERPL NAA+PROBE-ACNC: SIGNIFICANT CHANGE UP IU/ML
EBVPCR LOG: SIGNIFICANT CHANGE UP LOG10IU/ML
EGFR: 68 ML/MIN/1.73M2 — SIGNIFICANT CHANGE UP
GLUCOSE SERPL-MCNC: 95 MG/DL — SIGNIFICANT CHANGE UP (ref 70–99)
HCT VFR BLD CALC: 35.2 % — LOW (ref 39–50)
HGB BLD-MCNC: 11.8 G/DL — LOW (ref 13–17)
IGG SERPL-MCNC: 751 MG/DL — SIGNIFICANT CHANGE UP (ref 603–1613)
IGG1 SER-MCNC: 464 MG/DL — SIGNIFICANT CHANGE UP (ref 248–810)
IGG2 SER-MCNC: 176 MG/DL — SIGNIFICANT CHANGE UP (ref 130–555)
IGG3 SER-MCNC: 43 MG/DL — SIGNIFICANT CHANGE UP (ref 15–102)
IGG4 SER-MCNC: 15 MG/DL — SIGNIFICANT CHANGE UP (ref 2–96)
MCHC RBC-ENTMCNC: 30.4 PG — SIGNIFICANT CHANGE UP (ref 27–34)
MCHC RBC-ENTMCNC: 33.5 GM/DL — SIGNIFICANT CHANGE UP (ref 32–36)
MCV RBC AUTO: 90.7 FL — SIGNIFICANT CHANGE UP (ref 80–100)
NRBC # BLD: 0 /100 WBCS — SIGNIFICANT CHANGE UP (ref 0–0)
PLATELET # BLD AUTO: 267 K/UL — SIGNIFICANT CHANGE UP (ref 150–400)
POTASSIUM SERPL-MCNC: 3.8 MMOL/L — SIGNIFICANT CHANGE UP (ref 3.5–5.3)
POTASSIUM SERPL-SCNC: 3.8 MMOL/L — SIGNIFICANT CHANGE UP (ref 3.5–5.3)
PROT SERPL-MCNC: 6.7 G/DL — SIGNIFICANT CHANGE UP (ref 6–8.3)
RBC # BLD: 3.88 M/UL — LOW (ref 4.2–5.8)
RBC # FLD: 12.8 % — SIGNIFICANT CHANGE UP (ref 10.3–14.5)
SODIUM SERPL-SCNC: 140 MMOL/L — SIGNIFICANT CHANGE UP (ref 135–145)
WBC # BLD: 4.8 K/UL — SIGNIFICANT CHANGE UP (ref 3.8–10.5)
WBC # FLD AUTO: 4.8 K/UL — SIGNIFICANT CHANGE UP (ref 3.8–10.5)

## 2023-06-19 RX ORDER — ENOXAPARIN SODIUM 100 MG/ML
40 INJECTION SUBCUTANEOUS EVERY 24 HOURS
Refills: 0 | Status: DISCONTINUED | OUTPATIENT
Start: 2023-06-19 | End: 2023-06-20

## 2023-06-19 RX ADMIN — LOSARTAN POTASSIUM 100 MILLIGRAM(S): 100 TABLET, FILM COATED ORAL at 05:40

## 2023-06-19 RX ADMIN — QUETIAPINE FUMARATE 50 MILLIGRAM(S): 200 TABLET, FILM COATED ORAL at 05:40

## 2023-06-19 RX ADMIN — LAMOTRIGINE 150 MILLIGRAM(S): 25 TABLET, ORALLY DISINTEGRATING ORAL at 05:40

## 2023-06-19 RX ADMIN — Medication 4 MILLIGRAM(S): at 05:40

## 2023-06-19 RX ADMIN — PANTOPRAZOLE SODIUM 40 MILLIGRAM(S): 20 TABLET, DELAYED RELEASE ORAL at 17:32

## 2023-06-19 RX ADMIN — ENOXAPARIN SODIUM 40 MILLIGRAM(S): 100 INJECTION SUBCUTANEOUS at 21:21

## 2023-06-19 RX ADMIN — LAMOTRIGINE 150 MILLIGRAM(S): 25 TABLET, ORALLY DISINTEGRATING ORAL at 17:32

## 2023-06-19 RX ADMIN — Medication 3 MILLIGRAM(S): at 21:21

## 2023-06-19 RX ADMIN — Medication 1 MILLIGRAM(S): at 21:21

## 2023-06-19 RX ADMIN — PANTOPRAZOLE SODIUM 40 MILLIGRAM(S): 20 TABLET, DELAYED RELEASE ORAL at 05:40

## 2023-06-19 RX ADMIN — QUETIAPINE FUMARATE 50 MILLIGRAM(S): 200 TABLET, FILM COATED ORAL at 17:32

## 2023-06-19 RX ADMIN — Medication 1 MILLIGRAM(S): at 05:40

## 2023-06-19 NOTE — DIETITIAN INITIAL EVALUATION ADULT - PROBLEM SELECTOR PLAN 5
Well controlled at on current regimen   - Home medications of Lamictal, Seroquel, Ativan and Prozac   - Continue home medications

## 2023-06-19 NOTE — DISCHARGE NOTE PROVIDER - HOSPITAL COURSE
HPI:  61 yo M with PMHx of HTN, HLD, Bipolar, GERD, Jhon's Esophagus Asthma, and Abnormal LFTs presents to the ED with 1x day hx of epigastric pain and new onset fever. Pt endorses feeling ache and heaviness in the epigastric region after he ate breakfast this morning. Noted laying down on his left side with improvement of symptoms, but persisted throughout the day. Pt states that pain worsened when he had a nonbloody bowel movement, with associated nausea, lightheadedness, and diaphoresis. Following this event pt was brought to the ED by his daughter in fear he was having a heart attack. In the ED, pt was found to be tachypneic, tachycardic, and febrile to 100.6. Pt was given IVF, Ativan, and Ofirmev with improvement of symptoms. Pt states that he no longer has the epigastric pain, but has experienced these symptoms in the past. Recently diagnosed with Jhon's Esophagus following EGD due to worsening acid reflux. Pt also noted to have large duodenal diverticulum during EGD. Endorses improving headache, dizziness, SOB, nausea  and distended abdomen throughout event. Pt has no complaints at this time, though appears anxious throughout discussion. Denies any recent sick contacts.     Of note, pt found to have elevated LFTs and Alkphos in outpatient workup since February. Dr. Ewing (PCP) attributed this to recent viral illness, but subsequent labs not checked.     ED Course:   Vitals: BP: 157/75, HR: 77, Temp: 100.6, RR: 20, SpO2: 97% on RA    Labs:  WBC 11.71, Lactate 2.0 < 3.0, Cr 1.50, Alkphos 208, ,    UA: Negative   CXR: Grossly normal as per personal read, official read pending   CT: Minimal periportal edema, nonspecific. Correlate with liver function tests. Otherwise, no acute abdominopelvic findings. No acute thoracic findings. Right lower lobe pulmonary nodule measuring 4 mm.  US Abdomen: Layering gallbladder sludge. No wall thickening or pericholecystic fluid. Focal hyperechoic right hepatic lobe lesion measures 1.0 x 0.9 x 0.8 cm, possibility is a hepatic hemangioma  EKG: NSR 85 bpm with prolonged /509   Received in the ED: Ofirmev, Pepcid 20 mg IVP, Ativan 1mg IVP, Zosyn, 1.5L NS bolus    (14 Jun 2023 23:41)    UPDATED 6/19/23    ---  HOSPITAL COURSE: Patient admitted to medicine floor for management of  Abnormal LFTs, epigastric pain, and new onset fever.  Blood cultures and urine cultures showed no growth. Patient was eventually transitioned off antibiotics due to improvement of fever. Repeat CT scan angio A/P showed no evidence of acute mesenteric ischemia. MRCP was performed showing gallbladder edema, possible pancreatitis/cholecystitis w/ CBD dilation to 9mm. There was suspicion transient biliary obstruction 2/2 duodenal diverticulum. ERCP was performed and showed ****_____________    Pt seen and examined on day of discharge. Patient is medically optimized for discharge to home with close outpatient followup.        ---  CONSULTANTS:   GI: Dr. Mclaughlin  ID: Dr. Bahena    ---  TIME SPENT:  I, the attending physician, was physically present for the key portions of the evaluation and management (E/M) service provided. The total amount of time spent reviewing the hospital notes, laboratory values, imaging findings, assessing/counseling the patient, discussing with consultant physicians, social work, nursing staff was -- minutes    ---  Primary care provider was made aware of plan for discharge:      [  ] NO     [  ] YES   HPI:  63 yo M with PMHx of HTN, HLD, Bipolar, GERD, Jhon's Esophagus Asthma, and Abnormal LFTs presents to the ED with 1x day hx of epigastric pain and new onset fever. Pt endorses feeling ache and heaviness in the epigastric region after he ate breakfast this morning. Noted laying down on his left side with improvement of symptoms, but persisted throughout the day. Pt states that pain worsened when he had a nonbloody bowel movement, with associated nausea, lightheadedness, and diaphoresis. Following this event pt was brought to the ED by his daughter in fear he was having a heart attack. In the ED, pt was found to be tachypneic, tachycardic, and febrile to 100.6. Pt was given IVF, Ativan, and Ofirmev with improvement of symptoms. Pt states that he no longer has the epigastric pain, but has experienced these symptoms in the past. Recently diagnosed with Jhon's Esophagus following EGD due to worsening acid reflux. Pt also noted to have large duodenal diverticulum during EGD. Endorses improving headache, dizziness, SOB, nausea  and distended abdomen throughout event. Pt has no complaints at this time, though appears anxious throughout discussion. Denies any recent sick contacts.     Of note, pt found to have elevated LFTs and Alkphos in outpatient workup since February. Dr. Ewing (PCP) attributed this to recent viral illness, but subsequent labs not checked.     ED Course:   Vitals: BP: 157/75, HR: 77, Temp: 100.6, RR: 20, SpO2: 97% on RA    Labs:  WBC 11.71, Lactate 2.0 < 3.0, Cr 1.50, Alkphos 208, ,    UA: Negative   CXR: Grossly normal as per personal read, official read pending   CT: Minimal periportal edema, nonspecific. Correlate with liver function tests. Otherwise, no acute abdominopelvic findings. No acute thoracic findings. Right lower lobe pulmonary nodule measuring 4 mm.  US Abdomen: Layering gallbladder sludge. No wall thickening or pericholecystic fluid. Focal hyperechoic right hepatic lobe lesion measures 1.0 x 0.9 x 0.8 cm, possibility is a hepatic hemangioma  EKG: NSR 85 bpm with prolonged /509   Received in the ED: Ofirmev, Pepcid 20 mg IVP, Ativan 1mg IVP, Zosyn, 1.5L NS bolus    (14 Jun 2023 23:41)      ---  HOSPITAL COURSE: Patient admitted to medicine floor for management of  Abnormal LFTs, epigastric pain, and new onset fever.  Blood cultures and urine cultures showed no growth. Patient was eventually transitioned off antibiotics due to improvement of fever. Repeat CT scan angio A/P showed no evidence of acute mesenteric ischemia. MRCP was performed showing gallbladder edema, possible pancreatitis/cholecystitis w/ CBD dilation to 9mm. There was suspicion transient biliary obstruction 2/2 duodenal diverticulum. Abdominal pain improved, bilirubin eventually normalized, LFTs were downtrending. A passed stone was suspected due to resolution of pain and improved labs so ERCP was deferred. Pt seen and examined on day of discharge. Patient is medically optimized for discharge to home with close outpatient followup.        ---  CONSULTANTS:   GI: Dr. Mclaughlin  ID: Dr. Bahena    ---  TIME SPENT:  I, the attending physician, was physically present for the key portions of the evaluation and management (E/M) service provided. The total amount of time spent reviewing the hospital notes, laboratory values, imaging findings, assessing/counseling the patient, discussing with consultant physicians, social work, nursing staff was -- minutes    ---  Primary care provider was made aware of plan for discharge:      [  ] NO     [  ] YES   HPI:  63 yo M with PMHx of HTN, HLD, Bipolar, GERD, Jhon's Esophagus Asthma, and Abnormal LFTs presents to the ED with 1x day hx of epigastric pain and new onset fever. Pt endorses feeling ache and heaviness in the epigastric region after he ate breakfast this morning. Noted laying down on his left side with improvement of symptoms, but persisted throughout the day. Pt states that pain worsened when he had a nonbloody bowel movement, with associated nausea, lightheadedness, and diaphoresis. Following this event pt was brought to the ED by his daughter in fear he was having a heart attack. In the ED, pt was found to be tachypneic, tachycardic, and febrile to 100.6. Pt was given IVF, Ativan, and Ofirmev with improvement of symptoms. Pt states that he no longer has the epigastric pain, but has experienced these symptoms in the past. Recently diagnosed with Jhon's Esophagus following EGD due to worsening acid reflux. Pt also noted to have large duodenal diverticulum during EGD. Endorses improving headache, dizziness, SOB, nausea  and distended abdomen throughout event. Pt has no complaints at this time, though appears anxious throughout discussion. Denies any recent sick contacts.     Of note, pt found to have elevated LFTs and Alkphos in outpatient workup since February. Dr. Ewing (PCP) attributed this to recent viral illness, but subsequent labs not checked.     ED Course:   Vitals: BP: 157/75, HR: 77, Temp: 100.6, RR: 20, SpO2: 97% on RA    Labs:  WBC 11.71, Lactate 2.0 < 3.0, Cr 1.50, Alkphos 208, ,    UA: Negative   CXR: Grossly normal as per personal read, official read pending   CT: Minimal periportal edema, nonspecific. Correlate with liver function tests. Otherwise, no acute abdominopelvic findings. No acute thoracic findings. Right lower lobe pulmonary nodule measuring 4 mm.  US Abdomen: Layering gallbladder sludge. No wall thickening or pericholecystic fluid. Focal hyperechoic right hepatic lobe lesion measures 1.0 x 0.9 x 0.8 cm, possibility is a hepatic hemangioma  EKG: NSR 85 bpm with prolonged /509   Received in the ED: Ofirmev, Pepcid 20 mg IVP, Ativan 1mg IVP, Zosyn, 1.5L NS bolus    (14 Jun 2023 23:41)    UPDATED 6/20/23    ---  HOSPITAL COURSE: Patient admitted to medicine floor for management of  Abnormal LFTs, epigastric pain, and new onset fever.  Blood cultures and urine cultures showed no growth. Patient was eventually transitioned off antibiotics due to improvement of fever. Repeat CT scan angio A/P showed no evidence of acute mesenteric ischemia. MRCP was performed showing gallbladder edema, possible pancreatitis/cholecystitis w/ CBD dilation to 9mm. There was suspicion transient biliary obstruction 2/2 duodenal diverticulum. ERCP was performed and showed ******_______ Pt seen and examined on day of discharge. Patient is medically optimized for discharge to home with close outpatient followup.        ---  CONSULTANTS:   GI: Dr. Mclaughlin  ID: Dr. Bahena    ---  TIME SPENT:  I, the attending physician, was physically present for the key portions of the evaluation and management (E/M) service provided. The total amount of time spent reviewing the hospital notes, laboratory values, imaging findings, assessing/counseling the patient, discussing with consultant physicians, social work, nursing staff was -- minutes    ---  Primary care provider was made aware of plan for discharge:      [  ] NO     [  ] YES   HPI:  63 yo M with PMHx of HTN, HLD, Bipolar, GERD, Jhon's Esophagus Asthma, and Abnormal LFTs presents to the ED with 1x day hx of epigastric pain and new onset fever. Pt endorses feeling ache and heaviness in the epigastric region after he ate breakfast this morning. Noted laying down on his left side with improvement of symptoms, but persisted throughout the day. Pt states that pain worsened when he had a nonbloody bowel movement, with associated nausea, lightheadedness, and diaphoresis. Following this event pt was brought to the ED by his daughter in fear he was having a heart attack. In the ED, pt was found to be tachypneic, tachycardic, and febrile to 100.6. Pt was given IVF, Ativan, and Ofirmev with improvement of symptoms. Pt states that he no longer has the epigastric pain, but has experienced these symptoms in the past. Recently diagnosed with Jhon's Esophagus following EGD due to worsening acid reflux. Pt also noted to have large duodenal diverticulum during EGD. Endorses improving headache, dizziness, SOB, nausea  and distended abdomen throughout event. Pt has no complaints at this time, though appears anxious throughout discussion. Denies any recent sick contacts.     Of note, pt found to have elevated LFTs and Alkphos in outpatient workup since February. Dr. Ewing (PCP) attributed this to recent viral illness, but subsequent labs not checked.     ED Course:   Vitals: BP: 157/75, HR: 77, Temp: 100.6, RR: 20, SpO2: 97% on RA    Labs:  WBC 11.71, Lactate 2.0 < 3.0, Cr 1.50, Alkphos 208, ,    UA: Negative   CXR: Grossly normal as per personal read, official read pending   CT: Minimal periportal edema, nonspecific. Correlate with liver function tests. Otherwise, no acute abdominopelvic findings. No acute thoracic findings. Right lower lobe pulmonary nodule measuring 4 mm.  US Abdomen: Layering gallbladder sludge. No wall thickening or pericholecystic fluid. Focal hyperechoic right hepatic lobe lesion measures 1.0 x 0.9 x 0.8 cm, possibility is a hepatic hemangioma  EKG: NSR 85 bpm with prolonged /509   Received in the ED: Ofirmev, Pepcid 20 mg IVP, Ativan 1mg IVP, Zosyn, 1.5L NS bolus    (14 Jun 2023 23:41)      ---  HOSPITAL COURSE: Patient admitted to medicine floor for management of  Abnormal LFTs, epigastric pain, and new onset fever.  Blood cultures and urine cultures showed no growth. Patient was eventually transitioned off antibiotics due to improvement of fever. Repeat CT scan angio A/P showed no evidence of acute mesenteric ischemia. MRCP was performed showing gallbladder edema, possible pancreatitis/cholecystitis w/ CBD dilation to 9mm. There was suspicion transient biliary obstruction 2/2 duodenal diverticulum. Patient bilirubin levels normalized and pain resolved so passing of obstructing stone was suspected. Pt seen and examined on day of discharge. ERCP was deferred due suspected passing of obstructed stone. Patient is medically optimized for discharge to home with close outpatient followup.        ---  CONSULTANTS:   GI: Dr. Mclaughlin  ID: Dr. Bahena    ---  TIME SPENT:  I, the attending physician, was physically present for the key portions of the evaluation and management (E/M) service provided. The total amount of time spent reviewing the hospital notes, laboratory values, imaging findings, assessing/counseling the patient, discussing with consultant physicians, social work, nursing staff was -- minutes    ---  Primary care provider was made aware of plan for discharge:      [  ] NO     [  ] YES   HPI:  61 yo M with PMHx of HTN, HLD, Bipolar, GERD, Jhon's Esophagus Asthma, and Abnormal LFTs presents to the ED with 1x day hx of epigastric pain and new onset fever. Pt endorses feeling ache and heaviness in the epigastric region after he ate breakfast this morning. Noted laying down on his left side with improvement of symptoms, but persisted throughout the day. Pt states that pain worsened when he had a nonbloody bowel movement, with associated nausea, lightheadedness, and diaphoresis. Following this event pt was brought to the ED by his daughter in fear he was having a heart attack. In the ED, pt was found to be tachypneic, tachycardic, and febrile to 100.6. Pt was given IVF, Ativan, and Ofirmev with improvement of symptoms. Pt states that he no longer has the epigastric pain, but has experienced these symptoms in the past. Recently diagnosed with Jhon's Esophagus following EGD due to worsening acid reflux. Pt also noted to have large duodenal diverticulum during EGD. Endorses improving headache, dizziness, SOB, nausea  and distended abdomen throughout event. Pt has no complaints at this time, though appears anxious throughout discussion. Denies any recent sick contacts.     Of note, pt found to have elevated LFTs and Alkphos in outpatient workup since February. Dr. Ewing (PCP) attributed this to recent viral illness, but subsequent labs not checked.     ED Course:   Vitals: BP: 157/75, HR: 77, Temp: 100.6, RR: 20, SpO2: 97% on RA    Labs:  WBC 11.71, Lactate 2.0 < 3.0, Cr 1.50, Alkphos 208, ,    UA: Negative   CXR: Grossly normal as per personal read, official read pending   CT: Minimal periportal edema, nonspecific. Correlate with liver function tests. Otherwise, no acute abdominopelvic findings. No acute thoracic findings. Right lower lobe pulmonary nodule measuring 4 mm.  US Abdomen: Layering gallbladder sludge. No wall thickening or pericholecystic fluid. Focal hyperechoic right hepatic lobe lesion measures 1.0 x 0.9 x 0.8 cm, possibility is a hepatic hemangioma  EKG: NSR 85 bpm with prolonged /509   Received in the ED: Ofirmev, Pepcid 20 mg IVP, Ativan 1mg IVP, Zosyn, 1.5L NS bolus    (14 Jun 2023 23:41)      ---  HOSPITAL COURSE: Patient admitted to medicine floor for management of  Abnormal LFTs, epigastric pain, and new onset fever.  Blood cultures and urine cultures showed no growth. Patient was eventually transitioned off antibiotics due to improvement of fever. Repeat CT scan angio A/P showed no evidence of acute mesenteric ischemia. MRCP was performed showing gallbladder edema, possible pancreatitis/cholecystitis w/ CBD dilation to 9mm. There was suspicion transient biliary obstruction 2/2 duodenal diverticulum. Patient bilirubin levels normalized and pain resolved so passing of obstructing stone was suspected. Pt seen and examined on day of discharge. ERCP was deferred due suspected passing of obstructed stone/sludge. Patient is medically optimized for discharge to home with close outpatient followup.        ---  CONSULTANTS:   GI: Dr. Mclaughlin  ID: Dr. Bahena    ---  TIME SPENT:  I, the attending physician, was physically present for the key portions of the evaluation and management (E/M) service provided. The total amount of time spent reviewing the hospital notes, laboratory values, imaging findings, assessing/counseling the patient, discussing with consultant physicians, social work, nursing staff was -- minutes    ---  Primary care provider was made aware of plan for discharge:      [  ] NO     [  ] YES   HPI:  63 yo M with PMHx of HTN, HLD, Bipolar, GERD, Jhon's Esophagus Asthma, and Abnormal LFTs presents to the ED with 1x day hx of epigastric pain and new onset fever. Pt endorses feeling ache and heaviness in the epigastric region after he ate breakfast this morning. Noted laying down on his left side with improvement of symptoms, but persisted throughout the day. Pt states that pain worsened when he had a nonbloody bowel movement, with associated nausea, lightheadedness, and diaphoresis. Following this event pt was brought to the ED by his daughter in fear he was having a heart attack. In the ED, pt was found to be tachypneic, tachycardic, and febrile to 100.6. Pt was given IVF, Ativan, and Ofirmev with improvement of symptoms. Pt states that he no longer has the epigastric pain, but has experienced these symptoms in the past. Recently diagnosed with Jhon's Esophagus following EGD due to worsening acid reflux. Pt also noted to have large duodenal diverticulum during EGD. Endorses improving headache, dizziness, SOB, nausea  and distended abdomen throughout event. Pt has no complaints at this time, though appears anxious throughout discussion. Denies any recent sick contacts.     Of note, pt found to have elevated LFTs and Alkphos in outpatient workup since February. Dr. Ewing (PCP) attributed this to recent viral illness, but subsequent labs not checked.     ED Course:   Vitals: BP: 157/75, HR: 77, Temp: 100.6, RR: 20, SpO2: 97% on RA    Labs:  WBC 11.71, Lactate 2.0 < 3.0, Cr 1.50, Alkphos 208, ,    UA: Negative   CXR: Grossly normal as per personal read, official read pending   CT: Minimal periportal edema, nonspecific. Correlate with liver function tests. Otherwise, no acute abdominopelvic findings. No acute thoracic findings. Right lower lobe pulmonary nodule measuring 4 mm.  US Abdomen: Layering gallbladder sludge. No wall thickening or pericholecystic fluid. Focal hyperechoic right hepatic lobe lesion measures 1.0 x 0.9 x 0.8 cm, possibility is a hepatic hemangioma  EKG: NSR 85 bpm with prolonged /509   Received in the ED: Ofirmev, Pepcid 20 mg IVP, Ativan 1mg IVP, Zosyn, 1.5L NS bolus    (14 Jun 2023 23:41)      ---  HOSPITAL COURSE: Patient admitted to medicine floor for management of  Abnormal LFTs, epigastric pain, and new onset fever. Pt was started on IV Zosyn for fever , R/O Cholangitis,  seen By ID Dr Sood , Blood cultures and urine cultures showed no growth. Patient was eventually transitioned off antibiotics due to improvement of fever.  RRT was called for abdominal pain , Repeat CT scan angio A/P showed no evidence of acute mesenteric ischemia. MRCP was performed showing gallbladder edema, possible pancreatitis/cholecystitis w/ CBD dilation to 9mm. There was suspicion transient biliary obstruction 2/2 possible CBD sludge / Stone / duodenal diverticulum. Patient bilirubin levels normalized and pain resolved so passing of obstructing stone/Sludge was suspected. Pt seen and examined on day of discharge. ERCP was deferred due suspected passing of obstructed stone/sludge. Patient is medically optimized for discharge to home with close outpatient followup. LFT's are elevated on D/c Day likely 2/2 Duodenal Diverticulum ,going to take few weeks for LFT's to improve.Pt tolerated PO diet well, stable for d/c from GI stand point.Pt MUST Follow up with GI in 1week as out pt , d/w pt at detail.        ---  CONSULTANTS:   GI: Dr. Mclaughlin  ID: Dr. Bahena    ---  TIME SPENT:  I, the attending physician, was physically present for the key portions of the evaluation and management (E/M) service provided. The total amount of time spent reviewing the hospital notes, laboratory values, imaging findings, assessing/counseling the patient, discussing with consultant physicians, social work, nursing staff was 60  minutes

## 2023-06-19 NOTE — DIETITIAN INITIAL EVALUATION ADULT - PROBLEM SELECTOR PLAN 1
Temperature 100.6   - Found to be tachycardic to 101,   - WBC 11.71 and Lactate 2.0 < 3.0   - EKG: NSR 85 bpm with prolonged /509   - UA: Negative   - CXR: Grossly normal as per personal read, official read pending   - CT: Minimal periportal edema, nonspecific. Correlate with liver function tests. Otherwise, no acute abdominopelvic findings. No acute thoracic findings. Right lower lobe pulmonary nodule measuring 4 mm.  - US Abdomen: Layering gallbladder sludge. No wall thickening or pericholecystic fluid. Focal hyperechoic right hepatic lobe lesion measures 1.0 x 0.9 x 0.8 cm, possibility is a hepatic hemangioma  - Blood and urine cultures sent, follow up results  Follow RVP   - S/p 1.5 L NS bolus and Zosyn in ED   - Blood c/s x 2  and urine cultures sent, follow up results   - Will monitor off Abx, but if pt spikes additional fever restart Zosyn q 8 hrs   - Continue IVF NS @ 75cc/hr for 24 hr   - Infectious Disease consulted, Dr. Bahena, follow up recommendations

## 2023-06-19 NOTE — DIETITIAN INITIAL EVALUATION ADULT - PROBLEM SELECTOR PLAN 6
Well controlled on outpatient regimen  - Presenting with elevated pressure of 185/100, likely reactive to anxiety  - S/p Ativan 1mg with drop in BP to 157/75  - Continue home medications of Losartan and Doxazosin with hold parameter

## 2023-06-19 NOTE — PROGRESS NOTE ADULT - ASSESSMENT
61 yo M with PMHx of HTN, HLD, Bipolar, GERD, Spivey's Esophagus Asthma, and Abnormal LFTs presented to the ED with 1x day hx of epigastric pain and new onset fever. In the ED, pt was found to be tachypneic, tachycardic, and febrile to 100.6. pt found to have elevated LFTs and Alk phos in outpatient workup since February. Dr. Ewing (PCP) attributed this to recent viral illness, but subsequent labs not checked.   Temp: 100.6, RR: 20, SpO2: 97% on RA    Labs:  WBC 11.71, Lactate 2.0 < 3.0, Cr 1.50, Alk phos 208, ,      Fever/Transaminitis  cb dilitation - pancreatitis/cholecystitis /biliary obstruction without obvious stone   - 6/15 RRT for severe epigastric pain, febrile to 103F  - CT w/o mesenteric ischemia  fever resolved   mrcp- cbd dilated, cholecystitis and  pancreatitis and small cyst no necrosis or abscess   lft trending down   sp short course abx now stopped so rec obs off abx  GI involved with concern for transient biliary obstruction 2/2 duodenal diverticulum w plan for ERCP today    Thank you for consulting us and involving us in the management of this most interesting and challenging case.  We will follow along in the care of this patient. Please call us at [phone] or text me directly on my cell# at [phone] with any concerns.    Starting tomorrow Dr Briana Lopze will be assuming care of this patient so please contact her with any questions, concerns or new micro data.

## 2023-06-19 NOTE — DIETITIAN INITIAL EVALUATION ADULT - PROBLEM SELECTOR PLAN 3
Presenting Cr 1.50   - Per outpatient records, most recent Cr 1.29, with prior levels at 1.32   - Technically no an KIRA at this time, however more elevated likely due to decreased PO intake of fluids   - S/p 1.5L NS in ED  - Continue IVF NS @ 75cc/hr for 24 hr   - Encouraged PO intake   - Follow up AM labs-BMP

## 2023-06-19 NOTE — DISCHARGE NOTE PROVIDER - NSDCMRMEDTOKEN_GEN_ALL_CORE_FT
aspirin 81 mg oral capsule: 1 orally  Ativan 1 mg oral tablet: 1 orally 2 times a day  cholecalciferol 50 mcg (2000 intl units) oral tablet: 1 orally once a day  Cozaar 100 mg oral tablet: 1 orally once a day  doxazosin 4 mg oral tablet: 1 orally  fluticasone 50 mcg/inh nasal spray: 2 spray(s) in each nostril once a day as needed for  congestion  ipratropium-albuterol 0.5 mg-2.5 mg/3 mL inhalation solution: 1 unit(s) by nebulizer every 4 hours as needed for  bronchospasm  lamoTRIgine 200 mg oral tablet: 0.75 tab(s) orally 2 times a day  ProAir HFA 90 mcg/inh inhalation aerosol: 2 inhaled every 6 hours as needed for  bronchospasm  Protonix 40 mg oral delayed release tablet: 1 orally once a day  PROzac 40 mg oral capsule: 1 orally once a day Alternates qD and BID every other day  QUEtiapine 50 mg oral tablet, extended release: 1 orally 2 times a day  rosuvastatin 20 mg oral tablet: 1 orally once a day  ZyrTEC 10 mg oral tablet: 1 orally once a day   aspirin 81 mg oral capsule: 1 orally  Ativan 1 mg oral tablet: 1 orally 2 times a day  cholecalciferol 50 mcg (2000 intl units) oral tablet: 1 orally once a day  Cozaar 100 mg oral tablet: 1 orally once a day  doxazosin 4 mg oral tablet: 1 orally  fluticasone 50 mcg/inh nasal spray: 2 spray(s) in each nostril once a day as needed for  congestion  ipratropium-albuterol 0.5 mg-2.5 mg/3 mL inhalation solution: 1 unit(s) by nebulizer every 4 hours as needed for  bronchospasm  lamoTRIgine 200 mg oral tablet: 0.75 tab(s) orally 2 times a day  ProAir HFA 90 mcg/inh inhalation aerosol: 2 inhaled every 6 hours as needed for  bronchospasm  Protonix 40 mg oral delayed release tablet: 1 orally once a day  PROzac 40 mg oral capsule: 1 orally once a day Alternates qD and BID every other day  QUEtiapine 50 mg oral tablet, extended release: 1 orally 2 times a day  ZyrTEC 10 mg oral tablet: 1 orally once a day   aspirin 81 mg oral capsule: 1 tab(s) orally once a day  Ativan 1 mg oral tablet: 1 orally 2 times a day  cholecalciferol 50 mcg (2000 intl units) oral tablet: 1 orally once a day  Cozaar 100 mg oral tablet: 1 orally once a day  doxazosin 4 mg oral tablet: 1 tab(s) orally once a day  fluticasone 50 mcg/inh nasal spray: 2 spray(s) in each nostril once a day as needed for  congestion  ipratropium-albuterol 0.5 mg-2.5 mg/3 mL inhalation solution: 1 unit(s) by nebulizer every 4 hours as needed for  bronchospasm  lamoTRIgine 200 mg oral tablet: 0.75 tab(s) orally 2 times a day  ProAir HFA 90 mcg/inh inhalation aerosol: 2 inhaled every 6 hours as needed for  bronchospasm  Protonix 40 mg oral delayed release tablet: 1 orally once a day  PROzac 40 mg oral capsule: 1 orally once a day Alternates qD and BID every other day  QUEtiapine 50 mg oral tablet, extended release: 1 orally 2 times a day  ZyrTEC 10 mg oral tablet: 1 orally once a day   aspirin 81 mg oral capsule: 1 tab(s) orally once a day  Ativan 1 mg oral tablet: 1 orally 2 times a day  cholecalciferol 50 mcg (2000 intl units) oral tablet: 1 orally once a day  Cozaar 100 mg oral tablet: 1 orally once a day  doxazosin 4 mg oral tablet: 1 tab(s) orally once a day  fluticasone 50 mcg/inh nasal spray: 2 spray(s) in each nostril once a day as needed for  congestion  Home Physical Therapy: Home Physical Therapy, ICD-10 Code: R26  ipratropium-albuterol 0.5 mg-2.5 mg/3 mL inhalation solution: 1 unit(s) by nebulizer every 4 hours as needed for  bronchospasm  lamoTRIgine 200 mg oral tablet: 0.75 tab(s) orally 2 times a day  ProAir HFA 90 mcg/inh inhalation aerosol: 2 inhaled every 6 hours as needed for  bronchospasm  Protonix 40 mg oral delayed release tablet: 1 tab(s) orally 2 times a day  PROzac 40 mg oral capsule: 1 orally once a day Alternates qD and BID every other day  QUEtiapine 50 mg oral tablet, extended release: 1 orally 2 times a day  ZyrTEC 10 mg oral tablet: 1 orally once a day

## 2023-06-19 NOTE — DIETITIAN INITIAL EVALUATION ADULT - PERTINENT MEDS FT
MEDICATIONS  (STANDING):  aspirin  chewable 81 milliGRAM(s) Oral daily  cholecalciferol 2000 Unit(s) Oral daily  doxazosin 4 milliGRAM(s) Oral daily  Fluoxetine 40 milliGRAM(s) 40 milliGRAM(s) Oral <User Schedule>  Fluoxetine 40mg 1 Capsule(s) 1 Capsule(s) Oral <User Schedule>  lamoTRIgine 150 milliGRAM(s) Oral two times a day  LORazepam     Tablet 1 milliGRAM(s) Oral two times a day  losartan 100 milliGRAM(s) Oral daily  pantoprazole    Tablet 40 milliGRAM(s) Oral two times a day  QUEtiapine 50 milliGRAM(s) Oral two times a day  sodium chloride 0.45% with potassium chloride 20 mEq/L 1000 milliLiter(s) (70 mL/Hr) IV Continuous <Continuous>  sodium chloride 0.9%. 1000 milliLiter(s) (75 mL/Hr) IV Continuous <Continuous>    MEDICATIONS  (PRN):  acetaminophen     Tablet .. 650 milliGRAM(s) Oral every 6 hours PRN Temp greater or equal to 38C (100.4F), Mild Pain (1 - 3)  albuterol    90 MICROgram(s) HFA Inhaler 2 Puff(s) Inhalation every 6 hours PRN for bronchospasm  aluminum hydroxide/magnesium hydroxide/simethicone Suspension 30 milliLiter(s) Oral every 4 hours PRN Dyspepsia  fluticasone propionate 50 MICROgram(s)/spray Nasal Spray 1 Spray(s) Both Nostrils two times a day PRN Congestion  melatonin 3 milliGRAM(s) Oral at bedtime PRN Insomnia

## 2023-06-19 NOTE — PROGRESS NOTE ADULT - SUBJECTIVE AND OBJECTIVE BOX
[facility] DIVISION of INFECTIOUS DISEASE  Loyd Bahena MD PhD, Dede Pina MD, Briana Lopez MD, Diogenes Valentin MD, Devaughn Becerril MD  and providing coverage with Han Bowser MD  Providing Infectious Disease Consultations at [facility], [facility], [facility]'s    Office# [phone] to schedule follow up appointments  Answering Service for urgent calls or New Consults [phone]  Cell# to text for urgent issues Loyd Bahena [phone]     infectious diseases progress note:    AMELIA SHARMA is a 62y y. o. Male patient    Overnight and events of the last 24hrs reviewed    Allergies    No Known Allergies    Intolerances        ANTIBIOTICS/RELEVANT:  antimicrobials    immunologic:    OTHER:  acetaminophen     Tablet .. 650 milliGRAM(s) Oral every 6 hours PRN  albuterol    90 MICROgram(s) HFA Inhaler 2 Puff(s) Inhalation every 6 hours PRN  aluminum hydroxide/magnesium hydroxide/simethicone Suspension 30 milliLiter(s) Oral every 4 hours PRN  aspirin  chewable 81 milliGRAM(s) Oral daily  cholecalciferol 2000 Unit(s) Oral daily  doxazosin 4 milliGRAM(s) Oral daily  Fluoxetine 40 milliGRAM(s) 40 milliGRAM(s) Oral <User Schedule>  Fluoxetine 40mg 1 Capsule(s) 1 Capsule(s) Oral <User Schedule>  fluticasone propionate 50 MICROgram(s)/spray Nasal Spray 1 Spray(s) Both Nostrils two times a day PRN  lamoTRIgine 150 milliGRAM(s) Oral two times a day  LORazepam     Tablet 1 milliGRAM(s) Oral two times a day  losartan 100 milliGRAM(s) Oral daily  melatonin 3 milliGRAM(s) Oral at bedtime PRN  pantoprazole    Tablet 40 milliGRAM(s) Oral two times a day  QUEtiapine 50 milliGRAM(s) Oral two times a day  sodium chloride 0.45% with potassium chloride 20 mEq/L 1000 milliLiter(s) IV Continuous <Continuous>  sodium chloride 0.9%. 1000 milliLiter(s) IV Continuous <Continuous>      Objective:  Vital Signs Last 24 Hrs  T(C): 36.4 (19 Jun 2023 04:36), Max: 36.8 (18 Jun 2023 20:19)  T(F): 97.5 (19 Jun 2023 04:36), Max: 98.2 (18 Jun 2023 20:19)  HR: 53 (19 Jun 2023 04:36) (53 - 57)  BP: 164/89 (19 Jun 2023 04:36) (131/78 - 164/89)  BP(mean): --  RR: 18 (19 Jun 2023 04:36) (18 - 18)  SpO2: 92% (19 Jun 2023 04:36) (92% - 93%)    Parameters below as of 19 Jun 2023 04:36  Patient On (Oxygen Delivery Method): room air  O2 Flow (L/min): 2      T(C): 36.4 (06-19-23 @ 04:36), Max: 36.8 (06-18-23 @ 20:19)  T(C): 36.4 (06-19-23 @ 04:36), Max: 37.1 (06-16-23 @ 20:13)  T(C): 36.4 (06-19-23 @ 04:36), Max: 39.7 (06-15-23 @ 17:10)    PHYSICAL EXAM:  HEENT: NC atraumatic  Neck: supple  Respiratory: no accessory muscle use, breathing comfortably  Cardiovascular: distant  Gastrointestinal: normal appearing, nondistended  Extremities: no clubbing, no cyanosis,        LABS:                          11.8   4.80  )-----------( 267      ( 19 Jun 2023 07:05 )             35.2       WBC  4.80 06-19 @ 07:05  6.15 06-18 @ 09:10  7.46 06-17 @ 06:59  7.77 06-16 @ 08:43  7.97 06-15 @ 14:45  8.20 06-15 @ 05:15  11.71 06-14 @ 15:33      06-19    140  |  107  |  15  ----------------------------<  95  3.8   |  27  |  1.20    Ca    9.4      19 Jun 2023 07:05    TPro  6.7  /  Alb  3.3  /  TBili  0.8  /  DBili  x   /  AST  49<H>  /  ALT  232<H>  /  AlkPhos  230<H>  06-19      Creatinine, Serum: 1.20 mg/dL (06-19-23 @ 07:05)  Creatinine, Serum: 1.10 mg/dL (06-18-23 @ 09:10)  Creatinine, Serum: 1.20 mg/dL (06-17-23 @ 06:59)  Creatinine, Serum: 1.10 mg/dL (06-16-23 @ 08:43)  Creatinine, Serum: 1.40 mg/dL (06-15-23 @ 14:45)  Creatinine, Serum: 1.30 mg/dL (06-15-23 @ 05:15)  Creatinine, Serum: 1.50 mg/dL (06-14-23 @ 15:33)                INFLAMMATORY MARKERS      MICROBIOLOGY:              RADIOLOGY & ADDITIONAL STUDIES:

## 2023-06-19 NOTE — PROGRESS NOTE ADULT - PROBLEM SELECTOR PLAN 2
Hx of Transaminitis since 2/23: still downtrending today   - US Abdomen: Focal hyperechoic right hepatic lobe lesion measures 1.0 x 0.9 x 0.8 cm, possibility is a hepatic hemangioma  -CT A/P with IVC - periportal edema  - MRCP performed - gallbladder edema, possible pancreatitis/cholecystitis w/ CBD dilation to 9mm.   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 Mclaughlin, d/w at detail.- was on Clear liquid diet; ERCP planned for today, NPO currently Hx of Transaminitis since 2/23: still downtrending today   - US Abdomen: Focal hyperechoic right hepatic lobe lesion measures 1.0 x 0.9 x 0.8 cm, possibility is a hepatic hemangioma  -CT A/P with IVC - periportal edema  - MRCP performed - gallbladder edema, possible pancreatitis/cholecystitis w/ CBD dilation to 9mm.   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 Mclaughlin, d/w at detail.- was on Clear liquid diet; ERCP planned for today, NPO currently  -

## 2023-06-19 NOTE — DIETITIAN INITIAL EVALUATION ADULT - PERTINENT LABORATORY DATA
06-19    140  |  107  |  15  ----------------------------<  95  3.8   |  27  |  1.20    Ca    9.4      19 Jun 2023 07:05    TPro  6.7  /  Alb  3.3  /  TBili  0.8  /  DBili  x   /  AST  49<H>  /  ALT  232<H>  /  AlkPhos  230<H>  06-19

## 2023-06-19 NOTE — DISCHARGE NOTE PROVIDER - PROVIDER TOKENS
PROVIDER:[TOKEN:[[id number]],FOLLOWUP:[1 week]] PROVIDER:[TOKEN:[[id number]],FOLLOWUP:[1 week]],PROVIDER:[TOKEN:[[id number]],FOLLOWUP:[1 week]] PROVIDER:[TOKEN:[[id number]],FOLLOWUP:[1 week]],PROVIDER:[TOKEN:[[id number]],FOLLOWUP:[1 week]]

## 2023-06-19 NOTE — DISCHARGE NOTE PROVIDER - CARE PROVIDER_API CALL
Ben Mclaughlin  Gastroenterology  [address] [place] [address]  Phone: [phone]  Fax: [phone]  Follow Up Time: 1 week   Ben Mclaughlin  Gastroenterology  [address] [place] [address]  Phone: [phone]  Fax: [phone]  Follow Up Time: 1 week    Sofía Ewing  Internal Medicine  [address] [place] [address], [address] [place] [address]  Phone: [phone]  Fax: [phone]  Follow Up Time: 1 week   Sofía Ewing  Internal Medicine  [address] [facility], [address]  [place] [address]  Phone: [phone]  Fax: [phone]  Follow Up Time: 1 week    Yung Burns  Gastroenterology  [address] [facility], [address]  [place] [address]  Phone: [phone]  Fax: [phone]  Follow Up Time: 1 week

## 2023-06-19 NOTE — PROGRESS NOTE ADULT - SUBJECTIVE AND OBJECTIVE BOX
[facility]  Osman Chao PA-C  [address] [place] [address]  [phone]      INTERVAL HPI/OVERNIGHT EVENTS:  Pt s/e  abdominal pain improved  tolerating clears    MEDICATIONS  (STANDING):  aspirin  chewable 81 milliGRAM(s) Oral daily  cholecalciferol 2000 Unit(s) Oral daily  doxazosin 4 milliGRAM(s) Oral daily  enoxaparin Injectable 40 milliGRAM(s) SubCutaneous every 24 hours  Fluoxetine 40 milliGRAM(s) 40 milliGRAM(s) Oral <User Schedule>  lamoTRIgine 150 milliGRAM(s) Oral two times a day  LORazepam     Tablet 1 milliGRAM(s) Oral two times a day  losartan 100 milliGRAM(s) Oral daily  pantoprazole    Tablet 40 milliGRAM(s) Oral two times a day  piperacillin/tazobactam IVPB.. 3.375 Gram(s) IV Intermittent every 8 hours  QUEtiapine 50 milliGRAM(s) Oral two times a day  sodium chloride 0.9%. 1000 milliLiter(s) (75 mL/Hr) IV Continuous <Continuous>    MEDICATIONS  (PRN):  acetaminophen     Tablet .. 650 milliGRAM(s) Oral every 6 hours PRN Temp greater or equal to 38C (100.4F), Mild Pain (1 - 3)  albuterol    90 MICROgram(s) HFA Inhaler 2 Puff(s) Inhalation every 6 hours PRN for bronchospasm  aluminum hydroxide/magnesium hydroxide/simethicone Suspension 30 milliLiter(s) Oral every 4 hours PRN Dyspepsia  fluticasone propionate 50 MICROgram(s)/spray Nasal Spray 1 Spray(s) Both Nostrils two times a day PRN Congestion  melatonin 3 milliGRAM(s) Oral at bedtime PRN Insomnia      Allergies    No Known Allergies      PHYSICAL EXAM:   Vital Signs:  Vital Signs Last 24 Hrs  T(C): 37.1 (2023 12:00), Max: 39.7 (15 Robert 2023 17:10)  T(F): 98.7 (2023 12:00), Max: 103.4 (15 Robert 2023 17:10)  HR: 58 (2023 12:00) (54 - 67)  BP: 133/70 (2023 12:00) (133/70 - 165/78)  BP(mean): --  RR: 18 (2023 12:00) (18 - 18)  SpO2: 98% (2023 12:00) (95% - 98%)    Parameters below as of 2023 12:00  Patient On (Oxygen Delivery Method): nasal cannula  O2 Flow (L/min): 2    Daily     Daily Weight in k (2023 04:58)    GENERAL:  Appears stated age  HEENT:  NC/AT  CHEST:  Full & symmetric excursion  HEART:  Regular rhythm  ABDOMEN:  Soft, +tender epigastric, non-distended  EXTEREMITIES:  no cyanosis  SKIN:  No rash  NEURO:  Alert      LABS:                        11.8   7.77  )-----------( 218      ( 2023 08:43 )             36.0     06-16    141  |  110<H>  |  18  ----------------------------<  103<H>  3.6   |  27  |  1.10    Ca    8.8      2023 08:43    TPro  6.2  /  Alb  3.0<L>  /  TBili  3.0<H>  /  DBili  2.4<H>  /  AST  262<H>  /  ALT  566<H>  /  AlkPhos  264<H>  06-16    PT/INR - ( 15 Robert 2023 14:45 )   PT: 13.7 sec;   INR: 1.17 ratio         PTT - ( 15 Robert 2023 14:45 )  PTT:31.9 sec  Urinalysis Basic - ( 2023 18:00 )    Color: Dark Yellow / Appearance: Clear / S.019 / pH: x  Gluc: x / Ketone: Negative mg/dL  / Bili: Negative / Urobili: 1.0 mg/dL   Blood: x / Protein: Trace mg/dL / Nitrite: Negative   Leuk Esterase: Negative / RBC: x / WBC x   Sq Epi: x / Non Sq Epi: x / Bacteria: x    IMAGING:  < from: CT Angio Abdomen and Pelvis w/ IV Cont (06.15.23 @ 16:48) >    ACC: [id number] EXAM:  CT ANGIO ABD PELV (W)AW IC   ORDERED BY: ULICES SAWYER     PROCEDURE DATE:  06/15/2023          INTERPRETATION:  CLINICAL INFORMATION: Abdominal pain. Evaluate   mesenteric ischemia.    COMPARISON: CT abdomen pelvis 2023.    CONTRAST/COMPLICATIONS:  IV Contrast: Omnipaque 350  90 cc administered   10 cc discarded  Oral Contrast: NONE  Complications: None reported at time of study completion    PROCEDURE:  CT Angiography of the Abdomen and Pelvis was performed.  Arterial and venous phases were acquired.  Sagittal and coronal reformats were performed as well as 3D (MIP)   reconstructions.    FINDINGS:  LOWER CHEST: Within normal limits.    LIVER: Within normal limits.  BILE DUCTS: Normal caliber.  GALLBLADDER: Within normal limits.  SPLEEN: Within normal limits.  PANCREAS: Within normal limits.  ADRENALS: Within normal limits.  KIDNEYS/URETERS: No hydronephrosis. Bilateral renal cortical and   parapelvic cysts.    BLADDER: Small right posterior bladder diverticulum.  REPRODUCTIVE ORGANS: Prostate is enlarged.    BOWEL: No bowel obstruction. Appendix is normal. Periampullary duodenal   diverticulum. No abnormal bowel enhancement, pneumatosis, or portal   venous gas.  PERITONEUM: No ascites.  VESSELS: Mild atherosclerotic changes. Celiac axis, SMA, and SUGAR are   patent. Patent mesenteric veins.  RETROPERITONEUM/LYMPH NODES: No lymphadenopathy.  ABDOMINAL WALL: Small fat-containing umbilical hernia.  BONES: Degenerative changes. Multilevel vertebral hemangiomas.    IMPRESSION:  No evidence of acute mesenteric ischemia.        --- End of Report ---            DEB STEVENS MD; Attending Radiologist  This document has been electronically signed. Robert 15 2023  5:16PM    < end of copied text >

## 2023-06-19 NOTE — PROGRESS NOTE ADULT - ASSESSMENT
61 yo M with PMHx of HTN, HLD, Bipolar, GERD, Jhon's Esphagous, Asthma, and Abnormal LFTs presents to the ED with 1x day hx of epigastric pain and new onset fever, admitted for workup.      63 yo M with PMHx of HTN, HLD, Bipolar, GERD, Jhon's  Esophagus Asthma, and Abnormal LFTs presents to the ED with 1x day hx of epigastric pain and new onset fever, admitted for workup.

## 2023-06-19 NOTE — DISCHARGE NOTE PROVIDER - NSDCCPCAREPLAN_GEN_ALL_CORE_FT
PRINCIPAL DISCHARGE DIAGNOSIS  Diagnosis: Elevated liver transaminase level  Assessment and Plan of Treatment: You had elevated liver enzymes and bilirubin likely due to a gallstone stuck in your common bile duct. An MRCP was performed and showed gallbladder edema and possible pancreatitis/cholecystitis with common bile duct dilation suggesting a stone obstructing the common bile duct.  Your liver enzymes and bilirubin improved so the suspected stone likely passed. An ERCP was not performed due to improvement of your abdominal pain and improvement of labs.  Please follow up with Dr. Mclaughlin outpatient in one week.  Follow up with your primary care provider for regular follow up.      SECONDARY DISCHARGE DIAGNOSES  Diagnosis: HLD (hyperlipidemia)  Assessment and Plan of Treatment: You have high cholesterol.   Please stop taking your home rosuvastatin as this can elevated your liver enzymes and cause worsening liver damage.  Please follow up with Dr. Mclaughlin outpatient in one week to discuss when appropriate to restart a statin.  Follow up with your primary care provider for regular follow up.      Diagnosis: KIRA (acute kidney injury)  Assessment and Plan of Treatment: You had a suspected kidney injury likely due to IV contrast from iagming or dehydration. Your Kidney function improved after being given IV fluids.  Follow up with your primary care provider for regular follow up.     PRINCIPAL DISCHARGE DIAGNOSIS  Diagnosis: Elevated liver transaminase level  Assessment and Plan of Treatment: You had elevated liver enzymes and bilirubin likely due to a gallstone stuck in your common bile duct. An MRCP was performed and showed gallbladder edema and possible pancreatitis/cholecystitis with common bile duct dilation suggesting a stone obstructing the common bile duct.  Your liver enzymes and bilirubin improved so the suspected stone likely passed. An ERCP was not performed due to improvement of your abdominal pain and improvement of labs.  Please follow up with Dr. Mclaughlin outpatient in one week.  Follow up with your primary care provider for regular follow up.      SECONDARY DISCHARGE DIAGNOSES  Diagnosis: HLD (hyperlipidemia)  Assessment and Plan of Treatment: You have high cholesterol.   Please stop taking your home rosuvastatin as this can elevated your liver enzymes and cause worsening liver damage.  Please follow up with Dr. Mclaughlin outpatient in one week to discuss when appropriate to restart a statin.  Follow up with your primary care provider for regular follow up.      Diagnosis: KIRA (acute kidney injury)  Assessment and Plan of Treatment: You had a suspected kidney injury likely due to IV contrast from iagming or dehydration. Your Kidney function improved after being given IV fluids.  Follow up with your primary care provider for regular follow up.    Diagnosis: HTN (hypertension)  Assessment and Plan of Treatment: You have high blood pressure. Please continue taking your home cozaar 100 mg once a day as previously prescribed prior to being admitted to the hospital.    Diagnosis: Bipolar disorder  Assessment and Plan of Treatment: Please continue your home fluoxetine, Seroquel, lamotrigine, and lorazepam as previously prescribed prior to being admitted to the hospital.    Diagnosis: GERD (gastroesophageal reflux disease)  Assessment and Plan of Treatment: You have a history of GERD and cher's esophagus. Please continue your home protonix (pantoprazole) as previously prescribed prior to being admitted to the hospital.    Diagnosis: Asthma  Assessment and Plan of Treatment: You have a history of asthma. Please continue to take your home inhalers as needed     PRINCIPAL DISCHARGE DIAGNOSIS  Diagnosis: Elevated liver transaminase level  Assessment and Plan of Treatment: You had elevated liver enzymes and bilirubin likely due to a gallstone stuck in your common bile duct. An MRCP was performed and showed gallbladder edema and possible pancreatitis/cholecystitis with common bile duct dilation suggesting a stone obstructing the common bile duct.  An ECRP was performed and showed ******  Please follow up with Dr. Mclaughlin outpatient in one week.  Follow up with your primary care provider for regular follow up.      SECONDARY DISCHARGE DIAGNOSES  Diagnosis: HLD (hyperlipidemia)  Assessment and Plan of Treatment: You have high cholesterol.   Please stop taking your home rosuvastatin as this can elevated your liver enzymes and cause worsening liver damage.  Please follow up with Dr. Mclaughlin outpatient in one week to discuss when appropriate to restart a statin.  Follow up with your primary care provider for regular follow up.      Diagnosis: KIRA (acute kidney injury)  Assessment and Plan of Treatment: You had a suspected kidney injury likely due to IV contrast from iagming or dehydration. Your Kidney function improved after being given IV fluids.  Follow up with your primary care provider for regular follow up.    Diagnosis: HTN (hypertension)  Assessment and Plan of Treatment: You have high blood pressure. Please continue taking your home cozaar 100 mg once a day as previously prescribed prior to being admitted to the hospital.    Diagnosis: Bipolar disorder  Assessment and Plan of Treatment: Please continue your home fluoxetine, Seroquel, lamotrigine, and lorazepam as previously prescribed prior to being admitted to the hospital.    Diagnosis: GERD (gastroesophageal reflux disease)  Assessment and Plan of Treatment: You have a history of GERD and cher's esophagus. Please continue your home protonix (pantoprazole) as previously prescribed prior to being admitted to the hospital.    Diagnosis: Asthma  Assessment and Plan of Treatment: You have a history of asthma. Please continue to take your home inhalers as needed     PRINCIPAL DISCHARGE DIAGNOSIS  Diagnosis: Elevated liver transaminase level  Assessment and Plan of Treatment: You had elevated liver enzymes and bilirubin likely due to a gallstone stuck in your common bile duct. An MRCP was performed and showed gallbladder edema and possible pancreatitis/cholecystitis with common bile duct dilation suggesting a stone obstructing the common bile duct.  Your bilirubin normalized and your abdominal pain resolved so the obstructing stone likely passed so an ERCP was not performed.  Please follow up with Dr. Mclaughlin outpatient in one week.  Follow up with your primary care provider for regular follow up.      SECONDARY DISCHARGE DIAGNOSES  Diagnosis: HLD (hyperlipidemia)  Assessment and Plan of Treatment: You have high cholesterol.   Please stop taking your home rosuvastatin as this can elevated your liver enzymes and cause worsening liver damage.  Please follow up with Dr. Mclaughlin outpatient in one week to discuss when appropriate to restart a statin.  Follow up with your primary care provider for regular follow up.      Diagnosis: KIRA (acute kidney injury)  Assessment and Plan of Treatment: You had a suspected kidney injury likely due to IV contrast from iagming or dehydration. Your Kidney function improved after being given IV fluids.  Follow up with your primary care provider for regular follow up.    Diagnosis: HTN (hypertension)  Assessment and Plan of Treatment: You have high blood pressure. Please continue taking your home cozaar 100 mg once a day as previously prescribed prior to being admitted to the hospital.  Follow up with your primary care provider for regular follow up.    Diagnosis: Bipolar disorder  Assessment and Plan of Treatment: Please continue your home fluoxetine, Seroquel, lamotrigine, and lorazepam as previously prescribed prior to being admitted to the hospital.    Diagnosis: GERD (gastroesophageal reflux disease)  Assessment and Plan of Treatment: You have a history of GERD and cher's esophagus. Please continue your home protonix (pantoprazole) as previously prescribed prior to being admitted to the hospital.    Diagnosis: Asthma  Assessment and Plan of Treatment: You have a history of asthma. Please continue to take your home inhalers as needed     PRINCIPAL DISCHARGE DIAGNOSIS  Diagnosis: Elevated liver transaminase level  Assessment and Plan of Treatment: You had elevated liver enzymes and bilirubin likely due to a gallstone stuck in your common bile duct. An MRCP was performed and showed gallbladder edema and possible pancreatitis/cholecystitis with common bile duct dilation suggesting a stone obstructing the common bile duct.  Your bilirubin normalized and your abdominal pain resolved so the obstructing stone likely passed so an ERCP was not performed.  Please follow up with Dr. Mclaughlin outpatient in one week.  Follow up with your primary care provider for regular follow up in two weeks      SECONDARY DISCHARGE DIAGNOSES  Diagnosis: HLD (hyperlipidemia)  Assessment and Plan of Treatment: You have high cholesterol.   Please stop taking your home rosuvastatin as this can elevated your liver enzymes and cause worsening liver damage.  Please follow up with Dr. Mclaughlin outpatient in one week to discuss when appropriate to restart a statin.  Follow up with your primary care provider for regular follow up.      Diagnosis: KIRA (acute kidney injury)  Assessment and Plan of Treatment: You had a suspected kidney injury likely due to IV contrast from iagming or dehydration. Your Kidney function improved after being given IV fluids.  Follow up with your primary care provider for regular follow up.    Diagnosis: HTN (hypertension)  Assessment and Plan of Treatment: You have high blood pressure. Please continue taking your home cozaar 100 mg once a day as previously prescribed prior to being admitted to the hospital.  Follow up with your primary care provider for regular follow up.    Diagnosis: Bipolar disorder  Assessment and Plan of Treatment: Please continue your home fluoxetine, Seroquel, lamotrigine, and lorazepam as previously prescribed prior to being admitted to the hospital.    Diagnosis: GERD (gastroesophageal reflux disease)  Assessment and Plan of Treatment: You have a history of GERD and cher's esophagus. Please continue your home protonix (pantoprazole) as previously prescribed prior to being admitted to the hospital.    Diagnosis: Asthma  Assessment and Plan of Treatment: You have a history of asthma. Please continue to take your home inhalers as needed     PRINCIPAL DISCHARGE DIAGNOSIS  Diagnosis: Elevated liver transaminase level  Assessment and Plan of Treatment: You had elevated liver enzymes and bilirubin likely due to a gallstone stuck in your common bile duct. An MRCP was performed and showed gallbladder edema and possible pancreatitis/cholecystitis with common bile duct dilation suggesting a stone obstructing the common bile duct.  Your bilirubin normalized and your abdominal pain resolved so the obstructing stone likely passed so an ERCP was not performed.  Please follow up with Dr. Mclaughlin outpatient in one week.  Follow up with your primary care provider for regular follow up in two weeks      SECONDARY DISCHARGE DIAGNOSES  Diagnosis: GERD (gastroesophageal reflux disease)  Assessment and Plan of Treatment: You have a history of GERD and cher's esophagus.   Please take protonix (pantoprazole) 40mg twice a day by mouth.      Diagnosis: HLD (hyperlipidemia)  Assessment and Plan of Treatment: You have high cholesterol.   Please stop taking your home rosuvastatin as this can elevated your liver enzymes and cause worsening liver damage.  Please follow up with Dr. Mclaughlin outpatient in one week to discuss when appropriate to restart a statin.  Follow up with your primary care provider for regular follow up.      Diagnosis: KIRA (acute kidney injury)  Assessment and Plan of Treatment: You had a suspected kidney injury likely due to IV contrast from iagming or dehydration. Your Kidney function improved after being given IV fluids.  Follow up with your primary care provider for regular follow up.    Diagnosis: HTN (hypertension)  Assessment and Plan of Treatment: You have high blood pressure. Please continue taking your home cozaar 100 mg once a day as previously prescribed prior to being admitted to the hospital.  Follow up with your primary care provider for regular follow up.    Diagnosis: Bipolar disorder  Assessment and Plan of Treatment: Please continue your home fluoxetine, Seroquel, lamotrigine, and lorazepam as previously prescribed prior to being admitted to the hospital.    Diagnosis: Asthma  Assessment and Plan of Treatment: You have a history of asthma. Please continue to take your home inhalers as needed     PRINCIPAL DISCHARGE DIAGNOSIS  Diagnosis: Elevated liver transaminase level  Assessment and Plan of Treatment: You had elevated liver enzymes and bilirubin likely due to a gallstone stuck in your common bile duct. An MRCP was performed and showed gallbladder edema and possible pancreatitis/cholecystitis with common bile duct dilation suggesting a stone obstructing the common bile duct.  Your bilirubin normalized and your abdominal pain resolved so the obstructing stone likely passed so an ERCP was not performed.  Please follow up with Dr. Burns outpatient in one week.  Follow up with your primary care provider for regular follow up in two weeks      SECONDARY DISCHARGE DIAGNOSES  Diagnosis: GERD (gastroesophageal reflux disease)  Assessment and Plan of Treatment: You have a history of GERD and cher's esophagus.   Please take protonix (pantoprazole) 40mg twice a day by mouth.      Diagnosis: HLD (hyperlipidemia)  Assessment and Plan of Treatment: You have high cholesterol.   Please stop taking your home rosuvastatin as this can elevated your liver enzymes and cause worsening liver damage.  Please follow up with Dr. Burns outpatient in one week to discuss when appropriate to restart a statin.  Follow up with your primary care provider for regular follow up.      Diagnosis: KIRA (acute kidney injury)  Assessment and Plan of Treatment: You had a suspected kidney injury likely due to IV contrast from iagming or dehydration. Your Kidney function improved after being given IV fluids.  Follow up with your primary care provider for regular follow up.    Diagnosis: HTN (hypertension)  Assessment and Plan of Treatment: You have high blood pressure. Please continue taking your home cozaar 100 mg once a day as previously prescribed prior to being admitted to the hospital.  Follow up with your primary care provider for regular follow up.    Diagnosis: Bipolar disorder  Assessment and Plan of Treatment: Please continue your home fluoxetine, Seroquel, lamotrigine, and lorazepam as previously prescribed prior to being admitted to the hospital.    Diagnosis: Asthma  Assessment and Plan of Treatment: You have a history of asthma. Please continue to take your home inhalers as needed     PRINCIPAL DISCHARGE DIAGNOSIS  Diagnosis: Elevated liver transaminase level  Assessment and Plan of Treatment: You had elevated liver enzymes and bilirubin likely due to a gallstone Or Slugdge stuck in your common bile duct.   -You might have Passed or cleared the sluge/ Stone as your Bili is normal.  -Elevated LFT's also like 2/2 Duodenal Diverticulum taht may be affecting drainage of Bile  - An MRCP was performed and showed gallbladder edema and possible pancreatitis/cholecystitis with common bile duct dilation suggesting a stone obstructing the common bile duct.   -Likely 2/2 Transient blockage of  CBD with Stone/Sludge.   - Your bilirubin normalized and your abdominal pain resolved so the obstructing stone likely passed so an ERCP was not performed.  - LOW Fat , Low Cholesterol diet   -Repeat LFT's after 1 week   HOLD your Statin   Please follow up with Dr. Burns outpatient in one week.  Follow up with your primary care provider for regular follow up in two weeks      SECONDARY DISCHARGE DIAGNOSES  Diagnosis: HLD (hyperlipidemia)  Assessment and Plan of Treatment: You have high cholesterol.   Please stop taking your home rosuvastatin as this can elevated your liver enzymes and cause worsening liver damage.  Please follow up with Dr. Burns outpatient in one week to discuss when appropriate to restart a statin.  Follow up with your primary care provider for regular follow up.      Diagnosis: GERD (gastroesophageal reflux disease)  Assessment and Plan of Treatment: You have a history of GERD and cher's esophagus.   Please take protonix (pantoprazole) 40mg twice a day by mouth.  Follow up with your GI in 1weeks       Diagnosis: Bipolar disorder  Assessment and Plan of Treatment: Please continue your home fluoxetine, Seroquel, lamotrigine, and lorazepam as previously prescribed prior to being admitted to the hospital.  Continue all your home meds    Diagnosis: KIRA (acute kidney injury)  Assessment and Plan of Treatment: You had a suspected kidney injury likely due to dehydration. Your Kidney function improved after being given IV fluids.  Follow up with your primary care provider for regular follow up.    Diagnosis: HTN (hypertension)  Assessment and Plan of Treatment: You have high blood pressure. Please continue taking your home cozaar 100 mg once a day as previously prescribed prior to being admitted to the hospital.  Follow up with your primary care provider for regular follow up.    Diagnosis: Asthma  Assessment and Plan of Treatment: You have a history of asthma. Please continue to take your home inhalers as needed

## 2023-06-19 NOTE — PROGRESS NOTE ADULT - SUBJECTIVE AND OBJECTIVE BOX
Patient is a 62y old  Male who presents with a chief complaint of Fever workup (18 Jun 2023 12:26)    HPI:  61 yo M with PMHx of HTN, HLD, Bipolar, GERD, Jhon's Esophagus Asthma, and Abnormal LFTs presents to the ED with 1x day hx of epigastric pain and new onset fever. Pt endorses feeling ache and heaviness in the epigastric region after he ate breakfast this morning. Noted laying down on his left side with improvement of symptoms, but persisted throughout the day. Pt states that pain worsened when he had a nonbloody bowel movement, with associated nausea, lightheadedness, and diaphoresis. Following this event pt was brought to the ED by his daughter in fear he was having a heart attack. In the ED, pt was found to be tachypneic, tachycardic, and febrile to 100.6. Pt was given IVF, Ativan, and Ofirmev with improvement of symptoms. Pt states that he no longer has the epigastric pain, but has experienced these symptoms in the past. Recently diagnosed with Jhon's Esophagus following EGD due to worsening acid reflux. Pt also noted to have large duodenal diverticulum during EGD. Endorses improving headache, dizziness, SOB, nausea  and distended abdomen throughout event. Pt has no complaints at this time, though appears anxious throughout discussion. Denies any recent sick contacts.     Of note, pt found to have elevated LFTs and Alkphos in outpatient workup since February. Dr. Ewing (PCP) attributed this to recent viral illness, but subsequent labs not checked.     ED Course:   Vitals: BP: 157/75, HR: 77, Temp: 100.6, RR: 20, SpO2: 97% on RA    Labs:  WBC 11.71, Lactate 2.0 < 3.0, Cr 1.50, Alkphos 208, ,    UA: Negative   CXR: Grossly normal as per personal read, official read pending   CT: Minimal periportal edema, nonspecific. Correlate with liver function tests. Otherwise, no acute abdominopelvic findings. No acute thoracic findings. Right lower lobe pulmonary nodule measuring 4 mm.  US Abdomen: Layering gallbladder sludge. No wall thickening or pericholecystic fluid. Focal hyperechoic right hepatic lobe lesion measures 1.0 x 0.9 x 0.8 cm, possibility is a hepatic hemangioma  EKG: NSR 85 bpm with prolonged /509   Received in the ED: Ofirmev, Pepcid 20 mg IVP, Ativan 1mg IVP, Zosyn, 1.5L NS bolus    (14 Jun 2023 23:41)    INTERVAL HPI:   6/16: RRT called yesterday for abdominal pain and fever. CTA performed to rule out acute mesenteric ischemia - negative. Started on Zosyn due to fever, MRCP performed this afternoon 2/2 transaminitis and abdominal pain. Will f/u official read.     6/17: No acute overnight events. MRCP yesterday significant for gallbladder edema, possible pancreatitis/cholecystitis w/ CBD dilation to 9mm. No pain or complaints at this time. C/w clear liquid diet and pain control. ERCP Monday discussed w patient at the bedside. IV Zosyn , LFT improving.  6/18: Patient seen and examined at bedside. Denies abdominal pain. Tolerating liquid diet well without nausea. Complains of headache this AM. Off IV abx. ERCP Monday. NPO after midnight , ON IV Abx   6/19: Patient seen and examined at bedside. Denies abdominal pain. Tolerating liquid diet well without nausea. Currently has no complaints at this time. Off IV abx. ERCP today. Currently NPO      OVERNIGHT EVENTS: none    Home Medications:  aspirin 81 mg oral capsule: 1 orally (15 Robert 2023 00:43)  Ativan 1 mg oral tablet: 1 orally 2 times a day (15 Robert 2023 00:43)  cholecalciferol 50 mcg (2000 intl units) oral tablet: 1 orally once a day (15 Robert 2023 00:43)  Cozaar 100 mg oral tablet: 1 orally once a day (15 Robert 2023 00:43)  doxazosin 4 mg oral tablet: 1 orally (15 Robert 2023 00:43)  fluticasone 50 mcg/inh nasal spray: 2 spray(s) in each nostril once a day as needed for  congestion (15 Robert 2023 01:45)  ipratropium-albuterol 0.5 mg-2.5 mg/3 mL inhalation solution: 1 unit(s) by nebulizer every 4 hours as needed for  bronchospasm (15 Robert 2023 01:45)  lamoTRIgine 200 mg oral tablet: 0.75 tab(s) orally 2 times a day (15 Robert 2023 01:45)  ProAir HFA 90 mcg/inh inhalation aerosol: 2 inhaled every 6 hours as needed for  bronchospasm (15 Robert 2023 01:45)  Protonix 40 mg oral delayed release tablet: 1 orally once a day (15 Robert 2023 01:41)  PROzac 40 mg oral capsule: 1 orally once a day Alternates qD and BID every other day (15 Robert 2023 01:45)  QUEtiapine 50 mg oral tablet, extended release: 1 orally 2 times a day (15 Robert 2023 01:45)  rosuvastatin 20 mg oral tablet: 1 orally once a day (15 Robert 2023 01:45)  ZyrTEC 10 mg oral tablet: 1 orally once a day (15 Robert 2023 01:45)      MEDICATIONS  (STANDING):  aspirin  chewable 81 milliGRAM(s) Oral daily  cholecalciferol 2000 Unit(s) Oral daily  doxazosin 4 milliGRAM(s) Oral daily  Fluoxetine 40 milliGRAM(s) 40 milliGRAM(s) Oral <User Schedule>  Fluoxetine 40mg 1 Capsule(s) 1 Capsule(s) Oral <User Schedule>  lamoTRIgine 150 milliGRAM(s) Oral two times a day  LORazepam     Tablet 1 milliGRAM(s) Oral two times a day  losartan 100 milliGRAM(s) Oral daily  pantoprazole    Tablet 40 milliGRAM(s) Oral two times a day  QUEtiapine 50 milliGRAM(s) Oral two times a day  sodium chloride 0.45% with potassium chloride 20 mEq/L 1000 milliLiter(s) (70 mL/Hr) IV Continuous <Continuous>  sodium chloride 0.9%. 1000 milliLiter(s) (75 mL/Hr) IV Continuous <Continuous>    MEDICATIONS  (PRN):  acetaminophen     Tablet .. 650 milliGRAM(s) Oral every 6 hours PRN Temp greater or equal to 38C (100.4F), Mild Pain (1 - 3)  albuterol    90 MICROgram(s) HFA Inhaler 2 Puff(s) Inhalation every 6 hours PRN for bronchospasm  aluminum hydroxide/magnesium hydroxide/simethicone Suspension 30 milliLiter(s) Oral every 4 hours PRN Dyspepsia  fluticasone propionate 50 MICROgram(s)/spray Nasal Spray 1 Spray(s) Both Nostrils two times a day PRN Congestion  melatonin 3 milliGRAM(s) Oral at bedtime PRN Insomnia      Allergies    No Known Allergies    Intolerances        Social History:  Lives: At home with Wife and Daughter   ADLs: Independent, walks independently however becoming more painful given osteoarthritis   Diet: Avoid carbonated beverages, spicy foods, and salt   Vaccination: COVID vaccinatedx3   Alcohol Use: Denies   Tobacco Use: Denies   Recreational Drug Use: Denies (14 Jun 2023 23:41)    REVIEW OF SYSTEMS: i am OK  CONSTITUTIONAL: No fever, No chills, No fatigue, No myalgia, No Body ache, No Weakness  EYES: No eye pain,  No visual disturbances, No discharge, No Redness  ENMT: No ear pain, No nose bleed, No vertigo; No sinus pain, No throat pain, No Congestion  NECK: No pain, No stiffness  RESPIRATORY: No cough, No wheezing, No hemoptysis, No shortness of breath  CARDIOVASCULAR: No chest pain, No palpitations  GASTROINTESTINAL: No abdominal pain, No epigastric pain. No nausea, No vomiting, No diarrhea, No constipation; [ x ] BM  GENITOURINARY: No dysuria, No frequency, No urgency, No hematuria, No incontinence  NEUROLOGICAL: No headaches, No dizziness, No numbness, No tingling, No tremors, No weakness  EXTREMITIES: No Swelling, No Pain, No Edema  SKIN: [ x ] No itching, burning, rashes, or lesions   MUSCULOSKELETAL: No joint pain, No joint swelling; No muscle pain, No back pain, No extremity pain  PSYCHIATRIC: No depression, No anxiety, No mood swings, No difficulty sleeping at night  PAIN SCALE: [ x ] None  [  ] Other-  ROS Unable to obtain due to: [  ] Dementia  [  ] Lethargy  [  ] Sedated  [  ] Non verbal  REST OF REVIEW OF SYSTEMS: [ x ] Normal     Vital Signs Last 24 Hrs  T(C): 36.4 (19 Jun 2023 04:36), Max: 36.8 (18 Jun 2023 20:19)  T(F): 97.5 (19 Jun 2023 04:36), Max: 98.2 (18 Jun 2023 20:19)  HR: 53 (19 Jun 2023 04:36) (53 - 62)  BP: 164/89 (19 Jun 2023 04:36) (127/81 - 164/89)  BP(mean): --  RR: 18 (19 Jun 2023 04:36) (18 - 18)  SpO2: 92% (19 Jun 2023 04:36) (92% - 93%)    Parameters below as of 19 Jun 2023 04:36  Patient On (Oxygen Delivery Method): room air  O2 Flow (L/min): 2    Finger Stick        06-18 @ 07:01  -  06-19 @ 07:00  --------------------------------------------------------  IN: 560 mL / OUT: 400 mL / NET: 160 mL      PHYSICAL EXAM:  GENERAL:  [ x ] NAD, [ x ] Well appearing, [  ] Agitated, [  ] Drowsy, [  ] Lethargy, [  ] Confused   HEAD:  [ x ] Normal, [  ] Other  EYES:  [ x ] EOMI, [ x ] PERRLA, [ x ] Conjunctiva and sclera clear normal, [  ] Other, [  ] Pallor, [  ] Discharge  ENMT:  [ x ] Normal, [ x ] Moist mucous membranes, [ x ] Good dentition, [ x ] No thrush  NECK:  [ x ] Supple, [ x ] No JVD, [ x ] Normal thyroid, [  ] Lymphadenopathy, [  ] Other  CHEST/LUNG:  [ x ] Clear to auscultation bilaterally, [ x ] Breath Sounds equal B/L, [  ] Poor effort, [ x ] No rales, [ x ] No rhonchi, [ x ] No wheezing  HEART:  [ x ] Regular rate and rhythm, [  ] Tachycardia, [  ] Bradycardia, [  ] Irregular, [ x ] No murmurs, No rubs, No gallops, [  ] PPM in place (Mfr:  )  ABDOMEN:  [ x ] Soft, [ x ] Nontender, [ x ] Nondistended, [ x ] No mass, [ x ] Bowel sounds present, [x  ] Obese  NERVOUS SYSTEM:  [ x ] Alert & Oriented x3, [ x ] Nonfocal, [  ] Confusion, [  ] Encephalopathic, [  ] Sedated, [  ] Unable to assess, [  ] Dementia, [  ] Other-  EXTREMITIES:  [ x ] 2+ Peripheral Pulses, No clubbing, No cyanosis,  [  ] Edema B/L lower EXT, [  ] PVD stasis skin changes B/L lower EXT, [  ] Wound  LYMPH:  No lymphadenopathy noted  SKIN:  [ x ] No rashes or lesions, [  ] Pressure ulcers, [  ] Ecchymosis, [  ] Skin tears, [  ] Other      DIET: Diet, NPO after Midnight:      NPO Start Date: 18-Jun-2023,   NPO Start Time: 23:59 (06-18-23 @ 12:28)  Diet, NPO after Midnight:      NPO Start Date: 18-Jun-2023,   NPO Start Time: 23:59  Except Medications (06-18-23 @ 11:34)  Diet, Clear Liquid (06-16-23 @ 14:52)      LABS:                        11.8   4.80  )-----------( 267      ( 19 Jun 2023 07:05 )             35.2     18 Jun 2023 09:10    141    |  110    |  18     ----------------------------<  88     3.7     |  25     |  1.10     Ca    9.2        18 Jun 2023 09:10    TPro  6.6    /  Alb  3.1    /  TBili  0.8    /  DBili  x      /  AST  39     /  ALT  281    /  AlkPhos  228    18 Jun 2023 09:10          Culture Results:   No growth to date. (06-16 @ 07:55)  Culture Results:   No growth to date. (06-16 @ 07:50)  Culture Results:   No growth to date. (06-15 @ 19:05)  Culture Results:   No growth to date. (06-15 @ 19:00)  Culture Results:   <10,000 CFU/mL Normal Urogenital Rebecca (06-14 @ 18:00)  Culture Results:   No growth to date. (06-14 @ 16:57)  Culture Results:   No growth to date. (06-14 @ 16:51)                  Culture - Blood (collected 16 Jun 2023 07:55)  Source: .Blood Blood-Venous  Preliminary Report (17 Jun 2023 14:02):    No growth to date.    Culture - Blood (collected 16 Jun 2023 07:50)  Source: .Blood Blood-Peripheral  Preliminary Report (17 Jun 2023 14:02):    No growth to date.    Culture - Blood (collected 15 Robert 2023 19:05)  Source: .Blood Blood-Peripheral  Preliminary Report (17 Jun 2023 01:02):    No growth to date.    Culture - Blood (collected 15 Robert 2023 19:00)  Source: .Blood Blood-Peripheral  Preliminary Report (17 Jun 2023 01:02):    No growth to date.    Culture - Urine (collected 14 Jun 2023 18:00)  Source: Clean Catch Clean Catch (Midstream)  Final Report (16 Jun 2023 07:25):    <10,000 CFU/mL Normal Urogenital Rebecca    Culture - Blood (collected 14 Jun 2023 16:57)  Source: .Blood Blood-Peripheral  Preliminary Report (16 Jun 2023 01:02):    No growth to date.    Culture - Blood (collected 14 Jun 2023 16:51)  Source: .Blood Blood-Peripheral  Preliminary Report (16 Jun 2023 01:02):    No growth to date.         Anemia Panel:  Iron Total, Serum: 20 ug/dL (06-16-23 @ 07:50)  Iron - Total Binding Capacity.: 288 ug/dL (06-16-23 @ 07:50)            Lipase, Serum: 280 U/L (06-14-23 @ 15:33)        HEALTH ISSUES - PROBLEM Dx:  Bipolar disorder    GERD (gastroesophageal reflux disease)    KIRA (acute kidney injury)    HTN (hypertension)    HLD (hyperlipidemia)    Transaminitis    Asthma    Need for prophylactic measure    Fever          Consultant(s) Notes Reviewed:  [ x ] YES     Care Discussed with [ x ] Consultants, [ x ] Patient, [ x ] Family, [  ] HCP, [ x ] , [  ] Social Service, [ x ] RN, [  ] Physical Therapy, [  ] Palliative Care Team  DVT PPX: [x  ] Lovenox, [  ] SC Heparin, [  ] Coumadin, [  ] Xarelto, [  ] Eliquis, [  ] Pradaxa, [  ] IV Heparin drip, [  ] SCD, [  ] Ambulation, [  ] Contraindicated 2/2 GI Bleed, [  ] Contraindicated 2/2  Bleed, [  ] Contraindicated 2/2 Brain Bleed  Advanced Directive: [ x ] None, [  ] DNR/DNI Patient is a 62y old  Male who presents with a chief complaint of Fever workup (18 Jun 2023 12:26)    HPI:  63 yo M with PMHx of HTN, HLD, Bipolar, GERD, Jhon's Esophagus Asthma, and Abnormal LFTs presents to the ED with 1x day hx of epigastric pain and new onset fever. Pt endorses feeling ache and heaviness in the epigastric region after he ate breakfast this morning. Noted laying down on his left side with improvement of symptoms, but persisted throughout the day. Pt states that pain worsened when he had a nonbloody bowel movement, with associated nausea, lightheadedness, and diaphoresis. Following this event pt was brought to the ED by his daughter in fear he was having a heart attack. In the ED, pt was found to be tachypneic, tachycardic, and febrile to 100.6. Pt was given IVF, Ativan, and Ofirmev with improvement of symptoms. Pt states that he no longer has the epigastric pain, but has experienced these symptoms in the past. Recently diagnosed with Jhon's Esophagus following EGD due to worsening acid reflux. Pt also noted to have large duodenal diverticulum during EGD. Endorses improving headache, dizziness, SOB, nausea  and distended abdomen throughout event. Pt has no complaints at this time, though appears anxious throughout discussion. Denies any recent sick contacts.     Of note, pt found to have elevated LFTs and Alkphos in outpatient workup since February. Dr. Ewing (PCP) attributed this to recent viral illness, but subsequent labs not checked.     ED Course:   Vitals: BP: 157/75, HR: 77, Temp: 100.6, RR: 20, SpO2: 97% on RA    Labs:  WBC 11.71, Lactate 2.0 < 3.0, Cr 1.50, Alkphos 208, ,    UA: Negative   CXR: Grossly normal as per personal read, official read pending   CT: Minimal periportal edema, nonspecific. Correlate with liver function tests. Otherwise, no acute abdominopelvic findings. No acute thoracic findings. Right lower lobe pulmonary nodule measuring 4 mm.  US Abdomen: Layering gallbladder sludge. No wall thickening or pericholecystic fluid. Focal hyperechoic right hepatic lobe lesion measures 1.0 x 0.9 x 0.8 cm, possibility is a hepatic hemangioma  EKG: NSR 85 bpm with prolonged /509   Received in the ED: Ofirmev, Pepcid 20 mg IVP, Ativan 1mg IVP, Zosyn, 1.5L NS bolus    (14 Jun 2023 23:41)    INTERVAL HPI:   6/16: RRT called yesterday for abdominal pain and fever. CTA performed to rule out acute mesenteric ischemia - negative. Started on Zosyn due to fever, MRCP performed this afternoon 2/2 transaminitis and abdominal pain. Will f/u official read.     6/17: No acute overnight events. MRCP yesterday significant for gallbladder edema, possible pancreatitis/cholecystitis w/ CBD dilation to 9mm. No pain or complaints at this time. C/w clear liquid diet and pain control. ERCP Monday discussed w patient at the bedside. IV Zosyn , LFT improving.  6/18: Patient seen and examined at bedside. Denies abdominal pain. Tolerating liquid diet well without nausea. Complains of headache this AM. Off IV abx. ERCP Monday. NPO after midnight , Off IV Abx   6/19: Patient seen and examined at bedside. Denies abdominal pain. Tolerating liquid diet well without nausea. Currently has no complaints at this time. Off IV abx. ERCP today. Currently NPO.      OVERNIGHT EVENTS: none    Home Medications:  aspirin 81 mg oral capsule: 1 orally (15 Robert 2023 00:43)  Ativan 1 mg oral tablet: 1 orally 2 times a day (15 Robert 2023 00:43)  cholecalciferol 50 mcg (2000 intl units) oral tablet: 1 orally once a day (15 Robert 2023 00:43)  Cozaar 100 mg oral tablet: 1 orally once a day (15 Robert 2023 00:43)  doxazosin 4 mg oral tablet: 1 orally (15 Robert 2023 00:43)  fluticasone 50 mcg/inh nasal spray: 2 spray(s) in each nostril once a day as needed for  congestion (15 Robert 2023 01:45)  ipratropium-albuterol 0.5 mg-2.5 mg/3 mL inhalation solution: 1 unit(s) by nebulizer every 4 hours as needed for  bronchospasm (15 Robert 2023 01:45)  lamoTRIgine 200 mg oral tablet: 0.75 tab(s) orally 2 times a day (15 Robert 2023 01:45)  ProAir HFA 90 mcg/inh inhalation aerosol: 2 inhaled every 6 hours as needed for  bronchospasm (15 Robert 2023 01:45)  Protonix 40 mg oral delayed release tablet: 1 orally once a day (15 Robert 2023 01:41)  PROzac 40 mg oral capsule: 1 orally once a day Alternates qD and BID every other day (15 Robert 2023 01:45)  QUEtiapine 50 mg oral tablet, extended release: 1 orally 2 times a day (15 Robert 2023 01:45)  rosuvastatin 20 mg oral tablet: 1 orally once a day (15 Robert 2023 01:45)  ZyrTEC 10 mg oral tablet: 1 orally once a day (15 Robert 2023 01:45)      MEDICATIONS  (STANDING):  aspirin  chewable 81 milliGRAM(s) Oral daily  cholecalciferol 2000 Unit(s) Oral daily  doxazosin 4 milliGRAM(s) Oral daily  Fluoxetine 40 milliGRAM(s) 40 milliGRAM(s) Oral <User Schedule>  Fluoxetine 40mg 1 Capsule(s) 1 Capsule(s) Oral <User Schedule>  lamoTRIgine 150 milliGRAM(s) Oral two times a day  LORazepam     Tablet 1 milliGRAM(s) Oral two times a day  losartan 100 milliGRAM(s) Oral daily  pantoprazole    Tablet 40 milliGRAM(s) Oral two times a day  QUEtiapine 50 milliGRAM(s) Oral two times a day  sodium chloride 0.45% with potassium chloride 20 mEq/L 1000 milliLiter(s) (70 mL/Hr) IV Continuous <Continuous>  sodium chloride 0.9%. 1000 milliLiter(s) (75 mL/Hr) IV Continuous <Continuous>    MEDICATIONS  (PRN):  acetaminophen     Tablet .. 650 milliGRAM(s) Oral every 6 hours PRN Temp greater or equal to 38C (100.4F), Mild Pain (1 - 3)  albuterol    90 MICROgram(s) HFA Inhaler 2 Puff(s) Inhalation every 6 hours PRN for bronchospasm  aluminum hydroxide/magnesium hydroxide/simethicone Suspension 30 milliLiter(s) Oral every 4 hours PRN Dyspepsia  fluticasone propionate 50 MICROgram(s)/spray Nasal Spray 1 Spray(s) Both Nostrils two times a day PRN Congestion  melatonin 3 milliGRAM(s) Oral at bedtime PRN Insomnia      Allergies    No Known Allergies    Intolerances        Social History:  Lives: At home with Wife and Daughter   ADLs: Independent, walks independently however becoming more painful given osteoarthritis   Diet: Avoid carbonated beverages, spicy foods, and salt   Vaccination: COVID vaccinatedx3   Alcohol Use: Denies   Tobacco Use: Denies   Recreational Drug Use: Denies (14 Jun 2023 23:41)    REVIEW OF SYSTEMS: i am OK  CONSTITUTIONAL: No fever, No chills, No fatigue, No myalgia, No Body ache, No Weakness  EYES: No eye pain,  No visual disturbances, No discharge, No Redness  ENMT: No ear pain, No nose bleed, No vertigo; No sinus pain, No throat pain, No Congestion  NECK: No pain, No stiffness  RESPIRATORY: No cough, No wheezing, No hemoptysis, No shortness of breath  CARDIOVASCULAR: No chest pain, No palpitations  GASTROINTESTINAL: No abdominal pain, No epigastric pain. No nausea, No vomiting, No diarrhea, No constipation; [ x ] BM  GENITOURINARY: No dysuria, No frequency, No urgency, No hematuria, No incontinence  NEUROLOGICAL: No headaches, No dizziness, No numbness, No tingling, No tremors, No weakness  EXTREMITIES: No Swelling, No Pain, No Edema  SKIN: [ x ] No itching, burning, rashes, or lesions   MUSCULOSKELETAL: No joint pain, No joint swelling; No muscle pain, No back pain, No extremity pain  PSYCHIATRIC: No depression, No anxiety, No mood swings, No difficulty sleeping at night  PAIN SCALE: [ x ] None  [  ] Other-  ROS Unable to obtain due to: [  ] Dementia  [  ] Lethargy  [  ] Sedated  [  ] Non verbal  REST OF REVIEW OF SYSTEMS: [ x ] Normal     Vital Signs Last 24 Hrs  T(C): 36.4 (19 Jun 2023 04:36), Max: 36.8 (18 Jun 2023 20:19)  T(F): 97.5 (19 Jun 2023 04:36), Max: 98.2 (18 Jun 2023 20:19)  HR: 53 (19 Jun 2023 04:36) (53 - 62)  BP: 164/89 (19 Jun 2023 04:36) (127/81 - 164/89)  BP(mean): --  RR: 18 (19 Jun 2023 04:36) (18 - 18)  SpO2: 92% (19 Jun 2023 04:36) (92% - 93%)    Parameters below as of 19 Jun 2023 04:36  Patient On (Oxygen Delivery Method): room air  O2 Flow (L/min): 2    Finger Stick        06-18 @ 07:01  -  06-19 @ 07:00  --------------------------------------------------------  IN: 560 mL / OUT: 400 mL / NET: 160 mL      PHYSICAL EXAM: i am ok  GENERAL:  [ x ] NAD, [ x ] Well appearing, [  ] Agitated, [  ] Drowsy, [  ] Lethargy, [  ] Confused   HEAD:  [ x ] Normal, [  ] Other  EYES:  [ x ] EOMI, [ x ] PERRLA, [ x ] Conjunctiva and sclera clear normal, [  ] Other, [  ] Pallor, [  ] Discharge  ENMT:  [ x ] Normal, [ x ] Moist mucous membranes, [ x ] Good dentition, [ x ] No thrush  NECK:  [ x ] Supple, [ x ] No JVD, [ x ] Normal thyroid, [  ] Lymphadenopathy, [  ] Other  CHEST/LUNG:  [ x ] Clear to auscultation bilaterally, [ x ] Breath Sounds equal B/L, [  ] Poor effort, [ x ] No rales, [ x ] No rhonchi, [ x ] No wheezing  HEART:  [ x ] Regular rate and rhythm, [  ] Tachycardia, [  ] Bradycardia, [  ] Irregular, [ x ] No murmurs, No rubs, No gallops, [  ] PPM in place (Mfr:  )  ABDOMEN:  [ x ] Soft, [ x ] Nontender, [ x ] Nondistended, [ x ] No mass, [ x ] Bowel sounds present, [x  ] Obese  NERVOUS SYSTEM:  [ x ] Alert & Oriented x3, [ x ] Nonfocal, [  ] Confusion, [  ] Encephalopathic, [  ] Sedated, [  ] Unable to assess, [  ] Dementia, [  ] Other-  EXTREMITIES:  [ x ] 2+ Peripheral Pulses, No clubbing, No cyanosis,  [  ] Edema B/L lower EXT, [  ] PVD stasis skin changes B/L lower EXT, [  ] Wound  LYMPH:  No lymphadenopathy noted  SKIN:  [ x ] No rashes or lesions, [  ] Pressure ulcers, [  ] Ecchymosis, [  ] Skin tears, [  ] Other      DIET: Diet, NPO after Midnight:      NPO Start Date: 18-Jun-2023,   NPO Start Time: 23:59 (06-18-23 @ 12:28)  Diet, NPO after Midnight:      NPO Start Date: 18-Jun-2023,   NPO Start Time: 23:59  Except Medications (06-18-23 @ 11:34)  Diet, Clear Liquid (06-16-23 @ 14:52)      LABS:                        11.8   4.80  )-----------( 267      ( 19 Jun 2023 07:05 )             35.2     18 Jun 2023 09:10    141    |  110    |  18     ----------------------------<  88     3.7     |  25     |  1.10     Ca    9.2        18 Jun 2023 09:10    TPro  6.6    /  Alb  3.1    /  TBili  0.8    /  DBili  x      /  AST  39     /  ALT  281    /  AlkPhos  228    18 Jun 2023 09:10      Culture Results:   No growth to date. (06-16 @ 07:55)  Culture Results:   No growth to date. (06-16 @ 07:50)  Culture Results:   No growth to date. (06-15 @ 19:05)  Culture Results:   No growth to date. (06-15 @ 19:00)  Culture Results:   <10,000 CFU/mL Normal Urogenital Rebecca (06-14 @ 18:00)  Culture Results:   No growth to date. (06-14 @ 16:57)  Culture Results:   No growth to date. (06-14 @ 16:51)      Culture - Blood (collected 16 Jun 2023 07:55)  Source: .Blood Blood-Venous  Preliminary Report (17 Jun 2023 14:02):    No growth to date.    Culture - Blood (collected 16 Jun 2023 07:50)  Source: .Blood Blood-Peripheral  Preliminary Report (17 Jun 2023 14:02):    No growth to date.    Culture - Blood (collected 15 Robert 2023 19:05)  Source: .Blood Blood-Peripheral  Preliminary Report (17 Jun 2023 01:02):    No growth to date.    Culture - Blood (collected 15 Robret 2023 19:00)  Source: .Blood Blood-Peripheral  Preliminary Report (17 Jun 2023 01:02):    No growth to date.    Culture - Urine (collected 14 Jun 2023 18:00)  Source: Clean Catch Clean Catch (Midstream)  Final Report (16 Jun 2023 07:25):    <10,000 CFU/mL Normal Urogenital Rebecca    Culture - Blood (collected 14 Jun 2023 16:57)  Source: .Blood Blood-Peripheral  Preliminary Report (16 Jun 2023 01:02):    No growth to date.    Culture - Blood (collected 14 Jun 2023 16:51)  Source: .Blood Blood-Peripheral  Preliminary Report (16 Jun 2023 01:02):    No growth to date.         Anemia Panel:  Iron Total, Serum: 20 ug/dL (06-16-23 @ 07:50)  Iron - Total Binding Capacity.: 288 ug/dL (06-16-23 @ 07:50)    Lipase, Serum: 280 U/L (06-14-23 @ 15:33)        HEALTH ISSUES - PROBLEM Dx:  Bipolar disorder    GERD (gastroesophageal reflux disease)    KIRA (acute kidney injury)    HTN (hypertension)    HLD (hyperlipidemia)    Transaminitis    Asthma    Need for prophylactic measure    Fever      Consultant(s) Notes Reviewed:  [ x ] YES     Care Discussed with [ x ] Consultants, [ x ] Patient, [ x ] Family, [  ] HCP, [ x ] , [  ] Social Service, [ x ] RN, [  ] Physical Therapy, [  ] Palliative Care Team  DVT PPX: [x  ] Lovenox, [  ] SC Heparin, [  ] Coumadin, [  ] Xarelto, [  ] Eliquis, [  ] Pradaxa, [  ] IV Heparin drip, [  ] SCD, [  ] Ambulation, [  ] Contraindicated 2/2 GI Bleed, [  ] Contraindicated 2/2  Bleed, [  ] Contraindicated 2/2 Brain Bleed  Advanced Directive: [ x ] None, [  ] DNR/DNI

## 2023-06-19 NOTE — PROGRESS NOTE ADULT - ASSESSMENT
elevated lfts  fever  abdominal pain    bilirubin normalized  lfts significantly improved  pain resolved  suspect passed stone  defer ercp  Advance diet as tolerated  if tolerates diet then dc planning  d/w patient and family    I reviewed the overnight course of events on the unit, re-confirming the patient history. I discussed the care with the patient and their family  Differential diagnosis and plan of care discussed with patient after the evaluation  40 minutes spent on total encounter of which more than fifty percent of the encounter was spent counseling and/or coordinating care by the attending physician.  Advanced care planning was discussed with patient and family.  Advanced care planning forms were reviewed and discussed.  Risks, benefits and alternatives of gastroenterologic procedures were discussed in detail and all questions were answered.

## 2023-06-19 NOTE — DIETITIAN INITIAL EVALUATION ADULT - PROBLEM SELECTOR PLAN 7
Noted prior intolerance to unspecified statin in outpatient notes   - Transitioned to Rosuvastatin 20 mg qD with improvement of myalgias   - Will hold statin given worsening LFTs and hx of myalgias on statin other than Rosuvastatin -HOLD statin   - Monitor for signs of myalgias   - AM Lipid panel

## 2023-06-19 NOTE — PROGRESS NOTE ADULT - PROBLEM SELECTOR PLAN 1
Afebrile - likely transient fevers from biliary obstruction , cholangitis ?  - Blood  c/s x 2 and urine cultures to  follow - NGTD   - CTA neg for acute mesenteric ischemia.  - s/p IV zosyn, stopped per ID as cx negative and symptoms resolving and lft trending down   - Monitor off abx for now  - ID -Dr. CHERI Lopez/Dr souza follow up   - MRCP performed; gallbladder edema, possible pancreatitis/cholecystitis w/ CBD dilation to 9mm.   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  - s/p IV zosyn, stopped per ID as cx negative and symptoms resolving and lft trending down   - Monitor off abx for now  - ID -Dr. CHERI Lopez/Dr souza follow up   - MRCP performed; gallbladder edema, possible pancreatitis/cholecystitis w/ CBD dilation to 9mm.   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

## 2023-06-19 NOTE — DIETITIAN INITIAL EVALUATION ADULT - ORAL INTAKE PTA/DIET HISTORY
patient seen in room. reports with good PO PTA. states no food allergies. regular diet followed PTA no spicy foods now with new dx Spivey's Esophagus.  patient NPO defer education pending further dx

## 2023-06-19 NOTE — DIETITIAN INITIAL EVALUATION ADULT - PROBLEM SELECTOR PLAN 2
Hx of Transaminitis since 2/23: AST/ALT - 74/245, AP - 237  -   Alkphos 208  - No hx of alcohol abuse  - Prior elevations attributed to recent viral illness  - Hepatitis C sent, follow up results   -CT A/P with IV Contrast  -periportal edema, No Ac findings   - US Abdomen: Focal hyperechoic right hepatic lobe lesion measures 1.0 x 0.9 x 0.8 cm, possibility is a hepatic hemangioma  - Continue to monitor   - GI consulted, Dr. Mclaughlin, follow up recommendations  HOLD Statin, LFT's in AM

## 2023-06-19 NOTE — PROGRESS NOTE ADULT - PROBLEM SELECTOR PLAN 3
Presenting Cr 1.50 s/p IV Contrast in ER -RESOLVED  - downtrending  - Encouraged PO intake  -IVF to continue -BMP in AM   - Avoid Nephrotoxics   -Renal dose meds, IVF

## 2023-06-19 NOTE — DIETITIAN INITIAL EVALUATION ADULT - OTHER INFO
Reason for Admission: Fever workup  History of Present Illness:   63 yo M with PMHx of HTN, HLD, Bipolar, GERD, Jhon's Esophagus Asthma, and Abnormal LFTs presents to the ED with 1x day hx of epigastric pain and new onset fever. Pt endorses feeling ache and heaviness in the epigastric region after he ate breakfast this morning. Noted laying down on his left side with improvement of symptoms, but persisted throughout the day. Pt states that pain worsened when he had a nonbloody bowel movement, with associated nausea, lightheadedness, and diaphoresis. Following this event pt was brought to the ED by his daughter in fear he was having a heart attack. In the ED, pt was found to be tachypneic, tachycardic, and febrile to 100.6.   - MRCP performed; gallbladder edema, possible pancreatitis/cholecystitis w/ CBD dilation to 9mm.   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  weight 200# per patient

## 2023-06-20 ENCOUNTER — TRANSCRIPTION ENCOUNTER (OUTPATIENT)
Age: 63
End: 2023-06-20

## 2023-06-20 VITALS
RESPIRATION RATE: 18 BRPM | SYSTOLIC BLOOD PRESSURE: 156 MMHG | HEART RATE: 60 BPM | TEMPERATURE: 98 F | OXYGEN SATURATION: 96 % | DIASTOLIC BLOOD PRESSURE: 76 MMHG

## 2023-06-20 LAB
ALBUMIN SERPL ELPH-MCNC: 3.5 G/DL — SIGNIFICANT CHANGE UP (ref 3.3–5)
ALP SERPL-CCNC: 349 U/L — HIGH (ref 40–120)
ALT FLD-CCNC: 288 U/L — HIGH (ref 12–78)
ANION GAP SERPL CALC-SCNC: 7 MMOL/L — SIGNIFICANT CHANGE UP (ref 5–17)
AST SERPL-CCNC: 136 U/L — HIGH (ref 15–37)
BILIRUB SERPL-MCNC: 0.7 MG/DL — SIGNIFICANT CHANGE UP (ref 0.2–1.2)
BUN SERPL-MCNC: 17 MG/DL — SIGNIFICANT CHANGE UP (ref 7–23)
CALCIUM SERPL-MCNC: 9.5 MG/DL — SIGNIFICANT CHANGE UP (ref 8.5–10.1)
CHLORIDE SERPL-SCNC: 108 MMOL/L — SIGNIFICANT CHANGE UP (ref 96–108)
CO2 SERPL-SCNC: 25 MMOL/L — SIGNIFICANT CHANGE UP (ref 22–31)
CREAT SERPL-MCNC: 1.3 MG/DL — SIGNIFICANT CHANGE UP (ref 0.5–1.3)
CULTURE RESULTS: SIGNIFICANT CHANGE UP
CULTURE RESULTS: SIGNIFICANT CHANGE UP
EGFR: 61 ML/MIN/1.73M2 — SIGNIFICANT CHANGE UP
GLUCOSE SERPL-MCNC: 94 MG/DL — SIGNIFICANT CHANGE UP (ref 70–99)
HCT VFR BLD CALC: 38.2 % — LOW (ref 39–50)
HGB BLD-MCNC: 12.9 G/DL — LOW (ref 13–17)
MCHC RBC-ENTMCNC: 30.6 PG — SIGNIFICANT CHANGE UP (ref 27–34)
MCHC RBC-ENTMCNC: 33.8 GM/DL — SIGNIFICANT CHANGE UP (ref 32–36)
MCV RBC AUTO: 90.5 FL — SIGNIFICANT CHANGE UP (ref 80–100)
NRBC # BLD: 0 /100 WBCS — SIGNIFICANT CHANGE UP (ref 0–0)
PLATELET # BLD AUTO: 288 K/UL — SIGNIFICANT CHANGE UP (ref 150–400)
POTASSIUM SERPL-MCNC: 4 MMOL/L — SIGNIFICANT CHANGE UP (ref 3.5–5.3)
POTASSIUM SERPL-SCNC: 4 MMOL/L — SIGNIFICANT CHANGE UP (ref 3.5–5.3)
PROT SERPL-MCNC: 7.2 G/DL — SIGNIFICANT CHANGE UP (ref 6–8.3)
RBC # BLD: 4.22 M/UL — SIGNIFICANT CHANGE UP (ref 4.2–5.8)
RBC # FLD: 12.8 % — SIGNIFICANT CHANGE UP (ref 10.3–14.5)
SODIUM SERPL-SCNC: 140 MMOL/L — SIGNIFICANT CHANGE UP (ref 135–145)
SPECIMEN SOURCE: SIGNIFICANT CHANGE UP
SPECIMEN SOURCE: SIGNIFICANT CHANGE UP
WBC # BLD: 4.68 K/UL — SIGNIFICANT CHANGE UP (ref 3.8–10.5)
WBC # FLD AUTO: 4.68 K/UL — SIGNIFICANT CHANGE UP (ref 3.8–10.5)

## 2023-06-20 PROCEDURE — 87340 HEPATITIS B SURFACE AG IA: CPT

## 2023-06-20 PROCEDURE — 81003 URINALYSIS AUTO W/O SCOPE: CPT

## 2023-06-20 PROCEDURE — 82962 GLUCOSE BLOOD TEST: CPT

## 2023-06-20 PROCEDURE — 86255 FLUORESCENT ANTIBODY SCREEN: CPT

## 2023-06-20 PROCEDURE — 76705 ECHO EXAM OF ABDOMEN: CPT

## 2023-06-20 PROCEDURE — 85027 COMPLETE CBC AUTOMATED: CPT

## 2023-06-20 PROCEDURE — 82787 IGG 1 2 3 OR 4 EACH: CPT

## 2023-06-20 PROCEDURE — 97162 PT EVAL MOD COMPLEX 30 MIN: CPT

## 2023-06-20 PROCEDURE — 86038 ANTINUCLEAR ANTIBODIES: CPT

## 2023-06-20 PROCEDURE — 86663 EPSTEIN-BARR ANTIBODY: CPT

## 2023-06-20 PROCEDURE — 74177 CT ABD & PELVIS W/CONTRAST: CPT | Mod: MA

## 2023-06-20 PROCEDURE — 80061 LIPID PANEL: CPT

## 2023-06-20 PROCEDURE — 93005 ELECTROCARDIOGRAM TRACING: CPT

## 2023-06-20 PROCEDURE — 36415 COLL VENOUS BLD VENIPUNCTURE: CPT

## 2023-06-20 PROCEDURE — 97116 GAIT TRAINING THERAPY: CPT

## 2023-06-20 PROCEDURE — 99285 EMERGENCY DEPT VISIT HI MDM: CPT

## 2023-06-20 PROCEDURE — 74174 CTA ABD&PLVS W/CONTRAST: CPT

## 2023-06-20 PROCEDURE — 83550 IRON BINDING TEST: CPT

## 2023-06-20 PROCEDURE — 87086 URINE CULTURE/COLONY COUNT: CPT

## 2023-06-20 PROCEDURE — 74181 MRI ABDOMEN W/O CONTRAST: CPT

## 2023-06-20 PROCEDURE — 85025 COMPLETE CBC W/AUTO DIFF WBC: CPT

## 2023-06-20 PROCEDURE — 71045 X-RAY EXAM CHEST 1 VIEW: CPT

## 2023-06-20 PROCEDURE — 83540 ASSAY OF IRON: CPT

## 2023-06-20 PROCEDURE — 84484 ASSAY OF TROPONIN QUANT: CPT

## 2023-06-20 PROCEDURE — 71260 CT THORAX DX C+: CPT | Mod: MA

## 2023-06-20 PROCEDURE — 86381 MITOCHONDRIAL ANTIBODY EACH: CPT

## 2023-06-20 PROCEDURE — 96374 THER/PROPH/DIAG INJ IV PUSH: CPT

## 2023-06-20 PROCEDURE — 87521 HEPATITIS C PROBE&RVRS TRNSC: CPT

## 2023-06-20 PROCEDURE — 80053 COMPREHEN METABOLIC PANEL: CPT

## 2023-06-20 PROCEDURE — 85610 PROTHROMBIN TIME: CPT

## 2023-06-20 PROCEDURE — 87040 BLOOD CULTURE FOR BACTERIA: CPT

## 2023-06-20 PROCEDURE — 86665 EPSTEIN-BARR CAPSID VCA: CPT

## 2023-06-20 PROCEDURE — 80048 BASIC METABOLIC PNL TOTAL CA: CPT

## 2023-06-20 PROCEDURE — 86803 HEPATITIS C AB TEST: CPT

## 2023-06-20 PROCEDURE — 86645 CMV ANTIBODY IGM: CPT

## 2023-06-20 PROCEDURE — 86644 CMV ANTIBODY: CPT

## 2023-06-20 PROCEDURE — 80076 HEPATIC FUNCTION PANEL: CPT

## 2023-06-20 PROCEDURE — 85730 THROMBOPLASTIN TIME PARTIAL: CPT

## 2023-06-20 PROCEDURE — 80074 ACUTE HEPATITIS PANEL: CPT

## 2023-06-20 PROCEDURE — 0225U NFCT DS DNA&RNA 21 SARSCOV2: CPT

## 2023-06-20 PROCEDURE — 83690 ASSAY OF LIPASE: CPT

## 2023-06-20 PROCEDURE — 74018 RADEX ABDOMEN 1 VIEW: CPT

## 2023-06-20 PROCEDURE — 96375 TX/PRO/DX INJ NEW DRUG ADDON: CPT

## 2023-06-20 PROCEDURE — 87799 DETECT AGENT NOS DNA QUANT: CPT

## 2023-06-20 PROCEDURE — 86664 EPSTEIN-BARR NUCLEAR ANTIGEN: CPT

## 2023-06-20 PROCEDURE — 97530 THERAPEUTIC ACTIVITIES: CPT

## 2023-06-20 PROCEDURE — 87517 HEPATITIS B DNA QUANT: CPT

## 2023-06-20 PROCEDURE — 83605 ASSAY OF LACTIC ACID: CPT

## 2023-06-20 PROCEDURE — 83880 ASSAY OF NATRIURETIC PEPTIDE: CPT

## 2023-06-20 RX ORDER — PANTOPRAZOLE SODIUM 20 MG/1
1 TABLET, DELAYED RELEASE ORAL
Qty: 60 | Refills: 0
Start: 2023-06-20 | End: 2023-07-19

## 2023-06-20 RX ADMIN — Medication 1 MILLIGRAM(S): at 05:26

## 2023-06-20 RX ADMIN — Medication 2000 UNIT(S): at 11:59

## 2023-06-20 RX ADMIN — LAMOTRIGINE 150 MILLIGRAM(S): 25 TABLET, ORALLY DISINTEGRATING ORAL at 05:27

## 2023-06-20 RX ADMIN — LOSARTAN POTASSIUM 100 MILLIGRAM(S): 100 TABLET, FILM COATED ORAL at 05:26

## 2023-06-20 RX ADMIN — Medication 4 MILLIGRAM(S): at 05:27

## 2023-06-20 RX ADMIN — QUETIAPINE FUMARATE 50 MILLIGRAM(S): 200 TABLET, FILM COATED ORAL at 05:27

## 2023-06-20 RX ADMIN — PANTOPRAZOLE SODIUM 40 MILLIGRAM(S): 20 TABLET, DELAYED RELEASE ORAL at 05:26

## 2023-06-20 NOTE — PROGRESS NOTE ADULT - SUBJECTIVE AND OBJECTIVE BOX
[facility]  Osman Chao PA-C  [address] [place] [address]  [phone]      INTERVAL HPI/OVERNIGHT EVENTS:  Pt s/e  ERCP yesterday was cancelled  Reports no GI complaints, tolerating reg diet    MEDICATIONS  (STANDING):  aspirin  chewable 81 milliGRAM(s) Oral daily  cholecalciferol 2000 Unit(s) Oral daily  doxazosin 4 milliGRAM(s) Oral daily  enoxaparin Injectable 40 milliGRAM(s) SubCutaneous every 24 hours  Fluoxetine 40 milliGRAM(s) 40 milliGRAM(s) Oral <User Schedule>  Fluoxetine 40mg 1 Capsule(s) 1 Capsule(s) Oral <User Schedule>  lamoTRIgine 150 milliGRAM(s) Oral two times a day  LORazepam     Tablet 1 milliGRAM(s) Oral two times a day  losartan 100 milliGRAM(s) Oral daily  pantoprazole    Tablet 40 milliGRAM(s) Oral two times a day  QUEtiapine 50 milliGRAM(s) Oral two times a day    MEDICATIONS  (PRN):  acetaminophen     Tablet .. 650 milliGRAM(s) Oral every 6 hours PRN Temp greater or equal to 38C (100.4F), Mild Pain (1 - 3)  albuterol    90 MICROgram(s) HFA Inhaler 2 Puff(s) Inhalation every 6 hours PRN for bronchospasm  aluminum hydroxide/magnesium hydroxide/simethicone Suspension 30 milliLiter(s) Oral every 4 hours PRN Dyspepsia  fluticasone propionate 50 MICROgram(s)/spray Nasal Spray 1 Spray(s) Both Nostrils two times a day PRN Congestion  melatonin 3 milliGRAM(s) Oral at bedtime PRN Insomnia      Allergies    No Known Allergies      PHYSICAL EXAM:   Vital Signs:  Vital Signs Last 24 Hrs  T(C): 36.5 (20 Jun 2023 04:58), Max: 36.8 (19 Jun 2023 19:23)  T(F): 97.7 (20 Jun 2023 04:58), Max: 98.3 (19 Jun 2023 19:23)  HR: 50 (20 Jun 2023 04:58) (50 - 74)  BP: 153/80 (20 Jun 2023 04:58) (133/81 - 196/92)  BP(mean): --  RR: 18 (20 Jun 2023 04:58) (18 - 18)  SpO2: 95% (20 Jun 2023 04:58) (93% - 95%)    Parameters below as of 20 Jun 2023 04:58  Patient On (Oxygen Delivery Method): room air    GENERAL:  Appears stated age  HEENT:  NC/AT  CHEST:  Full & symmetric excursion  HEART:  Regular rhythm  ABDOMEN:  Soft, non-tender, non-distended  EXTEREMITIES:  no cyanosis  SKIN:  No rash  NEURO:  Alert      LABS:                        12.9   4.68  )-----------( 288      ( 20 Jun 2023 08:20 )             38.2     06-20    140  |  108  |  17  ----------------------------<  94  4.0   |  25  |  1.30    Ca    9.5      20 Jun 2023 08:20    TPro  7.2  /  Alb  3.5  /  TBili  0.7  /  DBili  x   /  AST  136<H>  /  ALT  288<H>  /  AlkPhos  349<H>  06-20

## 2023-06-20 NOTE — PROGRESS NOTE ADULT - ASSESSMENT
63 yo M with PMHx of HTN, HLD, Bipolar, GERD, Jhon's  Esophagus Asthma, and Abnormal LFTs presents to the ED with 1x day hx of epigastric pain and new onset fever, admitted for workup.     Fever/Epigastric Pain/Transaminitis  - suspected to be transient fevers from biliary obstruction, cholangitis 2/2 transient biliary obstruction   - all cx NGTD  - CTA neg for acute mesenteric ischemia.  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 Please call with any questions.   Briana Lopez M.D.  [facility] [phone]

## 2023-06-20 NOTE — PROGRESS NOTE ADULT - SUBJECTIVE AND OBJECTIVE BOX
[facility] of Infectious Diseases  ELIOT He Y. Patel, S. Shah, G. [facility]  [phone]    Name: AMELIA SHARMA  Age: 62y  Gender: Male  MRN: [id number]    Interval History:  Patient seen and examined at bedside this morning  No acute overnight events.   Notes reviewed    Antibiotics:      Medications:  acetaminophen     Tablet .. 650 milliGRAM(s) Oral every 6 hours PRN  albuterol    90 MICROgram(s) HFA Inhaler 2 Puff(s) Inhalation every 6 hours PRN  aluminum hydroxide/magnesium hydroxide/simethicone Suspension 30 milliLiter(s) Oral every 4 hours PRN  aspirin  chewable 81 milliGRAM(s) Oral daily  cholecalciferol 2000 Unit(s) Oral daily  doxazosin 4 milliGRAM(s) Oral daily  enoxaparin Injectable 40 milliGRAM(s) SubCutaneous every 24 hours  Fluoxetine 40 milliGRAM(s) 40 milliGRAM(s) Oral <User Schedule>  Fluoxetine 40mg 1 Capsule(s) 1 Capsule(s) Oral <User Schedule>  fluticasone propionate 50 MICROgram(s)/spray Nasal Spray 1 Spray(s) Both Nostrils two times a day PRN  lamoTRIgine 150 milliGRAM(s) Oral two times a day  LORazepam     Tablet 1 milliGRAM(s) Oral two times a day  losartan 100 milliGRAM(s) Oral daily  melatonin 3 milliGRAM(s) Oral at bedtime PRN  pantoprazole    Tablet 40 milliGRAM(s) Oral two times a day  QUEtiapine 50 milliGRAM(s) Oral two times a day      Review of Systems:  A 10-point review of systems was obtained.     Pertinent positives and negatives--  Constitutional: No fevers. No Chills. No Rigors.   Cardiovascular: No chest pain. No palpitations.  Respiratory: No shortness of breath. No cough.  Gastrointestinal: No nausea or vomiting. No diarrhea or constipation.   Psychiatric: Pleasant. Appropriate affect.    Review of systems otherwise negative except as previously noted.    Allergies: No Known Allergies    For details regarding the patient's past medical history, social history, family history, and other miscellaneous elements, please refer the initial infectious diseases consultation and/or the admitting history and physical examination for this admission.    Objective:  Vitals:   T(C): 36.5 (06-20-23 @ 04:58), Max: 36.8 (06-19-23 @ 19:23)  HR: 50 (06-20-23 @ 04:58) (50 - 74)  BP: 153/80 (06-20-23 @ 04:58) (133/81 - 196/92)  RR: 18 (06-20-23 @ 04:58) (18 - 18)  SpO2: 95% (06-20-23 @ 04:58) (93% - 95%)    Physical Examination:  General: no acute distress  HEENT: NC/AT, EOMI,   Cardio: S1, S2 heard, RRR, no murmurs  Resp: breath sounds heard bilaterally, no rales, wheezes or rhonchi  Abd: soft, NT, ND,  Ext: no edema or cyanosis  Skin: warm, dry, no visible rash      Laboratory Studies:  CBC:                       12.9   4.68  )-----------( 288      ( 20 Jun 2023 08:20 )             38.2     CMP: 06-20    140  |  108  |  17  ----------------------------<  94  4.0   |  25  |  1.30    Ca    9.5      20 Jun 2023 08:20    TPro  7.2  /  Alb  3.5  /  TBili  0.7  /  DBili  x   /  AST  136<H>  /  ALT  288<H>  /  AlkPhos  349<H>  06-20    LIVER FUNCTIONS - ( 20 Jun 2023 08:20 )  Alb: 3.5 g/dL / Pro: 7.2 g/dL / ALK PHOS: 349 U/L / ALT: 288 U/L / AST: 136 U/L / GGT: x               Microbiology: reviewed    Culture - Blood (collected 06-16-23 @ 07:55)  Source: .Blood Blood-Venous  Preliminary Report (06-17-23 @ 14:02):    No growth to date.    Culture - Blood (collected 06-16-23 @ 07:50)  Source: .Blood Blood-Peripheral  Preliminary Report (06-17-23 @ 14:02):    No growth to date.    Culture - Blood (collected 06-15-23 @ 19:05)  Source: .Blood Blood-Peripheral  Preliminary Report (06-17-23 @ 01:02):    No growth to date.    Culture - Blood (collected 06-15-23 @ 19:00)  Source: .Blood Blood-Peripheral  Preliminary Report (06-17-23 @ 01:02):    No growth to date.    Culture - Urine (collected 06-14-23 @ 18:00)  Source: Clean Catch Clean Catch (Midstream)  Final Report (06-16-23 @ 07:25):    <10,000 CFU/mL Normal Urogenital Rebecca    Culture - Blood (collected 06-14-23 @ 16:57)  Source: .Blood Blood-Peripheral  Final Report (06-20-23 @ 01:01):    No Growth Final    Culture - Blood (collected 06-14-23 @ 16:51)  Source: .Blood Blood-Peripheral  Final Report (06-20-23 @ 01:01):    No Growth Final          Radiology: reviewed

## 2023-06-20 NOTE — PROGRESS NOTE ADULT - PROBLEM SELECTOR PLAN 8
Historic hx of Asthma   - Has not used rescue inhalers in "a long time"   - Continue inhaler PRN
50
Historic hx of Asthma   - Has not used rescue inhalers in "a long time"   - Continue inhaler PRN

## 2023-06-20 NOTE — PROGRESS NOTE ADULT - PROBLEM SELECTOR PLAN 2
Hx of Transaminitis since 2/23: still downtrending today   - US Abdomen: Focal hyperechoic right hepatic lobe lesion measures 1.0 x 0.9 x 0.8 cm, possibility is a hepatic hemangioma  -CT A/P with IVC - periportal edema  - MRCP performed - gallbladder edema, possible pancreatitis/cholecystitis w/ CBD dilation to 9mm.   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 Mclaughlin, d/w at detail, recs appreciated Hx of Transaminitis since 2/23: still downtrending today   - US Abdomen: Focal hyperechoic right hepatic lobe lesion measures 1.0 x 0.9 x 0.8 cm, possibility is a hepatic hemangioma  -CT A/P with IVC - periportal edema  - MRCP performed - gallbladder edema, possible pancreatitis/cholecystitis w/ CBD dilation to 9mm.   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 Mclaughlin, d/w at detail, recs appreciated Hx of Transaminitis since 2/23: still downtrending today   - US Abdomen: Focal hyperechoic right hepatic lobe lesion measures 1.0 x 0.9 x 0.8 cm, possibility is a hepatic hemangioma  -CT A/P with IVC - periportal edema-No Concern for AC [place], No  concern for Ac Pancreatitis   - MRCP performed - gallbladder edema, possible pancreatitis/cholecystitis w/ CBD dilation to 9mm.   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 Mclaughlin, d/w at detail, NO need for ERCP as Nl Bili, pt tolerated PO diet well,

## 2023-06-20 NOTE — PROGRESS NOTE ADULT - REASON FOR ADMISSION
Fever workup

## 2023-06-20 NOTE — PROGRESS NOTE ADULT - PROBLEM SELECTOR PLAN 1
Afebrile - likely transient fevers from biliary obstruction , cholangitis  2/2 transient biliary obstruction   - Blood  c/s x 2 and urine cultures to  follow - NGTD   - CTA neg for acute mesenteric ischemia.  - s/p IV zosyn, stopped per ID as cx negative and symptoms resolving and lft trending down   - Monitor off abx for now  - ID -Dr. CHERI Lopez/Dr souza consulted, recs appreciated  - MRCP performed; gallbladder edema, possible pancreatitis/cholecystitis w/ CBD dilation to 9mm.   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 Mclaughlin, d/w at detail, recs appreciated Afebrile - likely transient fevers from biliary obstruction , cholangitis  2/2 transient biliary obstruction   - Blood  c/s x 2 and urine cultures to  follow - NGTD   - CTA neg for acute mesenteric ischemia.  - s/p IV zosyn, stopped per ID as cx negative and symptoms resolving and lft trending down   - Monitor off abx for now  - ID -Dr. 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 Mclaughlin, d/w at detail, recs appreciated Afebrile - likely transient fevers from biliary obstruction , cholangitis  2/2 transient biliary obstruction   - Blood  c/s x 2 and urine cultures to  follow - NGTD   - CTA neg for acute mesenteric ischemia.  - s/p IV zosyn, stopped per ID as cx negative and symptoms resolving and lft trending down   - Monitor off abx for now  - ID -Dr. 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 Mclaughlin, d/w at detail, recs appreciated

## 2023-06-20 NOTE — PROGRESS NOTE ADULT - ASSESSMENT
61 yo M with PMHx of HTN, HLD, Bipolar, GERD, Jhon's  Esophagus Asthma, and Abnormal LFTs presents to the ED with 1x day hx of epigastric pain and new onset fever, admitted for workup.

## 2023-06-20 NOTE — PROGRESS NOTE ADULT - ATTENDING COMMENTS
62 y male with PMHx HTN, Dyslipidemia, Anxiety , GERD- Spivey's esophagus   came to Hospital with epigastric / Abdominal pain after eating food this Breakfast/ with fever, Abnormal LFT's   Pt seen, examined, case & care plan d/w pt ,residents at detail.  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   Total care time is 60 minutes.
62 y male with PMHx HTN, Dyslipidemia, Anxiety , GERD- Spivey's esophagus   came to Hospital with epigastric / Abdominal pain after eating food this Breakfast/ with fever, Abnormal LFT's   Pt seen, examined, case & care plan d/w pt ,residents at detail.  Consults:  ID-Dr CHERI Lopez -IV [place], Blood Cultures x 2 NGTD,   -GI Dr Mclaughlin - High LFt's, Bili -transient biliary obstruction 2/2 duodenal diverticulum, ERCP on Monday,,  -SCD, -HOLD AC  Pt is medically optimized for schedule ERCP today   AM labs  -OOB to  chair assist.   -D/W Dr Mclaughlin-pt initially refused ERCP---> wanted to Eat as hungry.  Dr Mclaughlin started PO diet. As Bili is NORMAL, Pt might have passed CBD stone., Pt has NO pain ,  Total care time is 60 minutes.
62 y male with PMHx HTN, Dyslipidemia, Anxiety , GERD- Spivey's esophagus   came to Hospital with epigastric / Abdominal pain after eating food this Breakfast/ with fever, Abnormal LFT's   Pt seen, examined, case & care plan d/w pt , residents at detail.  Consults:  ID-Dr HCERI Lopez -FOUZIA Hare, Blood Cultures x 2 NGTD, ERCP on Monday , Clear liquid   SCD,   AM labs  -OOB to  chair assist.   -D/W Wife & Family.at detail.  Total care time is 60 minutes.
62 y male with PMHx HTN, Dyslipidemia, Anxiety , GERD- Spivey's esophagus   came to Hospital with epigastric / Abdominal pain after eating food this Breakfast/ with fever, Abnormal LFT's   Pt seen, examined, case & care plan d/w pt ,residents at detail.  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   Total care time is 60 minutes.
62 y male with PMHx HTN, Dyslipidemia, Anxiety , GERD- Spivey's esophagus   came to Hospital with epigastric / Abdominal pain after eating food this Breakfast/ with fever, Abnormal LFT's   Pt seen, examined, case & care plan d/w pt ,residents at detail.  Consults:  ID-Dr CHERI Lopez -FOUZIA Hare, Blood Cultures x 2 NGTD, No need for Abx   -GI Dr Mclaughlin - High LFt's, Bili is Normal  -transient biliary obstruction 2/2 duodenal diverticulum, No need for  ERCP   -OOB to  chair assist. PT--> Home PT, RW   -D/W Dr Mclaughlin-d/w stable for d/c home with out pt follow up with primary GI in 1 week  D/C Home today, d/w pt at [facility]-START PPI 2x day, STOP Statin 2/2 High LFT's , may take 2 weeks to normalize or may Fluctuate 2/2 Duodenal diverticulum, Low Fat , Low cholesterol diet d/w pt. ALl questions answered to best of my Ability.  D/C Home today.  Total d/c care time is 60 minutes.
62 y male with PMHx HTN, Dyslipidemia, Anxiety , GERD- Spivey's esophagus   came to Hospital with epigastric / Abdominal pain after eating food this Breakfast/ with fever, Abnormal LFT's   Pt seen, examined, case & care plan d/w pt , residents at detail.  Consults:  GI -Dr Mclaughlin- Abdominal pain ,today with RRT  elevated LFT's- stat CT Angio A/P , Plan for MRCP Non cont in AM   ID Dr le for fever - Follow  ER Blood c/sx 2 , Urine c/s -Restart IV Zosyn q 8 hrs with fever 103.6,   -Cooling blanket, IVF, Repeat Blood c/sx 2   IVF, PO diet , pt wants to bring all meds from HOME,  SCD,   AM labs  -OOB to  chair assist.   Total care time is 65 minutes.

## 2023-06-20 NOTE — PROGRESS NOTE ADULT - PROBLEM SELECTOR PLAN 9
Lovenox 40mg qD
Lovenox 40mg qD held for ECRP today

## 2023-06-20 NOTE — DISCHARGE NOTE NURSING/CASE MANAGEMENT/SOCIAL WORK - PATIENT PORTAL LINK FT
You can access the FollowMyHealth Patient Portal offered by [facility] by registering at the following website: [email or web address]. By joining [employer]’s FollowMyHealth portal, you will also be able to view your health information using other applications (apps) compatible with our system.

## 2023-06-20 NOTE — PROGRESS NOTE ADULT - SUBJECTIVE AND OBJECTIVE BOX
Patient is a 62y old  Male who presents with a chief complaint of Fever workup (19 Jun 2023 17:05)    HPI:  61 yo M with PMHx of HTN, HLD, Bipolar, GERD, Jhon's Esophagus Asthma, and Abnormal LFTs presents to the ED with 1x day hx of epigastric pain and new onset fever. Pt endorses feeling ache and heaviness in the epigastric region after he ate breakfast this morning. Noted laying down on his left side with improvement of symptoms, but persisted throughout the day. Pt states that pain worsened when he had a nonbloody bowel movement, with associated nausea, lightheadedness, and diaphoresis. Following this event pt was brought to the ED by his daughter in fear he was having a heart attack. In the ED, pt was found to be tachypneic, tachycardic, and febrile to 100.6. Pt was given IVF, Ativan, and Ofirmev with improvement of symptoms. Pt states that he no longer has the epigastric pain, but has experienced these symptoms in the past. Recently diagnosed with Jhon's Esophagus following EGD due to worsening acid reflux. Pt also noted to have large duodenal diverticulum during EGD. Endorses improving headache, dizziness, SOB, nausea  and distended abdomen throughout event. Pt has no complaints at this time, though appears anxious throughout discussion. Denies any recent sick contacts.     Of note, pt found to have elevated LFTs and Alkphos in outpatient workup since February. Dr. Ewing (PCP) attributed this to recent viral illness, but subsequent labs not checked.     ED Course:   Vitals: BP: 157/75, HR: 77, Temp: 100.6, RR: 20, SpO2: 97% on RA    Labs:  WBC 11.71, Lactate 2.0 < 3.0, Cr 1.50, Alkphos 208, ,    UA: Negative   CXR: Grossly normal as per personal read, official read pending   CT: Minimal periportal edema, nonspecific. Correlate with liver function tests. Otherwise, no acute abdominopelvic findings. No acute thoracic findings. Right lower lobe pulmonary nodule measuring 4 mm.  US Abdomen: Layering gallbladder sludge. No wall thickening or pericholecystic fluid. Focal hyperechoic right hepatic lobe lesion measures 1.0 x 0.9 x 0.8 cm, possibility is a hepatic hemangioma  EKG: NSR 85 bpm with prolonged /509   Received in the ED: Ofirmev, Pepcid 20 mg IVP, Ativan 1mg IVP, Zosyn, 1.5L NS bolus    (14 Jun 2023 23:41)    INTERVAL HPI:   6/16: RRT called yesterday for abdominal pain and fever. CTA performed to rule out acute mesenteric ischemia - negative. Started on Zosyn due to fever, MRCP performed this afternoon 2/2 transaminitis and abdominal pain. Will f/u official read.     6/17: No acute overnight events. MRCP yesterday significant for gallbladder edema, possible pancreatitis/cholecystitis w/ CBD dilation to 9mm. No pain or complaints at this time. C/w clear liquid diet and pain control. ERCP Monday discussed w patient at the bedside. IV Zosyn , LFT improving.  6/18: Patient seen and examined at bedside. Denies abdominal pain. Tolerating liquid diet well without nausea. Complains of headache this AM. Off IV abx. ERCP Monday. NPO after midnight , Off IV Abx   6/19: Patient seen and examined at bedside. Denies abdominal pain. Tolerating liquid diet well without nausea. Currently has no complaints at this time. Off IV abx. ERCP today. Currently NPO.  6/20: Patient seen and examined at bedside. Denies abdominal pain. Tolerating reg low fat diet well without nausea last night. Currently has no complaints at this time. Off IV abx. ERCP canceled due to suspected passed stone. Currently on reg diet.      OVERNIGHT EVENTS: none    Home Medications:  aspirin 81 mg oral capsule: 1 orally (15 Robert 2023 00:43)  Ativan 1 mg oral tablet: 1 orally 2 times a day (15 Robert 2023 00:43)  cholecalciferol 50 mcg (2000 intl units) oral tablet: 1 orally once a day (15 Robert 2023 00:43)  Cozaar 100 mg oral tablet: 1 orally once a day (15 Robert 2023 00:43)  doxazosin 4 mg oral tablet: 1 orally (15 Robert 2023 00:43)  fluticasone 50 mcg/inh nasal spray: 2 spray(s) in each nostril once a day as needed for  congestion (15 Robert 2023 01:45)  ipratropium-albuterol 0.5 mg-2.5 mg/3 mL inhalation solution: 1 unit(s) by nebulizer every 4 hours as needed for  bronchospasm (15 Robert 2023 01:45)  lamoTRIgine 200 mg oral tablet: 0.75 tab(s) orally 2 times a day (15 Robert 2023 01:45)  ProAir HFA 90 mcg/inh inhalation aerosol: 2 inhaled every 6 hours as needed for  bronchospasm (15 Robert 2023 01:45)  Protonix 40 mg oral delayed release tablet: 1 orally once a day (15 Robert 2023 01:41)  PROzac 40 mg oral capsule: 1 orally once a day Alternates qD and BID every other day (15 Robert 2023 01:45)  QUEtiapine 50 mg oral tablet, extended release: 1 orally 2 times a day (15 Robert 2023 01:45)  rosuvastatin 20 mg oral tablet: 1 orally once a day (15 Robert 2023 01:45)  ZyrTEC 10 mg oral tablet: 1 orally once a day (15 Robert 2023 01:45)      MEDICATIONS  (STANDING):  aspirin  chewable 81 milliGRAM(s) Oral daily  cholecalciferol 2000 Unit(s) Oral daily  doxazosin 4 milliGRAM(s) Oral daily  enoxaparin Injectable 40 milliGRAM(s) SubCutaneous every 24 hours  Fluoxetine 40 milliGRAM(s) 40 milliGRAM(s) Oral <User Schedule>  Fluoxetine 40mg 1 Capsule(s) 1 Capsule(s) Oral <User Schedule>  lamoTRIgine 150 milliGRAM(s) Oral two times a day  LORazepam     Tablet 1 milliGRAM(s) Oral two times a day  losartan 100 milliGRAM(s) Oral daily  pantoprazole    Tablet 40 milliGRAM(s) Oral two times a day  QUEtiapine 50 milliGRAM(s) Oral two times a day    MEDICATIONS  (PRN):  acetaminophen     Tablet .. 650 milliGRAM(s) Oral every 6 hours PRN Temp greater or equal to 38C (100.4F), Mild Pain (1 - 3)  albuterol    90 MICROgram(s) HFA Inhaler 2 Puff(s) Inhalation every 6 hours PRN for bronchospasm  aluminum hydroxide/magnesium hydroxide/simethicone Suspension 30 milliLiter(s) Oral every 4 hours PRN Dyspepsia  fluticasone propionate 50 MICROgram(s)/spray Nasal Spray 1 Spray(s) Both Nostrils two times a day PRN Congestion  melatonin 3 milliGRAM(s) Oral at bedtime PRN Insomnia      Allergies    No Known Allergies    Intolerances        Social History:  Lives: At home with Wife and Daughter   ADLs: Independent, walks independently however becoming more painful given osteoarthritis   Diet: Avoid carbonated beverages, spicy foods, and salt   Vaccination: COVID vaccinatedx3   Alcohol Use: Denies   Tobacco Use: Denies   Recreational Drug Use: Denies (14 Jun 2023 23:41)      REVIEW OF SYSTEMS: i am OK  CONSTITUTIONAL: No fever, No chills, No fatigue, No myalgia, No Body ache, No Weakness  EYES: No eye pain,  No visual disturbances, No discharge, No Redness  ENMT: No ear pain, No nose bleed, No vertigo; No sinus pain, No throat pain, No Congestion  NECK: No pain, No stiffness  RESPIRATORY: No cough, No wheezing, No hemoptysis, No shortness of breath  CARDIOVASCULAR: No chest pain, No palpitations  GASTROINTESTINAL: No abdominal pain, No epigastric pain. No nausea, No vomiting, No diarrhea, No constipation; [ x ] BM  GENITOURINARY: No dysuria, No frequency, No urgency, No hematuria, No incontinence  NEUROLOGICAL: No headaches, No dizziness, No numbness, No tingling, No tremors, No weakness  EXTREMITIES: No Swelling, No Pain, No Edema  SKIN: [ x ] No itching, burning, rashes, or lesions   MUSCULOSKELETAL: No joint pain, No joint swelling; No muscle pain, No back pain, No extremity pain  PSYCHIATRIC: No depression, No anxiety, No mood swings, No difficulty sleeping at night  PAIN SCALE: [ x ] None  [  ] Other-  ROS Unable to obtain due to: [  ] Dementia  [  ] Lethargy  [  ] Sedated  [  ] Non verbal  REST OF REVIEW OF SYSTEMS: [ x ] Normal     Vital Signs Last 24 Hrs  T(C): 36.5 (20 Jun 2023 04:58), Max: 36.8 (19 Jun 2023 19:23)  T(F): 97.7 (20 Jun 2023 04:58), Max: 98.3 (19 Jun 2023 19:23)  HR: 50 (20 Jun 2023 04:58) (50 - 74)  BP: 153/80 (20 Jun 2023 04:58) (133/81 - 196/92)  BP(mean): --  RR: 18 (20 Jun 2023 04:58) (18 - 18)  SpO2: 95% (20 Jun 2023 04:58) (93% - 95%)    Parameters below as of 20 Jun 2023 04:58  Patient On (Oxygen Delivery Method): room air      Finger Stick        06-19 @ 07:01  -  06-20 @ 07:00  --------------------------------------------------------  IN: 300 mL / OUT: 950 mL / NET: -650 mL        PHYSICAL EXAM: i am ok  GENERAL:  [ x ] NAD, [ x ] Well appearing, [  ] Agitated, [  ] Drowsy, [  ] Lethargy, [  ] Confused   HEAD:  [ x ] Normal, [  ] Other  EYES:  [ x ] EOMI, [ x ] PERRLA, [ x ] Conjunctiva and sclera clear normal, [  ] Other, [  ] Pallor, [  ] Discharge  ENMT:  [ x ] Normal, [ x ] Moist mucous membranes, [ x ] Good dentition, [ x ] No thrush  NECK:  [ x ] Supple, [ x ] No JVD, [ x ] Normal thyroid, [  ] Lymphadenopathy, [  ] Other  CHEST/LUNG:  [ x ] Clear to auscultation bilaterally, [ x ] Breath Sounds equal B/L, [  ] Poor effort, [ x ] No rales, [ x ] No rhonchi, [ x ] No wheezing  HEART:  [ x ] Regular rate and rhythm, [  ] Tachycardia, [  ] Bradycardia, [  ] Irregular, [ x ] No murmurs, No rubs, No gallops, [  ] PPM in place (Mfr:  )  ABDOMEN:  [ x ] Soft, [ x ] Nontender, [ x ] Nondistended, [ x ] No mass, [ x ] Bowel sounds present, [x  ] Obese  NERVOUS SYSTEM:  [ x ] Alert & Oriented x3, [ x ] Nonfocal, [  ] Confusion, [  ] Encephalopathic, [  ] Sedated, [  ] Unable to assess, [  ] Dementia, [  ] Other-  EXTREMITIES:  [ x ] 2+ Peripheral Pulses, No clubbing, No cyanosis,  [  ] Edema B/L lower EXT, [  ] PVD stasis skin changes B/L lower EXT, [  ] Wound  LYMPH:  No lymphadenopathy noted  SKIN:  [ x ] No rashes or lesions, [  ] Pressure ulcers, [  ] Ecchymosis, [  ] Skin tears, [  ] Other    DIET: Diet, Regular:   Low Fat (LOWFAT) (06-19-23 @ 15:26)      LABS:      Ca    9.4        19 Jun 2023 07:05            Culture Results:   No growth to date. (06-16 @ 07:55)  Culture Results:   No growth to date. (06-16 @ 07:50)  Culture Results:   No growth to date. (06-15 @ 19:05)  Culture Results:   No growth to date. (06-15 @ 19:00)  Culture Results:   <10,000 CFU/mL Normal Urogenital Rebecca (06-14 @ 18:00)  Culture Results:   No Growth Final (06-14 @ 16:57)  Culture Results:   No Growth Final (06-14 @ 16:51)                  Culture - Blood (collected 16 Jun 2023 07:55)  Source: .Blood Blood-Venous  Preliminary Report (17 Jun 2023 14:02):    No growth to date.    Culture - Blood (collected 16 Jun 2023 07:50)  Source: .Blood Blood-Peripheral  Preliminary Report (17 Jun 2023 14:02):    No growth to date.    Culture - Blood (collected 15 Robert 2023 19:05)  Source: .Blood Blood-Peripheral  Preliminary Report (17 Jun 2023 01:02):    No growth to date.    Culture - Blood (collected 15 Robert 2023 19:00)  Source: .Blood Blood-Peripheral  Preliminary Report (17 Jun 2023 01:02):    No growth to date.    Culture - Urine (collected 14 Jun 2023 18:00)  Source: Clean Catch Clean Catch (Midstream)  Final Report (16 Jun 2023 07:25):    <10,000 CFU/mL Normal Urogenital Rebecca    Culture - Blood (collected 14 Jun 2023 16:57)  Source: .Blood Blood-Peripheral  Final Report (20 Jun 2023 01:01):    No Growth Final    Culture - Blood (collected 14 Jun 2023 16:51)  Source: .Blood Blood-Peripheral  Final Report (20 Jun 2023 01:01):    No Growth Final         Anemia Panel:  Iron Total, Serum: 20 ug/dL (06-16-23 @ 07:50)  Iron - Total Binding Capacity.: 288 ug/dL (06-16-23 @ 07:50)            Lipase, Serum: 280 U/L (06-14-23 @ 15:33)          HEALTH ISSUES - PROBLEM Dx:  Bipolar disorder    GERD (gastroesophageal reflux disease)    KIRA (acute kidney injury)    HTN (hypertension)    HLD (hyperlipidemia)    Transaminitis    Asthma    Need for prophylactic measure    Fever        Consultant(s) Notes Reviewed:  [ x ] YES     Care Discussed with [ x ] Consultants, [ x ] Patient, [ x ] Family, [  ] HCP, [ x ] , [  ] Social Service, [ x ] RN, [  ] Physical Therapy, [  ] Palliative Care Team  DVT PPX: [ x ] Lovenox, [  ] SC Heparin, [  ] Coumadin, [  ] Xarelto, [  ] Eliquis, [  ] Pradaxa, [  ] IV Heparin drip, [  ] SCD, [  ] Ambulation, [  ] Contraindicated 2/2 GI Bleed, [  ] Contraindicated 2/2  Bleed, [  ] Contraindicated 2/2 Brain Bleed  Advanced Directive: [ x ] None, [  ] DNR/DNI Patient is a 62y old  Male who presents with a chief complaint of Fever workup (19 Jun 2023 17:05)    HPI:  61 yo M with PMHx of HTN, HLD, Bipolar, GERD, Jhon's Esophagus Asthma, and Abnormal LFTs presents to the ED with 1x day hx of epigastric pain and new onset fever. Pt endorses feeling ache and heaviness in the epigastric region after he ate breakfast this morning. Noted laying down on his left side with improvement of symptoms, but persisted throughout the day. Pt states that pain worsened when he had a nonbloody bowel movement, with associated nausea, lightheadedness, and diaphoresis. Following this event pt was brought to the ED by his daughter in fear he was having a heart attack. In the ED, pt was found to be tachypneic, tachycardic, and febrile to 100.6. Pt was given IVF, Ativan, and Ofirmev with improvement of symptoms. Pt states that he no longer has the epigastric pain, but has experienced these symptoms in the past. Recently diagnosed with Jhon's Esophagus following EGD due to worsening acid reflux. Pt also noted to have large duodenal diverticulum during EGD. Endorses improving headache, dizziness, SOB, nausea  and distended abdomen throughout event. Pt has no complaints at this time, though appears anxious throughout discussion. Denies any recent sick contacts.     Of note, pt found to have elevated LFTs and Alkphos in outpatient workup since February. Dr. Ewing (PCP) attributed this to recent viral illness, but subsequent labs not checked.     ED Course:   Vitals: BP: 157/75, HR: 77, Temp: 100.6, RR: 20, SpO2: 97% on RA    Labs:  WBC 11.71, Lactate 2.0 < 3.0, Cr 1.50, Alkphos 208, ,    UA: Negative   CXR: Grossly normal as per personal read, official read pending   CT: Minimal periportal edema, nonspecific. Correlate with liver function tests. Otherwise, no acute abdominopelvic findings. No acute thoracic findings. Right lower lobe pulmonary nodule measuring 4 mm.  US Abdomen: Layering gallbladder sludge. No wall thickening or pericholecystic fluid. Focal hyperechoic right hepatic lobe lesion measures 1.0 x 0.9 x 0.8 cm, possibility is a hepatic hemangioma  EKG: NSR 85 bpm with prolonged /509   Received in the ED: Ofirmev, Pepcid 20 mg IVP, Ativan 1mg IVP, Zosyn, 1.5L NS bolus    (14 Jun 2023 23:41)    INTERVAL HPI:   6/16: RRT called yesterday for abdominal pain and fever. CTA performed to rule out acute mesenteric ischemia - negative. Started on Zosyn due to fever, MRCP performed this afternoon 2/2 transaminitis and abdominal pain. Will f/u official read.     6/17: No acute overnight events. MRCP yesterday significant for gallbladder edema, possible pancreatitis/cholecystitis w/ CBD dilation to 9mm. No pain or complaints at this time. C/w clear liquid diet and pain control. ERCP Monday discussed w patient at the bedside. IV Zosyn , LFT improving.  6/18: Patient seen and examined at bedside. Denies abdominal pain. Tolerating liquid diet well without nausea. Complains of headache this AM. Off IV abx. ERCP Monday. NPO after midnight , Off IV Abx   6/19: Patient seen and examined at bedside. Denies abdominal pain. Tolerating liquid diet well without nausea. Currently has no complaints at this time. Off IV abx. ERCP today. Currently NPO.  6/20: Patient seen and examined at bedside. Denies abdominal pain. Tolerating reg low fat diet well without nausea last night. Currently has no complaints at this time. Off IV abx. ERCP canceled due to suspected passed stone. Currently on reg diet.      OVERNIGHT EVENTS: none    Home Medications:  aspirin 81 mg oral capsule: 1 orally (15 Robert 2023 00:43)  Ativan 1 mg oral tablet: 1 orally 2 times a day (15 Robert 2023 00:43)  cholecalciferol 50 mcg (2000 intl units) oral tablet: 1 orally once a day (15 Robert 2023 00:43)  Cozaar 100 mg oral tablet: 1 orally once a day (15 Robert 2023 00:43)  doxazosin 4 mg oral tablet: 1 orally (15 Robert 2023 00:43)  fluticasone 50 mcg/inh nasal spray: 2 spray(s) in each nostril once a day as needed for  congestion (15 Robert 2023 01:45)  ipratropium-albuterol 0.5 mg-2.5 mg/3 mL inhalation solution: 1 unit(s) by nebulizer every 4 hours as needed for  bronchospasm (15 Robert 2023 01:45)  lamoTRIgine 200 mg oral tablet: 0.75 tab(s) orally 2 times a day (15 Robert 2023 01:45)  ProAir HFA 90 mcg/inh inhalation aerosol: 2 inhaled every 6 hours as needed for  bronchospasm (15 Robert 2023 01:45)  Protonix 40 mg oral delayed release tablet: 1 orally once a day (15 Robert 2023 01:41)  PROzac 40 mg oral capsule: 1 orally once a day Alternates qD and BID every other day (15 Robert 2023 01:45)  QUEtiapine 50 mg oral tablet, extended release: 1 orally 2 times a day (15 Robert 2023 01:45)  rosuvastatin 20 mg oral tablet: 1 orally once a day (15 Robert 2023 01:45)  ZyrTEC 10 mg oral tablet: 1 orally once a day (15 Robert 2023 01:45)      MEDICATIONS  (STANDING):  aspirin  chewable 81 milliGRAM(s) Oral daily  cholecalciferol 2000 Unit(s) Oral daily  doxazosin 4 milliGRAM(s) Oral daily  enoxaparin Injectable 40 milliGRAM(s) SubCutaneous every 24 hours  Fluoxetine 40 milliGRAM(s) 40 milliGRAM(s) Oral <User Schedule>  Fluoxetine 40mg 1 Capsule(s) 1 Capsule(s) Oral <User Schedule>  lamoTRIgine 150 milliGRAM(s) Oral two times a day  LORazepam     Tablet 1 milliGRAM(s) Oral two times a day  losartan 100 milliGRAM(s) Oral daily  pantoprazole    Tablet 40 milliGRAM(s) Oral two times a day  QUEtiapine 50 milliGRAM(s) Oral two times a day    MEDICATIONS  (PRN):  acetaminophen     Tablet .. 650 milliGRAM(s) Oral every 6 hours PRN Temp greater or equal to 38C (100.4F), Mild Pain (1 - 3)  albuterol    90 MICROgram(s) HFA Inhaler 2 Puff(s) Inhalation every 6 hours PRN for bronchospasm  aluminum hydroxide/magnesium hydroxide/simethicone Suspension 30 milliLiter(s) Oral every 4 hours PRN Dyspepsia  fluticasone propionate 50 MICROgram(s)/spray Nasal Spray 1 Spray(s) Both Nostrils two times a day PRN Congestion  melatonin 3 milliGRAM(s) Oral at bedtime PRN Insomnia      Allergies    No Known Allergies    Intolerances        Social History:  Lives: At home with Wife and Daughter   ADLs: Independent, walks independently however becoming more painful given osteoarthritis   Diet: Avoid carbonated beverages, spicy foods, and salt   Vaccination: COVID vaccinatedx3   Alcohol Use: Denies   Tobacco Use: Denies   Recreational Drug Use: Denies (14 Jun 2023 23:41)      REVIEW OF SYSTEMS: i am OK  CONSTITUTIONAL: No fever, No chills, No fatigue, No myalgia, No Body ache, No Weakness  EYES: No eye pain,  No visual disturbances, No discharge, No Redness  ENMT: No ear pain, No nose bleed, No vertigo; No sinus pain, No throat pain, No Congestion  NECK: No pain, No stiffness  RESPIRATORY: No cough, No wheezing, No hemoptysis, No shortness of breath  CARDIOVASCULAR: No chest pain, No palpitations  GASTROINTESTINAL: No abdominal pain, No epigastric pain. No nausea, No vomiting, No diarrhea, No constipation; [ x ] BM  GENITOURINARY: No dysuria, No frequency, No urgency, No hematuria, No incontinence  NEUROLOGICAL: No headaches, No dizziness, No numbness, No tingling, No tremors, No weakness  EXTREMITIES: No Swelling, No Pain, No Edema  SKIN: [ x ] No itching, burning, rashes, or lesions   MUSCULOSKELETAL: No joint pain, No joint swelling; No muscle pain, No back pain, No extremity pain  PSYCHIATRIC: No depression, No anxiety, No mood swings, No difficulty sleeping at night  PAIN SCALE: [ x ] None  [  ] Other-  ROS Unable to obtain due to: [  ] Dementia  [  ] Lethargy  [  ] Sedated  [  ] Non verbal  REST OF REVIEW OF SYSTEMS: [ x ] Normal     Vital Signs Last 24 Hrs  T(C): 36.5 (20 Jun 2023 04:58), Max: 36.8 (19 Jun 2023 19:23)  T(F): 97.7 (20 Jun 2023 04:58), Max: 98.3 (19 Jun 2023 19:23)  HR: 50 (20 Jun 2023 04:58) (50 - 74)  BP: 153/80 (20 Jun 2023 04:58) (133/81 - 196/92)  BP(mean): --  RR: 18 (20 Jun 2023 04:58) (18 - 18)  SpO2: 95% (20 Jun 2023 04:58) (93% - 95%)    Parameters below as of 20 Jun 2023 04:58  Patient On (Oxygen Delivery Method): room air      Finger Stick        06-19 @ 07:01  -  06-20 @ 07:00  --------------------------------------------------------  IN: 300 mL / OUT: 950 mL / NET: -650 mL        PHYSICAL EXAM: i am ok  GENERAL:  [ x ] NAD, [ x ] Well appearing, [  ] Agitated, [  ] Drowsy, [  ] Lethargy, [  ] Confused   HEAD:  [ x ] Normal, [  ] Other  EYES:  [ x ] EOMI, [ x ] PERRLA, [ x ] Conjunctiva and sclera clear normal, [  ] Other, [  ] Pallor, [  ] Discharge  ENMT:  [ x ] Normal, [ x ] Moist mucous membranes, [ x ] Good dentition, [ x ] No thrush  NECK:  [ x ] Supple, [ x ] No JVD, [ x ] Normal thyroid, [  ] Lymphadenopathy, [  ] Other  CHEST/LUNG:  [ x ] Clear to auscultation bilaterally, [ x ] Breath Sounds equal B/L, [  ] Poor effort, [ x ] No rales, [ x ] No rhonchi, [ x ] No wheezing  HEART:  [ x ] Regular rate and rhythm, [  ] Tachycardia, [  ] Bradycardia, [  ] Irregular, [ x ] No murmurs, No rubs, No gallops, [  ] PPM in place (Mfr:  )  ABDOMEN:  [ x ] Soft, [ x ] Nontender, [ x ] Nondistended, [ x ] No mass, [ x ] Bowel sounds present, [x  ] Obese  NERVOUS SYSTEM:  [ x ] Alert & Oriented x3, [ x ] Nonfocal, [  ] Confusion, [  ] Encephalopathic, [  ] Sedated, [  ] Unable to assess, [  ] Dementia, [  ] Other-  EXTREMITIES:  [ x ] 2+ Peripheral Pulses, No clubbing, No cyanosis,  [  ] Edema B/L lower EXT, [  ] PVD stasis skin changes B/L lower EXT, [  ] Wound  LYMPH:  No lymphadenopathy noted  SKIN:  [ x ] No rashes or lesions, [  ] Pressure ulcers, [  ] Ecchymosis, [  ] Skin tears, [  ] Other    DIET: Diet, Regular:   Low Fat (LOWFAT) (06-19-23 @ 15:26)      LABS:                          12.9   4.68  )-----------( 288      ( 20 Jun 2023 08:20 )             38.2     20 Jun 2023 08:20    140    |  108    |  17     ----------------------------<  94     4.0     |  25     |  1.30     Ca    9.5        20 Jun 2023 08:20    TPro  7.2    /  Alb  3.5    /  TBili  0.7    /  DBili  x      /  AST  136    /  ALT  288    /  AlkPhos  349    20 Jun 2023 08:20              Culture Results:   No growth to date. (06-16 @ 07:55)  Culture Results:   No growth to date. (06-16 @ 07:50)  Culture Results:   No growth to date. (06-15 @ 19:05)  Culture Results:   No growth to date. (06-15 @ 19:00)  Culture Results:   <10,000 CFU/mL Normal Urogenital Rebecca (06-14 @ 18:00)  Culture Results:   No Growth Final (06-14 @ 16:57)  Culture Results:   No Growth Final (06-14 @ 16:51)                  Culture - Blood (collected 16 Jun 2023 07:55)  Source: .Blood Blood-Venous  Preliminary Report (17 Jun 2023 14:02):    No growth to date.    Culture - Blood (collected 16 Jun 2023 07:50)  Source: .Blood Blood-Peripheral  Preliminary Report (17 Jun 2023 14:02):    No growth to date.    Culture - Blood (collected 15 Robert 2023 19:05)  Source: .Blood Blood-Peripheral  Preliminary Report (17 Jun 2023 01:02):    No growth to date.    Culture - Blood (collected 15 Robert 2023 19:00)  Source: .Blood Blood-Peripheral  Preliminary Report (17 Jun 2023 01:02):    No growth to date.    Culture - Urine (collected 14 Jun 2023 18:00)  Source: Clean Catch Clean Catch (Midstream)  Final Report (16 Jun 2023 07:25):    <10,000 CFU/mL Normal Urogenital Rebecca    Culture - Blood (collected 14 Jun 2023 16:57)  Source: .Blood Blood-Peripheral  Final Report (20 Jun 2023 01:01):    No Growth Final    Culture - Blood (collected 14 Jun 2023 16:51)  Source: .Blood Blood-Peripheral  Final Report (20 Jun 2023 01:01):    No Growth Final         Anemia Panel:  Iron Total, Serum: 20 ug/dL (06-16-23 @ 07:50)  Iron - Total Binding Capacity.: 288 ug/dL (06-16-23 @ 07:50)            Lipase, Serum: 280 U/L (06-14-23 @ 15:33)          HEALTH ISSUES - PROBLEM Dx:  Bipolar disorder    GERD (gastroesophageal reflux disease)    KIRA (acute kidney injury)    HTN (hypertension)    HLD (hyperlipidemia)    Transaminitis    Asthma    Need for prophylactic measure    Fever        Consultant(s) Notes Reviewed:  [ x ] YES     Care Discussed with [ x ] Consultants, [ x ] Patient, [ x ] Family, [  ] HCP, [ x ] , [  ] Social Service, [ x ] RN, [  ] Physical Therapy, [  ] Palliative Care Team  DVT PPX: [ x ] Lovenox, [  ] SC Heparin, [  ] Coumadin, [  ] Xarelto, [  ] Eliquis, [  ] Pradaxa, [  ] IV Heparin drip, [  ] SCD, [  ] Ambulation, [  ] Contraindicated 2/2 GI Bleed, [  ] Contraindicated 2/2  Bleed, [  ] Contraindicated 2/2 Brain Bleed  Advanced Directive: [ x ] None, [  ] DNR/DNI Patient is a 62y old  Male who presents with a chief complaint of Fever workup (19 Jun 2023 17:05)    HPI:  61 yo M with PMHx of HTN, HLD, Bipolar, GERD, Jhon's Esophagus Asthma, and Abnormal LFTs presents to the ED with 1x day hx of epigastric pain and new onset fever. Pt endorses feeling ache and heaviness in the epigastric region after he ate breakfast this morning. Noted laying down on his left side with improvement of symptoms, but persisted throughout the day. Pt states that pain worsened when he had a nonbloody bowel movement, with associated nausea, lightheadedness, and diaphoresis. Following this event pt was brought to the ED by his daughter in fear he was having a heart attack. In the ED, pt was found to be tachypneic, tachycardic, and febrile to 100.6. Pt was given IVF, Ativan, and Ofirmev with improvement of symptoms. Pt states that he no longer has the epigastric pain, but has experienced these symptoms in the past. Recently diagnosed with Jhon's Esophagus following EGD due to worsening acid reflux. Pt also noted to have large duodenal diverticulum during EGD. Endorses improving headache, dizziness, SOB, nausea  and distended abdomen throughout event. Pt has no complaints at this time, though appears anxious throughout discussion. Denies any recent sick contacts.     Of note, pt found to have elevated LFTs and Alkphos in outpatient workup since February. Dr. Ewing (PCP) attributed this to recent viral illness, but subsequent labs not checked.     ED Course:   Vitals: BP: 157/75, HR: 77, Temp: 100.6, RR: 20, SpO2: 97% on RA    Labs:  WBC 11.71, Lactate 2.0 < 3.0, Cr 1.50, Alkphos 208, ,    UA: Negative   CXR: Grossly normal as per personal read, official read pending   CT: Minimal periportal edema, nonspecific. Correlate with liver function tests. Otherwise, no acute abdominopelvic findings. No acute thoracic findings. Right lower lobe pulmonary nodule measuring 4 mm.  US Abdomen: Layering gallbladder sludge. No wall thickening or pericholecystic fluid. Focal hyperechoic right hepatic lobe lesion measures 1.0 x 0.9 x 0.8 cm, possibility is a hepatic hemangioma  EKG: NSR 85 bpm with prolonged /509   Received in the ED: Ofirmev, Pepcid 20 mg IVP, Ativan 1mg IVP, Zosyn, 1.5L NS bolus    (14 Jun 2023 23:41)    INTERVAL HPI:   6/16: RRT called yesterday for abdominal pain and fever. CTA performed to rule out acute mesenteric ischemia - negative. Started on Zosyn due to fever, MRCP performed this afternoon 2/2 transaminitis and abdominal pain. Will f/u official read.     6/17: No acute overnight events. MRCP yesterday significant for gallbladder edema, possible pancreatitis/cholecystitis w/ CBD dilation to 9mm. No pain or complaints at this time. C/w clear liquid diet and pain control. ERCP Monday discussed w patient at the bedside. IV Zosyn , LFT improving.  6/18: Patient seen and examined at bedside. Denies abdominal pain. Tolerating liquid diet well without nausea. Complains of headache this AM. Off IV abx. ERCP Monday. NPO after midnight , Off IV Abx   6/19: Patient seen and examined at bedside. Denies abdominal pain. Tolerating liquid diet well without nausea. Currently has no complaints at this time. Off IV abx. ERCP today. Currently NPO.  6/20: Patient seen and examined at bedside. Denies abdominal pain. Tolerating reg low fat diet well without nausea last night. Currently has no complaints at this time. Off IV abx. ERCP canceled due to suspected passed stone. Currently on reg diet. D/C Home       OVERNIGHT EVENTS: none    Home Medications:  aspirin 81 mg oral capsule: 1 orally (15 Robert 2023 00:43)  Ativan 1 mg oral tablet: 1 orally 2 times a day (15 Robert 2023 00:43)  cholecalciferol 50 mcg (2000 intl units) oral tablet: 1 orally once a day (15 Robert 2023 00:43)  Cozaar 100 mg oral tablet: 1 orally once a day (15 Robert 2023 00:43)  doxazosin 4 mg oral tablet: 1 orally (15 Robert 2023 00:43)  fluticasone 50 mcg/inh nasal spray: 2 spray(s) in each nostril once a day as needed for  congestion (15 Robert 2023 01:45)  ipratropium-albuterol 0.5 mg-2.5 mg/3 mL inhalation solution: 1 unit(s) by nebulizer every 4 hours as needed for  bronchospasm (15 Robert 2023 01:45)  lamoTRIgine 200 mg oral tablet: 0.75 tab(s) orally 2 times a day (15 Robert 2023 01:45)  ProAir HFA 90 mcg/inh inhalation aerosol: 2 inhaled every 6 hours as needed for  bronchospasm (15 Robert 2023 01:45)  Protonix 40 mg oral delayed release tablet: 1 orally once a day (15 Robert 2023 01:41)  PROzac 40 mg oral capsule: 1 orally once a day Alternates qD and BID every other day (15 Robert 2023 01:45)  QUEtiapine 50 mg oral tablet, extended release: 1 orally 2 times a day (15 Robert 2023 01:45)  rosuvastatin 20 mg oral tablet: 1 orally once a day (15 Robert 2023 01:45)  ZyrTEC 10 mg oral tablet: 1 orally once a day (15 Robert 2023 01:45)      MEDICATIONS  (STANDING):  aspirin  chewable 81 milliGRAM(s) Oral daily  cholecalciferol 2000 Unit(s) Oral daily  doxazosin 4 milliGRAM(s) Oral daily  enoxaparin Injectable 40 milliGRAM(s) SubCutaneous every 24 hours  Fluoxetine 40 milliGRAM(s) 40 milliGRAM(s) Oral <User Schedule>  Fluoxetine 40mg 1 Capsule(s) 1 Capsule(s) Oral <User Schedule>  lamoTRIgine 150 milliGRAM(s) Oral two times a day  LORazepam     Tablet 1 milliGRAM(s) Oral two times a day  losartan 100 milliGRAM(s) Oral daily  pantoprazole    Tablet 40 milliGRAM(s) Oral two times a day  QUEtiapine 50 milliGRAM(s) Oral two times a day    MEDICATIONS  (PRN):  acetaminophen     Tablet .. 650 milliGRAM(s) Oral every 6 hours PRN Temp greater or equal to 38C (100.4F), Mild Pain (1 - 3)  albuterol    90 MICROgram(s) HFA Inhaler 2 Puff(s) Inhalation every 6 hours PRN for bronchospasm  aluminum hydroxide/magnesium hydroxide/simethicone Suspension 30 milliLiter(s) Oral every 4 hours PRN Dyspepsia  fluticasone propionate 50 MICROgram(s)/spray Nasal Spray 1 Spray(s) Both Nostrils two times a day PRN Congestion  melatonin 3 milliGRAM(s) Oral at bedtime PRN Insomnia      Allergies    No Known Allergies    Intolerances        Social History:  Lives: At home with Wife and Daughter   ADLs: Independent, walks independently however becoming more painful given osteoarthritis   Diet: Avoid carbonated beverages, spicy foods, and salt   Vaccination: COVID vaccinatedx3   Alcohol Use: Denies   Tobacco Use: Denies   Recreational Drug Use: Denies (14 Jun 2023 23:41)      REVIEW OF SYSTEMS: i am OK  CONSTITUTIONAL: No fever, No chills, No fatigue, No myalgia, No Body ache, No Weakness  EYES: No eye pain,  No visual disturbances, No discharge, No Redness  ENMT: No ear pain, No nose bleed, No vertigo; No sinus pain, No throat pain, No Congestion  NECK: No pain, No stiffness  RESPIRATORY: No cough, No wheezing, No hemoptysis, No shortness of breath  CARDIOVASCULAR: No chest pain, No palpitations  GASTROINTESTINAL: No abdominal pain, No epigastric pain. No nausea, No vomiting, No diarrhea, No constipation; [ x ] BM  GENITOURINARY: No dysuria, No frequency, No urgency, No hematuria, No incontinence  NEUROLOGICAL: No headaches, No dizziness, No numbness, No tingling, No tremors, No weakness  EXTREMITIES: No Swelling, No Pain, No Edema  SKIN: [ x ] No itching, burning, rashes, or lesions   MUSCULOSKELETAL: No joint pain, No joint swelling; No muscle pain, No back pain, No extremity pain  PSYCHIATRIC: No depression, No anxiety, No mood swings, No difficulty sleeping at night  PAIN SCALE: [ x ] None  [  ] Other-  ROS Unable to obtain due to: [  ] Dementia  [  ] Lethargy  [  ] Sedated  [  ] Non verbal  REST OF REVIEW OF SYSTEMS: [ x ] Normal     Vital Signs Last 24 Hrs  T(C): 36.5 (20 Jun 2023 04:58), Max: 36.8 (19 Jun 2023 19:23)  T(F): 97.7 (20 Jun 2023 04:58), Max: 98.3 (19 Jun 2023 19:23)  HR: 50 (20 Jun 2023 04:58) (50 - 74)  BP: 153/80 (20 Jun 2023 04:58) (133/81 - 196/92)  BP(mean): --  RR: 18 (20 Jun 2023 04:58) (18 - 18)  SpO2: 95% (20 Jun 2023 04:58) (93% - 95%)    Parameters below as of 20 Jun 2023 04:58  Patient On (Oxygen Delivery Method): room air      Finger Stick        06-19 @ 07:01  -  06-20 @ 07:00  --------------------------------------------------------  IN: 300 mL / OUT: 950 mL / NET: -650 mL        PHYSICAL EXAM: i am ok  GENERAL:  [ x ] NAD, [ x ] Well appearing, [  ] Agitated, [  ] Drowsy, [  ] Lethargy, [  ] Confused   HEAD:  [ x ] Normal, [  ] Other  EYES:  [ x ] EOMI, [ x ] PERRLA, [ x ] Conjunctiva and sclera clear normal, [  ] Other, [  ] Pallor, [  ] Discharge  ENMT:  [ x ] Normal, [ x ] Moist mucous membranes, [ x ] Good dentition, [ x ] No thrush  NECK:  [ x ] Supple, [ x ] No JVD, [ x ] Normal thyroid, [  ] Lymphadenopathy, [  ] Other  CHEST/LUNG:  [ x ] Clear to auscultation bilaterally, [ x ] Breath Sounds equal B/L, [  ] Poor effort, [ x ] No rales, [ x ] No rhonchi, [ x ] No wheezing  HEART:  [ x ] Regular rate and rhythm, [  ] Tachycardia, [  ] Bradycardia, [  ] Irregular, [ x ] No murmurs, No rubs, No gallops, [  ] PPM in place (Mfr:  )  ABDOMEN:  [ x ] Soft, [ x ] Nontender, [ x ] Nondistended, [ x ] No mass, [ x ] Bowel sounds present, [x  ] Obese  NERVOUS SYSTEM:  [ x ] Alert & Oriented x3, [ x ] Nonfocal, [  ] Confusion, [  ] Encephalopathic, [  ] Sedated, [  ] Unable to assess, [  ] Dementia, [  ] Other-  EXTREMITIES:  [ x ] 2+ Peripheral Pulses, No clubbing, No cyanosis,  [  ] Edema B/L lower EXT, [  ] PVD stasis skin changes B/L lower EXT, [  ] Wound  LYMPH:  No lymphadenopathy noted  SKIN:  [ x ] No rashes or lesions, [  ] Pressure ulcers, [  ] Ecchymosis, [  ] Skin tears, [  ] Other    DIET: Diet, Regular:   Low Fat (LOWFAT) (06-19-23 @ 15:26)      LABS:                          12.9   4.68  )-----------( 288      ( 20 Jun 2023 08:20 )             38.2     20 Jun 2023 08:20    140    |  108    |  17     ----------------------------<  94     4.0     |  25     |  1.30     Ca    9.5        20 Jun 2023 08:20    TPro  7.2    /  Alb  3.5    /  TBili  0.7    /  DBili  x      /  AST  136    /  ALT  288    /  AlkPhos  349    20 Jun 2023 08:20              Culture Results:   No growth to date. (06-16 @ 07:55)  Culture Results:   No growth to date. (06-16 @ 07:50)  Culture Results:   No growth to date. (06-15 @ 19:05)  Culture Results:   No growth to date. (06-15 @ 19:00)  Culture Results:   <10,000 CFU/mL Normal Urogenital Rebecca (06-14 @ 18:00)  Culture Results:   No Growth Final (06-14 @ 16:57)  Culture Results:   No Growth Final (06-14 @ 16:51)      Culture - Blood (collected 16 Jun 2023 07:55)  Source: .Blood Blood-Venous  Preliminary Report (17 Jun 2023 14:02):    No growth to date.    Culture - Blood (collected 16 Jun 2023 07:50)  Source: .Blood Blood-Peripheral  Preliminary Report (17 Jun 2023 14:02):    No growth to date.    Culture - Blood (collected 15 Roebrt 2023 19:05)  Source: .Blood Blood-Peripheral  Preliminary Report (17 Jun 2023 01:02):    No growth to date.    Culture - Blood (collected 15 Robert 2023 19:00)  Source: .Blood Blood-Peripheral  Preliminary Report (17 Jun 2023 01:02):    No growth to date.    Culture - Urine (collected 14 Jun 2023 18:00)  Source: Clean Catch Clean Catch (Midstream)  Final Report (16 Jun 2023 07:25):    <10,000 CFU/mL Normal Urogenital Rebecca    Culture - Blood (collected 14 Jun 2023 16:57)  Source: .Blood Blood-Peripheral  Final Report (20 Jun 2023 01:01):    No Growth Final    Culture - Blood (collected 14 Jun 2023 16:51)  Source: .Blood Blood-Peripheral  Final Report (20 Jun 2023 01:01):    No Growth Final         Anemia Panel:  Iron Total, Serum: 20 ug/dL (06-16-23 @ 07:50)  Iron - Total Binding Capacity.: 288 ug/dL (06-16-23 @ 07:50)            Lipase, Serum: 280 U/L (06-14-23 @ 15:33)          HEALTH ISSUES - PROBLEM Dx:  Bipolar disorder    GERD (gastroesophageal reflux disease)    KIRA (acute kidney injury)    HTN (hypertension)    HLD (hyperlipidemia)    Transaminitis    Asthma    Need for prophylactic measure    Fever        Consultant(s) Notes Reviewed:  [ x ] YES     Care Discussed with [ x ] Consultants, [ x ] Patient, [ x ] Family, [  ] HCP, [ x ] , [  ] Social Service, [ x ] RN, [  ] Physical Therapy, [  ] Palliative Care Team  DVT PPX: [ x ] Lovenox, [  ] SC Heparin, [  ] Coumadin, [  ] Xarelto, [  ] Eliquis, [  ] Pradaxa, [  ] IV Heparin drip, [  ] SCD, [  ] Ambulation, [  ] Contraindicated 2/2 GI Bleed, [  ] Contraindicated 2/2  Bleed, [  ] Contraindicated 2/2 Brain Bleed  Advanced Directive: [ x ] None, [  ] DNR/DNI

## 2023-06-20 NOTE — PROGRESS NOTE ADULT - PROBLEM SELECTOR PLAN 4
Presenting with epigastric pain worse after eating   - Known hx of GERD with recent diagnosis of Jhon's Esophagus   - EGD performed 4/28/23: significant for a 2 cm hiatal hernia with grade 3 reflux esophagitis and an irregular Z-line intestinal metaplasia consistent with Spivey's esophagus. No dysplasia. Large diverticulum in the second portion of the duodenum  - Continue Pantoprazole 40 mg BID while inpatient   - Repeat CT scan angio  A/P-No evidence of acute mesenteric ischemia. Presenting with epigastric pain worse after eating   - Known hx of GERD with recent diagnosis of Jhon's Esophagus   - EGD performed 4/28/23: significant for a 2 cm hiatal hernia with grade 3 reflux esophagitis and an irregular Z-line intestinal metaplasia consistent with Spivey's esophagus. No dysplasia. Large diverticulum in the second portion of the duodenum  - Continue Pantoprazole 40 mg BID while inpatient-out pt follow up with DR Burns as out pt   - Repeat CT scan angio  A/P-No evidence of acute mesenteric ischemia.

## 2023-06-20 NOTE — PROGRESS NOTE ADULT - PROBLEM SELECTOR PLAN 7
Noted prior intolerance to unspecified statin in outpatient notes   - Transitioned to Rosuvastatin 20 mg qD with improvement of myalgias   - Will hold statin given worsening LFTs and hx of myalgias on statin other than Rosuvastatin   - Monitor for signs of myalgias Noted prior intolerance to unspecified statin in outpatient notes   - Transitioned to Rosuvastatin 20 mg qD with improvement of myalgias   - Will hold statin given worsening LFTs and hx of myalgias on statin other than Rosuvastatin   - Monitor for signs of myalgias  HOLD statin as elevated LFT's

## 2023-06-20 NOTE — PROGRESS NOTE ADULT - PROVIDER SPECIALTY LIST ADULT
Gastroenterology
Internal Medicine
Gastroenterology
Infectious Disease
Gastroenterology
Infectious Disease
Internal Medicine

## 2023-06-20 NOTE — PROGRESS NOTE ADULT - PROBLEM SELECTOR PLAN 3
Presenting Cr 1.50 s/p IV Contrast in ER -RESOLVED  - downtrending  - Encouraged PO intake  -IVF to continue -BMP in AM   - Avoid Nephrotoxics   -Renal dose meds, IVF Presenting Cr 1.50 s/p IV Contrast in ER -RESOLVED  - Encouraged PO intake  -IVF given   - Avoid Nephrotoxics  -Restart ARB on d/c

## 2023-06-21 ENCOUNTER — NON-APPOINTMENT (OUTPATIENT)
Age: 63
End: 2023-06-21

## 2023-06-21 LAB
CULTURE RESULTS: SIGNIFICANT CHANGE UP
SPECIMEN SOURCE: SIGNIFICANT CHANGE UP

## 2023-06-29 ENCOUNTER — APPOINTMENT (OUTPATIENT)
Dept: GASTROENTEROLOGY | Facility: CLINIC | Age: 63
End: 2023-06-29
Payer: COMMERCIAL

## 2023-06-29 VITALS
OXYGEN SATURATION: 96 % | WEIGHT: 196 LBS | BODY MASS INDEX: 28.06 KG/M2 | HEART RATE: 65 BPM | TEMPERATURE: 97.5 F | SYSTOLIC BLOOD PRESSURE: 112 MMHG | DIASTOLIC BLOOD PRESSURE: 70 MMHG | HEIGHT: 70 IN

## 2023-06-29 DIAGNOSIS — K44.9 DIAPHRAGMATIC HERNIA W/OUT OBSTRUCTION OR GANGRENE: ICD-10-CM

## 2023-06-29 DIAGNOSIS — R93.5 ABNORMAL FINDINGS ON DIAGNOSTIC IMAGING OF OTHER ABDOMINAL REGIONS, INCLUDING RETROPERITONEUM: ICD-10-CM

## 2023-06-29 DIAGNOSIS — K21.00 GASTRO-ESOPHAGEAL REFLUX DISEASE WITH ESOPHAGITIS, WITHOUT BLEEDING: ICD-10-CM

## 2023-06-29 PROCEDURE — 99215 OFFICE O/P EST HI 40 MIN: CPT | Mod: 25

## 2023-06-29 PROCEDURE — 36415 COLL VENOUS BLD VENIPUNCTURE: CPT

## 2023-07-01 LAB
ALBUMIN SERPL ELPH-MCNC: 4.6 G/DL
ALP BLD-CCNC: 164 U/L
ALT SERPL-CCNC: 31 U/L
AMYLASE/CREAT SERPL: 101 U/L
ANION GAP SERPL CALC-SCNC: 12 MMOL/L
AST SERPL-CCNC: 12 U/L
BILIRUB SERPL-MCNC: 0.3 MG/DL
BUN SERPL-MCNC: 23 MG/DL
CALCIUM SERPL-MCNC: 10.1 MG/DL
CANCER AG19-9 SERPL-ACNC: 8 U/ML
CHLORIDE SERPL-SCNC: 102 MMOL/L
CO2 SERPL-SCNC: 25 MMOL/L
CREAT SERPL-MCNC: 1.27 MG/DL
EGFR: 63 ML/MIN/1.73M2
FERRITIN SERPL-MCNC: 116 NG/ML
FOLATE SERPL-MCNC: 11.2 NG/ML
GGT SERPL-CCNC: 122 U/L
GLUCOSE SERPL-MCNC: 94 MG/DL
IRON SATN MFR SERPL: 19 %
IRON SERPL-MCNC: 61 UG/DL
LPL SERPL-CCNC: 91 U/L
POTASSIUM SERPL-SCNC: 4.8 MMOL/L
PROT SERPL-MCNC: 7 G/DL
SODIUM SERPL-SCNC: 139 MMOL/L
TIBC SERPL-MCNC: 325 UG/DL
UIBC SERPL-MCNC: 264 UG/DL
VIT B12 SERPL-MCNC: 339 PG/ML

## 2023-07-01 NOTE — REASON FOR VISIT
[Post Hospitalization] : a post hospitalization visit [FreeTextEntry1] : Abnormal MRI, choledocholithiasis, pancreatic cyst, Spivey's esophagus, reflux esophagitis, hiatal hernia

## 2023-07-01 NOTE — ASSESSMENT
[FreeTextEntry1] : Patient status posthospitalization for what appears to have been a passed CBD stone.  In the hospital, MRI/MRCP was done without contrast and showed a filling defect in the very distal common bile duct with associated biliary ductal dilatation, consistent with a stone or mass.  There was a question of acute pancreatitis.  There was also a 6 mm nonspecific cystic lesion in the pancreatic head.  A nonspecific lesion was seen in T10 as well.  The patient has a history of Spivey's esophagus and an irregular Z-line, grade 3 reflux esophagitis, 2 cm hiatal hernia, and a family history of a brother with an advanced polyp.\par \par Blood work was sent for CBC, comprehensive metabolic panel, amylase, lipase, CA 19-9, iron studies, B12, folate.\par \par Patient was sent for an MRI/MRCP with contrast to further evaluate the bile duct and pancreatic head lesion.\par \par Patient was advised that he can decrease pantoprazole to 40 mg a day\par \par Based upon the above results, we will need to consider elective cholecystectomy.\par \par Patient was advised to follow-up with Dr. Ewing, we will need to further evaluate the lesion in T10 and the small lung nodule.\par \par Patient is due for EGD and colonoscopy in April 2026.\par \par \par Plan from 6/2/2023 - Patient with a 2 cm hiatal hernia, grade 3 reflux esophagitis, and Spivey's esophagus in an irregular Z-line.  He is doing well on pantoprazole 40 mg a day.\par \par Patient will continue pantoprazole 40 mg a day.\par \par We will repeat an EGD in April 2026.  We will also perform a colonoscopy at that time given the patient's family history of a brother with an advanced polyp.\par \par Patient will return to see me in 1 year or sooner if needed.\par \par \par Plan from 4/6/2023 - Patient with complaints of heartburn, belching, and bloating.  Multiple etiologies need to be considered.\par \par An EGD has been scheduled. The risks, benefits, alternatives, and limitations of the procedure were explained.\par \par A list of dietary and lifestyle modifications in the treatment of GERD was given to the patient.  We discussed this in depth.\par \par Patient is due for colonoscopy in July 2025 given the family history of a brother with an advanced colonic polyp.\par \par \par Plan from 5/19/2020 - Patient was brother had a precancerous colonic polyp who is in need of a screening colonoscopy.\par \par Once the COVID-19 pandemic allows, a colonoscopy will be scheduled. The risks, benefits, alternatives, and limitations of the procedure, including the possibility of missed lesions, were explained.

## 2023-07-01 NOTE — HISTORY OF PRESENT ILLNESS
[FreeTextEntry1] : The patient has a history of a 2 cm hiatal hernia, grade 3 reflux esophagitis, and Spivey's esophagus in an irregular Z-line.  He now presents after hospitalization at [facility] from  to .  On , the patient awoke with abdominal pain, nausea, fever, and jaundice.  There was no vomiting.  LFTs were elevated.  Ultrasound done on  showed layering sludge.  CT scan done the same day showed minimal periportal edema as well as a small lung nodule which the patient states is known and followed.  Patient had worsening pain and had a CT angiogram on Jojo 15.  The patient then had an MRI/MRCP without contrast which showed a liver cyst or hemangioma, apparent 7 mm filling defect in the very distal common bile duct with upstream biliary dilatation to 9 mm consistent with a stone or mass, pericholecystic fluid/edema, questionable acute pancreatitis, trace ascites, a 6 mm nonspecific cystic lesion in the pancreatic head, and a nonspecific lesion in T10 for which a spinal MRI with and without contrast was recommended.  The patient was to have an ERCP but his pain resolved and his numbers improved.  He was then discharged on  with alkaline phosphatase 349, , , and total bilirubin 0.7.\par \par The patient had previously been on pantoprazole 40 mg once a day but has been on twice a day since the hospital.  He no longer has abdominal pain.  He denies heartburn, dysphagia, nausea, vomiting.  He is having 2-3 solid bowel movements a day without melena or bright red blood per rectum.  His statin has been placed on hold.\par \par \par Note from 2023 - We reviewed the patient's EGD performed on 2023.  The exam was significant for a 2 cm hiatal hernia with grade 3 reflux esophagitis and an irregular Z-line.  Biopsies showed intestinal metaplasia consistent with Spivey's esophagus.  The patient also has a large diverticulum in the second portion of the duodenum.  Patient is on pantoprazole 40 mg a day.  He feels much better with no heartburn, belching, nausea, vomiting, dysphagia, abdominal pain.  Bowel movements are normal without melena or bright red blood per rectum.\par \par \par Note from 2023 - The patient is seen for the first time since he underwent colonoscopy on 2020.  The exam was normal other than internal and external hemorrhoids which are asymptomatic.  The patient now complains of heartburn and belching that occurs about twice a week over the past year.  He denies dysphagia, nausea, vomiting.  He notes abdominal bloating with associated discomfort.  He reports 1-3 solid bowel movements a day without melena or bright red blood per rectum.  The patient has had mild weight gain.  The patient has not been admitted to the hospital in the past year and denies any cardiac issues.  The patient has a family history of a brother with advanced colonic polyp.\par \par \par Note from 2020 - The patient is a 59-year-old man whose brother had precancerous colonic polyp which could not be removed by colonoscopy.  Unfortunately, the brother recently  of COVID-19 before the polyp could be resected.  The patient feels well denying abdominal pain, diarrhea, constipation.  He has 2-3 solid bowel movements a day without melena or bright red blood per rectum.  He denies heartburn or dysphasia.  The patient's weight is stable.  The patient has not been admitted to the hospital in the past year and denies any cardiac issues.

## 2023-07-01 NOTE — CONSULT LETTER
[FreeTextEntry1] : Dear Dr. Sofía Ewing,\par \par I had the pleasure of seeing your patient AMELIA SHARMA in the office today.  My office note is attached. PLEASE READ THE "ASSESSMENT" SECTION OF THE NOTE TO SEE MY IMPRESSION AND PLAN.\par \par Thank you very much for allowing me to participate in the care of your patient.\par \par Sincerely,\par \par Yung Burns M.D., FAC, FACP\par Director, Celiac Program at [facility]\par  of Medicine, [employer] at [facility] Adjunct  of Medicine, [facility] Medicine\[facility] Practice Director, [facility] - Gastroenterology at [place]\[facility] [address]\[place] [address]\par Tel: [phone]\par Email: [email or web address]\par \par \par The attached note has been created using a voice recognition system (Dragon).  There may be some misspellings and typos.  Please call my office if you have any issues or questions.

## 2023-07-03 ENCOUNTER — NON-APPOINTMENT (OUTPATIENT)
Age: 63
End: 2023-07-03

## 2023-07-05 ENCOUNTER — APPOINTMENT (OUTPATIENT)
Dept: INTERNAL MEDICINE | Facility: CLINIC | Age: 63
End: 2023-07-05
Payer: COMMERCIAL

## 2023-07-05 VITALS
TEMPERATURE: 97.2 F | OXYGEN SATURATION: 96 % | WEIGHT: 197 LBS | HEART RATE: 70 BPM | HEIGHT: 70 IN | BODY MASS INDEX: 28.2 KG/M2 | DIASTOLIC BLOOD PRESSURE: 81 MMHG | SYSTOLIC BLOOD PRESSURE: 151 MMHG

## 2023-07-05 DIAGNOSIS — M54.50 LOW BACK PAIN, UNSPECIFIED: ICD-10-CM

## 2023-07-05 DIAGNOSIS — E53.8 DEFICIENCY OF OTHER SPECIFIED B GROUP VITAMINS: ICD-10-CM

## 2023-07-05 PROCEDURE — 99214 OFFICE O/P EST MOD 30 MIN: CPT | Mod: 25

## 2023-07-05 PROCEDURE — 99495 TRANSJ CARE MGMT MOD F2F 14D: CPT

## 2023-07-06 ENCOUNTER — APPOINTMENT (OUTPATIENT)
Dept: GASTROENTEROLOGY | Facility: CLINIC | Age: 63
End: 2023-07-06

## 2023-07-06 VITALS — SYSTOLIC BLOOD PRESSURE: 132 MMHG | DIASTOLIC BLOOD PRESSURE: 78 MMHG

## 2023-07-06 PROBLEM — M54.50 LOW BACK PAIN: Status: ACTIVE | Noted: 2023-07-05

## 2023-07-06 PROBLEM — E53.8 LOW VITAMIN B12 LEVEL: Status: ACTIVE | Noted: 2023-07-06

## 2023-07-06 NOTE — HISTORY OF PRESENT ILLNESS
[Post-hospitalization from ___ Hospital] : Post-hospitalization from [unfilled] Hospital [Admitted on: ___] : The patient was admitted on [unfilled] [Discharged on ___] : discharged on [unfilled] [Discharge Summary] : discharge summary [Discharge Med List] : discharge medication list [Med Reconciliation] : medication reconciliation has been completed [Patient Contacted By: ____] : and contacted by [unfilled] [Pertinent Labs] : pertinent labs [FreeTextEntry2] : Pt presented today for hospital discharge f/u accompanied by his wife.\par \par Pt was hospitalized for epigastric pain, and found to have very high LFTs and elevated pancreatic enzymes.  Initial test was unremarkable and he was ruled out for hepatitis.  After ultrasound and CT scan, patient had MRCP, and found to have sludge is in the gallbladder, with evidence of bile duct obstruction.  Patient was prepared for ERCP, but his pain improved with conservative management, his LFTs also improved.  Patient was therefore discharged without any intervention.  He had follow-up with GI last week and had labs done.  He is also scheduled to have a repeat MRCP next week.  He is feeling a lot better and back to eating normal.  He has made a conscious effort to stay off alcohol and fatty food.\par \par Patient was also noted incidentally to have a pancreatic cyst, and MRCP will hopefully address that.\par \par Patient also saw GI over the last few months, for GERD symptoms.  He had EGD done which showed evidence of Spivey's esophagus, but also has a very large duodenal diverticulum.\par \par Pt c/o LBP, that has been going on for a couple of months, and it's getting worse.  It's very noticeable when he first get up from bed or after sitting for a while.  He has therefore been very sedentary.  He tries to avoid analgesics.  While patient was in the hospital, imaging studies done for the abdomen also noted a 1.9 cm hyperintense lesion at T10 that may be cystic as it was fluid-filled, he was concerned that this may be the reason for his back pain.  He was told that this needs outpatient follow-up.

## 2023-07-06 NOTE — PHYSICAL EXAM
[No Acute Distress] : no acute distress [Well Nourished] : well nourished [Well Developed] : well developed [Supple] : supple [No Respiratory Distress] : no respiratory distress  [Clear to Auscultation] : lungs were clear to auscultation bilaterally [Normal Rate] : normal rate  [Regular Rhythm] : with a regular rhythm [Normal S1, S2] : normal S1 and S2 [No Edema] : there was no peripheral edema [Soft] : abdomen soft [Non Tender] : non-tender [Normal Bowel Sounds] : normal bowel sounds [No CVA Tenderness] : no CVA  tenderness [No Spinal Tenderness] : no spinal tenderness [Grossly Normal Strength/Tone] : grossly normal strength/tone [No Joint Swelling] : no joint swelling [Normal Gait] : normal gait [Speech Grossly Normal] : speech grossly normal [Normal Affect] : the affect was normal [Alert and Oriented x3] : oriented to person, place, and time [Normal Mood] : the mood was normal [26413 - Moderate Complexity requires multiple possible diagnoses and/or the management options, moderate complexity of the medical data (tests, etc.) to be reviewed, and moderate risk of significant complications, morbidity, and/or mortality as well as co] : Moderate Complexity [de-identified] : Overweight male in stated age,  [de-identified] : No straight leg raising,

## 2023-07-12 ENCOUNTER — APPOINTMENT (OUTPATIENT)
Dept: MRI IMAGING | Facility: CLINIC | Age: 63
End: 2023-07-12
Payer: COMMERCIAL

## 2023-07-12 PROCEDURE — 74183 MRI ABD W/O CNTR FLWD CNTR: CPT

## 2023-07-12 PROCEDURE — A9585: CPT

## 2023-07-17 ENCOUNTER — APPOINTMENT (OUTPATIENT)
Dept: MRI IMAGING | Facility: CLINIC | Age: 63
End: 2023-07-17
Payer: COMMERCIAL

## 2023-07-17 PROCEDURE — 72157 MRI CHEST SPINE W/O & W/DYE: CPT

## 2023-07-17 PROCEDURE — A9585: CPT

## 2023-07-17 PROCEDURE — 72158 MRI LUMBAR SPINE W/O & W/DYE: CPT

## 2023-07-19 NOTE — PROGRESS NOTE ADULT - PROBLEM SELECTOR PLAN 2
Hx of Transaminitis since 2/23: AST/ALT - 74/245, AP - 237-   AlkPhos 208  - US Abdomen: Focal hyperechoic right hepatic lobe lesion measures 1.0 x 0.9 x 0.8 cm, possibility is a hepatic hemangioma  -CT A/P with IVC - periportal edema  - MRCP performed - will plan for ERCP Monday   - GI , Dr. Mclaughlin, d/w at [facility].-Clear liquid Hx of Transaminitis since 2/23: downtrending today   - US Abdomen: Focal hyperechoic right hepatic lobe lesion measures 1.0 x 0.9 x 0.8 cm, possibility is a hepatic hemangioma  -CT A/P with IVC - periportal edema  - MRCP performed - gallbladder edema, possible pancreatitis/cholecystitis w/ CBD dilation to 9mm.   - GI , Dr. Mclaughlin, d/w at [facility].-Clear liquid diet; ERCP monday Hx of Transaminitis since 2/23: downtrending today   - US Abdomen: Focal hyperechoic right hepatic lobe lesion measures 1.0 x 0.9 x 0.8 cm, possibility is a hepatic hemangioma  -CT A/P with IVC - periportal edema  - MRCP performed - gallbladder edema, possible pancreatitis/cholecystitis w/ CBD dilation to 9mm.   - GI , Dr. Mclaughlin, d/w at [facility].-Clear liquid diet; ERCP Mondays No IV

## 2023-07-21 ENCOUNTER — NON-APPOINTMENT (OUTPATIENT)
Age: 63
End: 2023-07-21

## 2023-07-22 ENCOUNTER — RX RENEWAL (OUTPATIENT)
Age: 63
End: 2023-07-22

## 2023-07-22 PROBLEM — Z00.00 ENCOUNTER FOR PREVENTIVE HEALTH EXAMINATION: Status: ACTIVE | Noted: 2023-07-22

## 2023-07-26 ENCOUNTER — APPOINTMENT (OUTPATIENT)
Dept: GASTROENTEROLOGY | Facility: CLINIC | Age: 63
End: 2023-07-26
Payer: COMMERCIAL

## 2023-07-26 VITALS
DIASTOLIC BLOOD PRESSURE: 70 MMHG | SYSTOLIC BLOOD PRESSURE: 114 MMHG | HEIGHT: 71 IN | TEMPERATURE: 97.3 F | HEART RATE: 62 BPM | OXYGEN SATURATION: 97 % | BODY MASS INDEX: 28 KG/M2 | WEIGHT: 200 LBS

## 2023-07-26 DIAGNOSIS — K86.2 CYST OF PANCREAS: ICD-10-CM

## 2023-07-26 PROCEDURE — 99215 OFFICE O/P EST HI 40 MIN: CPT | Mod: 25

## 2023-07-26 PROCEDURE — 36415 COLL VENOUS BLD VENIPUNCTURE: CPT

## 2023-07-27 ENCOUNTER — NON-APPOINTMENT (OUTPATIENT)
Age: 63
End: 2023-07-27

## 2023-07-27 LAB
ALBUMIN SERPL ELPH-MCNC: 4.9 G/DL
ALP BLD-CCNC: 91 U/L
ALT SERPL-CCNC: 19 U/L
AMYLASE/CREAT SERPL: 83 U/L
AST SERPL-CCNC: 15 U/L
BILIRUB DIRECT SERPL-MCNC: 0.1 MG/DL
BILIRUB INDIRECT SERPL-MCNC: 0.2 MG/DL
BILIRUB SERPL-MCNC: 0.3 MG/DL
GGT SERPL-CCNC: 32 U/L
LPL SERPL-CCNC: 60 U/L
PROT SERPL-MCNC: 7.2 G/DL

## 2023-07-27 NOTE — ASSESSMENT
[FreeTextEntry1] : Patient status posthospitalization for what appears to have been a passed CBD stone.  He has multiple gallstones.  His CBD is dilated on MRI/MRCP with a smooth tapering at the level of the ampulla consistent with a benign stricture.  The patient's liver tests are improving but have not yet normalized.  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  He has a history of short segment Spivey's esophagus in an irregular Z-line.\par \par I had a long discussion with the patient and his wife regarding the potential for recurrent choledocholithiasis and the need to further evaluate the narrowing and dilatation of the CBD.\par \par The patient is scheduled for endoscopic ultrasound on August 17.\par \par I discussed the ultimate need for cholecystectomy.\par \par Blood work was sent for LFTs, amylase, lipase.\par \par Patient will take vitamin B12 1000 mcg a day.\par \par Patient was counseled regarding the importance of a low-fat diet.\par \par Patient is due for EGD and colonoscopy in April 2026.\par \par \par Plan from 6/29/2023 - Patient status posthospitalization for what appears to have been a passed CBD stone.  In the hospital, MRI/MRCP was done without contrast and showed a filling defect in the very distal common bile duct with associated biliary ductal dilatation, consistent with a stone or mass.  There was a question of acute pancreatitis.  There was also a 6 mm nonspecific cystic lesion in the pancreatic head.  A nonspecific lesion was seen in T10 as well.  The patient has a history of Spivey's esophagus and an irregular Z-line, grade 3 reflux esophagitis, 2 cm hiatal hernia, and a family history of a brother with an advanced polyp.\par \par Blood work was sent for CBC, comprehensive metabolic panel, amylase, lipase, CA 19-9, iron studies, B12, folate.\par \par Patient was sent for an MRI/MRCP with contrast to further evaluate the bile duct and pancreatic head lesion.\par \par Patient was advised that he can decrease pantoprazole to 40 mg a day\par \par Based upon the above results, we will need to consider elective cholecystectomy.\par \par Patient was advised to follow-up with Dr. Ewing, we will need to further evaluate the lesion in T10 and the small lung nodule.\par \par Patient is due for EGD and colonoscopy in April 2026.\par \par \par Plan from 6/2/2023 - Patient with a 2 cm hiatal hernia, grade 3 reflux esophagitis, and Spivey's esophagus in an irregular Z-line.  He is doing well on pantoprazole 40 mg a day.\par \par Patient will continue pantoprazole 40 mg a day.\par \par We will repeat an EGD in April 2026.  We will also perform a colonoscopy at that time given the patient's family history of a brother with an advanced polyp.\par \par Patient will return to see me in 1 year or sooner if needed.\par \par \par Plan from 4/6/2023 - Patient with complaints of heartburn, belching, and bloating.  Multiple etiologies need to be considered.\par \par An EGD has been scheduled. The risks, benefits, alternatives, and limitations of the procedure were explained.\par \par A list of dietary and lifestyle modifications in the treatment of GERD was given to the patient.  We discussed this in depth.\par \par Patient is due for colonoscopy in July 2025 given the family history of a brother with an advanced colonic polyp.\par \par \par Plan from 5/19/2020 - Patient was brother had a precancerous colonic polyp who is in need of a screening colonoscopy.\par \par Once the COVID-19 pandemic allows, a colonoscopy will be scheduled. The risks, benefits, alternatives, and limitations of the procedure, including the possibility of missed lesions, were explained.

## 2023-07-27 NOTE — HISTORY OF PRESENT ILLNESS
[FreeTextEntry1] : We reviewed the evaluations done since the patient's last visit on 2023.  Blood work from that day showed improved LFTs with AST and ALT at 12 and 31 respectively with an alkaline phosphatase of 164, , and total bilirubin of 0.3.  Amylase was normal at 101 but lipase was mildly elevated at 91.  CA 19-9 was normal and B12 was in the low normal range at 339.  The patient had MRI/MRCP on 2023.  This revealed a dilated CBD of 1.1 cm with a smooth tapering at the level of the ampulla thought to be consistent with a benign stricture.  The patient has gallstones.  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  He is on pantoprazole 40 mg a day for reflux and denies abdominal pain, heartburn, dysphagia, nausea, vomiting.  Bowel movements are normal without melena or bright red blood per rectum.\par \par \par Note from 2023 - The patient has a history of a 2 cm hiatal hernia, grade 3 reflux esophagitis, and Spivey's esophagus in an irregular Z-line.  He now presents after hospitalization at [facility] from  to .  On , the patient awoke with abdominal pain, nausea, fever, and jaundice.  There was no vomiting.  LFTs were elevated.  Ultrasound done on  showed layering sludge.  CT scan done the same day showed minimal periportal edema as well as a small lung nodule which the patient states is known and followed.  Patient had worsening pain and had a CT angiogram on Jojo 15.  The patient then had an MRI/MRCP without contrast which showed a liver cyst or hemangioma, apparent 7 mm filling defect in the very distal common bile duct with upstream biliary dilatation to 9 mm consistent with a stone or mass, pericholecystic fluid/edema, questionable acute pancreatitis, trace ascites, a 6 mm nonspecific cystic lesion in the pancreatic head, and a nonspecific lesion in T10 for which a spinal MRI with and without contrast was recommended.  The patient was to have an ERCP but his pain resolved and his numbers improved.  He was then discharged on  with alkaline phosphatase 349, , , and total bilirubin 0.7.\par \par The patient had previously been on pantoprazole 40 mg once a day but has been on twice a day since the hospital.  He no longer has abdominal pain.  He denies heartburn, dysphagia, nausea, vomiting.  He is having 2-3 solid bowel movements a day without melena or bright red blood per rectum.  His statin has been placed on hold.\par \par \par Note from 2023 - We reviewed the patient's EGD performed on 2023.  The exam was significant for a 2 cm hiatal hernia with grade 3 reflux esophagitis and an irregular Z-line.  Biopsies showed intestinal metaplasia consistent with Spivey's esophagus.  The patient also has a large diverticulum in the second portion of the duodenum.  Patient is on pantoprazole 40 mg a day.  He feels much better with no heartburn, belching, nausea, vomiting, dysphagia, abdominal pain.  Bowel movements are normal without melena or bright red blood per rectum.\par \par \par Note from 2023 - The patient is seen for the first time since he underwent colonoscopy on 2020.  The exam was normal other than internal and external hemorrhoids which are asymptomatic.  The patient now complains of heartburn and belching that occurs about twice a week over the past year.  He denies dysphagia, nausea, vomiting.  He notes abdominal bloating with associated discomfort.  He reports 1-3 solid bowel movements a day without melena or bright red blood per rectum.  The patient has had mild weight gain.  The patient has not been admitted to the hospital in the past year and denies any cardiac issues.  The patient has a family history of a brother with advanced colonic polyp.\par \par \par Note from 2020 - The patient is a 59-year-old man whose brother had precancerous colonic polyp which could not be removed by colonoscopy.  Unfortunately, the brother recently  of COVID-19 before the polyp could be resected.  The patient feels well denying abdominal pain, diarrhea, constipation.  He has 2-3 solid bowel movements a day without melena or bright red blood per rectum.  He denies heartburn or dysphasia.  The patient's weight is stable.  The patient has not been admitted to the hospital in the past year and denies any cardiac issues.

## 2023-07-27 NOTE — REASON FOR VISIT
[Spouse] : spouse [FreeTextEntry1] : Dilated common bile duct, gallstones, pancreatic cyst, Spivey's esophagus

## 2023-08-03 ENCOUNTER — APPOINTMENT (OUTPATIENT)
Dept: GASTROENTEROLOGY | Facility: CLINIC | Age: 63
End: 2023-08-03

## 2023-08-04 ENCOUNTER — APPOINTMENT (OUTPATIENT)
Dept: ORTHOPEDIC SURGERY | Facility: CLINIC | Age: 63
End: 2023-08-04

## 2023-08-17 ENCOUNTER — TRANSCRIPTION ENCOUNTER (OUTPATIENT)
Age: 63
End: 2023-08-17

## 2023-08-17 ENCOUNTER — APPOINTMENT (OUTPATIENT)
Dept: GASTROENTEROLOGY | Facility: HOSPITAL | Age: 63
End: 2023-08-17

## 2023-08-17 ENCOUNTER — OUTPATIENT (OUTPATIENT)
Dept: OUTPATIENT SERVICES | Facility: HOSPITAL | Age: 63
LOS: 1 days | End: 2023-08-17
Payer: COMMERCIAL

## 2023-08-17 ENCOUNTER — RESULT REVIEW (OUTPATIENT)
Age: 63
End: 2023-08-17

## 2023-08-17 VITALS
RESPIRATION RATE: 16 BRPM | DIASTOLIC BLOOD PRESSURE: 77 MMHG | HEART RATE: 50 BPM | SYSTOLIC BLOOD PRESSURE: 158 MMHG | OXYGEN SATURATION: 94 %

## 2023-08-17 VITALS
SYSTOLIC BLOOD PRESSURE: 148 MMHG | RESPIRATION RATE: 18 BRPM | OXYGEN SATURATION: 100 % | WEIGHT: 199.96 LBS | HEART RATE: 67 BPM | TEMPERATURE: 98 F | DIASTOLIC BLOOD PRESSURE: 73 MMHG | HEIGHT: 71 IN

## 2023-08-17 DIAGNOSIS — K83.8 OTHER SPECIFIED DISEASES OF BILIARY TRACT: ICD-10-CM

## 2023-08-17 DIAGNOSIS — Z78.9 OTHER SPECIFIED HEALTH STATUS: Chronic | ICD-10-CM

## 2023-08-17 PROCEDURE — 74330 X-RAY BILE/PANC ENDOSCOPY: CPT

## 2023-08-17 PROCEDURE — C9399: CPT

## 2023-08-17 PROCEDURE — 43264 ERCP REMOVE DUCT CALCULI: CPT

## 2023-08-17 PROCEDURE — 88341 IMHCHEM/IMCYTCHM EA ADD ANTB: CPT

## 2023-08-17 PROCEDURE — 88305 TISSUE EXAM BY PATHOLOGIST: CPT

## 2023-08-17 PROCEDURE — 43239 EGD BIOPSY SINGLE/MULTIPLE: CPT | Mod: GC,59

## 2023-08-17 PROCEDURE — 74328 X-RAY BILE DUCT ENDOSCOPY: CPT | Mod: 26,GC

## 2023-08-17 PROCEDURE — 88305 TISSUE EXAM BY PATHOLOGIST: CPT | Mod: 26

## 2023-08-17 PROCEDURE — 43264 ERCP REMOVE DUCT CALCULI: CPT | Mod: GC

## 2023-08-17 PROCEDURE — 43262 ENDO CHOLANGIOPANCREATOGRAPH: CPT

## 2023-08-17 PROCEDURE — 43239 EGD BIOPSY SINGLE/MULTIPLE: CPT

## 2023-08-17 PROCEDURE — 43259 EGD US EXAM DUODENUM/JEJUNUM: CPT | Mod: GC

## 2023-08-17 PROCEDURE — 43262 ENDO CHOLANGIOPANCREATOGRAPH: CPT | Mod: GC

## 2023-08-17 PROCEDURE — C1769: CPT

## 2023-08-17 PROCEDURE — 88342 IMHCHEM/IMCYTCHM 1ST ANTB: CPT | Mod: 26

## 2023-08-17 DEVICE — HYDRATOME 44: Type: IMPLANTABLE DEVICE | Status: FUNCTIONAL

## 2023-08-17 DEVICE — CATH BLLN EXTRACT DIST GUIDE WIRE 15MM 3LUM: Type: IMPLANTABLE DEVICE | Status: FUNCTIONAL

## 2023-08-17 RX ORDER — PANTOPRAZOLE SODIUM 20 MG/1
1 TABLET, DELAYED RELEASE ORAL
Refills: 0 | DISCHARGE

## 2023-08-17 RX ORDER — SODIUM CHLORIDE 9 MG/ML
500 INJECTION, SOLUTION INTRAVENOUS
Refills: 0 | Status: COMPLETED | OUTPATIENT
Start: 2023-08-17 | End: 2023-08-17

## 2023-08-17 RX ORDER — INDOMETHACIN 50 MG
100 CAPSULE ORAL ONCE
Refills: 0 | Status: COMPLETED | OUTPATIENT
Start: 2023-08-17 | End: 2023-08-17

## 2023-08-17 RX ORDER — ROSUVASTATIN CALCIUM 5 MG/1
1 TABLET ORAL
Refills: 0 | DISCHARGE

## 2023-08-17 RX ADMIN — Medication 100 MILLIGRAM(S): at 13:33

## 2023-08-17 RX ADMIN — SODIUM CHLORIDE 30 MILLILITER(S): 9 INJECTION, SOLUTION INTRAVENOUS at 12:25

## 2023-08-17 NOTE — PRE PROCEDURE NOTE - PRE PROCEDURE EVALUATION
Attending Physician:   Dr. Prasad    Procedure: EGD/EUS+/-ERCP    Indication for Procedure: Abnormal MRI/MRCP; Dilated CBD (1.1 cm) tapers at the ampulla    ________________________________________________________  PAST MEDICAL & SURGICAL HISTORY:    ALLERGIES:    HOME MEDICATIONS:    AICD/PPM: [ ] yes   [x ] no    PERTINENT LAB DATA:                      PHYSICAL EXAMINATION:    T(C): --  HR: --  BP: --  RR: --  SpO2: --    Constitutional: NAD  HEENT: PERRLA, EOMI,    Neck:  No JVD  Respiratory: CTAB/L  Cardiovascular: S1 and S2  Gastrointestinal: BS+, soft, NT/ND  Extremities: No peripheral edema  Neurological: A/O x 3, no focal deficits  Psychiatric: Normal mood, normal affect  Skin: No rashes    ASA Class: I [ ]  II [ ]  III [ x]  IV [ ]    COMMENTS:    The patient is a suitable candidate for the planned procedure unless box checked [ ]  No, explain:    I explained the risks (bleeding, infection, perforation, pancreatitis, CV risk, anesthesia risk)/b/a with the patient. The patient agrees to proceed.

## 2023-08-17 NOTE — ASU PATIENT PROFILE, ADULT - FALL HARM RISK - UNIVERSAL INTERVENTIONS
Bed in lowest position, wheels locked, appropriate side rails in place/Call bell, personal items and telephone in reach/Instruct patient to call for assistance before getting out of bed or chair/Non-slip footwear when patient is out of bed/[place] to call system/Physically safe environment - no spills, clutter or unnecessary equipment/Purposeful Proactive Rounding/Room/bathroom lighting operational, light cord in reach

## 2023-08-17 NOTE — ASU DISCHARGE PLAN (ADULT/PEDIATRIC) - PHYSICIAN SECTION COMPLETE
Subjective:     History of Present Illness:  Narayan Underwood is a 6 y.o. male who presents to the clinic today for Consult (appetite DECREASED BM NORMAL  bought by MOM DARREN) and ADHD     History was provided by the mother. Pt was last seen on 2/18/2020.  Narayan is here for a consultation of ADD/ADHD    I reviewed both the parent and the teacher forms and these lead to a diagnosis of ADD/ADHD with a predominance of inattention and hyperactivity. We reviewed the different medication types, including non stimulant vs stimulant and the different stimulant drug classes: amphetamine based drugs vs methylphenidate drugs. We discussed side effect profiles, dosing and dosage and drug expectations. Parent had questions that were addressed and answered. 20 min spent in counseling. Parent will follow up in 2 weeks.We will start with Adderall XR 5 mg.     Review of Systems   Constitutional: Negative.    HENT: Negative.    Eyes: Negative.    Respiratory: Negative.    Cardiovascular: Negative.    Gastrointestinal: Negative.    Genitourinary: Negative.    Musculoskeletal: Negative.    Skin: Negative.    Allergic/Immunologic: Negative.    Neurological: Negative.    Hematological: Negative.    Psychiatric/Behavioral: Positive for behavioral problems and decreased concentration. The patient is nervous/anxious and is hyperactive.        Objective:     Physical Exam   Constitutional: He appears well-developed and well-nourished. He is active.   HENT:   Mouth/Throat: Mucous membranes are moist.   Cardiovascular: Normal rate and regular rhythm.   Pulmonary/Chest: Effort normal and breath sounds normal.   Neurological: He is alert.   Skin: Skin is warm and dry.       Assessment and Plan:     Attention deficit hyperactivity disorder (ADHD), combined type  -     dextroamphetamine-amphetamine (ADDERALL XR) 5 MG 24 hr capsule; Take 1 capsule (5 mg total) by mouth once daily.  Dispense: 30 capsule; Refill: 0  -     Nursing  communication    Allergic rhinitis, unspecified seasonality, unspecified trigger  -     levocetirizine (XYZAL) 2.5 mg/5 mL solution; Take 2.5 mLs (1.25 mg total) by mouth every evening.  Dispense: 148 mL; Refill: 1          Follow up in about 6 months (around 9/9/2020).     Yes

## 2023-08-17 NOTE — ASU PATIENT PROFILE, ADULT - NSICDXPASTMEDICALHX_GEN_ALL_CORE_FT
PAST MEDICAL HISTORY:  Anxiety and depression     Spivey esophagus     High cholesterol     HTN (hypertension)

## 2023-08-18 ENCOUNTER — INPATIENT (INPATIENT)
Facility: HOSPITAL | Age: 63
LOS: 2 days | Discharge: HOME | DRG: 393 | End: 2023-08-21
Attending: STUDENT IN AN ORGANIZED HEALTH CARE EDUCATION/TRAINING PROGRAM | Admitting: HOSPITALIST
Payer: COMMERCIAL

## 2023-08-18 VITALS — HEIGHT: 71 IN

## 2023-08-18 DIAGNOSIS — A41.9 SEPSIS, UNSPECIFIED ORGANISM: ICD-10-CM

## 2023-08-18 DIAGNOSIS — R74.01 ELEVATION OF LEVELS OF LIVER TRANSAMINASE LEVELS: ICD-10-CM

## 2023-08-18 DIAGNOSIS — K85.90 ACUTE PANCREATITIS WITHOUT NECROSIS OR INFECTION, UNSPECIFIED: ICD-10-CM

## 2023-08-18 DIAGNOSIS — Z29.9 ENCOUNTER FOR PROPHYLACTIC MEASURES, UNSPECIFIED: ICD-10-CM

## 2023-08-18 DIAGNOSIS — E78.5 HYPERLIPIDEMIA, UNSPECIFIED: ICD-10-CM

## 2023-08-18 DIAGNOSIS — R74.8 ABNORMAL LEVELS OF OTHER SERUM ENZYMES: ICD-10-CM

## 2023-08-18 DIAGNOSIS — I10 ESSENTIAL (PRIMARY) HYPERTENSION: ICD-10-CM

## 2023-08-18 DIAGNOSIS — K21.9 GASTRO-ESOPHAGEAL REFLUX DISEASE WITHOUT ESOPHAGITIS: ICD-10-CM

## 2023-08-18 DIAGNOSIS — R10.9 UNSPECIFIED ABDOMINAL PAIN: ICD-10-CM

## 2023-08-18 DIAGNOSIS — Z78.9 OTHER SPECIFIED HEALTH STATUS: Chronic | ICD-10-CM

## 2023-08-18 PROBLEM — N17.9 ACUTE KIDNEY FAILURE, UNSPECIFIED: Chronic | Status: ACTIVE | Noted: 2023-06-15

## 2023-08-18 PROBLEM — K22.70 BARRETT'S ESOPHAGUS WITHOUT DYSPLASIA: Chronic | Status: ACTIVE | Noted: 2023-08-17

## 2023-08-18 PROBLEM — F41.9 ANXIETY DISORDER, UNSPECIFIED: Chronic | Status: ACTIVE | Noted: 2023-08-17

## 2023-08-18 PROBLEM — F31.9 BIPOLAR DISORDER, UNSPECIFIED: Chronic | Status: ACTIVE | Noted: 2023-06-15

## 2023-08-18 PROBLEM — J45.909 UNSPECIFIED ASTHMA, UNCOMPLICATED: Chronic | Status: ACTIVE | Noted: 2023-06-15

## 2023-08-18 PROBLEM — E78.00 PURE HYPERCHOLESTEROLEMIA, UNSPECIFIED: Chronic | Status: ACTIVE | Noted: 2023-08-17

## 2023-08-18 LAB
ALBUMIN SERPL ELPH-MCNC: 3.4 G/DL — SIGNIFICANT CHANGE UP (ref 3.3–5)
ALBUMIN SERPL ELPH-MCNC: 4.8 G/DL — SIGNIFICANT CHANGE UP (ref 3.3–5)
ALP SERPL-CCNC: 140 U/L — HIGH (ref 40–120)
ALP SERPL-CCNC: 95 U/L — SIGNIFICANT CHANGE UP (ref 40–120)
ALT FLD-CCNC: 52 U/L — HIGH (ref 10–45)
ALT FLD-CCNC: 73 U/L — HIGH (ref 10–45)
ANION GAP SERPL CALC-SCNC: 13 MMOL/L — SIGNIFICANT CHANGE UP (ref 5–17)
ANION GAP SERPL CALC-SCNC: 24 MMOL/L — HIGH (ref 5–17)
APPEARANCE UR: CLEAR — SIGNIFICANT CHANGE UP
APTT BLD: 27.1 SEC — SIGNIFICANT CHANGE UP (ref 24.5–35.6)
AST SERPL-CCNC: 108 U/L — HIGH (ref 10–40)
AST SERPL-CCNC: 44 U/L — HIGH (ref 10–40)
BACTERIA # UR AUTO: NEGATIVE — SIGNIFICANT CHANGE UP
BASE EXCESS BLDV CALC-SCNC: -3.9 MMOL/L — LOW (ref -2–3)
BASE EXCESS BLDV CALC-SCNC: 1.3 MMOL/L — SIGNIFICANT CHANGE UP (ref -2–3)
BASE EXCESS BLDV CALC-SCNC: 2.8 MMOL/L — SIGNIFICANT CHANGE UP (ref -2–3)
BASOPHILS # BLD AUTO: 0.03 K/UL — SIGNIFICANT CHANGE UP (ref 0–0.2)
BASOPHILS # BLD AUTO: 0.04 K/UL — SIGNIFICANT CHANGE UP (ref 0–0.2)
BASOPHILS NFR BLD AUTO: 0.1 % — SIGNIFICANT CHANGE UP (ref 0–2)
BASOPHILS NFR BLD AUTO: 0.2 % — SIGNIFICANT CHANGE UP (ref 0–2)
BILIRUB SERPL-MCNC: 0.9 MG/DL — SIGNIFICANT CHANGE UP (ref 0.2–1.2)
BILIRUB SERPL-MCNC: 1 MG/DL — SIGNIFICANT CHANGE UP (ref 0.2–1.2)
BILIRUB UR-MCNC: NEGATIVE — SIGNIFICANT CHANGE UP
BLD GP AB SCN SERPL QL: NEGATIVE — SIGNIFICANT CHANGE UP
BUN SERPL-MCNC: 20 MG/DL — SIGNIFICANT CHANGE UP (ref 7–23)
BUN SERPL-MCNC: 20 MG/DL — SIGNIFICANT CHANGE UP (ref 7–23)
CA-I SERPL-SCNC: 1.12 MMOL/L — LOW (ref 1.15–1.33)
CA-I SERPL-SCNC: 1.19 MMOL/L — SIGNIFICANT CHANGE UP (ref 1.15–1.33)
CA-I SERPL-SCNC: 1.21 MMOL/L — SIGNIFICANT CHANGE UP (ref 1.15–1.33)
CALCIUM SERPL-MCNC: 8.5 MG/DL — SIGNIFICANT CHANGE UP (ref 8.4–10.5)
CALCIUM SERPL-MCNC: 9.8 MG/DL — SIGNIFICANT CHANGE UP (ref 8.4–10.5)
CHLORIDE BLDV-SCNC: 100 MMOL/L — SIGNIFICANT CHANGE UP (ref 96–108)
CHLORIDE BLDV-SCNC: 102 MMOL/L — SIGNIFICANT CHANGE UP (ref 96–108)
CHLORIDE BLDV-SCNC: 104 MMOL/L — SIGNIFICANT CHANGE UP (ref 96–108)
CHLORIDE SERPL-SCNC: 102 MMOL/L — SIGNIFICANT CHANGE UP (ref 96–108)
CHLORIDE SERPL-SCNC: 99 MMOL/L — SIGNIFICANT CHANGE UP (ref 96–108)
CO2 BLDV-SCNC: 23 MMOL/L — SIGNIFICANT CHANGE UP (ref 22–26)
CO2 BLDV-SCNC: 27 MMOL/L — HIGH (ref 22–26)
CO2 BLDV-SCNC: 27 MMOL/L — HIGH (ref 22–26)
CO2 SERPL-SCNC: 19 MMOL/L — LOW (ref 22–31)
CO2 SERPL-SCNC: 23 MMOL/L — SIGNIFICANT CHANGE UP (ref 22–31)
COLOR SPEC: YELLOW — SIGNIFICANT CHANGE UP
CREAT SERPL-MCNC: 1.28 MG/DL — SIGNIFICANT CHANGE UP (ref 0.5–1.3)
CREAT SERPL-MCNC: 1.31 MG/DL — HIGH (ref 0.5–1.3)
DIFF PNL FLD: NEGATIVE — SIGNIFICANT CHANGE UP
EGFR: 61 ML/MIN/1.73M2 — SIGNIFICANT CHANGE UP
EGFR: 62 ML/MIN/1.73M2 — SIGNIFICANT CHANGE UP
EOSINOPHIL # BLD AUTO: 0.01 K/UL — SIGNIFICANT CHANGE UP (ref 0–0.5)
EOSINOPHIL # BLD AUTO: 0.01 K/UL — SIGNIFICANT CHANGE UP (ref 0–0.5)
EOSINOPHIL NFR BLD AUTO: 0 % — SIGNIFICANT CHANGE UP (ref 0–6)
EOSINOPHIL NFR BLD AUTO: 0 % — SIGNIFICANT CHANGE UP (ref 0–6)
EPI CELLS # UR: 1 /HPF — SIGNIFICANT CHANGE UP
GAS PNL BLDV: 131 MMOL/L — LOW (ref 136–145)
GAS PNL BLDV: 137 MMOL/L — SIGNIFICANT CHANGE UP (ref 136–145)
GAS PNL BLDV: 138 MMOL/L — SIGNIFICANT CHANGE UP (ref 136–145)
GAS PNL BLDV: SIGNIFICANT CHANGE UP
GLUCOSE BLDV-MCNC: 103 MG/DL — HIGH (ref 70–99)
GLUCOSE BLDV-MCNC: 106 MG/DL — HIGH (ref 70–99)
GLUCOSE BLDV-MCNC: 96 MG/DL — SIGNIFICANT CHANGE UP (ref 70–99)
GLUCOSE SERPL-MCNC: 106 MG/DL — HIGH (ref 70–99)
GLUCOSE SERPL-MCNC: 121 MG/DL — HIGH (ref 70–99)
GLUCOSE UR QL: NEGATIVE — SIGNIFICANT CHANGE UP
HCO3 BLDV-SCNC: 22 MMOL/L — SIGNIFICANT CHANGE UP (ref 22–29)
HCO3 BLDV-SCNC: 26 MMOL/L — SIGNIFICANT CHANGE UP (ref 22–29)
HCO3 BLDV-SCNC: 26 MMOL/L — SIGNIFICANT CHANGE UP (ref 22–29)
HCT VFR BLD CALC: 33.7 % — LOW (ref 39–50)
HCT VFR BLD CALC: 41.2 % — SIGNIFICANT CHANGE UP (ref 39–50)
HCT VFR BLDA CALC: 35 % — LOW (ref 39–51)
HCT VFR BLDA CALC: 35 % — LOW (ref 39–51)
HCT VFR BLDA CALC: 43 % — SIGNIFICANT CHANGE UP (ref 39–51)
HGB BLD CALC-MCNC: 11.6 G/DL — LOW (ref 12.6–17.4)
HGB BLD CALC-MCNC: 11.8 G/DL — LOW (ref 12.6–17.4)
HGB BLD CALC-MCNC: 14.2 G/DL — SIGNIFICANT CHANGE UP (ref 12.6–17.4)
HGB BLD-MCNC: 11.7 G/DL — LOW (ref 13–17)
HGB BLD-MCNC: 13.9 G/DL — SIGNIFICANT CHANGE UP (ref 13–17)
HYALINE CASTS # UR AUTO: 0 /LPF — SIGNIFICANT CHANGE UP (ref 0–2)
IMM GRANULOCYTES NFR BLD AUTO: 0.5 % — SIGNIFICANT CHANGE UP (ref 0–0.9)
IMM GRANULOCYTES NFR BLD AUTO: 0.7 % — SIGNIFICANT CHANGE UP (ref 0–0.9)
INR BLD: 1.01 RATIO — SIGNIFICANT CHANGE UP (ref 0.85–1.18)
KETONES UR-MCNC: ABNORMAL
LACTATE BLDV-MCNC: 1.9 MMOL/L — SIGNIFICANT CHANGE UP (ref 0.5–2)
LACTATE BLDV-MCNC: 10.1 MMOL/L — CRITICAL HIGH (ref 0.5–2)
LACTATE BLDV-MCNC: 3.3 MMOL/L — HIGH (ref 0.5–2)
LEUKOCYTE ESTERASE UR-ACNC: NEGATIVE — SIGNIFICANT CHANGE UP
LIDOCAIN IGE QN: 138 U/L — HIGH (ref 7–60)
LIDOCAIN IGE QN: 183 U/L — HIGH (ref 7–60)
LYMPHOCYTES # BLD AUTO: 0.54 K/UL — LOW (ref 1–3.3)
LYMPHOCYTES # BLD AUTO: 0.56 K/UL — LOW (ref 1–3.3)
LYMPHOCYTES # BLD AUTO: 2.6 % — LOW (ref 13–44)
LYMPHOCYTES # BLD AUTO: 2.8 % — LOW (ref 13–44)
MCHC RBC-ENTMCNC: 30.2 PG — SIGNIFICANT CHANGE UP (ref 27–34)
MCHC RBC-ENTMCNC: 31.2 PG — SIGNIFICANT CHANGE UP (ref 27–34)
MCHC RBC-ENTMCNC: 33.7 GM/DL — SIGNIFICANT CHANGE UP (ref 32–36)
MCHC RBC-ENTMCNC: 34.7 GM/DL — SIGNIFICANT CHANGE UP (ref 32–36)
MCV RBC AUTO: 89.6 FL — SIGNIFICANT CHANGE UP (ref 80–100)
MCV RBC AUTO: 89.9 FL — SIGNIFICANT CHANGE UP (ref 80–100)
MONOCYTES # BLD AUTO: 0.64 K/UL — SIGNIFICANT CHANGE UP (ref 0–0.9)
MONOCYTES # BLD AUTO: 0.72 K/UL — SIGNIFICANT CHANGE UP (ref 0–0.9)
MONOCYTES NFR BLD AUTO: 3.2 % — SIGNIFICANT CHANGE UP (ref 2–14)
MONOCYTES NFR BLD AUTO: 3.5 % — SIGNIFICANT CHANGE UP (ref 2–14)
NEUTROPHILS # BLD AUTO: 18.85 K/UL — HIGH (ref 1.8–7.4)
NEUTROPHILS # BLD AUTO: 19.27 K/UL — HIGH (ref 1.8–7.4)
NEUTROPHILS NFR BLD AUTO: 93.1 % — HIGH (ref 43–77)
NEUTROPHILS NFR BLD AUTO: 93.3 % — HIGH (ref 43–77)
NITRITE UR-MCNC: NEGATIVE — SIGNIFICANT CHANGE UP
NRBC # BLD: 0 /100 WBCS — SIGNIFICANT CHANGE UP (ref 0–0)
NRBC # BLD: 0 /100 WBCS — SIGNIFICANT CHANGE UP (ref 0–0)
PCO2 BLDV: 33 MMHG — LOW (ref 42–55)
PCO2 BLDV: 40 MMHG — LOW (ref 42–55)
PCO2 BLDV: 40 MMHG — LOW (ref 42–55)
PH BLDV: 7.34 — SIGNIFICANT CHANGE UP (ref 7.32–7.43)
PH BLDV: 7.42 — SIGNIFICANT CHANGE UP (ref 7.32–7.43)
PH BLDV: 7.5 — HIGH (ref 7.32–7.43)
PH UR: 7.5 — SIGNIFICANT CHANGE UP (ref 5–8)
PLATELET # BLD AUTO: 228 K/UL — SIGNIFICANT CHANGE UP (ref 150–400)
PLATELET # BLD AUTO: 309 K/UL — SIGNIFICANT CHANGE UP (ref 150–400)
PO2 BLDV: 26 MMHG — SIGNIFICANT CHANGE UP (ref 25–45)
PO2 BLDV: 45 MMHG — SIGNIFICANT CHANGE UP (ref 25–45)
PO2 BLDV: 58 MMHG — HIGH (ref 25–45)
POTASSIUM BLDV-SCNC: 3.4 MMOL/L — LOW (ref 3.5–5.1)
POTASSIUM BLDV-SCNC: 3.9 MMOL/L — SIGNIFICANT CHANGE UP (ref 3.5–5.1)
POTASSIUM BLDV-SCNC: 4 MMOL/L — SIGNIFICANT CHANGE UP (ref 3.5–5.1)
POTASSIUM SERPL-MCNC: 3.6 MMOL/L — SIGNIFICANT CHANGE UP (ref 3.5–5.3)
POTASSIUM SERPL-MCNC: 3.9 MMOL/L — SIGNIFICANT CHANGE UP (ref 3.5–5.3)
POTASSIUM SERPL-SCNC: 3.6 MMOL/L — SIGNIFICANT CHANGE UP (ref 3.5–5.3)
POTASSIUM SERPL-SCNC: 3.9 MMOL/L — SIGNIFICANT CHANGE UP (ref 3.5–5.3)
PROT SERPL-MCNC: 5.6 G/DL — LOW (ref 6–8.3)
PROT SERPL-MCNC: 7.4 G/DL — SIGNIFICANT CHANGE UP (ref 6–8.3)
PROT UR-MCNC: ABNORMAL
PROTHROM AB SERPL-ACNC: 11.1 SEC — SIGNIFICANT CHANGE UP (ref 9.5–13)
RBC # BLD: 3.75 M/UL — LOW (ref 4.2–5.8)
RBC # BLD: 4.6 M/UL — SIGNIFICANT CHANGE UP (ref 4.2–5.8)
RBC # FLD: 11.9 % — SIGNIFICANT CHANGE UP (ref 10.3–14.5)
RBC # FLD: 12.5 % — SIGNIFICANT CHANGE UP (ref 10.3–14.5)
RBC CASTS # UR COMP ASSIST: 3 /HPF — SIGNIFICANT CHANGE UP (ref 0–4)
RH IG SCN BLD-IMP: POSITIVE — SIGNIFICANT CHANGE UP
SAO2 % BLDV: 39.3 % — LOW (ref 67–88)
SAO2 % BLDV: 80.7 % — SIGNIFICANT CHANGE UP (ref 67–88)
SAO2 % BLDV: 90.3 % — HIGH (ref 67–88)
SODIUM SERPL-SCNC: 138 MMOL/L — SIGNIFICANT CHANGE UP (ref 135–145)
SODIUM SERPL-SCNC: 142 MMOL/L — SIGNIFICANT CHANGE UP (ref 135–145)
SP GR SPEC: 1.02 — SIGNIFICANT CHANGE UP (ref 1.01–1.02)
UROBILINOGEN FLD QL: NEGATIVE — SIGNIFICANT CHANGE UP
WBC # BLD: 20.24 K/UL — HIGH (ref 3.8–10.5)
WBC # BLD: 20.68 K/UL — HIGH (ref 3.8–10.5)
WBC # FLD AUTO: 20.24 K/UL — HIGH (ref 3.8–10.5)
WBC # FLD AUTO: 20.68 K/UL — HIGH (ref 3.8–10.5)
WBC UR QL: 1 /HPF — SIGNIFICANT CHANGE UP (ref 0–5)

## 2023-08-18 PROCEDURE — 99222 1ST HOSP IP/OBS MODERATE 55: CPT

## 2023-08-18 PROCEDURE — 99291 CRITICAL CARE FIRST HOUR: CPT

## 2023-08-18 PROCEDURE — 76705 ECHO EXAM OF ABDOMEN: CPT | Mod: 26

## 2023-08-18 PROCEDURE — 74177 CT ABD & PELVIS W/CONTRAST: CPT | Mod: 26,MA

## 2023-08-18 PROCEDURE — 71045 X-RAY EXAM CHEST 1 VIEW: CPT | Mod: 26

## 2023-08-18 PROCEDURE — 99254 IP/OBS CNSLTJ NEW/EST MOD 60: CPT | Mod: GC

## 2023-08-18 RX ORDER — LAMOTRIGINE 25 MG/1
150 TABLET, ORALLY DISINTEGRATING ORAL
Refills: 0 | Status: DISCONTINUED | OUTPATIENT
Start: 2023-08-18 | End: 2023-08-21

## 2023-08-18 RX ORDER — CHOLECALCIFEROL (VITAMIN D3) 125 MCG
1 CAPSULE ORAL
Refills: 0 | DISCHARGE

## 2023-08-18 RX ORDER — SODIUM CHLORIDE 9 MG/ML
1000 INJECTION INTRAMUSCULAR; INTRAVENOUS; SUBCUTANEOUS ONCE
Refills: 0 | Status: COMPLETED | OUTPATIENT
Start: 2023-08-18 | End: 2023-08-18

## 2023-08-18 RX ORDER — ACETAMINOPHEN 500 MG
1000 TABLET ORAL ONCE
Refills: 0 | Status: COMPLETED | OUTPATIENT
Start: 2023-08-18 | End: 2023-08-18

## 2023-08-18 RX ORDER — PIPERACILLIN AND TAZOBACTAM 4; .5 G/20ML; G/20ML
3.38 INJECTION, POWDER, LYOPHILIZED, FOR SOLUTION INTRAVENOUS EVERY 8 HOURS
Refills: 0 | Status: DISCONTINUED | OUTPATIENT
Start: 2023-08-18 | End: 2023-08-20

## 2023-08-18 RX ORDER — ASPIRIN/CALCIUM CARB/MAGNESIUM 324 MG
1 TABLET ORAL
Refills: 0 | DISCHARGE

## 2023-08-18 RX ORDER — FLUOXETINE HCL 10 MG
1 CAPSULE ORAL
Refills: 0 | DISCHARGE

## 2023-08-18 RX ORDER — ATORVASTATIN CALCIUM 80 MG/1
80 TABLET, FILM COATED ORAL AT BEDTIME
Refills: 0 | Status: DISCONTINUED | OUTPATIENT
Start: 2023-08-18 | End: 2023-08-21

## 2023-08-18 RX ORDER — OXYCODONE HYDROCHLORIDE 5 MG/1
5 TABLET ORAL EVERY 6 HOURS
Refills: 0 | Status: DISCONTINUED | OUTPATIENT
Start: 2023-08-18 | End: 2023-08-21

## 2023-08-18 RX ORDER — ACETAMINOPHEN 500 MG
650 TABLET ORAL EVERY 6 HOURS
Refills: 0 | Status: DISCONTINUED | OUTPATIENT
Start: 2023-08-18 | End: 2023-08-21

## 2023-08-18 RX ORDER — HYDROMORPHONE HYDROCHLORIDE 2 MG/ML
0.5 INJECTION INTRAMUSCULAR; INTRAVENOUS; SUBCUTANEOUS ONCE
Refills: 0 | Status: DISCONTINUED | OUTPATIENT
Start: 2023-08-18 | End: 2023-08-18

## 2023-08-18 RX ORDER — LOSARTAN POTASSIUM 100 MG/1
1 TABLET, FILM COATED ORAL
Refills: 0 | DISCHARGE

## 2023-08-18 RX ORDER — ALBUTEROL 90 UG/1
2 AEROSOL, METERED ORAL
Refills: 0 | DISCHARGE

## 2023-08-18 RX ORDER — SODIUM CHLORIDE 9 MG/ML
500 INJECTION, SOLUTION INTRAVENOUS ONCE
Refills: 0 | Status: COMPLETED | OUTPATIENT
Start: 2023-08-18 | End: 2023-08-18

## 2023-08-18 RX ORDER — DOXAZOSIN MESYLATE 4 MG
4 TABLET ORAL AT BEDTIME
Refills: 0 | Status: DISCONTINUED | OUTPATIENT
Start: 2023-08-18 | End: 2023-08-21

## 2023-08-18 RX ORDER — PIPERACILLIN AND TAZOBACTAM 4; .5 G/20ML; G/20ML
3.38 INJECTION, POWDER, LYOPHILIZED, FOR SOLUTION INTRAVENOUS ONCE
Refills: 0 | Status: COMPLETED | OUTPATIENT
Start: 2023-08-18 | End: 2023-08-18

## 2023-08-18 RX ORDER — FLUTICASONE PROPIONATE 50 MCG
2 SPRAY, SUSPENSION NASAL
Refills: 0 | DISCHARGE

## 2023-08-18 RX ORDER — FLUOXETINE HCL 10 MG
40 CAPSULE ORAL
Refills: 0 | Status: DISCONTINUED | OUTPATIENT
Start: 2023-08-19 | End: 2023-08-19

## 2023-08-18 RX ORDER — PREGABALIN 225 MG/1
1000 CAPSULE ORAL DAILY
Refills: 0 | Status: DISCONTINUED | OUTPATIENT
Start: 2023-08-18 | End: 2023-08-19

## 2023-08-18 RX ORDER — LAMOTRIGINE 25 MG/1
1 TABLET, ORALLY DISINTEGRATING ORAL
Refills: 0 | DISCHARGE

## 2023-08-18 RX ORDER — QUETIAPINE FUMARATE 200 MG/1
1 TABLET, FILM COATED ORAL
Refills: 0 | DISCHARGE

## 2023-08-18 RX ORDER — SODIUM CHLORIDE 9 MG/ML
1000 INJECTION, SOLUTION INTRAVENOUS ONCE
Refills: 0 | Status: COMPLETED | OUTPATIENT
Start: 2023-08-18 | End: 2023-08-18

## 2023-08-18 RX ORDER — PANTOPRAZOLE SODIUM 20 MG/1
40 TABLET, DELAYED RELEASE ORAL
Refills: 0 | Status: DISCONTINUED | OUTPATIENT
Start: 2023-08-18 | End: 2023-08-19

## 2023-08-18 RX ORDER — IPRATROPIUM/ALBUTEROL SULFATE 18-103MCG
1 AEROSOL WITH ADAPTER (GRAM) INHALATION
Refills: 0 | DISCHARGE

## 2023-08-18 RX ORDER — PANTOPRAZOLE SODIUM 20 MG/1
40 TABLET, DELAYED RELEASE ORAL ONCE
Refills: 0 | Status: COMPLETED | OUTPATIENT
Start: 2023-08-18 | End: 2023-08-18

## 2023-08-18 RX ORDER — LAMOTRIGINE 25 MG/1
0.75 TABLET, ORALLY DISINTEGRATING ORAL
Refills: 0 | DISCHARGE

## 2023-08-18 RX ORDER — FLUOXETINE HCL 10 MG
40 CAPSULE ORAL
Refills: 0 | Status: DISCONTINUED | OUTPATIENT
Start: 2023-08-18 | End: 2023-08-19

## 2023-08-18 RX ORDER — LANOLIN ALCOHOL/MO/W.PET/CERES
3 CREAM (GRAM) TOPICAL AT BEDTIME
Refills: 0 | Status: DISCONTINUED | OUTPATIENT
Start: 2023-08-18 | End: 2023-08-21

## 2023-08-18 RX ORDER — OXYCODONE HYDROCHLORIDE 5 MG/1
10 TABLET ORAL EVERY 6 HOURS
Refills: 0 | Status: DISCONTINUED | OUTPATIENT
Start: 2023-08-18 | End: 2023-08-21

## 2023-08-18 RX ORDER — VANCOMYCIN HCL 1 G
1250 VIAL (EA) INTRAVENOUS EVERY 12 HOURS
Refills: 0 | Status: DISCONTINUED | OUTPATIENT
Start: 2023-08-18 | End: 2023-08-19

## 2023-08-18 RX ORDER — ASPIRIN/CALCIUM CARB/MAGNESIUM 324 MG
81 TABLET ORAL DAILY
Refills: 0 | Status: DISCONTINUED | OUTPATIENT
Start: 2023-08-18 | End: 2023-08-21

## 2023-08-18 RX ORDER — SODIUM CHLORIDE 9 MG/ML
2000 INJECTION, SOLUTION INTRAVENOUS ONCE
Refills: 0 | Status: COMPLETED | OUTPATIENT
Start: 2023-08-18 | End: 2023-08-18

## 2023-08-18 RX ORDER — SODIUM CHLORIDE 9 MG/ML
1000 INJECTION, SOLUTION INTRAVENOUS
Refills: 0 | Status: DISCONTINUED | OUTPATIENT
Start: 2023-08-18 | End: 2023-08-21

## 2023-08-18 RX ORDER — CETIRIZINE HYDROCHLORIDE 10 MG/1
1 TABLET ORAL
Refills: 0 | DISCHARGE

## 2023-08-18 RX ORDER — VANCOMYCIN HCL 1 G
1000 VIAL (EA) INTRAVENOUS ONCE
Refills: 0 | Status: COMPLETED | OUTPATIENT
Start: 2023-08-18 | End: 2023-08-18

## 2023-08-18 RX ADMIN — PIPERACILLIN AND TAZOBACTAM 25 GRAM(S): 4; .5 INJECTION, POWDER, LYOPHILIZED, FOR SOLUTION INTRAVENOUS at 09:09

## 2023-08-18 RX ADMIN — HYDROMORPHONE HYDROCHLORIDE 0.5 MILLIGRAM(S): 2 INJECTION INTRAMUSCULAR; INTRAVENOUS; SUBCUTANEOUS at 03:11

## 2023-08-18 RX ADMIN — PIPERACILLIN AND TAZOBACTAM 200 GRAM(S): 4; .5 INJECTION, POWDER, LYOPHILIZED, FOR SOLUTION INTRAVENOUS at 14:35

## 2023-08-18 RX ADMIN — SODIUM CHLORIDE 500 MILLILITER(S): 9 INJECTION, SOLUTION INTRAVENOUS at 21:57

## 2023-08-18 RX ADMIN — LAMOTRIGINE 150 MILLIGRAM(S): 25 TABLET, ORALLY DISINTEGRATING ORAL at 17:52

## 2023-08-18 RX ADMIN — Medication 250 MILLIGRAM(S): at 04:22

## 2023-08-18 RX ADMIN — Medication 166.67 MILLIGRAM(S): at 17:26

## 2023-08-18 RX ADMIN — PIPERACILLIN AND TAZOBACTAM 3.38 GRAM(S): 4; .5 INJECTION, POWDER, LYOPHILIZED, FOR SOLUTION INTRAVENOUS at 13:09

## 2023-08-18 RX ADMIN — PIPERACILLIN AND TAZOBACTAM 25 GRAM(S): 4; .5 INJECTION, POWDER, LYOPHILIZED, FOR SOLUTION INTRAVENOUS at 19:04

## 2023-08-18 RX ADMIN — SODIUM CHLORIDE 1000 MILLILITER(S): 9 INJECTION INTRAMUSCULAR; INTRAVENOUS; SUBCUTANEOUS at 14:52

## 2023-08-18 RX ADMIN — SODIUM CHLORIDE 125 MILLILITER(S): 9 INJECTION, SOLUTION INTRAVENOUS at 17:26

## 2023-08-18 RX ADMIN — SODIUM CHLORIDE 1000 MILLILITER(S): 9 INJECTION, SOLUTION INTRAVENOUS at 07:22

## 2023-08-18 RX ADMIN — PANTOPRAZOLE SODIUM 40 MILLIGRAM(S): 20 TABLET, DELAYED RELEASE ORAL at 10:58

## 2023-08-18 RX ADMIN — Medication 400 MILLIGRAM(S): at 03:45

## 2023-08-18 RX ADMIN — Medication 40 MILLIGRAM(S): at 17:52

## 2023-08-18 RX ADMIN — SODIUM CHLORIDE 2000 MILLILITER(S): 9 INJECTION, SOLUTION INTRAVENOUS at 01:56

## 2023-08-18 RX ADMIN — PIPERACILLIN AND TAZOBACTAM 200 GRAM(S): 4; .5 INJECTION, POWDER, LYOPHILIZED, FOR SOLUTION INTRAVENOUS at 01:56

## 2023-08-18 RX ADMIN — Medication 650 MILLIGRAM(S): at 17:26

## 2023-08-18 RX ADMIN — SODIUM CHLORIDE 1000 MILLILITER(S): 9 INJECTION, SOLUTION INTRAVENOUS at 06:22

## 2023-08-18 NOTE — H&P ADULT - ASSESSMENT
61 yo M with PMHx of gallstones, HTN, HLD, Bipolar, GERD, Jhon's Esophagus, asthma, hx of 6/19/23 hospitalization for choledocholithiasis with 9mm CBD dilation on MRCP, recent hx of ERCP 8/11 for recurrent choledolithiasis p/w sepsis 2/2 choledocholithias vs cholangitis  63 yo M with PMHx of gallstones, HTN, HLD, Bipolar, GERD, Jhon's Esophagus, asthma, hx of 6/19/23 hospitalization for choledocholithiasis with 9mm CBD dilation on MRCP, recent hx of ERCP 8/11 for recurrent choledolithiasis p/w sepsis 2/2 choledolithiasis +/ pancreatitis  63 yo M with PMHx of gallstones, HTN, HLD, Bipolar, GERD, Jhon's Esophagus, asthma, hx of 6/19/23 hospitalization for choledocholithiasis with 9mm CBD dilation on MRCP, recent hx of ERCP 8/11 for recurrent choledolithiasis p/w sepsis + pancreatitis

## 2023-08-18 NOTE — H&P ADULT - NSHPLABSRESULTS_GEN_ALL_CORE
INTERNAL MEDICINE INITIAL VISIT NOTE      CHIEF COMPLAINT     Chief Complaint   Patient presents with    [facility]       HPI     Jorge Bae III is a 35 y.o.  male who presents with depression, testosterone deficiency, hx addiction to rx drugs (Adderall), EtOH use, hx gross hematuria c neg w/u as per Urology, here today to establish care and for [facility] physical.    States he was previously addicted to Adderall and drinking heavily.  Went through rehab and was sober for about a year (was on Antabuse at one point which helped but did not like the s/e) but more recently has started drinking socially again.    States he was feeling depressed c SI at one point and has been seeing Dr. Bryant who started him on Wellbutrin which he has been taking for the past 6 mos.  Sees Dr. Bryant every few mos and also seeing a therapist.  Says psych has him on several supplements including Deplin, 5HTP, MindBlend, Baclofen (no longer taking due to night sweats, was getting off label for anxiety), Rhodiola, and cortisol manger.    Was then also seen by Dr. George London at [facility] in [place] to seek other treatment options for his depression.  States they did bloodwork and told him he had testosterone deficiency and started him on testosterone injections 0.75 weekly for the past 7 weeks.  States he feels significantly better on the injections and would like to stay on meds as it has helped his depression.    Past Medical History:  Past Medical History:   Diagnosis Date    Depression     Hematuria     Testosterone deficiency        Past Surgical History:  Past Surgical History:   Procedure Laterality Date    deviated septum repair         Home Medications:  Prior to Admission medications    Medication Sig Start Date End Date Taking? Authorizing Provider   buPROPion (WELLBUTRIN XL) 300 MG 24 hr tablet Take 300 mg by mouth once daily. 12/19/19  Yes Historical Provider, MD   testosterone cypionate  "(DEPOTESTOTERONE CYPIONATE) 200 mg/mL injection Inject 150 mg into the muscle every 7 days.   Yes Historical Provider, MD   diazePAM (VALIUM) 5 MG tablet Take 1 tablet (5 mg total) by mouth once. Take all 2-3 pills at once 30-45 minutes prior to procedure. for 1 dose 1/7/20 1/7/20  Jayden Silverio MD       Allergies:  Review of patient's allergies indicates:  No Known Allergies    Family History:  Family History   Problem Relation Age of Onset    Alzheimer's disease Mother     No Known Problems Father     Heart disease Maternal Grandfather        Social History:  Social History     Tobacco Use    Smoking status: Never Smoker    Smokeless tobacco: Never Used   Substance Use Topics    Alcohol use: Yes    Drug use: Not Currently     Types: Amphetamines       Review of Systems:  Review of Systems   Constitutional: Negative for appetite change, chills, fatigue, fever and unexpected weight change.   HENT: Negative for congestion, hearing loss and rhinorrhea.    Eyes: Negative for pain and visual disturbance.   Respiratory: Negative for cough, chest tightness, shortness of breath and wheezing.    Cardiovascular: Negative for chest pain, palpitations and leg swelling.   Gastrointestinal: Negative for abdominal distention and abdominal pain.   Endocrine: Negative for polydipsia and polyuria.   Genitourinary: Negative for decreased urine volume, discharge, dysuria and frequency.   Neurological: Negative for dizziness, weakness, numbness and headaches.   Psychiatric/Behavioral: Negative for behavioral problems and confusion.       Health Maintenance:   Immunizations:   Influenza Fall 2019   TDap 2015      Cancer Screening:  Colonoscopy:  rec at 48      PHYSICAL EXAM     /85   Pulse 100   Ht 5' 9" (1.753 m)   Wt 80 kg (176 lb 5.9 oz)   SpO2 97%   BMI 26.05 kg/m²     GEN - A+OX4, NAD   HEENT - PERRL, EOMI, OP clear  Neck - No thyromegaly or thyroid masses felt.  No cervical lymphadenopathy appreciated.  CV - " RRR, no m/r/g  Chest - CTAB, no wheezing, crackles, or rhonchi  Abd - S/NT/ND/+BS.   Ext - 2+BDP. No C/C/E.  LN - No LAD appreciated.  Skin - Normal color and texture, no rash, no skin lesions.      LABS     Lab Results   Component Value Date    WBC 6.29 02/03/2020    HGB 15.2 02/03/2020    HCT 47.8 02/03/2020    MCV 98 02/03/2020     02/03/2020     Sodium   Date Value Ref Range Status   02/03/2020 141 136 - 145 mmol/L Final     Potassium   Date Value Ref Range Status   02/03/2020 4.4 3.5 - 5.1 mmol/L Final     Chloride   Date Value Ref Range Status   02/03/2020 102 95 - 110 mmol/L Final     CO2   Date Value Ref Range Status   02/03/2020 28 23 - 29 mmol/L Final     Glucose   Date Value Ref Range Status   02/03/2020 88 70 - 110 mg/dL Final     BUN, Bld   Date Value Ref Range Status   02/03/2020 22 (H) 6 - 20 mg/dL Final     Creatinine   Date Value Ref Range Status   02/03/2020 0.9 0.5 - 1.4 mg/dL Final     Calcium   Date Value Ref Range Status   02/03/2020 9.5 8.7 - 10.5 mg/dL Final     Total Protein   Date Value Ref Range Status   02/03/2020 7.7 6.0 - 8.4 g/dL Final     Albumin   Date Value Ref Range Status   02/03/2020 4.2 3.5 - 5.2 g/dL Final     Total Bilirubin   Date Value Ref Range Status   02/03/2020 0.2 0.1 - 1.0 mg/dL Final     Comment:     For infants and newborns, interpretation of results should be based  on gestational age, weight and in agreement with clinical  observations.  Premature Infant recommended reference ranges:  Up to 24 hours.............<8.0 mg/dL  Up to 48 hours............<12.0 mg/dL  3-5 days..................<15.0 mg/dL  6-29 days.................<15.0 mg/dL       Alkaline Phosphatase   Date Value Ref Range Status   02/03/2020 79 55 - 135 U/L Final     AST   Date Value Ref Range Status   02/03/2020 92 (H) 10 - 40 U/L Final     ALT   Date Value Ref Range Status   02/03/2020 54 (H) 10 - 44 U/L Final     Anion Gap   Date Value Ref Range Status   02/03/2020 11 8 - 16 mmol/L Final      eGFR if    Date Value Ref Range Status   02/03/2020 >60.0 >60 mL/min/1.73 m^2 Final     eGFR if non    Date Value Ref Range Status   02/03/2020 >60.0 >60 mL/min/1.73 m^2 Final     Comment:     Calculation used to obtain the estimated glomerular filtration  rate (eGFR) is the CKD-EPI equation.        Lab Results   Component Value Date    HGBA1C 4.7 02/03/2020     Lab Results   Component Value Date    LDLCALC 133.6 02/03/2020     No results found for: TSH    ASSESSMENT/PLAN     Jorge Bae III is a 35 y.o. male with  Jorge was seen today for [facility].    Diagnoses and all orders for this visit:    Visit for annual health examination  History and physical exam completed.  Health maintenance reviewed as above.  All lab results d/w pt.  LFTs elevated likely due to recent EtOH use.  Will repeat labs in a month and advised EtOH cessation for now.  If persistent will rec abd u/s.  CXR abnormal.  Nonspecific findings.  Discussed close f/u CXR vs CT chest; pt opted for f/u CXR.  Risks and benefits were discussed with patient.  EKG c nonspecific TWI in inferior leads, asymptomatic, do not feel additional w/u needed at this time.    Major depression, chronic  As per HPI  Much better on wellbutrin and feels sx even better on testosterone  wellbutrin as per Dr. Annette deutsch c his therapist.  Will defer mgmt to psych.    Testosterone deficiency  Management as per Dr. George London.  Risks and benefits were discussed with patient.  Will obtain outside records for review.  Consider evaluation by urology here.    Alcohol use  Discussed cessation.  Discussed importance of AA; pt states he will try to make time to go and possibly stop drinking.  Rec continued f/u c psychiatry and his therapist.    HM as above    RTC in 3 months, sooner if needed and depending on labs.  F/u labs and CXR in a month.    Debi Crockett MD  Department of Internal Medicine - [facility]  02/06/2020   LABS:                           11.7   20.24 )-----------( 228      ( 18 Aug 2023 14:34 )             33.7     08-18    138  |  102  |  20  ----------------------------<  106<H>  3.6   |  23  |  1.28    Ca    8.5      18 Aug 2023 14:34    TPro  5.6<L>  /  Alb  3.4  /  TBili  0.9  /  DBili  x   /  AST  44<H>  /  ALT  52<H>  /  AlkPhos  95  08-18        PT/INR - ( 18 Aug 2023 01:05 )   PT: 11.1 sec;   INR: 1.01 ratio         PTT - ( 18 Aug 2023 01:05 )  PTT:27.1 sec      Urinalysis Basic - ( 18 Aug 2023 14:34 )    Color: x / Appearance: x / SG: x / pH: x  Gluc: 106 mg/dL / Ketone: x  / Bili: x / Urobili: x   Blood: x / Protein: x / Nitrite: x   Leuk Esterase: x / RBC: x / WBC x   Sq Epi: x / Non Sq Epi: x / Bacteria: x        LIVER FUNCTIONS - ( 18 Aug 2023 14:34 )  Alb: 3.4 g/dL / Pro: 5.6 g/dL / ALK PHOS: 95 U/L / ALT: 52 U/L / AST: 44 U/L / GGT: x             Blood Gas Profile w/Lytes - Venous: Performed In Lab (08-18-23 @ 14:32)  Blood Gas Profile w/Lytes - Venous: Performed In Lab (08-18-23 @ 04:23)  Blood Gas Profile w/Lytes - Venous: Performed In Lab (08-18-23 @ 02:33)    Blood Gas Profile w/Lytes - Venous: Performed In Lab (08-18-23 @ 14:32)  Blood Gas Profile w/Lytes - Venous: Performed In Lab (08-18-23 @ 04:23)  Blood Gas Profile w/Lytes - Venous: Performed In Lab (08-18-23 @ 02:33)    I&O's Summary         /     CAPILLARY BLOOD GLUCOSE                MICRO: Labs below are personally reviewed:                           11.7   20.24 )-----------( 228      ( 18 Aug 2023 14:34 )             33.7     08-18    138  |  102  |  20  ----------------------------<  106<H>  3.6   |  23  |  1.28    Ca    8.5      18 Aug 2023 14:34    TPro  5.6<L>  /  Alb  3.4  /  TBili  0.9  /  DBili  x   /  AST  44<H>  /  ALT  52<H>  /  AlkPhos  95  08-18        PT/INR - ( 18 Aug 2023 01:05 )   PT: 11.1 sec;   INR: 1.01 ratio         PTT - ( 18 Aug 2023 01:05 )  PTT:27.1 sec      Urinalysis Basic - ( 18 Aug 2023 14:34 )    Color: x / Appearance: x / SG: x / pH: x  Gluc: 106 mg/dL / Ketone: x  / Bili: x / Urobili: x   Blood: x / Protein: x / Nitrite: x   Leuk Esterase: x / RBC: x / WBC x   Sq Epi: x / Non Sq Epi: x / Bacteria: x        LIVER FUNCTIONS - ( 18 Aug 2023 14:34 )  Alb: 3.4 g/dL / Pro: 5.6 g/dL / ALK PHOS: 95 U/L / ALT: 52 U/L / AST: 44 U/L / GGT: x             Blood Gas Profile w/Lytes - Venous: Performed In Lab (08-18-23 @ 14:32)  Blood Gas Profile w/Lytes - Venous: Performed In Lab (08-18-23 @ 04:23)  Blood Gas Profile w/Lytes - Venous: Performed In Lab (08-18-23 @ 02:33)    Blood Gas Profile w/Lytes - Venous: Performed In Lab (08-18-23 @ 14:32)  Blood Gas Profile w/Lytes - Venous: Performed In Lab (08-18-23 @ 04:23)  Blood Gas Profile w/Lytes - Venous: Performed In Lab (08-18-23 @ 02:33)    I&O's Summary         /     CAPILLARY BLOOD GLUCOSE                MICRO:

## 2023-08-18 NOTE — PATIENT PROFILE ADULT - FALL HARM RISK - RISK INTERVENTIONS

## 2023-08-18 NOTE — CONSULT NOTE ADULT - SUBJECTIVE AND OBJECTIVE BOX
HPI:  AMELIA SHARMA is a 62 year old male with history of HTN, HLD, Bipolar, GERD, Jhon's esophagus, asthma, and gallstone pancreatitis, s/p ERCP 8/17/2023 for removal of biliary sludge who presents with fever and abdominal pain.    Patient underwent ERCP for history of gallstone pancreatitis on 8/17/2023, during which a sphincterotomy and balloon sweep was performed with removal of biliary sludge.  Later that day, patient developed fever, abdominal pain, and multiple episodes of N/V, prompting his presentation to [facility] ED.  Otherwise, patient denies weight loss, dysphagia, odynophagia, diarrhea, melena, hematemesis, hematochezia.    ROS:   General:  No fevers, chills, night sweats, fatigue  Eyes:  Good vision, no reported pain  ENT:  No sore throat, pain, runny nose  CV:  No pain, palpitations  Pulm:  No dyspnea, cough  GI:  See HPI, otherwise negative  :  No  incontinence, nocturia  Muscle:  No pain, weakness  Neuro:  No memory problems  Psych:  No insomnia, mood problems, depression  Endocrine:  No polyuria, polydipsia, cold/heat intolerance  Heme:  No petechiae, ecchymosis, easy bruisability  Skin:  No rash    PMHX/PSHX:    GERD (gastroesophageal reflux disease)    Spivey's esophagus with esophagitis    HTN (hypertension)    HLD (hyperlipidemia)    Transaminitis    Bipolar disorder    Asthma    Need for prophylactic measure    Fever of unknown origin    KIRA (acute kidney injury)    Anxiety and depression    HTN (hypertension)    Spivey esophagus    High cholesterol    No pertinent past surgical history    No significant past surgical history      Allergies:  No Known Allergies      Home Medications: reviewed  Hospital Medications:      Social History:   Tobacco: denies  Alcohol: denies  Recreational drugs: denies    Family history:    FH: Alzheimers disease (Mother)    FH: prostate cancer (Father)    FH: CVA (cerebrovascular accident) (Father)      Denies family history of colon cancer/polyps, stomach cancer/polyps, pancreatic cancer/masses, liver cancer/disease, ovarian cancer and endometrial cancer.    PHYSICAL EXAM:   Vital Signs:  Vital Signs Last 24 Hrs  T(C): 37.3 (18 Aug 2023 11:24), Max: 38.2 (18 Aug 2023 03:10)  T(F): 99.2 (18 Aug 2023 11:24), Max: 100.7 (18 Aug 2023 03:10)  HR: 76 (18 Aug 2023 11:24) (50 - 81)  BP: 96/57 (18 Aug 2023 11:24) (96/57 - 171/80)  BP(mean): 70 (18 Aug 2023 11:24) (67 - 75)  RR: 18 (18 Aug 2023 11:24) (10 - 18)  SpO2: 94% (18 Aug 2023 11:24) (93% - 100%)    Parameters below as of 18 Aug 2023 11:24  Patient On (Oxygen Delivery Method): room air      Daily Height in cm: 180.34 (18 Aug 2023 00:12)    Daily     GENERAL: no acute distress  NEURO: alert  HEENT: NCAT, no conjunctival pallor appreciated  CHEST: no respiratory distress, no accessory muscle use  CARDIAC: regular rate, +S1/S2  ABDOMEN: soft, mild RUQ tenderness to palpation, no rebound or guarding  EXTREMITIES: warm, well perfused  SKIN: no lesions noted    LABS: reviewed                        13.9   20.68 )-----------( 309      ( 18 Aug 2023 01:05 )             41.2     08-18    142  |  99  |  20  ----------------------------<  121<H>  3.9   |  19<L>  |  1.31<H>    Ca    9.8      18 Aug 2023 01:05    TPro  7.4  /  Alb  4.8  /  TBili  1.0  /  DBili  x   /  AST  108<H>  /  ALT  73<H>  /  AlkPhos  140<H>  08-18    LIVER FUNCTIONS - ( 18 Aug 2023 01:05 )  Alb: 4.8 g/dL / Pro: 7.4 g/dL / ALK PHOS: 140 U/L / ALT: 73 U/L / AST: 108 U/L / GGT: x               Diagnostic Studies: see sunrise for full report         HPI:  AMELIA SHARMA is a 62 year old male with history of HTN, HLD, Bipolar, GERD, Jhon's esophagus, asthma, and gallstone pancreatitis, s/p ERCP 8/17/2023 for removal of biliary sludge who presents with fever and abdominal pain.    Patient underwent ERCP for history of gallstone pancreatitis on 8/17/2023, during which a sphincterotomy and balloon sweep was performed with removal of biliary sludge.  Later that day, patient developed fever, abdominal pain, and multiple episodes of N/V, prompting his presentation to [facility] ED.  Otherwise, patient denies weight loss, dysphagia, odynophagia, diarrhea, melena, hematemesis, hematochezia.    ROS:   General:  No fevers, chills, night sweats, fatigue  Eyes:  Good vision, no reported pain  ENT:  No sore throat, pain, runny nose  CV:  No pain, palpitations  Pulm:  No dyspnea, cough  GI:  See HPI, otherwise negative  :  No  incontinence, nocturia  Muscle:  No pain, weakness  Neuro:  No memory problems  Psych:  No insomnia, mood problems, depression  Endocrine:  No polyuria, polydipsia, cold/heat intolerance  Heme:  No petechiae, ecchymosis, easy bruisability  Skin:  No rash    PMHX/PSHX:    GERD (gastroesophageal reflux disease)    Spivey's esophagus with esophagitis    HTN (hypertension)    HLD (hyperlipidemia)    Transaminitis    Bipolar disorder    Asthma    Need for prophylactic measure    Fever of unknown origin    KIRA (acute kidney injury)    Anxiety and depression    HTN (hypertension)    Spivey esophagus    High cholesterol    No pertinent past surgical history        Allergies:  No Known Allergies      Home Medications: reviewed  Hospital Medications:      Social History:   Tobacco: denies  Alcohol: denies  Recreational drugs: denies    Family history:    FH: Alzheimers disease (Mother)    FH: prostate cancer (Father)    FH: CVA (cerebrovascular accident) (Father)      Denies family history of colon cancer/polyps, stomach cancer/polyps, pancreatic cancer/masses, liver cancer/disease, ovarian cancer and endometrial cancer.    PHYSICAL EXAM:   Vital Signs:  Vital Signs Last 24 Hrs  T(C): 37.3 (18 Aug 2023 11:24), Max: 38.2 (18 Aug 2023 03:10)  T(F): 99.2 (18 Aug 2023 11:24), Max: 100.7 (18 Aug 2023 03:10)  HR: 76 (18 Aug 2023 11:24) (50 - 81)  BP: 96/57 (18 Aug 2023 11:24) (96/57 - 171/80)  BP(mean): 70 (18 Aug 2023 11:24) (67 - 75)  RR: 18 (18 Aug 2023 11:24) (10 - 18)  SpO2: 94% (18 Aug 2023 11:24) (93% - 100%)    Parameters below as of 18 Aug 2023 11:24  Patient On (Oxygen Delivery Method): room air      Daily Height in cm: 180.34 (18 Aug 2023 00:12)        GENERAL: no acute distress  NEURO: alert  HEENT: NCAT, no conjunctival pallor appreciated  CHEST: no respiratory distress, no accessory muscle use  CARDIAC: regular rate, +S1/S2  ABDOMEN: soft, mild RUQ tenderness to palpation, no rebound or guarding  EXTREMITIES: warm, well perfused  SKIN: no lesions noted    LABS: reviewed                        13.9   20.68 )-----------( 309      ( 18 Aug 2023 01:05 )             41.2     08-18    142  |  99  |  20  ----------------------------<  121<H>  3.9   |  19<L>  |  1.31<H>    Ca    9.8      18 Aug 2023 01:05    TPro  7.4  /  Alb  4.8  /  TBili  1.0  /  DBili  x   /  AST  108<H>  /  ALT  73<H>  /  AlkPhos  140<H>  08-18    LIVER FUNCTIONS - ( 18 Aug 2023 01:05 )  Alb: 4.8 g/dL / Pro: 7.4 g/dL / ALK PHOS: 140 U/L / ALT: 73 U/L / AST: 108 U/L / GGT: x               Diagnostic Studies: see sunrise for full report

## 2023-08-18 NOTE — ED PROVIDER NOTE - CLINICAL SUMMARY MEDICAL DECISION MAKING FREE TEXT BOX
63yo M w/ hx HTN, HLD, Bipolar, GERD, Jhon's Esophagus Asthma presenting with vomiting. Pt had ERCP yesterday for gallbladder related pancreatitis where sludge was removed. Pt tolerated procedure well, went home and tolerated po. Last night pt started having fever, nausea, nonbloody vomiting, epigastric/ruq abd pain. Symptoms similar to original presentation prior to ercp where he had fever and elevated LFTs. No cp, sob. Exam shows febrile pt, ttp ruq/epigastric. not peritonitic. c/f recurrent gallstone pancreatitis. will get labs, US, pain control.

## 2023-08-18 NOTE — CONSULT NOTE ADULT - ATTENDING COMMENTS
As above.    Impression:    #1.  Choledocholithiasis/biliary sludge diagnosed by MRCP during episode of gallstone pancreatitis, removed by outpatient ERCP 8/17/2023, with return to hospital for fever, epigastric pain, nausea, vomiting  #2.  Post ERCP pancreatitis based on clinical symptoms, leukocytosis, mild elevation of lipase, CT scan demonstrating inflammatory changes of the pancreatic head, duodenum and gallbladder.  #3.  Stable biliary dilation to 12 mm on CT scan this admission.    #4.  6 mm small pancreatic cyst on prior MRI/MRCP.    Recommendations:    #1.  Follow CBC/LFTs  #2.  IV fluids and antibiotics   #3.  Pain control  #4.  Diet as tolerated

## 2023-08-18 NOTE — ED ADULT NURSE NOTE - OBJECTIVE STATEMENT
61 y/o M A&Ox4 with PMH of HTN, HLD and GERD. Pt presents to the ED c/o vomiting. Pt reports he had an endoscopy performed today and he began having nausea, vomiting, fever, and chills a few hours after. Pt reports he had 4 episodes of vomiting; denies blood in vomit. Pt endorses epigastric abdominal pain. Pt reports he was discharged from hospital after being found to have gallstones/ sludge in gallbladder in June. Denies chest pain, SOB, diarrhea, dizziness, HA at this time. Upon arrival to ED, pt actively vomiting and agitated. IV access established. Patient placed on cardiac monitor. Patient safety and comfort measures implemented.

## 2023-08-18 NOTE — ED PROVIDER NOTE - SHIFT CHANGE DETAILS
Stephen Vee MD FACEP note of transfer at the usual time of sign out: Receiving team will follow up on labs, analgesia, any clinical imaging, reassess and disposition as clinically indicated.  Details of patient and plan conveyed to receiving physician and conveyed back for understanding.  There were no questions at this time about the patient's status, disposition, and plan. Patient's care to be taken over by receiving physician at this time, all decisions regarding the progression of care will be made at their discretion.

## 2023-08-18 NOTE — H&P ADULT - PROBLEM SELECTOR PLAN 3
-lipase 138  -unlikely to be pancreatitis since lipase not 3x upper limit of normal also no CT findings  CT: fat stranding about the pancreatic head and uncinate process, contiguous with the periduodenal infiltration. Trace fluid tracking along the anterior pararenal fascia  -outpatient GI records documents  a 6 mm nonspecific cystic lesion in the pancreatic head on MRCP  -may elevated due to cyst     PLAN  -ctm for abdominal pain -c/w home med -downtrending  -likely 2/2 from biliary back from gallstone    PLAN  -ctm LFTs

## 2023-08-18 NOTE — CONSULT NOTE ADULT - ASSESSMENT
62 year old male with history of HTN, HLD, Bipolar, GERD, Jhon's esophagus, asthma, and gallstone pancreatitis, s/p ERCP 8/17/2023 for removal of biliary sludge who presents with fever and abdominal pain.  Patient underwent ERCP for history of gallstone pancreatitis on 8/17/2023, during which a sphincterotomy and balloon sweep was performed with removal of biliary sludge.  Later that day, patient developed fever, abdominal pain, and multiple episodes of N/V, prompting his presentation to [facility] ED.  Otherwise, patient denies weight loss, dysphagia, odynophagia, diarrhea, melena, hematemesis, hematochezia.    # Fever  # RUQ pain  # N/V  # Leukocytosis  # Lactic acidosis, improved  # History of gallstone pancreatitis s/p ERCP  -EGD/EUS/ERCP 8/17/2023 revealed a large periampullary diverticulum, hyperechoic material consistent with sludge in the lower third of the main bile duct with lymphadenopathy. There was sludge in the gallbladder.  ERCP revealed dilated CBD with sludge s/p sphincterotomy, balloon extraction of debris and sludge.  Several islands of salmon-colored mucosa at the distal esophagus concerning for Barretts esophagus.  Erythematous mucosa in the stomach s/p biopsy.  Multiple large duodenal diverticula.    Recommendations:  -trend clinical symptoms, exam findings, vital signs, CBC, CMP, INR  -complete infectious workup and f/u BCx  -IVF and empiric antibiotics following culture collection  -f/u CT A/P    Note incomplete until finalized by attending signature/attestation.    Adama Mallory  GI/Hepatology Fellow    MONDAY-FRIDAY 8AM-5PM:  Pager# [phone] ([facility]) or [phone] ([facility])    NON-URGENT CONSULTS:  Please email [email or web address]  AT NIGHT AND ON WEEKENDS:  Contact on-call GI fellow via answering service ([phone]) from 5pm-8am and on weekends/holidays

## 2023-08-18 NOTE — H&P ADULT - PROBLEM SELECTOR PLAN 2
-AST/ALT: 44/52  -Alk phos: 95  -more likely to be a hepatic source vs biliary source give normal alk phos  -hepatitis last admission neg  -may be due to hypotensions    PLAN  -trend LFTs   -ctm #Choledocholithiasis  -ab pain + 183 lipase  -hx of gallstones and s/p ERCP  -RUQ U/S: Dilatation of the common bile duct up to 11 mm, a new finding compared   to the previous ultrasound. There is also mild intrahepatic biliary dilatation  -CT: Fatstranding about the pancreatic head and uncinate process, contiguous with the periduodenal infiltration. Trace fluid tracking along the anterior pararenal fascia.  -most likely pancreatitis 2/2 gall stone    PLAN  -IVF  -tylenol for pain meds  -ctm #Choledocholithiasis  -ab pain + 183 lipase  -hx of gallstones and s/p ERCP  -RUQ U/S: Dilatation of the common bile duct up to 11 mm, a new finding compared   to the previous ultrasound. There is also mild intrahepatic biliary dilatation  -CT: Fatstranding about the pancreatic head and uncinate process, contiguous with the periduodenal infiltration. Trace fluid tracking along the anterior pararenal fascia.  -most likely pancreatitis 2/2 gall stone    PLAN  -IVF  -tylenol for pain meds  -ctm  -c/s surgery for cholecystectomy for recurrent hx of choledocholithiasis requiring hospitalization

## 2023-08-18 NOTE — CHART NOTE - NSCHARTNOTEFT_GEN_A_CORE
Search Terms: David Sher, 1960Search Date: 08/18/2023 19:25:36 PM  Searching on behalf of: [facility]  The Drug Utilization Report below displays all of the controlled substance prescriptions, if any, that your patient has filled in the last twelve months. The information displayed on this report is compiled from pharmacy submissions to the Department, and accurately reflects the information as submitted by the pharmacies.    This report was requested by: Michael Perez | Reference #: [id number]    Practitioner Count: 1  Pharmacy Count: 1  Current Opioid Prescriptions: 0  Current Benzodiazepine Prescriptions: 1  Current Stimulant Prescriptions: 0      Patient Demographic Information (PDI)       PDI	First Name	Last Name	Birth Date	Gender	Street Address	[facility]  A	David Sher	1960	Male	[address] [facility]	[address]  B	David Sher	1960	Male	[address] [facility]	[address]    Prescription Information      PDI Filter:    PDI	Current Rx	Drug Type	Rx Written	Rx Dispensed	Drug	Quantity	Days Supply  A	Y	B	07/17/2023	07/25/2023	lorazepam 1 mg tablet	360	90  Prescriber Name Gomez Sánchez MD  Prescriber RADHA # [id number]  Payment Method Insurance  Dispenser [facility]  B	N	B	12/19/2022	12/22/2022	lorazepam 1 mg tablet	360	90  Prescriber Name Gomez Sánchez MD  Prescriber RADHA # [id number]  Payment Method Insurance  Dispenser [facility] #[id number]

## 2023-08-18 NOTE — H&P ADULT - ATTENDING COMMENTS
61 yo M with PMHx of gallstones, HTN, HLD, Bipolar, GERD, Jhon's Esophagus, asthma, hx of 6/19/23 hospitalization for choledocholithiasis with 9mm CBD dilation on MRCP, recent hx of ERCP 8/11 for recurrent choledolithiasis who now presents with fever, abdominal pain. Patient states he had fever last night and low grade fever this morning with increased epigastric and abdominal pain. Labs personally reviewed: has wbc of 20.68, Scr stable at 1.31, alk phos 140, , ALT 73, which are improving. Lipase elevated at 183. US and CT scan showing CBD dilation. Admitted for sepsis 2/2 possible GI infection and with acute pancreatitis. C/w Zosyn and Vanc, f/u BCx and MRSA swab. ID consult in AM. c/w IVF and c/w pain medicine. Start CLD now. Appreciate GI recs.    d/w HS4

## 2023-08-18 NOTE — ED ADULT NURSE REASSESSMENT NOTE - NS ED NURSE REASSESS COMMENT FT1
Patient reported wanting to take home medications. Patient and wife @ bedside educated that the patient is not to take any medications. Patient explained that he is admitted and med reconcile will take place with admitting MD. Labs drawn and sent and awaiting admission to MEDICINE.

## 2023-08-18 NOTE — ED ADULT NURSE NOTE - NSFALLRISKINTERV_ED_ALL_ED
Provide visual cue: yellow wristband, yellow gown, etc/Bed in lowest position, wheels locked, appropriate side rails in place/Call bell, personal items and telephone in reach/[place] to call system/Physically safe environment - no spills, clutter or unnecessary equipment/Purposeful Proactive Rounding/Room/bathroom lighting operational, light cord in reach

## 2023-08-18 NOTE — ED ADULT NURSE REASSESSMENT NOTE - NS ED NURSE REASSESS COMMENT FT1
MD Lacey OK patient to take home medications. Patient self administered Ativan 1mg, Seroquel 50mg, Lamictal 150mg, Crestor 20mg, and OTC Zyrtec.

## 2023-08-18 NOTE — ED PROVIDER NOTE - ATTENDING CONTRIBUTION TO CARE
Patient presents with nausea/vomiting from triage. Patient is status post ERCP yesterday and was told to take a light diet, now presents with emesis of food. Moderate to severe in nature. Patient has had fever.  + abdominal pain.  right upper quadrant tenderness to palpation  guarding  severe distress secondary to pain  trachea midline  no rebound/guarding  no gross deformity of extremities, no asymmetry  Stephen Vee MD, FACEP: In this physician's medical judgement based on clinical history and physical exam the patient's signs and symptoms lead to differential diagnoses which includes but is not limited to: post ERCP complication, persistent choledocholithiases, other biliary obstruction    Historical features, symptoms, and clinical exam not consistent with: abdominal perforation    Labs were ordered and independently reviewed by me.  EKG was ordered and independently reviewed by me.  Imaging was ordered and reviewed by me.      Appropriate medications for the patient's presenting complaints were ordered, and effects were reassessed.     Patient's records including prior hospital visit, med and medical history were reviewed.   History assisted by wife at bedside  Escalation to admission/observation was considered.    Will follow up on labs, therapeutics, imaging, reassess and disposition as clinically indicated.  *The above represents an initial assessment/impression. Please refer to my progress notes below for potential changes in patient clinical course*

## 2023-08-18 NOTE — ED ADULT NURSE REASSESSMENT NOTE - NS ED NURSE REASSESS COMMENT FT1
Patient returned from ultrasound. He is awake and axo x3. Patient is breathing spontaneous and unlabored on room air. No signs of respiratory distress @ this time.  Patient has a 20G PIV in the hand and a left 20G AC that is C/D/I. PIV are infusing Vancomycin as well as 1L of LR. Abdomen is soft, rounded, nondistended, and TTP in the RUQ, however PT denies pain and complaints @ this time. Awaiting MD Reassessment.

## 2023-08-18 NOTE — ED ADULT NURSE REASSESSMENT NOTE - NS ED NURSE REASSESS COMMENT FT1
5Monti called in attempt to give report to Nadege SHULTZ. Per Nadege SHULTZ she will call back in a few minutes.

## 2023-08-18 NOTE — H&P ADULT - PROBLEM SELECTOR PLAN 1
#Choledocholithiasis  #Cholangitis   -20.24 WBC with neutrophilic predominance + Tmax 100.7 = SIRS criteria pos  -hx of gall stones and recurrent choledocholithiasis  -lipase mildly elevated to 160   -CT: Dilated common bile duct up to 12 mm, not significantly changed since   7/12/2023; fat stranding about the pancreatic head and uncinate process, contiguous with the periduodenal infiltration. Trace fluid tracking along the anterior pararenal fascia.    PLAN  -GI following  -c/w vanc and zosyn  -f/u bcx -20.24 WBC with neutrophilic predominance + Tmax 100.7 = SIRS criteria pos  -CT: Dilated common bile duct up to 12 mm, not significantly changed since   7/12/2023; fat stranding about the pancreatic head and uncinate process, contiguous with the periduodenal infiltration. Trace fluid tracking along the anterior pararenal fascia.    PLAN  -GI following  -source: bacterial translocation from ERCP  -c/w vanc and zosyn  -f/u bcx  -IVF  -serial labs and trend vitals -20.24 WBC with neutrophilic predominance + Tmax 100.7 = SIRS criteria pos  -CT: Dilated common bile duct up to 12 mm, not significantly changed since   7/12/2023; fat stranding about the pancreatic head and uncinate process, contiguous with the periduodenal infiltration. Trace fluid tracking along the anterior pararenal fascia.    PLAN  -GI following  -source: bacterial translocation from ERCP per GI  -c/w vanc and zosyn  -f/u bcx  -IVF  -serial labs and trend vitals

## 2023-08-18 NOTE — H&P ADULT - NSHPPHYSICALEXAM_GEN_ALL_CORE
VITALS:   T(C): 37.3 (08-18-23 @ 11:24), Max: 38.2 (08-18-23 @ 03:10)  HR: 76 (08-18-23 @ 11:24) (71 - 81)  BP: 96/57 (08-18-23 @ 11:24) (96/57 - 160/88)  RR: 18 (08-18-23 @ 11:24) (18 - 18)  SpO2: 94% (08-18-23 @ 11:24) (94% - 100%)    GENERAL: NAD, lying in bed comfortably  HEAD:  Atraumatic, normocephalic  EYES: EOMI, PERRLA, conjunctiva and sclera clear  ENT: Moist mucous membranes  NECK: Supple, no JVD  HEART: Regular rate and rhythm, no murmurs, rubs, or gallops  LUNGS: Unlabored respirations.  Clear to auscultation bilaterally, no crackles, wheezing, or rhonchi  ABDOMEN: Soft, distended, generalized tenderness RUQ +, pos hyman's sign, neg fluid wave sign   EXTREMITIES: 2+ peripheral pulses bilaterally. Tracing pitting edema  NERVOUS SYSTEM:  A&Ox3, no focal deficits   SKIN: No rashes or lesions

## 2023-08-18 NOTE — ED ADULT NURSE NOTE - PRO INTERPRETER NEED 2
Patient completed labs Dr. Rona Jean ordered on 10/19/2020. Please cancel lab appointment - not needed.
Patient scheduled for lab appointment on 10/28/2020 ordered by  [facility]. Orders are missing. Please enter or extend lab orders prior to the appointment noted above.      Thank you
English

## 2023-08-18 NOTE — ED PROVIDER NOTE - OBJECTIVE STATEMENT
63yo M w/ hx HTN, HLD, Bipolar, GERD, Jhon's Esophagus Asthma presenting with vomiting. Pt had ERCP yesterday for gallbladder related pancreatitis where sludge was removed. Pt tolerated procedure well, went home and tolerated po. Last night pt started having fever, nausea, nonbloody vomiting, epigastric/ruq abd pain. Symptoms similar to original presentation prior to ercp where he had fever and elevated LFTs. No cp, sob.

## 2023-08-18 NOTE — H&P ADULT - HISTORY OF PRESENT ILLNESS
61 yo M with PMHx of gallstones, HTN, HLD, Bipolar, GERD, Jhon's Esophagus, asthma, hx of 6/19/23 hospitalization for choledocholithiasis with 9mm CBD dilation on MRCP, recent hx of ERCP 8/11 for recurrent choledolithiasis p/w vomitting x  61 yo M with PMHx of gallstones, HTN, HLD, Bipolar, GERD, Jhon's Esophagus, asthma, hx of 6/19/23 hospitalization for choledocholithiasis with 9mm CBD dilation on MRCP, recent hx of ERCP 8/11 for removal biliary sludge p/w abdominal pain x 1 day. Pt complains of a 10/10 dull non-radiating RUQ pain that's been worsening since yesterday, Pt states he's had 4 episodes of NBNB vomitting since yesterday. The pain is constant and pt states he has not been able to eat nor drink since the onset of the pain. He denies, HA, CP, SOB, constipation, diarrhea, hematochezia, and melena.

## 2023-08-18 NOTE — ED PROVIDER NOTE - PHYSICAL EXAMINATION
General appearance: NAD, conversant, febrile    Eyes: anicteric sclerae, MARIA ISABEL, EOMI   HENT: Atraumatic; oropharynx clear, MMM and no ulcerations, no pharyngeal erythema or exudate   Neck: Trachea midline; Full range of motion, supple   Pulm: CTA bl, normal respiratory effort and no intercostal retractions, normal work of breathing   CV: RRR, No murmurs, rubs, or gallops. 2+ peripheral pulses.   Abdomen: RUQ, epigastric ttp, not peritonitic. Soft, non-distended; no guarding or rebound   Extremities: No peripheral edema or extremity lymphadenopathy. 5/5 strength in all four extremities.   Skin: Dry, normal temperature, turgor and texture; no rash, ulcers or subcutaneous nodules   Psych: Appropriate affect, cooperative; alert and oriented to person, place and time

## 2023-08-19 LAB
ALBUMIN SERPL ELPH-MCNC: 3.2 G/DL — LOW (ref 3.3–5)
ALP SERPL-CCNC: 96 U/L — SIGNIFICANT CHANGE UP (ref 40–120)
ALT FLD-CCNC: 46 U/L — HIGH (ref 10–45)
ANION GAP SERPL CALC-SCNC: 14 MMOL/L — SIGNIFICANT CHANGE UP (ref 5–17)
AST SERPL-CCNC: 32 U/L — SIGNIFICANT CHANGE UP (ref 10–40)
BILIRUB SERPL-MCNC: 0.7 MG/DL — SIGNIFICANT CHANGE UP (ref 0.2–1.2)
BUN SERPL-MCNC: 22 MG/DL — SIGNIFICANT CHANGE UP (ref 7–23)
CALCIUM SERPL-MCNC: 8.4 MG/DL — SIGNIFICANT CHANGE UP (ref 8.4–10.5)
CHLORIDE SERPL-SCNC: 102 MMOL/L — SIGNIFICANT CHANGE UP (ref 96–108)
CO2 SERPL-SCNC: 22 MMOL/L — SIGNIFICANT CHANGE UP (ref 22–31)
CREAT SERPL-MCNC: 1.52 MG/DL — HIGH (ref 0.5–1.3)
CULTURE RESULTS: SIGNIFICANT CHANGE UP
EGFR: 51 ML/MIN/1.73M2 — LOW
GLUCOSE SERPL-MCNC: 93 MG/DL — SIGNIFICANT CHANGE UP (ref 70–99)
HCT VFR BLD CALC: 32.2 % — LOW (ref 39–50)
HGB BLD-MCNC: 10.6 G/DL — LOW (ref 13–17)
MAGNESIUM SERPL-MCNC: 1.6 MG/DL — SIGNIFICANT CHANGE UP (ref 1.6–2.6)
MCHC RBC-ENTMCNC: 30.6 PG — SIGNIFICANT CHANGE UP (ref 27–34)
MCHC RBC-ENTMCNC: 32.9 GM/DL — SIGNIFICANT CHANGE UP (ref 32–36)
MCV RBC AUTO: 93.1 FL — SIGNIFICANT CHANGE UP (ref 80–100)
MRSA PCR RESULT.: SIGNIFICANT CHANGE UP
NRBC # BLD: 0 /100 WBCS — SIGNIFICANT CHANGE UP (ref 0–0)
PHOSPHATE SERPL-MCNC: 2.7 MG/DL — SIGNIFICANT CHANGE UP (ref 2.5–4.5)
PLATELET # BLD AUTO: 204 K/UL — SIGNIFICANT CHANGE UP (ref 150–400)
POTASSIUM SERPL-MCNC: 3.5 MMOL/L — SIGNIFICANT CHANGE UP (ref 3.5–5.3)
POTASSIUM SERPL-SCNC: 3.5 MMOL/L — SIGNIFICANT CHANGE UP (ref 3.5–5.3)
PROT SERPL-MCNC: 5.4 G/DL — LOW (ref 6–8.3)
RBC # BLD: 3.46 M/UL — LOW (ref 4.2–5.8)
RBC # FLD: 13.2 % — SIGNIFICANT CHANGE UP (ref 10.3–14.5)
S AUREUS DNA NOSE QL NAA+PROBE: SIGNIFICANT CHANGE UP
SODIUM SERPL-SCNC: 138 MMOL/L — SIGNIFICANT CHANGE UP (ref 135–145)
SPECIMEN SOURCE: SIGNIFICANT CHANGE UP
WBC # BLD: 13.51 K/UL — HIGH (ref 3.8–10.5)
WBC # FLD AUTO: 13.51 K/UL — HIGH (ref 3.8–10.5)

## 2023-08-19 PROCEDURE — 99233 SBSQ HOSP IP/OBS HIGH 50: CPT

## 2023-08-19 PROCEDURE — 99232 SBSQ HOSP IP/OBS MODERATE 35: CPT | Mod: GC

## 2023-08-19 RX ORDER — FLUOXETINE HCL 10 MG
40 CAPSULE ORAL
Refills: 0 | Status: DISCONTINUED | OUTPATIENT
Start: 2023-08-19 | End: 2023-08-19

## 2023-08-19 RX ORDER — CHLORHEXIDINE GLUCONATE 213 G/1000ML
1 SOLUTION TOPICAL DAILY
Refills: 0 | Status: DISCONTINUED | OUTPATIENT
Start: 2023-08-19 | End: 2023-08-21

## 2023-08-19 RX ORDER — MAGNESIUM SULFATE 500 MG/ML
2 VIAL (ML) INJECTION ONCE
Refills: 0 | Status: COMPLETED | OUTPATIENT
Start: 2023-08-19 | End: 2023-08-19

## 2023-08-19 RX ORDER — CHOLECALCIFEROL (VITAMIN D3) 125 MCG
400 CAPSULE ORAL DAILY
Refills: 0 | Status: DISCONTINUED | OUTPATIENT
Start: 2023-08-19 | End: 2023-08-21

## 2023-08-19 RX ORDER — ERGOCALCIFEROL 1.25 MG/1
50000 CAPSULE ORAL ONCE
Refills: 0 | Status: DISCONTINUED | OUTPATIENT
Start: 2023-08-19 | End: 2023-08-19

## 2023-08-19 RX ORDER — ACETAMINOPHEN 500 MG
1000 TABLET ORAL ONCE
Refills: 0 | Status: COMPLETED | OUTPATIENT
Start: 2023-08-19 | End: 2023-08-19

## 2023-08-19 RX ORDER — PANTOPRAZOLE SODIUM 20 MG/1
40 TABLET, DELAYED RELEASE ORAL
Refills: 0 | Status: DISCONTINUED | OUTPATIENT
Start: 2023-08-19 | End: 2023-08-21

## 2023-08-19 RX ORDER — POTASSIUM CHLORIDE 20 MEQ
40 PACKET (EA) ORAL ONCE
Refills: 0 | Status: COMPLETED | OUTPATIENT
Start: 2023-08-19 | End: 2023-08-19

## 2023-08-19 RX ORDER — FLUOXETINE HCL 10 MG
40 CAPSULE ORAL
Refills: 0 | Status: DISCONTINUED | OUTPATIENT
Start: 2023-08-19 | End: 2023-08-21

## 2023-08-19 RX ORDER — FLUOXETINE HCL 10 MG
40 CAPSULE ORAL
Refills: 0 | Status: DISCONTINUED | OUTPATIENT
Start: 2023-08-20 | End: 2023-08-21

## 2023-08-19 RX ORDER — QUETIAPINE FUMARATE 200 MG/1
50 TABLET, FILM COATED ORAL
Refills: 0 | Status: DISCONTINUED | OUTPATIENT
Start: 2023-08-19 | End: 2023-08-21

## 2023-08-19 RX ORDER — PREGABALIN 225 MG/1
1000 CAPSULE ORAL DAILY
Refills: 0 | Status: DISCONTINUED | OUTPATIENT
Start: 2023-08-19 | End: 2023-08-21

## 2023-08-19 RX ORDER — LORATADINE 10 MG/1
10 TABLET ORAL ONCE
Refills: 0 | Status: COMPLETED | OUTPATIENT
Start: 2023-08-19 | End: 2023-08-19

## 2023-08-19 RX ADMIN — LAMOTRIGINE 150 MILLIGRAM(S): 25 TABLET, ORALLY DISINTEGRATING ORAL at 19:21

## 2023-08-19 RX ADMIN — Medication 25 GRAM(S): at 11:05

## 2023-08-19 RX ADMIN — Medication 40 MILLIGRAM(S): at 19:22

## 2023-08-19 RX ADMIN — PANTOPRAZOLE SODIUM 40 MILLIGRAM(S): 20 TABLET, DELAYED RELEASE ORAL at 06:58

## 2023-08-19 RX ADMIN — LAMOTRIGINE 150 MILLIGRAM(S): 25 TABLET, ORALLY DISINTEGRATING ORAL at 11:01

## 2023-08-19 RX ADMIN — Medication 1 MILLIGRAM(S): at 21:47

## 2023-08-19 RX ADMIN — QUETIAPINE FUMARATE 50 MILLIGRAM(S): 200 TABLET, FILM COATED ORAL at 10:34

## 2023-08-19 RX ADMIN — Medication 400 MILLIGRAM(S): at 16:38

## 2023-08-19 RX ADMIN — PIPERACILLIN AND TAZOBACTAM 25 GRAM(S): 4; .5 INJECTION, POWDER, LYOPHILIZED, FOR SOLUTION INTRAVENOUS at 11:06

## 2023-08-19 RX ADMIN — LORATADINE 10 MILLIGRAM(S): 10 TABLET ORAL at 11:00

## 2023-08-19 RX ADMIN — QUETIAPINE FUMARATE 50 MILLIGRAM(S): 200 TABLET, FILM COATED ORAL at 21:47

## 2023-08-19 RX ADMIN — Medication 4 MILLIGRAM(S): at 00:40

## 2023-08-19 RX ADMIN — Medication 166.67 MILLIGRAM(S): at 05:12

## 2023-08-19 RX ADMIN — Medication 400 UNIT(S): at 14:04

## 2023-08-19 RX ADMIN — ATORVASTATIN CALCIUM 80 MILLIGRAM(S): 80 TABLET, FILM COATED ORAL at 00:40

## 2023-08-19 RX ADMIN — PREGABALIN 1000 MICROGRAM(S): 225 CAPSULE ORAL at 14:04

## 2023-08-19 RX ADMIN — Medication 40 MILLIEQUIVALENT(S): at 11:05

## 2023-08-19 RX ADMIN — SODIUM CHLORIDE 125 MILLILITER(S): 9 INJECTION, SOLUTION INTRAVENOUS at 08:37

## 2023-08-19 RX ADMIN — SODIUM CHLORIDE 200 MILLILITER(S): 9 INJECTION, SOLUTION INTRAVENOUS at 11:03

## 2023-08-19 RX ADMIN — PIPERACILLIN AND TAZOBACTAM 25 GRAM(S): 4; .5 INJECTION, POWDER, LYOPHILIZED, FOR SOLUTION INTRAVENOUS at 19:22

## 2023-08-19 RX ADMIN — PIPERACILLIN AND TAZOBACTAM 25 GRAM(S): 4; .5 INJECTION, POWDER, LYOPHILIZED, FOR SOLUTION INTRAVENOUS at 03:19

## 2023-08-19 RX ADMIN — Medication 1 MILLIGRAM(S): at 11:01

## 2023-08-19 RX ADMIN — SODIUM CHLORIDE 125 MILLILITER(S): 9 INJECTION, SOLUTION INTRAVENOUS at 03:14

## 2023-08-19 NOTE — PROGRESS NOTE ADULT - PROBLEM SELECTOR PLAN 2
#Choledocholithiasis  -ab pain + 183 lipase  -hx of gallstones and s/p ERCP  -RUQ U/S: Dilatation of the common bile duct up to 11 mm, a new finding compared   to the previous ultrasound. There is also mild intrahepatic biliary dilatation  -CT: Fatstranding about the pancreatic head and uncinate process, contiguous with the periduodenal infiltration. Trace fluid tracking along the anterior pararenal fascia.  -most likely pancreatitis 2/2 gall stone    PLAN  -IVF  -tylenol for pain meds  -ctm  -c/s surgery for cholecystectomy for recurrent hx of choledocholithiasis requiring hospitalization -ab pain + 183 lipase  -hx of gallstones and s/p ERCP  -post-ERCP  -RUQ U/S: Dilatation of the common bile duct up to 11 mm, a new finding compared   to the previous ultrasound. There is also mild intrahepatic biliary dilatation  -CT: Fatstranding about the pancreatic head and uncinate process, contiguous with the periduodenal infiltration. Trace fluid tracking along the anterior pararenal fascia.  -most likely pancreatitis 2/2 gall stone    PLAN  -IVF  -tylenol for pain meds  -ctm  -c/s surgery for cholecystectomy for recurrent hx of choledocholithiasis requiring hospitalization

## 2023-08-19 NOTE — CONSULT NOTE ADULT - SUBJECTIVE AND OBJECTIVE BOX
[facility], Division of Infectious Diseases   ELIOT He S. Shah, Y. Patel, G. Casimir  [phone]   weekends and after hours [phone]    AMELIA SHARMA  62y, Male  [id number]      HPI:  61 yo M with PMHx of gallstones, HTN, HLD, Bipolar, GERD, Jhon's Esophagus, asthma, hx of 6/19/23 hospitalization for choledocholithiasis with 9mm CBD dilation on MRCP, underwent ercp 8/17   sphincterotomy removal biliary sludge p/w abdominal pain x 1 day. Pt went home after procedure then at home complains of a 10/10 dull non-radiating RUQ pain that's been worsening since yesterday, Pt states he's had 4 episodes of NBNB vomitting .   The pain is constant and pt states he has not been able to eat nor drink since the onset of the pain. )      PMH/PSH--  GERD (gastroesophageal reflux disease)  Spivey's esophagus with esophagitis  HTN (hypertension)  HLD (hyperlipidemia)  Transaminitis  Bipolar disorder  Asthma  KIRA (acute kidney injury)  Anxiety and depression  HTN (hypertension)  Spivey esophagus  High cholesterol      Allergies--  No Known Allergies      Medications--  Antibiotics: piperacillin/tazobactam IVPB.. 3.375 Gram(s) IV Intermittent every 8 hours    Immunologic:   Other: acetaminophen     Tablet .. PRN  aspirin  chewable  atorvastatin  chlorhexidine 4% Liquid  cholecalciferol  cyanocobalamin  doxazosin  FLUoxetine  FLUoxetine  lactated ringers.  lamoTRIgine  LORazepam     Tablet PRN  melatonin PRN  oxyCODONE    IR PRN  oxyCODONE    IR PRN  pantoprazole    Tablet  QUEtiapine      Social History--  EtOH: denies ***  Tobacco: denies ***  Drug Use: denies ***    Family/Marital History--  FH: Alzheimers disease (Mother)  FH: prostate cancer (Father)  FH: CVA (cerebrovascular accident) (Father)    Travel/Environmental/Occupational History:  retired -- worked for the state    Review of Systems:  REVIEW OF SYSTEMS  General: + fever, + chills, no wt loss	  Ophthalmologic: no blurry vision  Respiratory and Thorax: no cough, no dyspnea  Cardiovascular: no chest pain, no palpitations  Gastrointestinal:  no nausea, + vomiting, diarrhea  Genitourinary: no dysuria, no urgency, no frequency	  Musculoskeletal: no myalgias	  Neurological:  no headache	    Physical Exam--  Vital Signs: T(F): 99.2 (08-19-23 @ 04:28), Max: 99.2 (08-19-23 @ 04:28)  HR: 73 (08-19-23 @ 04:28)  BP: 105/55 (08-19-23 @ 04:28)  RR: 18 (08-19-23 @ 04:28)  SpO2: 93% (08-19-23 @ 04:28)  Wt(kg): --  General: Nontoxic-appearing Male in no acute distress.  HEENT: AT/NC. PERRL. EOMI. Anicteric. Conjunctiva pink and moist. Oropharynx clear. Dentition fair.  Neck: Not rigid. No sense of mass.  Nodes: None palpable.  Lungs: Clear bilaterally without rales, wheezing or rhonchi  Heart: Regular rate and rhythm. No Murmur. No rub. No gallop. No palpable thrill.  Abdomen: Bowel sounds present and normoactive. Soft. distended. tender  Back: No spinal tenderness. No costovertebral angle tenderness.   Extremities: No cyanosis or clubbing. No edema.   Skin: Warm. Dry. Good turgor. No rash. No vasculitic stigmata.  Psychiatric: Appropriate affect and mood for situation.         Laboratory & Imaging Data--  CBC                        10.6   13.51 )-----------( 204      ( 19 Aug 2023 07:21 )             32.2       Chemistries  08-19    138  |  102  |  22  ----------------------------<  93  3.5   |  22  |  1.52<H>    Ca    8.4      19 Aug 2023 07:19  Phos  2.7     08-19  Mg     1.6     08-19    TPro  5.4<L>  /  Alb  3.2<L>  /  TBili  0.7  /  DBili  x   /  AST  32  /  ALT  46<H>  /  AlkPhos  96  08-19      Culture Data    Culture - Urine (collected 18 Aug 2023 06:30)  Source: Clean Catch Clean Catch (Midstream)  Final Report (19 Aug 2023 09:48):    <10,000 CFU/mL Normal Urogenital Rebecca    Culture - Blood (collected 18 Aug 2023 01:54)  Source: .Blood Blood-Peripheral  Preliminary Report (19 Aug 2023 05:01):    No growth at 24 hours    Culture - Blood (collected 18 Aug 2023 01:44)  Source: .Blood Blood-Peripheral  Preliminary Report (19 Aug 2023 05:01):    No growth at 24 hours      < from: CT Abdomen and Pelvis w/ IV Cont (08.18.23 @ 12:48) >  DDER: Right posterior bladder wall diverticulum.  REPRODUCTIVE ORGANS: Enlarged prostate.    BOWEL: Again seen are several duodenal diverticula.Periduodenal   infiltration. No bowel obstruction. Appendix is normal.  PERITONEUM: Trace free fluid. No pneumoperitoneum. No organized   collection.  VESSELS: Mild atherosclerotic changes.  RETROPERITONEUM/LYMPH NODES: No lymphadenopathy.  ABDOMINALWALL: Small fat-containing umbilical hernia.  BONES: Degenerative changes. Multilevel vertebral body hemangiomas.    IMPRESSION:  Dilated common bile duct up to 12 mm, not significantly changed since   7/12/2023.    Right upper quadrant inflammatory changes surrounding the pancreatic   head/uncinate process, duodenum, and extending to the pericholecystic   region. Inflammatory changes appear centered in the pancreaticoduodenal   region with differential including pancreatitis and duodenitis. Correlate   with lipase. In conjunction with the recent ultrasound exam, acute   cholecystitis is thought to be unlikely.    Trace bilateral pleural effusions.      < end of copied text >         [facility], Division of Infectious Diseases   ELIOT He S. Shah, Y. Patel, G. Casimir  [phone]   weekends and after hours [phone]    AMELIA SHARMA  62y, Male  [id number]      HPI:  63 yo M with PMHx of gallstones, HTN, HLD, Bipolar, GERD, Jhon's Esophagus, asthma, hx of 6/19/23 hospitalization for choledocholithiasis with 9mm CBD dilation on MRCP, underwent ercp 8/17   sphincterotomy removal biliary sludge p/w abdominal pain x 1 day. Pt went home after procedure then at home complains of a 10/10 dull non-radiating RUQ pain that's been worsening since yesterday, Pt states he's had 4 episodes of NBNB vomitting .   The pain is constant and pt states he has not been able to eat nor drink since the onset of the pain. )      PMH/PSH--  GERD (gastroesophageal reflux disease)  Spivey's esophagus with esophagitis  HTN (hypertension)  HLD (hyperlipidemia)  Transaminitis  Bipolar disorder  Asthma  KIRA (acute kidney injury)  Anxiety and depression  HTN (hypertension)  Spivey esophagus  High cholesterol      Allergies--  No Known Allergies      Medications--  Antibiotics: piperacillin/tazobactam IVPB.. 3.375 Gram(s) IV Intermittent every 8 hours    Immunologic:   Other: acetaminophen     Tablet .. PRN  aspirin  chewable  atorvastatin  chlorhexidine 4% Liquid  cholecalciferol  cyanocobalamin  doxazosin  FLUoxetine  FLUoxetine  lactated ringers.  lamoTRIgine  LORazepam     Tablet PRN  melatonin PRN  oxyCODONE    IR PRN  oxyCODONE    IR PRN  pantoprazole    Tablet  QUEtiapine      Social History--  EtOH: denies ***  Tobacco: denies ***  Drug Use: denies ***    Family/Marital History--  FH: Alzheimers disease (Mother)  FH: prostate cancer (Father)  FH: CVA (cerebrovascular accident) (Father)    Travel/Environmental/Occupational History:  retired -- teacher     Review of Systems:  REVIEW OF SYSTEMS  General: + fever, + chills, no wt loss	  Ophthalmologic: no blurry vision  Respiratory and Thorax: no cough, no dyspnea  Cardiovascular: no chest pain, no palpitations  Gastrointestinal:  no nausea, + vomiting, diarrhea  Genitourinary: no dysuria, no urgency, no frequency	  Musculoskeletal: no myalgias	  Neurological:  no headache	    Physical Exam--  Vital Signs: T(F): 99.2 (08-19-23 @ 04:28), Max: 99.2 (08-19-23 @ 04:28)  HR: 73 (08-19-23 @ 04:28)  BP: 105/55 (08-19-23 @ 04:28)  RR: 18 (08-19-23 @ 04:28)  SpO2: 93% (08-19-23 @ 04:28)  Wt(kg): --  General: Nontoxic-appearing Male in no acute distress.  HEENT: AT/NC. PERRL. EOMI. Anicteric. Conjunctiva pink and moist. Oropharynx clear. Dentition fair.  Neck: Not rigid. No sense of mass.  Nodes: None palpable.  Lungs: Clear bilaterally without rales, wheezing or rhonchi  Heart: Regular rate and rhythm. No Murmur. No rub. No gallop. No palpable thrill.  Abdomen: Bowel sounds present and normoactive. Soft. distended. tender  Back: No spinal tenderness. No costovertebral angle tenderness.   Extremities: No cyanosis or clubbing. No edema.   Skin: Warm. Dry. Good turgor. No rash. No vasculitic stigmata.  Psychiatric: Appropriate affect and mood for situation.         Laboratory & Imaging Data--  CBC                        10.6   13.51 )-----------( 204      ( 19 Aug 2023 07:21 )             32.2       Chemistries  08-19    138  |  102  |  22  ----------------------------<  93  3.5   |  22  |  1.52<H>    Ca    8.4      19 Aug 2023 07:19  Phos  2.7     08-19  Mg     1.6     08-19    TPro  5.4<L>  /  Alb  3.2<L>  /  TBili  0.7  /  DBili  x   /  AST  32  /  ALT  46<H>  /  AlkPhos  96  08-19      Culture Data    Culture - Urine (collected 18 Aug 2023 06:30)  Source: Clean Catch Clean Catch (Midstream)  Final Report (19 Aug 2023 09:48):    <10,000 CFU/mL Normal Urogenital Rebecca    Culture - Blood (collected 18 Aug 2023 01:54)  Source: .Blood Blood-Peripheral  Preliminary Report (19 Aug 2023 05:01):    No growth at 24 hours    Culture - Blood (collected 18 Aug 2023 01:44)  Source: .Blood Blood-Peripheral  Preliminary Report (19 Aug 2023 05:01):    No growth at 24 hours      < from: CT Abdomen and Pelvis w/ IV Cont (08.18.23 @ 12:48) >  DDER: Right posterior bladder wall diverticulum.  REPRODUCTIVE ORGANS: Enlarged prostate.    BOWEL: Again seen are several duodenal diverticula.Periduodenal   infiltration. No bowel obstruction. Appendix is normal.  PERITONEUM: Trace free fluid. No pneumoperitoneum. No organized   collection.  VESSELS: Mild atherosclerotic changes.  RETROPERITONEUM/LYMPH NODES: No lymphadenopathy.  ABDOMINALWALL: Small fat-containing umbilical hernia.  BONES: Degenerative changes. Multilevel vertebral body hemangiomas.    IMPRESSION:  Dilated common bile duct up to 12 mm, not significantly changed since   7/12/2023.    Right upper quadrant inflammatory changes surrounding the pancreatic   head/uncinate process, duodenum, and extending to the pericholecystic   region. Inflammatory changes appear centered in the pancreaticoduodenal   region with differential including pancreatitis and duodenitis. Correlate   with lipase. In conjunction with the recent ultrasound exam, acute   cholecystitis is thought to be unlikely.    Trace bilateral pleural effusions.      < end of copied text >

## 2023-08-19 NOTE — PROGRESS NOTE ADULT - ATTENDING COMMENTS
63 yo M with PMHx of gallstones, HTN, HLD, Bipolar, GERD, Jhon's Esophagus, asthma, hx of 6/19/23 hospitalization for choledocholithiasis with 9mm CBD dilation on MRCP, recent hx of ERCP 8/11 for recurrent choledolithiasis who now presents with fever, abdominal pain. Patient states he had fever last night and low grade fever this morning with increased epigastric and abdominal pain. Labs personally reviewed: has wbc of 20.68, Scr stable at 1.31, alk phos 140, , ALT 73, which are improving. Lipase elevated at 183. US and CT scan showing CBD dilation. Admitted for sepsis 2/2 possible GI infection and with post ERCP pancreatitis. C/w Zosyn  for now, Pratik scales'd, f/u BCx. ID consulted. c/w IVF and c/w pain medicine. c/w CLD now. Appreciate GI recs.    d/w HS4

## 2023-08-19 NOTE — CONSULT NOTE ADULT - ASSESSMENT
63 yo M with PMHx of gallstones, HTN, HLD, Bipolar, GERD, Jhon's Esophagus, asthma, hx of 6/19/23 hospitalization for choledocholithiasis with 9mm CBD dilation on MRCP, underwent ercp 8/17   sphincterotomy removal biliary sludge p/w abdominal pain x 1 day vomiting  transaminitis   fever leukocytosis   sepsis v sirs    plan  ct with inflammatory pancreatitis no necrosis   8/18 blood cx neg todate  ua neg  no gu symptoms  no skin soft tissue infection  lft trending down  wbc trending down  temp curve improving  suspect sirs     if blood cx neg in 48 hours--will stop empiric antibx   serial abd exam

## 2023-08-19 NOTE — PROGRESS NOTE ADULT - SUBJECTIVE AND OBJECTIVE BOX
Mason Mcneil| PGY-1  Internal Medicine    OVERNIGHT EVENTS: hypotensive to 80s s/p 500cc bolus      SUBJECTIVE: Patient was examined at bedside this morning. Patient complains of abdominal pain stable from yesterday. No vomiting today. He denies HA, CP, SOB, N/V, constipation, diarrhea, melena, hematochezia.     MEDICATIONS  (STANDING):  aspirin  chewable 81 milliGRAM(s) Oral daily  atorvastatin 80 milliGRAM(s) Oral at bedtime  chlorhexidine 4% Liquid 1 Application(s) Topical daily  cholecalciferol 400 Unit(s) Oral daily  cyanocobalamin 1000 MICROGram(s) Oral daily  doxazosin 4 milliGRAM(s) Oral at bedtime  FLUoxetine 40 milliGRAM(s) Oral <User Schedule>  FLUoxetine 40 milliGRAM(s) Oral <User Schedule>  lactated ringers. 1000 milliLiter(s) (200 mL/Hr) IV Continuous <Continuous>  lamoTRIgine 150 milliGRAM(s) Oral two times a day  pantoprazole    Tablet 40 milliGRAM(s) Oral <User Schedule>  piperacillin/tazobactam IVPB.. 3.375 Gram(s) IV Intermittent every 8 hours  QUEtiapine 50 milliGRAM(s) Oral <User Schedule>    MEDICATIONS  (PRN):  acetaminophen     Tablet .. 650 milliGRAM(s) Oral every 6 hours PRN Temp greater or equal to 38C (100.4F), Mild Pain (1 - 3)  LORazepam     Tablet 1 milliGRAM(s) Oral <User Schedule> PRN Anxiety  melatonin 3 milliGRAM(s) Oral at bedtime PRN Insomnia  oxyCODONE    IR 5 milliGRAM(s) Oral every 6 hours PRN Moderate Pain (4 - 6)  oxyCODONE    IR 10 milliGRAM(s) Oral every 6 hours PRN Severe Pain (7 - 10)        T(F): 99.2 (08-19-23 @ 04:28), Max: 99.2 (08-19-23 @ 04:28)  HR: 73 (08-19-23 @ 04:28) (67 - 76)  BP: 105/55 (08-19-23 @ 04:28) (86/41 - 105/55)  BP(mean): 69 (08-18-23 @ 17:30) (69 - 69)  RR: 18 (08-19-23 @ 04:28) (18 - 18)  SpO2: 93% (08-19-23 @ 04:28) (93% - 95%)    PHYSICAL EXAM:     GENERAL: NAD, lying in bed comfortably  HEAD:  Atraumatic, Normocephalic  EYES: EOMI, PERRLA, conjunctiva and sclera clear, no nystagmus noted  ENT: Moist mucous membranes,   NECK: Supple, No JVD, trachea midline  CHEST/LUNG: Clear to auscultation bilaterally; No rales, rhonchi, wheezing, or rubs. Unlabored respirations  HEART: Regular rate and rhythm; No murmurs, rubs, or gallops, normal S1/S2  ABDOMEN: soft, distended, generaelized abd tenderness with exquisite RUQ tenderness, +hyman's sign pos  EXTREMITIES:  2+ Peripheral Pulses. Trace pedal edema  MSK: No gross deformities noted   Neurological:  A&Ox3, no focal deficits   SKIN: No rashes or lesions  PSYCH: Normal mood, affect     TELEMETRY:    LABS:                        10.6   13.51 )-----------( 204      ( 19 Aug 2023 07:21 )             32.2     08-19    138  |  102  |  22  ----------------------------<  93  3.5   |  22  |  1.52<H>    Ca    8.4      19 Aug 2023 07:19  Phos  2.7     08-19  Mg     1.6     08-19    TPro  5.4<L>  /  Alb  3.2<L>  /  TBili  0.7  /  DBili  x   /  AST  32  /  ALT  46<H>  /  AlkPhos  96  08-19        PT/INR - ( 18 Aug 2023 01:05 )   PT: 11.1 sec;   INR: 1.01 ratio         PTT - ( 18 Aug 2023 01:05 )  PTT:27.1 sec  Blood Gas Profile w/Lytes - Venous: Performed In Lab (08-18-23 @ 14:32)    Creatinine Trend: 1.52<--, 1.28<--, 1.31<--  I&O's Summary    BNP  Blood Gas Profile w/Lytes - Venous: Performed In Lab (08-18-23 @ 14:32)    RADIOLOGY & ADDITIONAL STUDIES:

## 2023-08-19 NOTE — PROGRESS NOTE ADULT - PROBLEM SELECTOR PLAN 1
-20.24 WBC with neutrophilic predominance + Tmax 100.7 = SIRS criteria pos  -CT: Dilated common bile duct up to 12 mm, not significantly changed since   7/12/2023; fat stranding about the pancreatic head and uncinate process, contiguous with the periduodenal infiltration. Trace fluid tracking along the anterior pararenal fascia.    PLAN  -GI following  -source: bacterial translocation from ERCP per GI  -c/w vanc and zosyn  -f/u bcx  -IVF  -serial labs and trend vitals -20.24 WBC with neutrophilic predominance + Tmax 100.7 = SIRS criteria pos  -CT: Dilated common bile duct up to 12 mm, not significantly changed since   7/12/2023; fat stranding about the pancreatic head and uncinate process, contiguous with the periduodenal infiltration. Trace fluid tracking along the anterior pararenal fascia.    PLAN  -ID following: dc empiric abx after 48hrs of NGTD on bcx (24hrs NGTD 8/19)  -c/w vanc and zosyn  -f/u bcx  -IVF   -serial labs and trend vitals

## 2023-08-19 NOTE — CONSULT NOTE ADULT - SUBJECTIVE AND OBJECTIVE BOX
GENERAL SURGERY CONSULT NOTE  Consulting surgical team: [facility]  Consulting attending: Veronica Boss     HPI:  HPI:  62M with hx of HTN, HLD, bipolar, GERD, Jhon's esophagus, and asthma admitted with post-ERCP pancreatitis and choledocholithiasis. Patient initially admitted in 6/2023 with gallstone pancreatitis and choledocholithiasis. Patient was planned for inpatient ERCP, however pain had resolved and bilirubin normalized so GI recommended outpatient ERCP. ERCP with sphincterotomy and sludge removal was performed on 8/11. Patient presented to hospital yesterday with 1 day of abdominal pain in the RUQ associated with 4 episodes of NBNB emesis. Lipase 183, WBC 20 and CT scan shows pancreatitis/duodenitis with 1.2 cm CBD. U/S correlates with 1.1 cm CBD. CT scan also shows duodenal diverticulum.     PAST MEDICAL HISTORY:  GERD (gastroesophageal reflux disease)    Spivey's esophagus with esophagitis    HTN (hypertension)    HLD (hyperlipidemia)    Transaminitis    Bipolar disorder    Asthma    Need for prophylactic measure    Fever of unknown origin    KIRA (acute kidney injury)    Anxiety and depression    HTN (hypertension)    Spivey esophagus    High cholesterol        PAST SURGICAL HISTORY:  No pertinent past surgical history    No significant past surgical history        SOCIAL HISTORY:  - Denies EtOH abuse, smoking, IVDA    MEDICATIONS:  acetaminophen     Tablet .. 650 milliGRAM(s) Oral every 6 hours PRN  aspirin  chewable 81 milliGRAM(s) Oral daily  atorvastatin 80 milliGRAM(s) Oral at bedtime  chlorhexidine 4% Liquid 1 Application(s) Topical daily  cholecalciferol 400 Unit(s) Oral daily  cyanocobalamin 1000 MICROGram(s) Oral daily  doxazosin 4 milliGRAM(s) Oral at bedtime  FLUoxetine 40 milliGRAM(s) Oral <User Schedule>  FLUoxetine 40 milliGRAM(s) Oral <User Schedule>  lactated ringers. 1000 milliLiter(s) IV Continuous <Continuous>  lamoTRIgine 150 milliGRAM(s) Oral two times a day  LORazepam     Tablet 1 milliGRAM(s) Oral <User Schedule> PRN  melatonin 3 milliGRAM(s) Oral at bedtime PRN  oxyCODONE    IR 5 milliGRAM(s) Oral every 6 hours PRN  oxyCODONE    IR 10 milliGRAM(s) Oral every 6 hours PRN  pantoprazole    Tablet 40 milliGRAM(s) Oral <User Schedule>  piperacillin/tazobactam IVPB.. 3.375 Gram(s) IV Intermittent every 8 hours  QUEtiapine 50 milliGRAM(s) Oral <User Schedule>      ALLERGIES:  No Known Allergies      VITALS & I/Os:  Vital Signs Last 24 Hrs  T(C): 38.1 (19 Aug 2023 16:00), Max: 38.1 (19 Aug 2023 16:00)  T(F): 100.6 (19 Aug 2023 16:00), Max: 100.6 (19 Aug 2023 16:00)  HR: 74 (19 Aug 2023 16:00) (67 - 76)  BP: 143/67 (19 Aug 2023 16:00) (86/41 - 143/67)  BP(mean): 69 (18 Aug 2023 17:30) (69 - 69)  RR: 18 (19 Aug 2023 16:00) (18 - 18)  SpO2: 93% (19 Aug 2023 16:00) (93% - 95%)    Parameters below as of 19 Aug 2023 16:00  Patient On (Oxygen Delivery Method): room air        I&O's Summary      PHYSICAL EXAM:  GEN: resting comfortably in bed, in NAD  RESP: no acute respiratory distress, breathing comfortably   ABD: soft, non-distended, non-tender   EXT:  WWP, OTERO   NEURO:  no focal neuro deficits     LABS:                        10.6   13.51 )-----------( 204      ( 19 Aug 2023 07:21 )             32.2     08-19    138  |  102  |  22  ----------------------------<  93  3.5   |  22  |  1.52<H>    Ca    8.4      19 Aug 2023 07:19  Phos  2.7     08-19  Mg     1.6     08-19    TPro  5.4<L>  /  Alb  3.2<L>  /  TBili  0.7  /  DBili  x   /  AST  32  /  ALT  46<H>  /  AlkPhos  96  08-19    Lactate:    PT/INR - ( 18 Aug 2023 01:05 )   PT: 11.1 sec;   INR: 1.01 ratio         PTT - ( 18 Aug 2023 01:05 )  PTT:27.1 sec          Urinalysis Basic - ( 19 Aug 2023 07:19 )    Color: x / Appearance: x / SG: x / pH: x  Gluc: 93 mg/dL / Ketone: x  / Bili: x / Urobili: x   Blood: x / Protein: x / Nitrite: x   Leuk Esterase: x / RBC: x / WBC x   Sq Epi: x / Non Sq Epi: x / Bacteria: x        IMAGING:   GENERAL SURGERY CONSULT NOTE  Consulting surgical team: [facility]  Consulting attending: Veronica Boss     HPI:  HPI:  62M with hx of HTN, HLD, bipolar, GERD, Jhon's esophagus, and asthma admitted with post-ERCP pancreatitis and choledocholithiasis. Patient initially admitted in 6/2023 with gallstone pancreatitis and choledocholithiasis. Patient was planned for inpatient ERCP, however pain had resolved and bilirubin normalized so GI recommended outpatient ERCP. ERCP with sphincterotomy and sludge removal was performed on 8/11. Patient presented to hospital yesterday with 1 day of abdominal pain in the RUQ associated with 4 episodes of NBNB emesis. Lipase 183, WBC 20 and CT scan shows pancreatitis/duodenitis with 1.2 cm CBD. U/S correlates with 1.1 cm CBD. CT scan also shows duodenal diverticulum.     PAST MEDICAL HISTORY:  GERD (gastroesophageal reflux disease)    Spivey's esophagus with esophagitis    HTN (hypertension)    HLD (hyperlipidemia)    Transaminitis    Bipolar disorder    Asthma    Need for prophylactic measure    Fever of unknown origin    KIRA (acute kidney injury)    Anxiety and depression    HTN (hypertension)    Spivey esophagus    High cholesterol        PAST SURGICAL HISTORY:  No pertinent past surgical history    No significant past surgical history        SOCIAL HISTORY:  - Denies EtOH abuse, smoking, IVDA    MEDICATIONS:  acetaminophen     Tablet .. 650 milliGRAM(s) Oral every 6 hours PRN  aspirin  chewable 81 milliGRAM(s) Oral daily  atorvastatin 80 milliGRAM(s) Oral at bedtime  chlorhexidine 4% Liquid 1 Application(s) Topical daily  cholecalciferol 400 Unit(s) Oral daily  cyanocobalamin 1000 MICROGram(s) Oral daily  doxazosin 4 milliGRAM(s) Oral at bedtime  FLUoxetine 40 milliGRAM(s) Oral <User Schedule>  FLUoxetine 40 milliGRAM(s) Oral <User Schedule>  lactated ringers. 1000 milliLiter(s) IV Continuous <Continuous>  lamoTRIgine 150 milliGRAM(s) Oral two times a day  LORazepam     Tablet 1 milliGRAM(s) Oral <User Schedule> PRN  melatonin 3 milliGRAM(s) Oral at bedtime PRN  oxyCODONE    IR 5 milliGRAM(s) Oral every 6 hours PRN  oxyCODONE    IR 10 milliGRAM(s) Oral every 6 hours PRN  pantoprazole    Tablet 40 milliGRAM(s) Oral <User Schedule>  piperacillin/tazobactam IVPB.. 3.375 Gram(s) IV Intermittent every 8 hours  QUEtiapine 50 milliGRAM(s) Oral <User Schedule>      ALLERGIES:  No Known Allergies      VITALS & I/Os:  Vital Signs Last 24 Hrs  T(C): 38.1 (19 Aug 2023 16:00), Max: 38.1 (19 Aug 2023 16:00)  T(F): 100.6 (19 Aug 2023 16:00), Max: 100.6 (19 Aug 2023 16:00)  HR: 74 (19 Aug 2023 16:00) (67 - 76)  BP: 143/67 (19 Aug 2023 16:00) (86/41 - 143/67)  BP(mean): 69 (18 Aug 2023 17:30) (69 - 69)  RR: 18 (19 Aug 2023 16:00) (18 - 18)  SpO2: 93% (19 Aug 2023 16:00) (93% - 95%)    Parameters below as of 19 Aug 2023 16:00  Patient On (Oxygen Delivery Method): room air        I&O's Summary      PHYSICAL EXAM:  GEN: resting comfortably in bed, in NAD  RESP: no acute respiratory distress, breathing comfortably   ABD: soft, moderately distended, RUQ and epigastric TTP. No rebound or guarding.   EXT:  WWP, OTERO   NEURO:  no focal neuro deficits     LABS:                        10.6   13.51 )-----------( 204      ( 19 Aug 2023 07:21 )             32.2     08-19    138  |  102  |  22  ----------------------------<  93  3.5   |  22  |  1.52<H>    Ca    8.4      19 Aug 2023 07:19  Phos  2.7     08-19  Mg     1.6     08-19    TPro  5.4<L>  /  Alb  3.2<L>  /  TBili  0.7  /  DBili  x   /  AST  32  /  ALT  46<H>  /  AlkPhos  96  08-19    Lactate:    PT/INR - ( 18 Aug 2023 01:05 )   PT: 11.1 sec;   INR: 1.01 ratio         PTT - ( 18 Aug 2023 01:05 )  PTT:27.1 sec          Urinalysis Basic - ( 19 Aug 2023 07:19 )    Color: x / Appearance: x / SG: x / pH: x  Gluc: 93 mg/dL / Ketone: x  / Bili: x / Urobili: x   Blood: x / Protein: x / Nitrite: x   Leuk Esterase: x / RBC: x / WBC x   Sq Epi: x / Non Sq Epi: x / Bacteria: x        IMAGING:

## 2023-08-19 NOTE — PROGRESS NOTE ADULT - ASSESSMENT
62 year old male with history of HTN, HLD, Bipolar, GERD, Jhon's esophagus, asthma, and gallstone pancreatitis, s/p ERCP 8/17/2023 for removal of biliary sludge who presents with fever and abdominal pain.  Patient underwent ERCP for history of gallstone pancreatitis on 8/17/2023, during which a sphincterotomy and balloon sweep was performed with removal of biliary sludge.  Later that day, patient developed fever, abdominal pain, and multiple episodes of N/V, prompting his presentation to [facility] ED with labs notable for eleavted lipase and iamging showing ijnflmation around the head of the pancreas c/w PEP     #PEP  # Fever, RUQ pain, N/V  # Leukocytosis  # Lactic acidosis, improved  # History of gallstone pancreatitis s/p ERCP  -EGD/EUS/ERCP 8/17/2023 revealed a large periampullary diverticulum, hyperechoic material consistent with sludge in the lower third of the main bile duct with lymphadenopathy. There was sludge in the gallbladder.  ERCP revealed dilated CBD with sludge s/p sphincterotomy, balloon extraction of debris and sludge.  Several islands of salmon-colored mucosa at the distal esophagus concerning for Barretts esophagus.  Erythematous mucosa in the stomach s/p biopsy.  Multiple large duodenal diverticula. Currently presenting with fevers,  RUQ pain, N/V and labs notable for elevated lipase and CT showing inflammatory changes surrounding the pancreatic head/uncinate process likely 2/2 to post-ERCP pancreatitis. Symptoms overall improving with supporitve care as outlined below     Recommendations:  -Ok for clear liquid diet   -Please increase IVF to 200 cc/hr   -Continue with empiric abx pending results of BCX   -Trend clinical symptoms, exam findings, vital signs, CBC, CMP, INR    All recommendations are tentative until note is attested by attending.     Jovanni Mg, PGY-5  Gastroenterology/Hepatology Fellow  Available on [employer] Teams   [phone] (Long Range Pager)  [phone] (Short Range Pager [facility])    After 5pm, please contact the on-call GI fellow. [phone]

## 2023-08-19 NOTE — PROGRESS NOTE ADULT - ASSESSMENT
63 yo M with PMHx of gallstones, HTN, HLD, Bipolar, GERD, Jhon's Esophagus, asthma, hx of 6/19/23 hospitalization for choledocholithiasis with 9mm CBD dilation on MRCP, recent hx of ERCP 8/11 for recurrent choledolithiasis p/w sepsis + pancreatitis  63 yo M with PMHx of gallstones, HTN, HLD, Bipolar, GERD, Jhon's Esophagus, asthma, hx of 6/19/23 hospitalization for choledocholithiasis with 9mm CBD dilation on MRCP, recent hx of ERCP 8/11 for recurrent choledolithiasis p/w post- ERCP pancreatitis

## 2023-08-19 NOTE — PROGRESS NOTE ADULT - SUBJECTIVE AND OBJECTIVE BOX
Gastroenterology Progress Note    Interval Events:   -Still having abdominal pain although slightly improved from prior Denies any ongoing n/v.     Allergies:  No Known Allergies      Hospital Medications:  acetaminophen     Tablet .. 650 milliGRAM(s) Oral every 6 hours PRN  aspirin  chewable 81 milliGRAM(s) Oral daily  atorvastatin 80 milliGRAM(s) Oral at bedtime  chlorhexidine 4% Liquid 1 Application(s) Topical daily  cyanocobalamin 1000 MICROGram(s) Oral daily  doxazosin 4 milliGRAM(s) Oral at bedtime  FLUoxetine 40 milliGRAM(s) Oral <User Schedule>  FLUoxetine 40 milliGRAM(s) Oral <User Schedule>  lactated ringers. 1000 milliLiter(s) IV Continuous <Continuous>  lamoTRIgine 150 milliGRAM(s) Oral two times a day  LORazepam     Tablet 1 milliGRAM(s) Oral two times a day PRN  melatonin 3 milliGRAM(s) Oral at bedtime PRN  oxyCODONE    IR 5 milliGRAM(s) Oral every 6 hours PRN  oxyCODONE    IR 10 milliGRAM(s) Oral every 6 hours PRN  pantoprazole    Tablet 40 milliGRAM(s) Oral before breakfast  piperacillin/tazobactam IVPB.. 3.375 Gram(s) IV Intermittent every 8 hours  vancomycin  IVPB 1250 milliGRAM(s) IV Intermittent every 12 hours      ROS: 14 point ROS negative unless otherwise state in subjective    PHYSICAL EXAM:   Vital Signs:  Vital Signs Last 24 Hrs  T(C): 37.3 (19 Aug 2023 04:28), Max: 37.3 (18 Aug 2023 11:24)  T(F): 99.2 (19 Aug 2023 04:28), Max: 99.2 (18 Aug 2023 11:24)  HR: 73 (19 Aug 2023 04:28) (67 - 76)  BP: 105/55 (19 Aug 2023 04:28) (86/41 - 105/55)  BP(mean): 69 (18 Aug 2023 17:30) (69 - 70)  RR: 18 (19 Aug 2023 04:28) (18 - 18)  SpO2: 93% (19 Aug 2023 04:28) (93% - 95%)    Parameters below as of 19 Aug 2023 04:28  Patient On (Oxygen Delivery Method): room air      Daily Height in cm: 180.34 (19 Aug 2023 04:28)    Daily     GENERAL:  No acute distress  HEENT:  NCAT  CHEST: no resp distress  HEART:  RRR  ABDOMEN:  Soft, mild TTP in the epigastric regionnon-distended, normoactive bowel sounds  EXTREMITIES:  No edema  NEURO:  Alert and oriented x 3    LABS:                        10.6   13.51 )-----------( 204      ( 19 Aug 2023 07:21 )             32.2     Mean Cell Volume: 93.1 fl (08-19-23 @ 07:21)    08-19    138  |  102  |  22  ----------------------------<  93  3.5   |  22  |  1.52<H>    Ca    8.4      19 Aug 2023 07:19  Phos  2.7     08-19  Mg     1.6     08-19    TPro  5.4<L>  /  Alb  3.2<L>  /  TBili  0.7  /  DBili  x   /  AST  32  /  ALT  46<H>  /  AlkPhos  96  08-19    LIVER FUNCTIONS - ( 19 Aug 2023 07:19 )  Alb: 3.2 g/dL / Pro: 5.4 g/dL / ALK PHOS: 96 U/L / ALT: 46 U/L / AST: 32 U/L / GGT: x           PT/INR - ( 18 Aug 2023 01:05 )   PT: 11.1 sec;   INR: 1.01 ratio         PTT - ( 18 Aug 2023 01:05 )  PTT:27.1 sec  Urinalysis Basic - ( 19 Aug 2023 07:19 )    Color: x / Appearance: x / SG: x / pH: x  Gluc: 93 mg/dL / Ketone: x  / Bili: x / Urobili: x   Blood: x / Protein: x / Nitrite: x   Leuk Esterase: x / RBC: x / WBC x   Sq Epi: x / Non Sq Epi: x / Bacteria: x        Lipase [id number]     Imaging:  < from: CT Abdomen and Pelvis w/ IV Cont (08.18.23 @ 12:48) >  IMPRESSION:  Dilated common bile duct up to 12 mm, not significantly changed since   7/12/2023.    Right upper quadrant inflammatory changes surrounding the pancreatic   head/uncinate process, duodenum, and extending to the pericholecystic   region. Inflammatory changes appear centered in the pancreaticoduodenal   region with differential including pancreatitis and duodenitis. Correlate   with lipase. In conjunction with the recent ultrasound exam, acute   cholecystitis is thought to be unlikely.    Trace bilateral pleural effusions.    < end of copied text >

## 2023-08-19 NOTE — CONSULT NOTE ADULT - ASSESSMENT
62M with hx of HTN, HLD, bipolar, GERD, Jhon's esophagus, and asthma Patient initially admitted in 6/2023 with gallstone pancreatitis and choledocholithiasis. Discharged with no intervention and now s/p ERCP with sphincterotomy on 8/11 c/b post-ERCP pancreatitis and dilated CBD c/f choledocholithiasis. Surgery consulted for timing of cholecystectomy     Plan      62M with hx of HTN, HLD, bipolar, GERD, Jhon's esophagus, and asthma Patient initially admitted in 6/2023 with gallstone pancreatitis and choledocholithiasis. Discharged with no intervention and now s/p ERCP with sphincterotomy on 8/11 c/b post-ERCP pancreatitis and dilated CBD c/f choledocholithiasis. Surgery consulted for timing of cholecystectomy     Plan   - F/u ERCP on Monday  - Timing of cholecystectomy depending of ERCP findings and clinical course     Plan discussed with Dr. Veronica Najera PGY-3   Acute Care Surgery  [id number]      62M with hx of HTN, HLD, bipolar, GERD, Jhon's esophagus, and asthma Patient initially admitted in 6/2023 with gallstone pancreatitis and choledocholithiasis. Discharged with no intervention and now s/p ERCP with sphincterotomy on 8/11 c/b post-ERCP pancreatitis and dilated CBD c/f choledocholithiasis. Surgery consulted for timing of cholecystectomy     Plan   - will discuss timing of cholecystectomy  - appreciate care per primary team and GI recommendations     Plan discussed with Dr. Veronica Najera PGY-3   Acute Care Surgery  [id number]

## 2023-08-20 DIAGNOSIS — I82.409 ACUTE EMBOLISM AND THROMBOSIS OF UNSPECIFIED DEEP VEINS OF UNSPECIFIED LOWER EXTREMITY: ICD-10-CM

## 2023-08-20 LAB
ANION GAP SERPL CALC-SCNC: 9 MMOL/L — SIGNIFICANT CHANGE UP (ref 5–17)
BASOPHILS # BLD AUTO: 0.02 K/UL — SIGNIFICANT CHANGE UP (ref 0–0.2)
BASOPHILS NFR BLD AUTO: 0.2 % — SIGNIFICANT CHANGE UP (ref 0–2)
BUN SERPL-MCNC: 16 MG/DL — SIGNIFICANT CHANGE UP (ref 7–23)
CALCIUM SERPL-MCNC: 9.1 MG/DL — SIGNIFICANT CHANGE UP (ref 8.4–10.5)
CHLORIDE SERPL-SCNC: 106 MMOL/L — SIGNIFICANT CHANGE UP (ref 96–108)
CO2 SERPL-SCNC: 23 MMOL/L — SIGNIFICANT CHANGE UP (ref 22–31)
CREAT SERPL-MCNC: 1.22 MG/DL — SIGNIFICANT CHANGE UP (ref 0.5–1.3)
EGFR: 66 ML/MIN/1.73M2 — SIGNIFICANT CHANGE UP
EOSINOPHIL # BLD AUTO: 0.23 K/UL — SIGNIFICANT CHANGE UP (ref 0–0.5)
EOSINOPHIL NFR BLD AUTO: 2.1 % — SIGNIFICANT CHANGE UP (ref 0–6)
GLUCOSE SERPL-MCNC: 91 MG/DL — SIGNIFICANT CHANGE UP (ref 70–99)
HCT VFR BLD CALC: 32.5 % — LOW (ref 39–50)
HGB BLD-MCNC: 10.8 G/DL — LOW (ref 13–17)
IMM GRANULOCYTES NFR BLD AUTO: 0.5 % — SIGNIFICANT CHANGE UP (ref 0–0.9)
LYMPHOCYTES # BLD AUTO: 0.84 K/UL — LOW (ref 1–3.3)
LYMPHOCYTES # BLD AUTO: 7.5 % — LOW (ref 13–44)
MAGNESIUM SERPL-MCNC: 2.1 MG/DL — SIGNIFICANT CHANGE UP (ref 1.6–2.6)
MCHC RBC-ENTMCNC: 30 PG — SIGNIFICANT CHANGE UP (ref 27–34)
MCHC RBC-ENTMCNC: 33.2 GM/DL — SIGNIFICANT CHANGE UP (ref 32–36)
MCV RBC AUTO: 90.3 FL — SIGNIFICANT CHANGE UP (ref 80–100)
MONOCYTES # BLD AUTO: 0.42 K/UL — SIGNIFICANT CHANGE UP (ref 0–0.9)
MONOCYTES NFR BLD AUTO: 3.8 % — SIGNIFICANT CHANGE UP (ref 2–14)
MRSA PCR RESULT.: SIGNIFICANT CHANGE UP
NEUTROPHILS # BLD AUTO: 9.56 K/UL — HIGH (ref 1.8–7.4)
NEUTROPHILS NFR BLD AUTO: 85.9 % — HIGH (ref 43–77)
NRBC # BLD: 0 /100 WBCS — SIGNIFICANT CHANGE UP (ref 0–0)
PHOSPHATE SERPL-MCNC: 1.9 MG/DL — LOW (ref 2.5–4.5)
PLATELET # BLD AUTO: 202 K/UL — SIGNIFICANT CHANGE UP (ref 150–400)
POTASSIUM SERPL-MCNC: 3.8 MMOL/L — SIGNIFICANT CHANGE UP (ref 3.5–5.3)
POTASSIUM SERPL-SCNC: 3.8 MMOL/L — SIGNIFICANT CHANGE UP (ref 3.5–5.3)
RBC # BLD: 3.6 M/UL — LOW (ref 4.2–5.8)
RBC # FLD: 12.9 % — SIGNIFICANT CHANGE UP (ref 10.3–14.5)
S AUREUS DNA NOSE QL NAA+PROBE: SIGNIFICANT CHANGE UP
SODIUM SERPL-SCNC: 138 MMOL/L — SIGNIFICANT CHANGE UP (ref 135–145)
WBC # BLD: 11.13 K/UL — HIGH (ref 3.8–10.5)
WBC # FLD AUTO: 11.13 K/UL — HIGH (ref 3.8–10.5)

## 2023-08-20 PROCEDURE — 99232 SBSQ HOSP IP/OBS MODERATE 35: CPT | Mod: GC

## 2023-08-20 PROCEDURE — 99233 SBSQ HOSP IP/OBS HIGH 50: CPT

## 2023-08-20 PROCEDURE — 93970 EXTREMITY STUDY: CPT | Mod: 26

## 2023-08-20 RX ORDER — ENOXAPARIN SODIUM 100 MG/ML
40 INJECTION SUBCUTANEOUS EVERY 24 HOURS
Refills: 0 | Status: DISCONTINUED | OUTPATIENT
Start: 2023-08-20 | End: 2023-08-21

## 2023-08-20 RX ORDER — SODIUM,POTASSIUM PHOSPHATES 278-250MG
1 POWDER IN PACKET (EA) ORAL EVERY 4 HOURS
Refills: 0 | Status: COMPLETED | OUTPATIENT
Start: 2023-08-20 | End: 2023-08-20

## 2023-08-20 RX ADMIN — Medication 400 UNIT(S): at 13:30

## 2023-08-20 RX ADMIN — Medication 1 MILLIGRAM(S): at 21:19

## 2023-08-20 RX ADMIN — Medication 1 PACKET(S): at 17:56

## 2023-08-20 RX ADMIN — QUETIAPINE FUMARATE 50 MILLIGRAM(S): 200 TABLET, FILM COATED ORAL at 21:18

## 2023-08-20 RX ADMIN — LAMOTRIGINE 150 MILLIGRAM(S): 25 TABLET, ORALLY DISINTEGRATING ORAL at 17:57

## 2023-08-20 RX ADMIN — PIPERACILLIN AND TAZOBACTAM 25 GRAM(S): 4; .5 INJECTION, POWDER, LYOPHILIZED, FOR SOLUTION INTRAVENOUS at 12:19

## 2023-08-20 RX ADMIN — Medication 1 PACKET(S): at 13:29

## 2023-08-20 RX ADMIN — PREGABALIN 1000 MICROGRAM(S): 225 CAPSULE ORAL at 13:30

## 2023-08-20 RX ADMIN — LAMOTRIGINE 150 MILLIGRAM(S): 25 TABLET, ORALLY DISINTEGRATING ORAL at 08:51

## 2023-08-20 RX ADMIN — SODIUM CHLORIDE 200 MILLILITER(S): 9 INJECTION, SOLUTION INTRAVENOUS at 08:51

## 2023-08-20 RX ADMIN — PIPERACILLIN AND TAZOBACTAM 25 GRAM(S): 4; .5 INJECTION, POWDER, LYOPHILIZED, FOR SOLUTION INTRAVENOUS at 02:07

## 2023-08-20 RX ADMIN — Medication 40 MILLIGRAM(S): at 17:57

## 2023-08-20 RX ADMIN — ENOXAPARIN SODIUM 40 MILLIGRAM(S): 100 INJECTION SUBCUTANEOUS at 12:20

## 2023-08-20 RX ADMIN — QUETIAPINE FUMARATE 50 MILLIGRAM(S): 200 TABLET, FILM COATED ORAL at 08:51

## 2023-08-20 RX ADMIN — Medication 4 MILLIGRAM(S): at 21:19

## 2023-08-20 RX ADMIN — Medication 650 MILLIGRAM(S): at 22:50

## 2023-08-20 RX ADMIN — PANTOPRAZOLE SODIUM 40 MILLIGRAM(S): 20 TABLET, DELAYED RELEASE ORAL at 05:19

## 2023-08-20 RX ADMIN — Medication 650 MILLIGRAM(S): at 21:20

## 2023-08-20 RX ADMIN — Medication 1 MILLIGRAM(S): at 08:51

## 2023-08-20 NOTE — PROGRESS NOTE ADULT - SUBJECTIVE AND OBJECTIVE BOX
Surgery Progress Note    SUBJECTIVE: Pt seen and examined at bedside.      Vital Signs Last 24 Hrs  T(C): 37.2 (20 Aug 2023 03:44), Max: 38.1 (19 Aug 2023 16:00)  T(F): 99 (20 Aug 2023 03:44), Max: 100.6 (19 Aug 2023 16:00)  HR: 64 (20 Aug 2023 03:44) (64 - 74)  BP: 151/73 (20 Aug 2023 03:44) (143/67 - 156/79)  BP(mean): --  RR: 18 (20 Aug 2023 03:44) (18 - 18)  SpO2: 91% (20 Aug 2023 03:44) (91% - 93%)    Parameters below as of 20 Aug 2023 03:44  Patient On (Oxygen Delivery Method): room air        Physical Exam:  General Appearance: NAD  Respiratory: No labored breathing  CV: Pulse regularly present  Abdomen: Soft, nontender    LABS:                        10.8   11.13 )-----------( 202      ( 20 Aug 2023 12:19 )             32.5     08-20    138  |  106  |  16  ----------------------------<  91  3.8   |  23  |  1.22    Ca    9.1      20 Aug 2023 12:18  Phos  1.9     08-20  Mg     2.1     08-20    TPro  5.4<L>  /  Alb  3.2<L>  /  TBili  0.7  /  DBili  x   /  AST  32  /  ALT  46<H>  /  AlkPhos  96  08-19      Urinalysis Basic - ( 20 Aug 2023 12:18 )    Color: x / Appearance: x / SG: x / pH: x  Gluc: 91 mg/dL / Ketone: x  / Bili: x / Urobili: x   Blood: x / Protein: x / Nitrite: x   Leuk Esterase: x / RBC: x / WBC x   Sq Epi: x / Non Sq Epi: x / Bacteria: x        INs and OUTs:    08-19-23 @ 07:01  -  08-20-23 @ 07:00  --------------------------------------------------------  IN: 2500 mL / OUT: 502 mL / NET: 1998 mL

## 2023-08-20 NOTE — PROGRESS NOTE ADULT - ASSESSMENT
61 yo M with PMHx of gallstones, HTN, HLD, Bipolar, GERD, Jhon's Esophagus, asthma, hx of 6/19/23 hospitalization for choledocholithiasis with 9mm CBD dilation on MRCP, recent hx of ERCP 8/11 for recurrent choledolithiasis p/w post- ERCP pancreatitis

## 2023-08-20 NOTE — PROGRESS NOTE ADULT - SUBJECTIVE AND OBJECTIVE BOX
[facility], Division of Infectious   Dr Bahena, Dr Pina, Dr Lopez, JESUS Sharma Jone  [phone]  after hours and weekends [phone]    Name: AMELIA SHARMA  Age: 62y  Gender: Male  MRN: [id number]    Interval History--  Notes reviewed  no overnight events         Allergies    No Known Allergies    Intolerances        Medications--  Antibiotics:    Immunologic:    Other:  acetaminophen     Tablet .. PRN  aspirin  chewable  atorvastatin  chlorhexidine 4% Liquid  cholecalciferol  cyanocobalamin  doxazosin  enoxaparin Injectable  FLUoxetine  FLUoxetine  lactated ringers.  lamoTRIgine  LORazepam     Tablet PRN  melatonin PRN  oxyCODONE    IR PRN  oxyCODONE    IR PRN  pantoprazole    Tablet  QUEtiapine      Review of Systems--  A 10-point review of systems was obtained.     Pertinent positives and negatives--  Constitutional: No fevers. No Chills. No Rigors.   Cardiovascular: No chest pain. No palpitations.  Respiratory: No shortness of breath. No cough.  Gastrointestinal: No nausea or vomiting. No diarrhea or constipation.   Psychiatric: Pleasant. Appropriate affect.    Review of systems otherwise negative except as previously noted.    Physical Examination--  Vital Signs: T(F): 99 (08-20-23 @ 03:44), Max: 100.6 (08-19-23 @ 16:00)  HR: 64 (08-20-23 @ 03:44)  BP: 151/73 (08-20-23 @ 03:44)  RR: 18 (08-20-23 @ 03:44)  SpO2: 91% (08-20-23 @ 03:44)  Wt(kg): --  General: Nontoxic-appearing Male in no acute distress.  HEENT: AT/NC  Neck: Not rigid. No sense of mass.  Nodes: None palpable.  Lungs: Clear bilaterally without rales, wheezing or rhonchi  Heart: Regular rate and rhythm. No Murmur. No rub. No gallop. No palpable thrill.  Abdomen: Bowel sounds present and normoactive. Soft. Nondistended. Nontender.   Back: No spinal tenderness. No costovertebral angle tenderness.   Extremities: No cyanosis or clubbing. No edema.   Skin: Warm. Dry. Good turgor. No rash. No vasculitic stigmata.  Psychiatric: Appropriate affect and mood for situation.         Laboratory Studies--  CBC                        10.8   11.13 )-----------( 202      ( 20 Aug 2023 12:19 )             32.5       Chemistries  08-19    138  |  102  |  22  ----------------------------<  93  3.5   |  22  |  1.52<H>    Ca    8.4      19 Aug 2023 07:19  Phos  2.7     08-19  Mg     1.6     08-19    TPro  5.4<L>  /  Alb  3.2<L>  /  TBili  0.7  /  DBili  x   /  AST  32  /  ALT  46<H>  /  AlkPhos  96  08-19      Culture Data    Culture - Urine (collected 18 Aug 2023 06:30)  Source: Clean Catch Clean Catch (Midstream)  Final Report (19 Aug 2023 09:48):    <10,000 CFU/mL Normal Urogenital Rebecca    Culture - Blood (collected 18 Aug 2023 01:54)  Source: .Blood Blood-Peripheral  Preliminary Report (20 Aug 2023 05:01):    No growth at 48 Hours    Culture - Blood (collected 18 Aug 2023 01:44)  Source: .Blood Blood-Peripheral  Preliminary Report (20 Aug 2023 05:01):    No growth at 48 Hours             [facility], Division of Infectious   Dr Bahena, Dr Pina, Dr Lopez, JESUS Sharma Jone  [phone]  after hours and weekends [phone]    Name: AMELIA SHARMA  Age: 62y  Gender: Male  MRN: [id number]    Interval History--  Notes reviewed  have a clot in the lle but its below the knee  just ate some liquids         Allergies    No Known Allergies    Intolerances        Medications--  Antibiotics:    Immunologic:    Other:  acetaminophen     Tablet .. PRN  aspirin  chewable  atorvastatin  chlorhexidine 4% Liquid  cholecalciferol  cyanocobalamin  doxazosin  enoxaparin Injectable  FLUoxetine  FLUoxetine  lactated ringers.  lamoTRIgine  LORazepam     Tablet PRN  melatonin PRN  oxyCODONE    IR PRN  oxyCODONE    IR PRN  pantoprazole    Tablet  QUEtiapine      Review of Systems--  A 10-point review of systems was obtained.     Pertinent positives and negatives--  Constitutional: No fevers. No Chills. No Rigors.   Cardiovascular: No chest pain. No palpitations.  Respiratory: No shortness of breath. No cough.  Gastrointestinal: No nausea or vomiting. No diarrhea or constipation.   Psychiatric: Pleasant. Appropriate affect.    Review of systems otherwise negative except as previously noted.    Physical Examination--  Vital Signs: T(F): 99 (08-20-23 @ 03:44), Max: 100.6 (08-19-23 @ 16:00)  HR: 64 (08-20-23 @ 03:44)  BP: 151/73 (08-20-23 @ 03:44)  RR: 18 (08-20-23 @ 03:44)  SpO2: 91% (08-20-23 @ 03:44)  Wt(kg): --  General: Nontoxic-appearing Male in no acute distress.  HEENT: AT/NC  Neck: Not rigid. No sense of mass.  Nodes: None palpable.  Lungs: Clear bilaterally without rales, wheezing or rhonchi  Heart: Regular rate and rhythm. No Murmur. No rub. No gallop. No palpable thrill.  Abdomen: Bowel sounds present and normoactive. Soft. Nondistended. Nontender.   Back: No spinal tenderness. No costovertebral angle tenderness.   Extremities: No cyanosis or clubbing. No edema.   Skin: Warm. Dry. Good turgor. No rash. No vasculitic stigmata.  Psychiatric: Appropriate affect and mood for situation.         Laboratory Studies--  CBC                        10.8   11.13 )-----------( 202      ( 20 Aug 2023 12:19 )             32.5       Chemistries  08-19    138  |  102  |  22  ----------------------------<  93  3.5   |  22  |  1.52<H>    Ca    8.4      19 Aug 2023 07:19  Phos  2.7     08-19  Mg     1.6     08-19    TPro  5.4<L>  /  Alb  3.2<L>  /  TBili  0.7  /  DBili  x   /  AST  32  /  ALT  46<H>  /  AlkPhos  96  08-19      Culture Data    Culture - Urine (collected 18 Aug 2023 06:30)  Source: Clean Catch Clean Catch (Midstream)  Final Report (19 Aug 2023 09:48):    <10,000 CFU/mL Normal Urogenital Rebecca    Culture - Blood (collected 18 Aug 2023 01:54)  Source: .Blood Blood-Peripheral  Preliminary Report (20 Aug 2023 05:01):    No growth at 48 Hours    Culture - Blood (collected 18 Aug 2023 01:44)  Source: .Blood Blood-Peripheral  Preliminary Report (20 Aug 2023 05:01):    No growth at 48 Hours      < from: [facility] Duplex Lower Ext Vein Scan, Bilat (08.20.23 @ 11:40) >  NDINGS:    RIGHT:  Normal compressibility of the RIGHT common femoral, femoral and popliteal   veins.  Doppler examination shows normal spontaneous and phasic flow.  Right soleal vein thrombus favors post thrombotic change. Otherwise no   acute LEFT calf vein thrombosis is detected    LEFT:  Normal compressibility of the LEFT common femoral, femoral and popliteal   veins.  Dopplerexamination shows normal spontaneous and phasic flow.  No Left calf vein thrombosis is detected.      IMPRESSION:  1.  Right soleal vein thrombus favors post thrombotic change.  Correlate   with physical exam  2.  Otherwise, no evidence of deep venous thrombosis in either lower   extremity.    < end of copied text >

## 2023-08-20 NOTE — PRE PROCEDURE NOTE - PRE PROCEDURE EVALUATION
Pt seen and examined at bedside. Patient comfortable and in no-apparent distress.       Vital Signs Last 24 Hrs  T(C): 37.5 (20 Aug 2023 20:42), Max: 37.5 (20 Aug 2023 20:42)  T(F): 99.5 (20 Aug 2023 20:42), Max: 99.5 (20 Aug 2023 20:42)  HR: 51 (20 Aug 2023 20:42) (51 - 64)  BP: 160/74 (20 Aug 2023 20:42) (151/73 - 164/75)  BP(mean): --  RR: 18 (20 Aug 2023 20:42) (16 - 18)  SpO2: 95% (20 Aug 2023 20:42) (91% - 95%)    Parameters below as of 20 Aug 2023 20:42  Patient On (Oxygen Delivery Method): room air        Physical Exam:  General Appearance: Appears well, NAD  Respiratory: No labored breathing  CV: Pulse regularly present  Abdomen: Soft, nontender    LABS:                        10.8   11.13 )-----------( 202      ( 20 Aug 2023 12:19 )             32.5     08-20    138  |  106  |  16  ----------------------------<  91  3.8   |  23  |  1.22    Ca    9.1      20 Aug 2023 12:18  Phos  1.9     08-20  Mg     2.1     08-20    TPro  5.4<L>  /  Alb  3.2<L>  /  TBili  0.7  /  DBili  x   /  AST  32  /  ALT  46<H>  /  AlkPhos  96  08-19      Urinalysis Basic - ( 20 Aug 2023 12:18 )    Color: x / Appearance: x / SG: x / pH: x  Gluc: 91 mg/dL / Ketone: x  / Bili: x / Urobili: x   Blood: x / Protein: x / Nitrite: x   Leuk Esterase: x / RBC: x / WBC x   Sq Epi: x / Non Sq Epi: x / Bacteria: x        INs and OUTs:    08-19-23 @ 07:01  -  08-20-23 @ 07:00  --------------------------------------------------------  IN: 2500 mL / OUT: 502 mL / NET: 1998 mL

## 2023-08-20 NOTE — PROGRESS NOTE ADULT - ASSESSMENT
62 year old male with history of HTN, HLD, Bipolar, GERD, Jhon's esophagus, asthma, and gallstone pancreatitis, s/p ERCP 8/17/2023 for removal of biliary sludge who presents with fever and abdominal pain.  Patient underwent ERCP for history of gallstone pancreatitis on 8/17/2023, during which a sphincterotomy and balloon sweep was performed with removal of biliary sludge.  Later that day, patient developed fever, abdominal pain, and multiple episodes of N/V, prompting his presentation to [facility] ED with labs notable for eleavted lipase and iamging showing ijnflmation around the head of the pancreas c/w PEP     #PEP  # Fever, RUQ pain, N/V  # Leukocytosis  # Lactic acidosis, improved  # History of gallstone pancreatitis s/p ERCP  -EGD/EUS/ERCP 8/17/2023 revealed a large periampullary diverticulum, hyperechoic material consistent with sludge in the lower third of the main bile duct with lymphadenopathy. There was sludge in the gallbladder.  ERCP revealed dilated CBD with sludge s/p sphincterotomy, balloon extraction of debris and sludge.  Several islands of salmon-colored mucosa at the distal esophagus concerning for Barretts esophagus.  Erythematous mucosa in the stomach s/p biopsy.  Multiple large duodenal diverticula. Currently presenting with fevers, RUQ pain, N/V and labs notable for elevated lipase and CT showing inflammatory changes surrounding the pancreatic head/uncinate process likely 2/2 to post-ERCP pancreatitis.  -Symptoms overall slowly improving with no fevers, downtrending CBC and LFTs. Ok to slowly advance diet as tolerated. If BCX with NGTD and wbc count decreasing ok to d/c abx.     Recommendations:  -Ok for soft low residue low fat diet as tolerated   -Continue with IVF 200cc/hr x 12 hours and then stop   -Continue with empiric abx pending results of BCX  If BCX with NGTD and wbc count decreasing ok to d/c abx if ok with ID  -Trend clinical symptoms, exam findings, vital signs, CBC, CMP, INR    All recommendations are tentative until note is attested by attending.     Jovanni Mg, PGY-5  Gastroenterology/Hepatology Fellow  Available on [employer] Teams   [phone] (Long Range Pager)  [phone] (Short Range Pager [facility])    After 5pm, please contact the on-call GI fellow. [phone]

## 2023-08-20 NOTE — PROGRESS NOTE ADULT - SUBJECTIVE AND OBJECTIVE BOX
Gastroenterology Progress Note    Interval Events:   -Continues to have ongoing abdominal pain but overall slightly improved. Tolerating liquids with no episodes of n/v.     Allergies:  No Known Allergies      Hospital Medications:  acetaminophen     Tablet .. 650 milliGRAM(s) Oral every 6 hours PRN  aspirin  chewable 81 milliGRAM(s) Oral daily  atorvastatin 80 milliGRAM(s) Oral at bedtime  chlorhexidine 4% Liquid 1 Application(s) Topical daily  cholecalciferol 400 Unit(s) Oral daily  cyanocobalamin 1000 MICROGram(s) Oral daily  doxazosin 4 milliGRAM(s) Oral at bedtime  FLUoxetine 40 milliGRAM(s) Oral <User Schedule>  FLUoxetine 40 milliGRAM(s) Oral <User Schedule>  lactated ringers. 1000 milliLiter(s) IV Continuous <Continuous>  lamoTRIgine 150 milliGRAM(s) Oral two times a day  LORazepam     Tablet 1 milliGRAM(s) Oral <User Schedule> PRN  melatonin 3 milliGRAM(s) Oral at bedtime PRN  oxyCODONE    IR 5 milliGRAM(s) Oral every 6 hours PRN  oxyCODONE    IR 10 milliGRAM(s) Oral every 6 hours PRN  pantoprazole    Tablet 40 milliGRAM(s) Oral <User Schedule>  piperacillin/tazobactam IVPB.. 3.375 Gram(s) IV Intermittent every 8 hours  QUEtiapine 50 milliGRAM(s) Oral <User Schedule>      ROS: 14 point ROS negative unless otherwise state in subjective    PHYSICAL EXAM:   Vital Signs:  Vital Signs Last 24 Hrs  T(C): 37.2 (20 Aug 2023 03:44), Max: 38.1 (19 Aug 2023 16:00)  T(F): 99 (20 Aug 2023 03:44), Max: 100.6 (19 Aug 2023 16:00)  HR: 64 (20 Aug 2023 03:44) (64 - 74)  BP: 151/73 (20 Aug 2023 03:44) (143/67 - 156/79)  BP(mean): --  RR: 18 (20 Aug 2023 03:44) (18 - 18)  SpO2: 91% (20 Aug 2023 03:44) (91% - 93%)    Parameters below as of 20 Aug 2023 03:44  Patient On (Oxygen Delivery Method): room air      GENERAL:  No acute distress  HEENT:  NCAT  CHEST: no resp distress  HEART:  RRR  ABDOMEN:  Soft, mild TTP in the midepigastric region non-distended, normoactive bowel sounds  EXTREMITIES:  No edema  NEURO:  Alert and oriented x 3    LABS:                        10.6   13.51 )-----------( 204      ( 19 Aug 2023 07:21 )             32.2       08-19    138  |  102  |  22  ----------------------------<  93  3.5   |  22  |  1.52<H>    Ca    8.4      19 Aug 2023 07:19  Phos  2.7     08-19  Mg     1.6     08-19    TPro  5.4<L>  /  Alb  3.2<L>  /  TBili  0.7  /  DBili  x   /  AST  32  /  ALT  46<H>  /  AlkPhos  96  08-19    LIVER FUNCTIONS - ( 19 Aug 2023 07:19 )  Alb: 3.2 g/dL / Pro: 5.4 g/dL / ALK PHOS: 96 U/L / ALT: 46 U/L / AST: 32 U/L / GGT: x             Urinalysis Basic - ( 19 Aug 2023 07:19 )    Color: x / Appearance: x / SG: x / pH: x  Gluc: 93 mg/dL / Ketone: x  / Bili: x / Urobili: x   Blood: x / Protein: x / Nitrite: x   Leuk Esterase: x / RBC: x / WBC x   Sq Epi: x / Non Sq Epi: x / Bacteria: x      Imaging:  < from: CT Abdomen and Pelvis w/ IV Cont (08.18.23 @ 12:48) >  IMPRESSION:  Dilated common bile duct up to 12 mm, not significantly changed since   7/12/2023.    Right upper quadrant inflammatory changes surrounding the pancreatic   head/uncinate process, duodenum, and extending to the pericholecystic   region. Inflammatory changes appear centered in the pancreaticoduodenal   region with differential including pancreatitis and duodenitis. Correlate   with lipase. In conjunction with the recent ultrasound exam, acute   cholecystitis is thought to be unlikely.    Trace bilateral pleural effusions.    < end of copied text >

## 2023-08-20 NOTE — PROGRESS NOTE ADULT - ATTENDING COMMENTS
63 yo M with PMHx of gallstones, HTN, HLD, Bipolar, GERD, Jhon's Esophagus, asthma, hx of 6/19/23 hospitalization for choledocholithiasis with 9mm CBD dilation on MRCP, recent hx of ERCP 8/11 for recurrent choledolithiasis who now presents with fever, abdominal pain, admitted for post ERCP pancreatitis.  - abdominal pain improving, advancing diet to soft, low fat diet  - appreciate ID recs - dc/ Zosyn now, BCX have been negative   - possible lap jossie Tuesday?     d/w HS4 63 yo M with PMHx of gallstones, HTN, HLD, Bipolar, GERD, Jhon's Esophagus, asthma, hx of 6/19/23 hospitalization for choledocholithiasis with 9mm CBD dilation on MRCP, recent hx of ERCP 8/11 for recurrent choledolithiasis who now presents with fever, abdominal pain, admitted for post ERCP pancreatitis.  - abdominal pain improving, advancing diet to soft, low fat diet  - appreciate ID recs - dc/ Zosyn now, BCX have been negative   - possible lap jossie Tuesday?   - LE dopplers showing Right soleal vein thrombus favors post thrombotic change, will need repeat LE Dopplers in 2 weeks.    d/w HS4 63 yo M with PMHx of gallstones, HTN, HLD, Bipolar, GERD, Jhon's Esophagus, asthma, hx of 6/19/23 hospitalization for choledocholithiasis with 9mm CBD dilation on MRCP, recent hx of ERCP 8/11 for recurrent choledolithiasis who now presents with fever, abdominal pain, admitted for post ERCP pancreatitis.  - abdominal pain improving, advancing diet to soft, low fat diet  - appreciate ID recs - dc/ Zosyn now, BCX have been negative   - possible lap jossie monday 8/21 ->RCRI 1 pt, medically optimized at this time for planned surgery, without any further workup  - LE dopplers showing Right soleal vein thrombus favors post thrombotic change, will need repeat LE Dopplers in 2 weeks.    d/w HS4

## 2023-08-20 NOTE — PROGRESS NOTE ADULT - PROBLEM SELECTOR PLAN 2
-ab pain + 183 lipase  -hx of gallstones and s/p ERCP  -post-ERCP  -RUQ U/S: Dilatation of the common bile duct up to 11 mm, a new finding compared   to the previous ultrasound. There is also mild intrahepatic biliary dilatation  -CT: Fatstranding about the pancreatic head and uncinate process, contiguous with the periduodenal infiltration. Trace fluid tracking along the anterior pararenal fascia.  -most likely pancreatitis 2/2 gall stone    PLAN  -IVF  -tylenol for pain meds  -ctm  -c/s surgery for cholecystectomy for recurrent hx of choledocholithiasis requiring hospitalization -ab pain + 183 lipase  -hx of gallstones and s/p ERCP  -post-ERCP  -RUQ U/S: Dilatation of the common bile duct up to 11 mm, a new finding compared   to the previous ultrasound. There is also mild intrahepatic biliary dilatation  -CT: Fatstranding about the pancreatic head and uncinate process, contiguous with the periduodenal infiltration. Trace fluid tracking along the anterior pararenal fascia.  -most likely pancreatitis 2/2 gall stone    PLAN  -IVF  -tylenol for pain meds  -advance diet to low fat   -ctm  -c/s surgery for cholecystectomy for recurrent hx of choledocholithiasis requiring hospitalization -ab pain + 183 lipase  -hx of gallstones and s/p ERCP  -post-ERCP  -RUQ U/S: Dilatation of the common bile duct up to 11 mm, a new finding compared   to the previous ultrasound. There is also mild intrahepatic biliary dilatation  -CT: Fatstranding about the pancreatic head and uncinate process, contiguous with the periduodenal infiltration. Trace fluid tracking along the anterior pararenal fascia.  -most likely pancreatitis 2/2 gall stone    PLAN  -IVF  -tylenol for pain meds  -advance diet to low fat   -ctm  -c/s surgery for cholecystectomy for recurrent hx of choledocholithiasis requiring hospitalization  -RCRI score 1, pt medically optimized for lap jossie

## 2023-08-20 NOTE — PROGRESS NOTE ADULT - ASSESSMENT
62M with hx of HTN, HLD, bipolar, GERD, Jhon's esophagus, and asthma Patient initially admitted in 6/2023 with gallstone pancreatitis and choledocholithiasis. Discharged with no intervention and now s/p ERCP with sphincterotomy on 8/11 c/b post-ERCP pancreatitis and dilated CBD c/f choledocholithiasis. Surgery consulted for timing of cholecystectomy     Plan   - Possible Lap Paola on Tuesday 8/22  - appreciate care per primary team and GI recommendations       Plan discussed with Dr. Veronica Najera PGY-3   Acute Care Surgery  [id number]

## 2023-08-20 NOTE — PROGRESS NOTE ADULT - PROBLEM SELECTOR PLAN 1
-20.24 WBC with neutrophilic predominance + Tmax 100.7 = SIRS criteria pos  -CT: Dilated common bile duct up to 12 mm, not significantly changed since   7/12/2023; fat stranding about the pancreatic head and uncinate process, contiguous with the periduodenal infiltration. Trace fluid tracking along the anterior pararenal fascia.    PLAN  -ID following: dc empiric abx after 48hrs of NGTD on bcx (24hrs NGTD 8/19)  -c/w vanc and zosyn  -f/u bcx  -IVF   -serial labs and trend vitals

## 2023-08-20 NOTE — PROGRESS NOTE ADULT - ASSESSMENT
61 yo M with PMHx of gallstones, HTN, HLD, Bipolar, GERD, Jhon's Esophagus, asthma, hx of 6/19/23 hospitalization for choledocholithiasis with 9mm CBD dilation on MRCP, underwent ercp 8/17   sphincterotomy removal biliary sludge p/w abdominal pain x 1 day vomiting  transaminitis   fever leukocytosis   sepsis v sirs    plan  ct with inflammatory pancreatitis no necrosis   8/18 blood cx neg todate  ua neg  urine cx neg  lft trending down  wbc trendingdown  low grade temp yesterday - but improving   suspect sirs      blood cx neg in 48 hours--will empiric antibx   serial abd exam     63 yo M with PMHx of gallstones, HTN, HLD, Bipolar, GERD, Jhon's Esophagus, asthma, hx of 6/19/23 hospitalization for choledocholithiasis with 9mm CBD dilation on MRCP, underwent ercp 8/17   sphincterotomy removal biliary sludge p/w abdominal pain x 1 day vomiting  transaminitis   fever leukocytosis   sepsis v sirs    plan  ct with inflammatory pancreatitis no necrosis   8/18 blood cx neg to date  ua neg  urine cx neg  doppler small thrombus lle   lft trending down  wbc trending down  low grade temp yesterday - but improving   suspect sirs      blood cx neg in 48 hours--will empiric antibx   serial abd exam     61 yo M with PMHx of gallstones, HTN, HLD, Bipolar, GERD, Jhon's Esophagus, asthma, hx of 6/19/23 hospitalization for choledocholithiasis with 9mm CBD dilation on MRCP, underwent ercp 8/17   sphincterotomy removal biliary sludge p/w abdominal pain x 1 day vomiting  transaminitis   fever leukocytosis   sepsis v sirs    plan  ct with inflammatory pancreatitis no necrosis   8/18 blood cx neg to date  ua neg  urine cx neg  doppler small thrombus lle   lft trending down  wbc trending down  low grade temp yesterday - but improving   suspect sirs      blood cx neg  48 hours-- so will  stop empiric antibx   serial abd exam  d/w wife at bedside

## 2023-08-20 NOTE — PROGRESS NOTE ADULT - PROBLEM SELECTOR PLAN 4
-c/w home med -VA duplex positive for R soleal vein DVT  -will monitor for now, repeat US in 2 weeks

## 2023-08-21 ENCOUNTER — TRANSCRIPTION ENCOUNTER (OUTPATIENT)
Age: 63
End: 2023-08-21

## 2023-08-21 VITALS
OXYGEN SATURATION: 96 % | HEART RATE: 58 BPM | DIASTOLIC BLOOD PRESSURE: 77 MMHG | SYSTOLIC BLOOD PRESSURE: 140 MMHG | TEMPERATURE: 98 F | RESPIRATION RATE: 18 BRPM

## 2023-08-21 LAB
ALBUMIN SERPL ELPH-MCNC: 3.3 G/DL — SIGNIFICANT CHANGE UP (ref 3.3–5)
ALP SERPL-CCNC: 103 U/L — SIGNIFICANT CHANGE UP (ref 40–120)
ALT FLD-CCNC: 37 U/L — SIGNIFICANT CHANGE UP (ref 10–45)
ANION GAP SERPL CALC-SCNC: 12 MMOL/L — SIGNIFICANT CHANGE UP (ref 5–17)
AST SERPL-CCNC: 22 U/L — SIGNIFICANT CHANGE UP (ref 10–40)
BASOPHILS # BLD AUTO: 0.07 K/UL — SIGNIFICANT CHANGE UP (ref 0–0.2)
BASOPHILS NFR BLD AUTO: 0.9 % — SIGNIFICANT CHANGE UP (ref 0–2)
BILIRUB SERPL-MCNC: 0.5 MG/DL — SIGNIFICANT CHANGE UP (ref 0.2–1.2)
BUN SERPL-MCNC: 14 MG/DL — SIGNIFICANT CHANGE UP (ref 7–23)
CALCIUM SERPL-MCNC: 9 MG/DL — SIGNIFICANT CHANGE UP (ref 8.4–10.5)
CHLORIDE SERPL-SCNC: 106 MMOL/L — SIGNIFICANT CHANGE UP (ref 96–108)
CO2 SERPL-SCNC: 22 MMOL/L — SIGNIFICANT CHANGE UP (ref 22–31)
CREAT SERPL-MCNC: 1.27 MG/DL — SIGNIFICANT CHANGE UP (ref 0.5–1.3)
EGFR: 63 ML/MIN/1.73M2 — SIGNIFICANT CHANGE UP
EOSINOPHIL # BLD AUTO: 0.26 K/UL — SIGNIFICANT CHANGE UP (ref 0–0.5)
EOSINOPHIL NFR BLD AUTO: 3.6 % — SIGNIFICANT CHANGE UP (ref 0–6)
GIANT PLATELETS BLD QL SMEAR: PRESENT — SIGNIFICANT CHANGE UP
GLUCOSE SERPL-MCNC: 86 MG/DL — SIGNIFICANT CHANGE UP (ref 70–99)
HCT VFR BLD CALC: 30.9 % — LOW (ref 39–50)
HGB BLD-MCNC: 10.3 G/DL — LOW (ref 13–17)
LYMPHOCYTES # BLD AUTO: 1.18 K/UL — SIGNIFICANT CHANGE UP (ref 1–3.3)
LYMPHOCYTES # BLD AUTO: 16.1 % — SIGNIFICANT CHANGE UP (ref 13–44)
MAGNESIUM SERPL-MCNC: 2 MG/DL — SIGNIFICANT CHANGE UP (ref 1.6–2.6)
MANUAL SMEAR VERIFICATION: SIGNIFICANT CHANGE UP
MCHC RBC-ENTMCNC: 30.1 PG — SIGNIFICANT CHANGE UP (ref 27–34)
MCHC RBC-ENTMCNC: 33.3 GM/DL — SIGNIFICANT CHANGE UP (ref 32–36)
MCV RBC AUTO: 90.4 FL — SIGNIFICANT CHANGE UP (ref 80–100)
MONOCYTES # BLD AUTO: 0.39 K/UL — SIGNIFICANT CHANGE UP (ref 0–0.9)
MONOCYTES NFR BLD AUTO: 5.3 % — SIGNIFICANT CHANGE UP (ref 2–14)
NEUTROPHILS # BLD AUTO: 5.41 K/UL — SIGNIFICANT CHANGE UP (ref 1.8–7.4)
NEUTROPHILS NFR BLD AUTO: 74.1 % — SIGNIFICANT CHANGE UP (ref 43–77)
PHOSPHATE SERPL-MCNC: 3 MG/DL — SIGNIFICANT CHANGE UP (ref 2.5–4.5)
PLAT MORPH BLD: NORMAL — SIGNIFICANT CHANGE UP
PLATELET # BLD AUTO: 207 K/UL — SIGNIFICANT CHANGE UP (ref 150–400)
POTASSIUM SERPL-MCNC: 3.8 MMOL/L — SIGNIFICANT CHANGE UP (ref 3.5–5.3)
POTASSIUM SERPL-SCNC: 3.8 MMOL/L — SIGNIFICANT CHANGE UP (ref 3.5–5.3)
PROT SERPL-MCNC: 6.1 G/DL — SIGNIFICANT CHANGE UP (ref 6–8.3)
RBC # BLD: 3.42 M/UL — LOW (ref 4.2–5.8)
RBC # FLD: 12.9 % — SIGNIFICANT CHANGE UP (ref 10.3–14.5)
RBC BLD AUTO: SIGNIFICANT CHANGE UP
SODIUM SERPL-SCNC: 140 MMOL/L — SIGNIFICANT CHANGE UP (ref 135–145)
SURGICAL PATHOLOGY STUDY: SIGNIFICANT CHANGE UP
WBC # BLD: 7.3 K/UL — SIGNIFICANT CHANGE UP (ref 3.8–10.5)
WBC # FLD AUTO: 7.3 K/UL — SIGNIFICANT CHANGE UP (ref 3.8–10.5)

## 2023-08-21 PROCEDURE — 86900 BLOOD TYPING SEROLOGIC ABO: CPT

## 2023-08-21 PROCEDURE — 87641 MR-STAPH DNA AMP PROBE: CPT

## 2023-08-21 PROCEDURE — 81001 URINALYSIS AUTO W/SCOPE: CPT

## 2023-08-21 PROCEDURE — 36415 COLL VENOUS BLD VENIPUNCTURE: CPT

## 2023-08-21 PROCEDURE — 99239 HOSP IP/OBS DSCHRG MGMT >30: CPT

## 2023-08-21 PROCEDURE — 93970 EXTREMITY STUDY: CPT

## 2023-08-21 PROCEDURE — 84295 ASSAY OF SERUM SODIUM: CPT

## 2023-08-21 PROCEDURE — 84100 ASSAY OF PHOSPHORUS: CPT

## 2023-08-21 PROCEDURE — 96376 TX/PRO/DX INJ SAME DRUG ADON: CPT

## 2023-08-21 PROCEDURE — 82330 ASSAY OF CALCIUM: CPT

## 2023-08-21 PROCEDURE — 85014 HEMATOCRIT: CPT

## 2023-08-21 PROCEDURE — 83690 ASSAY OF LIPASE: CPT

## 2023-08-21 PROCEDURE — 87640 STAPH A DNA AMP PROBE: CPT

## 2023-08-21 PROCEDURE — 76705 ECHO EXAM OF ABDOMEN: CPT

## 2023-08-21 PROCEDURE — 83735 ASSAY OF MAGNESIUM: CPT

## 2023-08-21 PROCEDURE — 80053 COMPREHEN METABOLIC PANEL: CPT

## 2023-08-21 PROCEDURE — 86901 BLOOD TYPING SEROLOGIC RH(D): CPT

## 2023-08-21 PROCEDURE — 96366 THER/PROPH/DIAG IV INF ADDON: CPT

## 2023-08-21 PROCEDURE — 85027 COMPLETE CBC AUTOMATED: CPT

## 2023-08-21 PROCEDURE — 71045 X-RAY EXAM CHEST 1 VIEW: CPT

## 2023-08-21 PROCEDURE — 74177 CT ABD & PELVIS W/CONTRAST: CPT | Mod: MA

## 2023-08-21 PROCEDURE — 82435 ASSAY OF BLOOD CHLORIDE: CPT

## 2023-08-21 PROCEDURE — 80048 BASIC METABOLIC PNL TOTAL CA: CPT

## 2023-08-21 PROCEDURE — 87086 URINE CULTURE/COLONY COUNT: CPT

## 2023-08-21 PROCEDURE — 82803 BLOOD GASES ANY COMBINATION: CPT

## 2023-08-21 PROCEDURE — 86850 RBC ANTIBODY SCREEN: CPT

## 2023-08-21 PROCEDURE — 97162 PT EVAL MOD COMPLEX 30 MIN: CPT

## 2023-08-21 PROCEDURE — 99233 SBSQ HOSP IP/OBS HIGH 50: CPT | Mod: GC

## 2023-08-21 PROCEDURE — 85730 THROMBOPLASTIN TIME PARTIAL: CPT

## 2023-08-21 PROCEDURE — 76705 ECHO EXAM OF ABDOMEN: CPT | Mod: 26

## 2023-08-21 PROCEDURE — 85610 PROTHROMBIN TIME: CPT

## 2023-08-21 PROCEDURE — 99291 CRITICAL CARE FIRST HOUR: CPT

## 2023-08-21 PROCEDURE — 87040 BLOOD CULTURE FOR BACTERIA: CPT

## 2023-08-21 PROCEDURE — 85018 HEMOGLOBIN: CPT

## 2023-08-21 PROCEDURE — 84132 ASSAY OF SERUM POTASSIUM: CPT

## 2023-08-21 PROCEDURE — 85025 COMPLETE CBC W/AUTO DIFF WBC: CPT

## 2023-08-21 PROCEDURE — 82947 ASSAY GLUCOSE BLOOD QUANT: CPT

## 2023-08-21 PROCEDURE — 96375 TX/PRO/DX INJ NEW DRUG ADDON: CPT

## 2023-08-21 PROCEDURE — 83605 ASSAY OF LACTIC ACID: CPT

## 2023-08-21 PROCEDURE — 96365 THER/PROPH/DIAG IV INF INIT: CPT

## 2023-08-21 RX ORDER — PREGABALIN 225 MG/1
1 CAPSULE ORAL
Qty: 0 | Refills: 0 | DISCHARGE
Start: 2023-08-21

## 2023-08-21 RX ORDER — QUETIAPINE FUMARATE 200 MG/1
1 TABLET, FILM COATED ORAL
Qty: 0 | Refills: 0 | DISCHARGE
Start: 2023-08-21

## 2023-08-21 RX ORDER — DOXAZOSIN MESYLATE 4 MG
1 TABLET ORAL
Qty: 0 | Refills: 0 | DISCHARGE

## 2023-08-21 RX ORDER — CHOLECALCIFEROL (VITAMIN D3) 125 MCG
400 CAPSULE ORAL
Qty: 0 | Refills: 0 | DISCHARGE
Start: 2023-08-21

## 2023-08-21 RX ORDER — LAMOTRIGINE 25 MG/1
1 TABLET, ORALLY DISINTEGRATING ORAL
Qty: 0 | Refills: 0 | DISCHARGE
Start: 2023-08-21

## 2023-08-21 RX ORDER — LAMOTRIGINE 25 MG/1
1 TABLET, ORALLY DISINTEGRATING ORAL
Refills: 0 | DISCHARGE

## 2023-08-21 RX ORDER — DOXAZOSIN MESYLATE 4 MG
1 TABLET ORAL
Refills: 0 | DISCHARGE

## 2023-08-21 RX ORDER — ACETAMINOPHEN 500 MG
2 TABLET ORAL
Qty: 0 | Refills: 0 | DISCHARGE
Start: 2023-08-21

## 2023-08-21 RX ORDER — FLUOXETINE HCL 10 MG
1 CAPSULE ORAL
Qty: 0 | Refills: 0 | DISCHARGE
Start: 2023-08-21

## 2023-08-21 RX ORDER — QUETIAPINE FUMARATE 200 MG/1
1 TABLET, FILM COATED ORAL
Refills: 0 | DISCHARGE

## 2023-08-21 RX ORDER — DOXAZOSIN MESYLATE 4 MG
1 TABLET ORAL
Qty: 0 | Refills: 0 | DISCHARGE
Start: 2023-08-21

## 2023-08-21 RX ADMIN — LAMOTRIGINE 150 MILLIGRAM(S): 25 TABLET, ORALLY DISINTEGRATING ORAL at 09:33

## 2023-08-21 RX ADMIN — Medication 1 MILLIGRAM(S): at 09:32

## 2023-08-21 RX ADMIN — ENOXAPARIN SODIUM 40 MILLIGRAM(S): 100 INJECTION SUBCUTANEOUS at 13:27

## 2023-08-21 RX ADMIN — SODIUM CHLORIDE 200 MILLILITER(S): 9 INJECTION, SOLUTION INTRAVENOUS at 08:42

## 2023-08-21 RX ADMIN — Medication 400 UNIT(S): at 13:26

## 2023-08-21 RX ADMIN — QUETIAPINE FUMARATE 50 MILLIGRAM(S): 200 TABLET, FILM COATED ORAL at 09:32

## 2023-08-21 RX ADMIN — PANTOPRAZOLE SODIUM 40 MILLIGRAM(S): 20 TABLET, DELAYED RELEASE ORAL at 05:42

## 2023-08-21 RX ADMIN — PREGABALIN 1000 MICROGRAM(S): 225 CAPSULE ORAL at 13:26

## 2023-08-21 NOTE — DISCHARGE NOTE PROVIDER - HOSPITAL COURSE
HPI:    61 yo M with PMHx of gallstones, HTN, HLD, Bipolar, GERD, Jhon's Esophagus, asthma, hx of 6/19/23 hospitalization for choledocholithiasis with 9mm CBD dilation on MRCP, recent hx of ERCP 8/11 for removal biliary sludge p/w abdominal pain x 1 day. Pt complains of a 10/10 dull non-radiating RUQ pain that's been worsening since yesterday, Pt states he's had 4 episodes of NBNB vomitting since yesterday. The pain is constant and pt states he has not been able to eat nor drink since the onset of the pain. He denies, HA, CP, SOB, constipation, diarrhea, hematochezia, and melena.    Hospital Course:    The patient was admitted for sepsis in the setting of post-ERCP pancreatitis vs bacterial translocation from the gut during ERCP. CT consistent w/ acute pancreatitis.  The patient was initially placed on vanc/zosn and mIVF w/ improvement in his symptoms. We consulted our ID physicians who recommended monitoring off abx given negative blood cultures. Despite absence of abx the patient continued to improve. General surgery was consulted for cholecystectomy however the patient reported that he had a family member that was about to pass away and he wanted to delay the surgery until after he dealt with that. Surgery and gastroenterology teams were made aware and in agreement for the surgery to be completed as an outpatient. During hospitalization the patient was found t o have a R soleal DVT, suspected to be provoked in the setting of repeated hospitalizations and poor mobility. Patient was recommended a repeat US in 2 weeks however given below the knee, no therapeutic AC was needed.    At the time of discharge the patient was hemodynamically stable with surgery, GI and primary team clearance for discharge home w/ PCP follow up. Dr. Ewing, patient's PCP called and made aware of patient's hospitalizations and necessary follow ups.    Important Medication Changes and Reason:    [ ] Please take tylenol as needed for abdominal pain     Active of Pending issues Requiring Follow up:  [ ] Repeat b/l LE U/S for R soleal DVT  [ ] Follow up w/ surgery for cholecystectomy  [ ] Follow up w/ GI for management of recurrent choledocholithiasis.      HPI:    63 yo M with PMHx of gallstones, HTN, HLD, Bipolar, GERD, Jhon's Esophagus, asthma, hx of 6/19/23 hospitalization for choledocholithiasis with 9mm CBD dilation on MRCP, recent hx of ERCP 8/11 for removal biliary sludge p/w abdominal pain x 1 day. Pt complains of a 10/10 dull non-radiating RUQ pain that's been worsening since yesterday, Pt states he's had 4 episodes of NBNB vomitting since yesterday. The pain is constant and pt states he has not been able to eat nor drink since the onset of the pain. He denies, HA, CP, SOB, constipation, diarrhea, hematochezia, and melena.    Hospital Course:    The patient was admitted for sepsis in the setting of post-ERCP pancreatitis vs bacterial translocation from the gut during ERCP. CT consistent w/ acute pancreatitis.  The patient was initially placed on vanc/zosn and mIVF w/ improvement in his symptoms. We consulted our ID physicians who recommended monitoring off abx given negative blood cultures. Despite absence of abx the patient continued to improve. General surgery was consulted for cholecystectomy however the patient reported that he had a family member that was about to pass away and he wanted to delay the surgery until after he dealt with that. Surgery and gastroenterology teams were made aware and in agreement for the surgery to be completed as an outpatient. During hospitalization the patient was found t o have a R soleal DVT, suspected to be provoked in the setting of repeated hospitalizations and poor mobility. Patient was recommended a repeat US in 2 weeks however given below the knee, no therapeutic AC was needed.    At the time of discharge the patient was hemodynamically stable with surgery, GI and primary team clearance for discharge home w/ PCP follow up. Dr. Ewing, patient's PCP called and made aware of patient's hospitalizations and necessary follow ups.    Important Medication Changes and Reason:    [ ] Please take tylenol as needed for abdominal pain   [ ] Cozaar reduced to 50 mg to be  started 8/22 given hypotension in the hospital and relative normotension off meds in patient     Active of Pending issues Requiring Follow up:  [ ] Repeat b/l LE U/S for R soleal DVT  [ ] Follow up w/ surgery for cholecystectomy  [ ] Follow up w/ GI for management of recurrent choledocholithiasis.

## 2023-08-21 NOTE — PROGRESS NOTE ADULT - ASSESSMENT
62 year old male with history of HTN, HLD, Bipolar, GERD, Jhon's esophagus, asthma, and gallstone pancreatitis, s/p ERCP 8/17/2023 for removal of biliary sludge who presents with fever and abdominal pain.  Patient underwent ERCP for history of gallstone pancreatitis on 8/17/2023, during which a sphincterotomy and balloon sweep was performed with removal of biliary sludge.  Later that day, patient developed fever, abdominal pain, and multiple episodes of N/V, prompting his presentation to [facility] ED with labs notable for eleavted lipase and iamging showing ijnflmation around the head of the pancreas c/w PEP     # Fever, RUQ pain, N/V  # Leukocytosis  # Lactic acidosis, improved  # History of gallstone pancreatitis s/p ERCP  -EGD/EUS/ERCP 8/17/2023 revealed a large periampullary diverticulum, hyperechoic material consistent with sludge in the lower third of the main bile duct with lymphadenopathy. There was sludge in the gallbladder.  ERCP revealed dilated CBD with sludge s/p sphincterotomy, balloon extraction of debris and sludge.  Several islands of salmon-colored mucosa at the distal esophagus concerning for Barretts esophagus.  Erythematous mucosa in the stomach s/p biopsy.  Multiple large duodenal diverticula.      Recommendations:  -trend clinical symptoms, exam findings, vital signs, CBC, CMP, INR  -advance diet as tolerated to low fat/residue diet from GI perspective  -continue antibiotics for projected 7 day course  -appreciate surgery recommendations regarding timing of CCY  -no further plans for anticipated inpatient GI interventions    Note incomplete until finalized by attending signature/attestation.    Adama Mallory  GI/Hepatology Fellow    MONDAY-FRIDAY 8AM-5PM:  Pager# [phone] ([facility]) or [phone] ([facility])    NON-URGENT CONSULTS:  Please email [email or web address]  AT NIGHT AND ON WEEKENDS:  Contact on-call GI fellow via answering service ([phone]) from 5pm-8am and on weekends/holidays

## 2023-08-21 NOTE — PROGRESS NOTE ADULT - PROBLEM SELECTOR PLAN 1
-ab pain + 183 lipase  -hx of gallstones and s/p ERCP  -post-ERCP  -RUQ U/S: Dilatation of the common bile duct up to 11 mm, a new finding compared   to the previous ultrasound. There is also mild intrahepatic biliary dilatation  -CT: Fatstranding about the pancreatic head and uncinate process, contiguous with the periduodenal infiltration. Trace fluid tracking along the anterior pararenal fascia.  -most likely pancreatitis 2/2 gall stone    PLAN  -IVF  -tylenol for pain meds  -advance diet to low fat, low residue regular consistency diet once no longer NPO   -surgery recs appreciated, per conversation w/ surgery on 8/20, patient add on for cholecystectomy 8/21  -RCRI score 1, pt medically optimized for lap jossie

## 2023-08-21 NOTE — PROGRESS NOTE ADULT - ASSESSMENT
61 yo M with PMHx of gallstones, HTN, HLD, Bipolar, GERD, Jhon's Esophagus, asthma, hx of 6/19/23 hospitalization for choledocholithiasis with 9mm CBD dilation on MRCP, underwent ercp 8/17   sphincterotomy removal biliary sludge p/w abdominal pain x 1 day vomiting  transaminitis   fever leukocytosis   sepsis v sirs    plan  ct with inflammatory pancreatitis, no necrosis   8/18 blood cx neg to date  ua negative, urine cx neg  doppler small thrombus lle   LFTs normalized   wbc normalized, afebrile x24h  suspect sirs   off antibiotics given Bcx NGTD and no infectious source found to date   Stable from ID standpoint at this time       Diogenes Valentin M.D.  [facility], Division of Infectious Diseases  [phone]  After 5pm on weekdays and all day on weekends - please call [phone]

## 2023-08-21 NOTE — PROGRESS NOTE ADULT - SUBJECTIVE AND OBJECTIVE BOX
Interval Events:   No acute events overnight.  Patient without acute symptoms at this time.  Endorses improvement in abdominal pain.    ROS:   12 point review of systems performed and negative except otherwise noted in HPI.    Hospital Medications:  acetaminophen     Tablet .. 650 milliGRAM(s) Oral every 6 hours PRN  aspirin  chewable 81 milliGRAM(s) Oral daily  atorvastatin 80 milliGRAM(s) Oral at bedtime  chlorhexidine 4% Liquid 1 Application(s) Topical daily  cholecalciferol 400 Unit(s) Oral daily  cyanocobalamin 1000 MICROGram(s) Oral daily  doxazosin 4 milliGRAM(s) Oral at bedtime  enoxaparin Injectable 40 milliGRAM(s) SubCutaneous every 24 hours  FLUoxetine 40 milliGRAM(s) Oral <User Schedule>  FLUoxetine 40 milliGRAM(s) Oral <User Schedule>  lamoTRIgine 150 milliGRAM(s) Oral two times a day  LORazepam     Tablet 1 milliGRAM(s) Oral <User Schedule> PRN  melatonin 3 milliGRAM(s) Oral at bedtime PRN  oxyCODONE    IR 5 milliGRAM(s) Oral every 6 hours PRN  oxyCODONE    IR 10 milliGRAM(s) Oral every 6 hours PRN  pantoprazole    Tablet 40 milliGRAM(s) Oral <User Schedule>  QUEtiapine 50 milliGRAM(s) Oral <User Schedule>      PHYSICAL EXAM:   Vital Signs:  Vital Signs Last 24 Hrs  T(C): 36.3 (21 Aug 2023 03:57), Max: 37.5 (20 Aug 2023 20:42)  T(F): 97.4 (21 Aug 2023 03:57), Max: 99.5 (20 Aug 2023 20:42)  HR: 45 (21 Aug 2023 03:57) (45 - 55)  BP: 159/67 (21 Aug 2023 03:57) (159/67 - 164/75)  BP(mean): --  RR: 18 (21 Aug 2023 03:57) (16 - 18)  SpO2: 94% (21 Aug 2023 03:57) (94% - 95%)    Parameters below as of 21 Aug 2023 03:57  Patient On (Oxygen Delivery Method): room air      Daily     Daily     GENERAL: no acute distress  NEURO: alert  HEENT: NCAT, no conjunctival pallor appreciated  CHEST: no respiratory distress, no accessory muscle use  CARDIAC: regular rate, +S1/S2  ABDOMEN: soft, nontender, no rebound or guarding  EXTREMITIES: warm, well perfused  SKIN: no lesions noted    LABS: reviewed                        10.3   7.30  )-----------( 207      ( 21 Aug 2023 07:04 )             30.9     08-21    140  |  106  |  14  ----------------------------<  86  3.8   |  22  |  1.27    Ca    9.0      21 Aug 2023 07:08  Phos  3.0     08-21  Mg     2.0     08-21    TPro  6.1  /  Alb  3.3  /  TBili  0.5  /  DBili  x   /  AST  22  /  ALT  37  /  AlkPhos  103  08-21    LIVER FUNCTIONS - ( 21 Aug 2023 07:08 )  Alb: 3.3 g/dL / Pro: 6.1 g/dL / ALK PHOS: 103 U/L / ALT: 37 U/L / AST: 22 U/L / GGT: x             Interval Diagnostic Studies: see sunrise for full report

## 2023-08-21 NOTE — PROGRESS NOTE ADULT - PROBLEM SELECTOR PLAN 8
-DVT: SCD  -diet: advance as tolerated once no longer NPO to low fat low residue regular diet
-DVT: SCD  -diet: liquid diet

## 2023-08-21 NOTE — PROGRESS NOTE ADULT - ATTENDING COMMENTS
63 yo M with PMHx of gallstones, HTN, HLD, Bipolar, GERD, Jhon's Esophagus, asthma, hx of 6/19/23 hospitalization for choledocholithiasis with 9mm CBD dilation on MRCP, recent hx of ERCP 8/11 for recurrent choledolithiasis who now presents with fever, abdominal pain, admitted for post ERCP pancreatitis.  - abdominal pain improved, tolerated low fat regular diet   - appreciate ID recs - dc/ Zosyn now, BCX have been negative   - patient prefers outpatient lap jossie given family issues outside of the hospital, and does not wish to stay longer in the hospital   - LE dopplers showing Right soleal vein thrombus favors post thrombotic change, will need repeat LE Dopplers in 2 weeks (signout given to PCP)  - discharge today, 51 minutes spent coordinating discharge     d/w HS4    Jeaneth Garcia MD  [facility] Division of Hospital Medicine  Available via MS Teams

## 2023-08-21 NOTE — DISCHARGE NOTE PROVIDER - PROVIDER TOKENS
PROVIDER:[TOKEN:[[id number]],FOLLOWUP:[1 week]] PROVIDER:[TOKEN:[[id number]],FOLLOWUP:[1 week]],PROVIDER:[TOKEN:[[id number]],FOLLOWUP:[1 week],ESTABLISHEDPATIENT:[T]]

## 2023-08-21 NOTE — PHYSICAL THERAPY INITIAL EVALUATION ADULT - PHYSICAL ASSIST/NONPHYSICAL ASSIST: STAIR NEGOTIATION, REHAB EVAL
Patient presented for above procedure.  Tolerated it well and was discharged home POD3 after voiding, tolerating diet, ambulating, pain controlled.  Discharge instructions, follow-up appointment, and med rec are below.     supervision

## 2023-08-21 NOTE — DISCHARGE NOTE PROVIDER - NSDCCPTREATMENT_GEN_ALL_CORE_FT
PRINCIPAL PROCEDURE  Procedure: ERCP  Findings and Treatment:   < end of copied text >  Impression:          - EUS revealed a large periampullary diverticulumhyperechoic material                        consistent with sludge in the lower third of the main bile duct with                        lymphadenopathy. There was sludge in the gallbladder.                       - ERCP revealed dilated CBD with slduge s/p sphincterotomy, balloon                        extraction of debris and sludge.              - Several islands of salmon-colored mucosa at the distal esophagus concerning                        for Barretts esophagus.                       - Erythematous mucosa in the stomach s/p biopsy.                       - Multiple large duodenal diverticula.  Recommendation:      - Discharge patient to home (ambulatory).                       - Await path results.                       - Clear liquid diet today.                       - Consider surgical evaluation for cholecystectomy.                      - Patient has a contact number available for emergencies. The signs and                        symptoms of potential delayed complications were discussed with the patient.                        Return to normal activities tomorrow. Written discharge instructions were                        provided to the patient.< from: ERCP (08.17.23 @ 12:43) >        SECONDARY PROCEDURE  Procedure: Abdomen CT  Findings and Treatment:   < end of copied text >  IMPRESSION:  Dilated common bile duct up to 12 mm, not significantly changed since   7/12/2023.  Right upper quadrant inflammatory changes surrounding the pancreatic   head/uncinate process, duodenum, and extending to the pericholecystic   region. Inflammatory changes appear centered in the pancreaticoduodenal   region with differential including pancreatitis and duodenitis. Correlate   with lipase. In conjunction with the recent ultrasound exam, acute   cholecystitis is thought to be unlikely.  Trace bilateral pleural effusions.  < from: CT Abdomen and Pelvis w/ IV Cont (08.18.23 @ 12:48) >      Procedure: Ultrasound of veins of both lower extremities for deep vein thrombosis (DVT)  Findings and Treatment:   < end of copied text >  FINDINGS:  RIGHT:  Normal compressibility of the RIGHT common femoral, femoral and popliteal   veins.  Doppler examination shows normal spontaneous and phasic flow.  Right soleal vein thrombus favors post thrombotic change. Otherwise no   acute LEFT calf vein thrombosis is detected  LEFT:  Normal compressibility of the LEFT common femoral, femoral and popliteal   veins.  Dopplerexamination shows normal spontaneous and phasic flow.  No Left calf vein thrombosis is detected.  IMPRESSION:  1.  Right soleal vein thrombus favors post thrombotic change.  Correlate   with physical exam  2.  Otherwise, no evidence of deep venous thrombosis in either lower   extremity.< from: VA Duplex Lower Ext Vein Scan, Bilat (08.20.23 @ 11:40) >

## 2023-08-21 NOTE — PROGRESS NOTE ADULT - SUBJECTIVE AND OBJECTIVE BOX
SURGERY PROGRESS NOTE    SUBJECTIVE / 24H EVENTS:  Patient seen and examined on morning rounds. No acute events overnight.      OBJECTIVE:  VITAL SIGNS:  T(C): 36.3 (08-21-23 @ 03:57), Max: 37.5 (08-20-23 @ 20:42)  HR: 45 (08-21-23 @ 03:57) (45 - 55)  BP: 159/67 (08-21-23 @ 03:57) (159/67 - 164/75)  RR: 18 (08-21-23 @ 03:57) (16 - 18)  SpO2: 94% (08-21-23 @ 03:57) (94% - 95%)  Daily     Daily       PHYSICAL EXAM:  Gen: NAD  LS: Respirations unlabored on RA  GI: Soft. Nontender. Nondistended.   Ext: Warm, well perfused      08-20-23 @ 07:01  -  08-21-23 @ 07:00  --------------------------------------------------------  IN:    Lactated Ringers: 2000 mL  Total IN: 2000 mL    OUT:    Voided (mL): 300 mL  Total OUT: 300 mL    Total NET: 1700 mL          LAB VALUES:  08-21    140  |  106  |  14  ----------------------------<  86  3.8   |  22  |  1.27    Ca    9.0      21 Aug 2023 07:08  Phos  3.0     08-21  Mg     2.0     08-21    TPro  6.1  /  Alb  3.3  /  TBili  0.5  /  DBili  x   /  AST  22  /  ALT  37  /  AlkPhos  103  08-21                               10.3   7.30  )-----------( 207      ( 21 Aug 2023 07:04 )             30.9     LIVER FUNCTIONS - ( 21 Aug 2023 07:08 )  Alb: 3.3 g/dL / Pro: 6.1 g/dL / ALK PHOS: 103 U/L / ALT: 37 U/L / AST: 22 U/L / GGT: x                   Urinalysis Basic - ( 21 Aug 2023 07:08 )    Color: x / Appearance: x / SG: x / pH: x  Gluc: 86 mg/dL / Ketone: x  / Bili: x / Urobili: x   Blood: x / Protein: x / Nitrite: x   Leuk Esterase: x / RBC: x / WBC x   Sq Epi: x / Non Sq Epi: x / Bacteria: x        MICROBIOLOGY:      RADIOLOGY:  PACS Image: Image(s) Available (08-21-23 @ 11:51)        MEDICATIONS  (STANDING):  aspirin  chewable 81 milliGRAM(s) Oral daily  atorvastatin 80 milliGRAM(s) Oral at bedtime  chlorhexidine 4% Liquid 1 Application(s) Topical daily  cholecalciferol 400 Unit(s) Oral daily  cyanocobalamin 1000 MICROGram(s) Oral daily  doxazosin 4 milliGRAM(s) Oral at bedtime  enoxaparin Injectable 40 milliGRAM(s) SubCutaneous every 24 hours  FLUoxetine 40 milliGRAM(s) Oral <User Schedule>  FLUoxetine 40 milliGRAM(s) Oral <User Schedule>  lamoTRIgine 150 milliGRAM(s) Oral two times a day  pantoprazole    Tablet 40 milliGRAM(s) Oral <User Schedule>  QUEtiapine 50 milliGRAM(s) Oral <User Schedule>    MEDICATIONS  (PRN):  acetaminophen     Tablet .. 650 milliGRAM(s) Oral every 6 hours PRN Temp greater or equal to 38C (100.4F), Mild Pain (1 - 3)  LORazepam     Tablet 1 milliGRAM(s) Oral <User Schedule> PRN Anxiety  melatonin 3 milliGRAM(s) Oral at bedtime PRN Insomnia  oxyCODONE    IR 5 milliGRAM(s) Oral every 6 hours PRN Moderate Pain (4 - 6)  oxyCODONE    IR 10 milliGRAM(s) Oral every 6 hours PRN Severe Pain (7 - 10)        SURGERY PROGRESS NOTE    SUBJECTIVE / 24H EVENTS:  Patient seen and examined on morning rounds. No acute events overnight.      OBJECTIVE:  VITAL SIGNS:  T(C): 36.3 (08-21-23 @ 03:57), Max: 37.5 (08-20-23 @ 20:42)  HR: 45 (08-21-23 @ 03:57) (45 - 55)  BP: 159/67 (08-21-23 @ 03:57) (159/67 - 164/75)  RR: 18 (08-21-23 @ 03:57) (16 - 18)  SpO2: 94% (08-21-23 @ 03:57) (94% - 95%)  Daily     Daily       PHYSICAL EXAM:  Gen: NAD  LS: Respirations unlabored on RA  GI: Soft. Mild RUQ pain. Nondistended.   Ext: Warm, well perfused      08-20-23 @ 07:01  -  08-21-23 @ 07:00  --------------------------------------------------------  IN:    Lactated Ringers: 2000 mL  Total IN: 2000 mL    OUT:    Voided (mL): 300 mL  Total OUT: 300 mL    Total NET: 1700 mL          LAB VALUES:  08-21    140  |  106  |  14  ----------------------------<  86  3.8   |  22  |  1.27    Ca    9.0      21 Aug 2023 07:08  Phos  3.0     08-21  Mg     2.0     08-21    TPro  6.1  /  Alb  3.3  /  TBili  0.5  /  DBili  x   /  AST  22  /  ALT  37  /  AlkPhos  103  08-21                               10.3   7.30  )-----------( 207      ( 21 Aug 2023 07:04 )             30.9     LIVER FUNCTIONS - ( 21 Aug 2023 07:08 )  Alb: 3.3 g/dL / Pro: 6.1 g/dL / ALK PHOS: 103 U/L / ALT: 37 U/L / AST: 22 U/L / GGT: x                   Urinalysis Basic - ( 21 Aug 2023 07:08 )    Color: x / Appearance: x / SG: x / pH: x  Gluc: 86 mg/dL / Ketone: x  / Bili: x / Urobili: x   Blood: x / Protein: x / Nitrite: x   Leuk Esterase: x / RBC: x / WBC x   Sq Epi: x / Non Sq Epi: x / Bacteria: x        MICROBIOLOGY:      RADIOLOGY:  PACS Image: Image(s) Available (08-21-23 @ 11:51)        MEDICATIONS  (STANDING):  aspirin  chewable 81 milliGRAM(s) Oral daily  atorvastatin 80 milliGRAM(s) Oral at bedtime  chlorhexidine 4% Liquid 1 Application(s) Topical daily  cholecalciferol 400 Unit(s) Oral daily  cyanocobalamin 1000 MICROGram(s) Oral daily  doxazosin 4 milliGRAM(s) Oral at bedtime  enoxaparin Injectable 40 milliGRAM(s) SubCutaneous every 24 hours  FLUoxetine 40 milliGRAM(s) Oral <User Schedule>  FLUoxetine 40 milliGRAM(s) Oral <User Schedule>  lamoTRIgine 150 milliGRAM(s) Oral two times a day  pantoprazole    Tablet 40 milliGRAM(s) Oral <User Schedule>  QUEtiapine 50 milliGRAM(s) Oral <User Schedule>    MEDICATIONS  (PRN):  acetaminophen     Tablet .. 650 milliGRAM(s) Oral every 6 hours PRN Temp greater or equal to 38C (100.4F), Mild Pain (1 - 3)  LORazepam     Tablet 1 milliGRAM(s) Oral <User Schedule> PRN Anxiety  melatonin 3 milliGRAM(s) Oral at bedtime PRN Insomnia  oxyCODONE    IR 5 milliGRAM(s) Oral every 6 hours PRN Moderate Pain (4 - 6)  oxyCODONE    IR 10 milliGRAM(s) Oral every 6 hours PRN Severe Pain (7 - 10)

## 2023-08-21 NOTE — PROGRESS NOTE ADULT - PROVIDER SPECIALTY LIST ADULT
Gastroenterology
Surgery
Trauma Surgery
Gastroenterology
Gastroenterology
Infectious Disease
Infectious Disease
Internal Medicine
No

## 2023-08-21 NOTE — DISCHARGE NOTE PROVIDER - NSFOLLOWUPCLINICS_GEN_ALL_ED_FT
Gastroenterology at [facility]  Gastroenterology  [address] [place] [address]  Phone: [phone]  Fax:   Established Patient  Follow Up Time: 1 week

## 2023-08-21 NOTE — DISCHARGE NOTE PROVIDER - ATTENDING DISCHARGE PHYSICAL EXAMINATION:
CONSTITUTIONAL: NAD, well-developed  RESPIRATORY: Normal respiratory effort; lungs are clear to auscultation bilaterally  CARDIOVASCULAR: Regular rate and rhythm, normal S1 and S2, no murmur/rub/gallop; No lower extremity edema; Peripheral pulses are 2+ bilaterally  ABDOMEN: Nontender to palpation, normoactive bowel sounds, no rebound/guarding; No hepatosplenomegaly  MUSCLOSKELETAL: no clubbing or cyanosis of digits; no joint swelling or tenderness to palpation  PSYCH: A+O to person, place, and time; affect appropriate

## 2023-08-21 NOTE — PROGRESS NOTE ADULT - PROBLEM SELECTOR PLAN 7
RESOLVED  -20.24 WBC with neutrophilic predominance + Tmax 100.7 = SIRS criteria pos  -CT: Dilated common bile duct up to 12 mm, not significantly changed since   7/12/2023; fat stranding about the pancreatic head and uncinate process, contiguous with the periduodenal infiltration. Trace fluid tracking along the anterior pararenal fascia.    PLAN  -ID following: s/p abx given negative bcx, SIRS likely 2/2 post-ercp pancreatitis

## 2023-08-21 NOTE — PROGRESS NOTE ADULT - ATTENDING COMMENTS
As above  62M with gallstones and CBD stones s/p ERCP c/p PEP  Significantly improved, going home  Surgical followup for cholecystectomy  Outpatient GI followup    Thank you for this interesting consult.  Please call the advanced GI service with any questions or concerns.

## 2023-08-21 NOTE — PROGRESS NOTE ADULT - PROBLEM SELECTOR PLAN 3
-VA duplex positive for R soleal vein DVT. likely induced in the setting of repeated hospitalizations and poor mobility.  -will monitor for now, repeat US in 2 weeks  - Does not need therapeutic anticoagulation at this time
-downtrending  -likely 2/2 from biliary back from gallstone    PLAN  -ctm LFTs
-downtrending  -likely 2/2 from biliary back from gallstone    PLAN  -ctm LFTs

## 2023-08-21 NOTE — DISCHARGE NOTE PROVIDER - CARE PROVIDERS DIRECT ADDRESSES
[email or web address]

## 2023-08-21 NOTE — DISCHARGE NOTE PROVIDER - NSDCMRMEDTOKEN_GEN_ALL_CORE_FT
aspirin 81 mg oral capsule: 1 tab(s) orally once a day  Ativan 1 mg oral tablet: 1 orally 2 times a day  Cozaar 100 mg oral tablet: 1 orally once a day  Crestor 20 mg oral tablet: 1 orally  doxazosin 4 mg oral tablet: 1 orally  doxazosin 4 mg oral tablet: 1 tab(s) orally once a day  LaMICtal 150 mg oral tablet: 1 orally  Protonix 40 mg oral delayed release tablet: 1 orally  Rolling walker: Please dispense rolling walker  Rolling walker: Please dispense rolling walker  Seroquel 50 mg oral tablet: 1 orally   acetaminophen 325 mg oral tablet: 2 tab(s) orally every 6 hours As needed Temp greater or equal to 38C (100.4F), Mild Pain (1 - 3)  aspirin 81 mg oral capsule: 1 tab(s) orally once a day  Ativan 1 mg oral tablet: 1 orally 2 times a day  cholecalciferol oral tablet: 400 unit(s) orally once a day  Cozaar 100 mg oral tablet: 0.5 tablet orally once a day Please take starting 8/22  Crestor 20 mg oral tablet: 1 orally  cyanocobalamin 1000 mcg oral tablet: 1 tab(s) orally once a day  doxazosin 4 mg oral tablet: 1 orally  FLUoxetine 40 mg oral capsule: 1 cap(s) orally once a day 2 tablets on even days, 1 tablet on odd days  LaMICtal 150 mg oral tablet: 1 orally  Protonix 40 mg oral delayed release tablet: 1 orally  Rolling walker: Please dispense rolling walker  Seroquel 50 mg oral tablet: 1 orally   acetaminophen 325 mg oral tablet: 2 tab(s) orally every 6 hours As needed Temp greater or equal to 38C (100.4F), Mild Pain (1 - 3)  aspirin 81 mg oral capsule: 1 tab(s) orally once a day  Ativan 1 mg oral tablet: 1 orally 2 times a day  cholecalciferol oral tablet: 400 unit(s) orally once a day  Cozaar 100 mg oral tablet: 0.5 tablet orally once a day Please take starting 8/22  Crestor 20 mg oral tablet: 1 tablet orally once a day  cyanocobalamin 1000 mcg oral tablet: 1 tab(s) orally once a day  doxazosin 4 mg oral tablet: 1 tab(s) orally once a day (at bedtime)  FLUoxetine 40 mg oral capsule: 1 cap(s) orally once a day 2 tablets on even days, 1 tablet on odd days  lamoTRIgine 150 mg oral tablet: 1 tab(s) orally 2 times a day  Protonix 40 mg oral delayed release tablet: 1 delayed release tablet orally once a day  QUEtiapine 50 mg oral tablet: 1 tab(s) orally 2 times a day  Rolling walker: Please dispense rolling walker

## 2023-08-21 NOTE — PROGRESS NOTE ADULT - ASSESSMENT
ASSESSMENT/RECOMMENDATIONS: 62M with hx of HTN, HLD, bipolar, GERD, Jhon's esophagus, and asthma, duodenal diverticulum s/p ERCP , admitted for treatment of post-ERCP pancreatitis. Surgery consulted for timing of cholecystectomy. Discussed risks/benefits of laparoscopic cholecystectomy, patient declining intervention at this time as has a  he would like to attend this week.     - Please re-page if patient amenable to surgery otherwise follow-up in clinic with Dr. Martin as soon as possible after discharge to schedule elective cholecystectomy  - Office information added to discharge  - Rest of care per primary    ACS/Trauma Surgery  [id number]     ASSESSMENT/RECOMMENDATIONS: 62M with hx of HTN, HLD, bipolar, GERD, Jhon's esophagus, and asthma, gallstone pancreatitis, duodenal diverticulum s/p ERCP , admitted for treatment of post-ERCP pancreatitis. Surgery consulted for timing of cholecystectomy. Discussed risks/benefits of laparoscopic cholecystectomy, patient declining intervention at this time as has a  he would like to attend this week.     - Please re-page if patient amenable to surgery otherwise follow-up in clinic with Dr. Martin as soon as possible after discharge to schedule elective cholecystectomy  - Office information added to discharge  - Rest of care per primary    ACS/Trauma Surgery  [id number]

## 2023-08-21 NOTE — PROGRESS NOTE ADULT - SUBJECTIVE AND OBJECTIVE BOX
Michael Perez MD  Internal Medicine, PGY-2  Please Contact via TEAMS    SUBJECTIVE / OVERNIGHT EVENTS:  - Pt seen and examined at bedside  - DEEON  - Patient denies any complaints overnight. The patient denies any fevers, chills, nausea, vomiting, or increased pain.     MEDICATIONS  (STANDING):  aspirin  chewable 81 milliGRAM(s) Oral daily  atorvastatin 80 milliGRAM(s) Oral at bedtime  chlorhexidine 4% Liquid 1 Application(s) Topical daily  cholecalciferol 400 Unit(s) Oral daily  cyanocobalamin 1000 MICROGram(s) Oral daily  doxazosin 4 milliGRAM(s) Oral at bedtime  enoxaparin Injectable 40 milliGRAM(s) SubCutaneous every 24 hours  FLUoxetine 40 milliGRAM(s) Oral <User Schedule>  FLUoxetine 40 milliGRAM(s) Oral <User Schedule>  lactated ringers. 1000 milliLiter(s) (200 mL/Hr) IV Continuous <Continuous>  lamoTRIgine 150 milliGRAM(s) Oral two times a day  pantoprazole    Tablet 40 milliGRAM(s) Oral <User Schedule>  QUEtiapine 50 milliGRAM(s) Oral <User Schedule>    MEDICATIONS  (PRN):  acetaminophen     Tablet .. 650 milliGRAM(s) Oral every 6 hours PRN Temp greater or equal to 38C (100.4F), Mild Pain (1 - 3)  LORazepam     Tablet 1 milliGRAM(s) Oral <User Schedule> PRN Anxiety  melatonin 3 milliGRAM(s) Oral at bedtime PRN Insomnia  oxyCODONE    IR 5 milliGRAM(s) Oral every 6 hours PRN Moderate Pain (4 - 6)  oxyCODONE    IR 10 milliGRAM(s) Oral every 6 hours PRN Severe Pain (7 - 10)        08-20-23 @ 07:01  -  08-21-23 @ 07:00  --------------------------------------------------------  IN: 2000 mL / OUT: 300 mL / NET: 1700 mL        PHYSICAL EXAM:  Vital Signs Last 24 Hrs  T(C): 36.3 (21 Aug 2023 03:57), Max: 37.5 (20 Aug 2023 20:42)  T(F): 97.4 (21 Aug 2023 03:57), Max: 99.5 (20 Aug 2023 20:42)  HR: 45 (21 Aug 2023 03:57) (45 - 55)  BP: 159/67 (21 Aug 2023 03:57) (159/67 - 164/75)  BP(mean): --  RR: 18 (21 Aug 2023 03:57) (16 - 18)  SpO2: 94% (21 Aug 2023 03:57) (94% - 95%)    Parameters below as of 21 Aug 2023 03:57  Patient On (Oxygen Delivery Method): room air        CAPILLARY BLOOD GLUCOSE        I&O's Summary    20 Aug 2023 07:01  -  21 Aug 2023 07:00  --------------------------------------------------------  IN: 2000 mL / OUT: 300 mL / NET: 1700 mL        CONSTITUTIONAL: NAD, well-developed  RESPIRATORY: Normal respiratory effort; lungs are clear to auscultation bilaterally  CARDIOVASCULAR: Regular rate and rhythm, normal S1 and S2, no murmur/rub/gallop; No lower extremity edema; Peripheral pulses are 2+ bilaterally  ABDOMEN: Nontender to palpation, normoactive bowel sounds, no rebound/guarding; No hepatosplenomegaly  MUSCLOSKELETAL: no clubbing or cyanosis of digits; no joint swelling or tenderness to palpation  PSYCH: A+O to person, place, and time; affect appropriate    LABS:                        10.3   7.30  )-----------( 207      ( 21 Aug 2023 07:04 )             30.9     08-20    138  |  106  |  16  ----------------------------<  91  3.8   |  23  |  1.22    Ca    9.1      20 Aug 2023 12:18  Phos  1.9     08-20  Mg     2.1     08-20            Urinalysis Basic - ( 20 Aug 2023 12:18 )    Color: x / Appearance: x / SG: x / pH: x  Gluc: 91 mg/dL / Ketone: x  / Bili: x / Urobili: x   Blood: x / Protein: x / Nitrite: x   Leuk Esterase: x / RBC: x / WBC x   Sq Epi: x / Non Sq Epi: x / Bacteria: x          IMAGING:    [X] All pertinent imaging reviewed by me

## 2023-08-21 NOTE — PHYSICAL THERAPY INITIAL EVALUATION ADULT - PERTINENT HX OF CURRENT PROBLEM, REHAB EVAL
61 yo M with PMHx of gallstones, HTN, HLD, Bipolar, GERD, Jhon's Esophagus, asthma, hx of 6/19/23 hospitalization for choledocholithiasis with 9mm CBD dilation on MRCP, recent hx of ERCP 8/11 for recurrent choledolithiasis p/w post- ERCP pancreatitis, pending cholecystectomy. VA duplex b/l LEs 8/20, Rt soleal vein thrombus favors post thrombutic changes, US RUQ 8/18, dilatation of the common bile duct upto 11mm, mild intrahepatic biliary dilatation, mod distended gallbladder with severe small stones, CXR (-). ECG 8/18, st wave abnormality, CT ABD/pelvis 8/18, pancreatitis vs. duodenitis, trace b/l pleural effusion.

## 2023-08-21 NOTE — PROGRESS NOTE ADULT - SUBJECTIVE AND OBJECTIVE BOX
[facility]  ELIOT He Y. Patel, S. Shah, G. Jone  [phone]  ([phone] - weekdays after 5pm and weekends)    Name: AMELIA SHARMA  Age/Gender: 62y Male  MRN: [id number]    Interval History:  Patient seen and examined this morning.   Denies fever, chills, increased pain, n/v/d.  States his cousin is ill and he wants to go be with him.   Notes reviewed. No concerning overnight events.  Afebrile.   Allergies: No Known Allergies      Objective:  Vitals:   T(F): 97.4 (08-21-23 @ 03:57), Max: 99.5 (08-20-23 @ 20:42)  HR: 45 (08-21-23 @ 03:57) (45 - 55)  BP: 159/67 (08-21-23 @ 03:57) (159/67 - 164/75)  RR: 18 (08-21-23 @ 03:57) (16 - 18)  SpO2: 94% (08-21-23 @ 03:57) (94% - 95%)  Physical Examination:  General: no acute distress  HEENT: NC/AT, EOMI, anicteric, neck supple  Cardio: S1, S2 present, normal rate  Resp: clear to auscultation bilaterally  Abd: soft, NT, ND, + BS  Neuro: AAOx3, no obvious focal deficits  Ext: no edema, no cyanosis, moving extremities  Skin: warm, dry, poison ivy rash on b/l UEs  Psych: appropriate affect and mood for situation  Lines: PIV    Laboratory Studies:  CBC:                       10.3   7.30  )-----------( 207      ( 21 Aug 2023 07:04 )             30.9     WBC Trend:  7.30 08-21-23 @ 07:04  11.13 08-20-23 @ 12:19  13.51 08-19-23 @ 07:21  20.24 08-18-23 @ 14:34  20.68 08-18-23 @ 01:05    CMP: 08-21    140  |  106  |  14  ----------------------------<  86  3.8   |  22  |  1.27    Ca    9.0      21 Aug 2023 07:08  Phos  3.0     08-21  Mg     2.0     08-21    TPro  6.1  /  Alb  3.3  /  TBili  0.5  /  DBili  x   /  AST  22  /  ALT  37  /  AlkPhos  103  08-21    Creatinine: 1.27 mg/dL (08-21-23 @ 07:08)  Creatinine: 1.22 mg/dL (08-20-23 @ 12:18)  Creatinine: 1.52 mg/dL (08-19-23 @ 07:19)  Creatinine: 1.28 mg/dL (08-18-23 @ 14:34)  Creatinine: 1.31 mg/dL (08-18-23 @ 01:05)    LIVER FUNCTIONS - ( 21 Aug 2023 07:08 )  Alb: 3.3 g/dL / Pro: 6.1 g/dL / ALK PHOS: 103 U/L / ALT: 37 U/L / AST: 22 U/L / GGT: x           Microbiology: reviewed   Culture - Urine (collected 08-18-23 @ 06:30)  Source: Clean Catch Clean Catch (Midstream)  Final Report (08-19-23 @ 09:48):    <10,000 CFU/mL Normal Urogenital Rebecca    Culture - Blood (collected 08-18-23 @ 01:54)  Source: .Blood Blood-Peripheral  Preliminary Report (08-21-23 @ 05:00):    No growth at 72 Hours    Culture - Blood (collected 08-18-23 @ 01:44)  Source: .Blood Blood-Peripheral  Preliminary Report (08-21-23 @ 05:00):    No growth at 72 Hours    Radiology: reviewed     Medications:  acetaminophen     Tablet .. 650 milliGRAM(s) Oral every 6 hours PRN  aspirin  chewable 81 milliGRAM(s) Oral daily  atorvastatin 80 milliGRAM(s) Oral at bedtime  chlorhexidine 4% Liquid 1 Application(s) Topical daily  cholecalciferol 400 Unit(s) Oral daily  cyanocobalamin 1000 MICROGram(s) Oral daily  doxazosin 4 milliGRAM(s) Oral at bedtime  enoxaparin Injectable 40 milliGRAM(s) SubCutaneous every 24 hours  FLUoxetine 40 milliGRAM(s) Oral <User Schedule>  FLUoxetine 40 milliGRAM(s) Oral <User Schedule>  lamoTRIgine 150 milliGRAM(s) Oral two times a day  LORazepam     Tablet 1 milliGRAM(s) Oral <User Schedule> PRN  melatonin 3 milliGRAM(s) Oral at bedtime PRN  oxyCODONE    IR 10 milliGRAM(s) Oral every 6 hours PRN  oxyCODONE    IR 5 milliGRAM(s) Oral every 6 hours PRN  pantoprazole    Tablet 40 milliGRAM(s) Oral <User Schedule>  QUEtiapine 50 milliGRAM(s) Oral <User Schedule>    Current Antimicrobials:    Prior/Completed Antimicrobials:  piperacillin/tazobactam IVPB.  piperacillin/tazobactam IVPB..  piperacillin/tazobactam IVPB...  vancomycin  IVPB.

## 2023-08-21 NOTE — DISCHARGE NOTE NURSING/CASE MANAGEMENT/SOCIAL WORK - PATIENT PORTAL LINK FT
You can access the FollowMyHealth Patient Portal offered by [facility] by registering at the following website: [email or web address]. By joining [employer]’s FollowMyHealth portal, you will also be able to view your health information using other applications (apps) compatible with our system.

## 2023-08-21 NOTE — DISCHARGE NOTE PROVIDER - NSDCFUADDAPPT_GEN_ALL_CORE_FT
APPTS ARE READY TO BE MADE: [X] YES    Best Family or Patient Contact (if needed):    Additional Information about above appointments (if needed):    1:   2:   3:     Other comments or requests:    APPTS ARE READY TO BE MADE: [X] YES    Best Family or Patient Contact (if needed):    Additional Information about above appointments (if needed):    1:   2:   3:     Other comments or requests:   Patient was scheduled for an appointment on 08/24 9:00a at [address] [place] [id number] with Yung Maurer. Patient/Caregiver was advised of appointment details.  Patient was scheduled for an appointment on 08/29 1:40p at [address] [place] [id number] with Sofía Coyne. Patient/Caregiver was advised of appointment details.  Patient/Caregiver declined scheduling assistance. Patient wants to hold off on an appointment with Surgery for right now.

## 2023-08-21 NOTE — DISCHARGE NOTE PROVIDER - NSDCFUSCHEDAPPT_GEN_ALL_CORE_FT
[facility]  INTMED [address]  Scheduled Appointment: 10/19/2023     [facility]  GASTRO [address]  Scheduled Appointment: 08/24/2023    [facility]  INTMED [address]  Scheduled Appointment: 08/29/2023    [facility]  INTMED [address]  Scheduled Appointment: 10/19/2023

## 2023-08-21 NOTE — PROGRESS NOTE ADULT - ASSESSMENT
61 yo M with PMHx of gallstones, HTN, HLD, Bipolar, GERD, Jhon's Esophagus, asthma, hx of 6/19/23 hospitalization for choledocholithiasis with 9mm CBD dilation on MRCP, recent hx of ERCP 8/11 for recurrent choledolithiasis p/w post- ERCP pancreatitis, pending cholecystectomy.

## 2023-08-21 NOTE — DISCHARGE NOTE PROVIDER - CARE PROVIDER_API CALL
Evens Martin [facility]ictaw[facility]  Surgery  [address] [address]  Phone: [phone]  Fax: [phone]  Follow Up Time: 1 week   Evens Martin Dict[facility]  Surgery  [address] [place] [address]  Phone: [phone]  Fax: [phone]  Follow Up Time: 1 week    Sofía Ewing  Internal Medicine  [address] [place] [address], [address]  [place] [address]  Phone: [phone]  Fax: [phone]  Established Patient  Follow Up Time: 1 week

## 2023-08-21 NOTE — DISCHARGE NOTE PROVIDER - NSDCCPCAREPLAN_GEN_ALL_CORE_FT
PRINCIPAL DISCHARGE DIAGNOSIS  Diagnosis: Pancreatitis  Assessment and Plan of Treatment: You were found to have inflammation of your pancreas ad duodenum likely due to your ERCP procedure. We initially started you on antibiotics and fluids for this however we discontinued antibiotics after 2 days since you did not have any signs of an infection on your blood cultures. We gave you fluids throughout your stay and you significantly improved. Our surgeons evaluated you and recommended cholecystectomy however you decided to pursue this as an outpatient to attend to some personal needs.      SECONDARY DISCHARGE DIAGNOSES  Diagnosis: Deep vein thrombosis (DVT)  Assessment and Plan of Treatment: You noted that you had leg pain and were found to have a small clot in your right soleal vein. Based on its location we held off on placing you on blood thinners however you will need to get a repeat ultrasound in two weeks to follow up on it.

## 2023-08-23 LAB
CULTURE RESULTS: SIGNIFICANT CHANGE UP
CULTURE RESULTS: SIGNIFICANT CHANGE UP
SPECIMEN SOURCE: SIGNIFICANT CHANGE UP
SPECIMEN SOURCE: SIGNIFICANT CHANGE UP

## 2023-08-24 ENCOUNTER — APPOINTMENT (OUTPATIENT)
Dept: GASTROENTEROLOGY | Facility: CLINIC | Age: 63
End: 2023-08-24
Payer: COMMERCIAL

## 2023-08-24 VITALS
BODY MASS INDEX: 29.54 KG/M2 | WEIGHT: 211 LBS | TEMPERATURE: 98.2 F | DIASTOLIC BLOOD PRESSURE: 80 MMHG | SYSTOLIC BLOOD PRESSURE: 140 MMHG | HEIGHT: 71 IN

## 2023-08-24 PROCEDURE — 99215 OFFICE O/P EST HI 40 MIN: CPT

## 2023-08-28 NOTE — CONSULT LETTER
[FreeTextEntry1] : Dear Dr. Sofía Ewign,\par  \par  I had the pleasure of seeing your patient AMELIA SHARMA in the office today.  My office note is attached. PLEASE READ THE "ASSESSMENT" SECTION OF THE NOTE TO SEE MY IMPRESSION AND PLAN.\par  \par  Thank you very much for allowing me to participate in the care of your patient.\par  \par  Sincerely,\par  \par  Yung Burns M.D., FAC, FACP\par  Director, Celiac Program at [facility]\par   of Medicine, [employer] at [facility]  Adjunct  of Medicine, [facility]  Practice Director, [facility] Gastroenterology at [place]\[facility]  [address] [facility] [address]\[place] [address]\par  Tel: [phone]\par  Email: [email or web address]  \par  \par  The attached note has been created using a voice recognition system (Dragon).  There may be some misspellings and typos.  Please call my office if you have any issues or questions.

## 2023-08-28 NOTE — ASSESSMENT
[FreeTextEntry1] : Patient status posthospitalization for post ERCP pancreatitis.  He had CBD sludge and underwent sphincterotomy and balloon extraction with subsequent pain.  He also has gallbladder sludge.  He opted not to have cholecystectomy which was recommended to the hospital.  He also has a DVT.  I had a long discussion with the patient regarding the importance of proceeding with cholecystectomy.  He was seen by Dr. Martin in the hospital and I stressed the importance of following up with her as soon as possible to proceed with cholecystectomy before he has any further migration of the sludge into the CBD.  The patient was also advised regarding the importance of following up with his primary care doctor, Dr. Ewing, regarding the DVT.  Patient is due for EGD and colonoscopy in April 2026.   Plan from 7/26/2023 - Patient status posthospitalization for what appears to have been a passed CBD stone.  He has multiple gallstones.  His CBD is dilated on MRI/MRCP with a smooth tapering at the level of the ampulla consistent with a benign stricture.  The patient's liver tests are improving but have not yet normalized.  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  He has a history of short segment Spivey's esophagus in an irregular Z-line.  I had a long discussion with the patient and his wife regarding the potential for recurrent choledocholithiasis and the need to further evaluate the narrowing and dilatation of the CBD.  The patient is scheduled for endoscopic ultrasound on August 17.  I discussed the ultimate need for cholecystectomy.  Blood work was sent for LFTs, amylase, lipase.  Patient will take vitamin B12 1000 mcg a day.  Patient was counseled regarding the importance of a low-fat diet.  Patient is due for EGD and colonoscopy in April 2026.   Plan from 6/29/2023 - Patient status posthospitalization for what appears to have been a passed CBD stone.  In the hospital, MRI/MRCP was done without contrast and showed a filling defect in the very distal common bile duct with associated biliary ductal dilatation, consistent with a stone or mass.  There was a question of acute pancreatitis.  There was also a 6 mm nonspecific cystic lesion in the pancreatic head.  A nonspecific lesion was seen in T10 as well.  The patient has a history of Spivey's esophagus and an irregular Z-line, grade 3 reflux esophagitis, 2 cm hiatal hernia, and a family history of a brother with an advanced polyp.  Blood work was sent for CBC, comprehensive metabolic panel, amylase, lipase, CA 19-9, iron studies, B12, folate.  Patient was sent for an MRI/MRCP with contrast to further evaluate the bile duct and pancreatic head lesion.  Patient was advised that he can decrease pantoprazole to 40 mg a day  Based upon the above results, we will need to consider elective cholecystectomy.  Patient was advised to follow-up with Dr. Ewing, we will need to further evaluate the lesion in T10 and the small lung nodule.  Patient is due for EGD and colonoscopy in April 2026.   Plan from 6/2/2023 - Patient with a 2 cm hiatal hernia, grade 3 reflux esophagitis, and Spivey's esophagus in an irregular Z-line.  He is doing well on pantoprazole 40 mg a day.  Patient will continue pantoprazole 40 mg a day.  We will repeat an EGD in April 2026.  We will also perform a colonoscopy at that time given the patient's family history of a brother with an advanced polyp.  Patient will return to see me in 1 year or sooner if needed.   Plan from 4/6/2023 - Patient with complaints of heartburn, belching, and bloating.  Multiple etiologies need to be considered.  An EGD has been scheduled. The risks, benefits, alternatives, and limitations of the procedure were explained.  A list of dietary and lifestyle modifications in the treatment of GERD was given to the patient.  We discussed this in depth.  Patient is due for colonoscopy in July 2025 given the family history of a brother with an advanced colonic polyp.   Plan from 5/19/2020 - Patient was brother had a precancerous colonic polyp who is in need of a screening colonoscopy.  Once the COVID-19 pandemic allows, a colonoscopy will be scheduled. The risks, benefits, alternatives, and limitations of the procedure, including the possibility of missed lesions, were explained.

## 2023-08-28 NOTE — REASON FOR VISIT
[Post Hospitalization] : a post hospitalization visit [FreeTextEntry1] : Post ERCP pancreatitis, Spivey's esophagus, gallstones and sludge

## 2023-08-28 NOTE — PHYSICAL EXAM
[None] : no edema [No CVA Tenderness] : no CVA  tenderness [Normal] : oriented to person, place, and time [de-identified] : mild RUQ tenderness, no rebound

## 2023-08-29 ENCOUNTER — APPOINTMENT (OUTPATIENT)
Dept: INTERNAL MEDICINE | Facility: CLINIC | Age: 63
End: 2023-08-29
Payer: COMMERCIAL

## 2023-08-29 VITALS — SYSTOLIC BLOOD PRESSURE: 122 MMHG | DIASTOLIC BLOOD PRESSURE: 72 MMHG

## 2023-08-29 VITALS
TEMPERATURE: 96.8 F | HEIGHT: 71 IN | HEART RATE: 73 BPM | WEIGHT: 202 LBS | BODY MASS INDEX: 28.28 KG/M2 | SYSTOLIC BLOOD PRESSURE: 131 MMHG | OXYGEN SATURATION: 98 % | DIASTOLIC BLOOD PRESSURE: 73 MMHG

## 2023-08-29 DIAGNOSIS — Z09 ENCOUNTER FOR FOLLOW-UP EXAMINATION AFTER COMPLETED TREATMENT FOR CONDITIONS OTHER THAN MALIGNANT NEOPLASM: ICD-10-CM

## 2023-08-29 PROCEDURE — 99214 OFFICE O/P EST MOD 30 MIN: CPT | Mod: 25

## 2023-08-29 PROCEDURE — 99495 TRANSJ CARE MGMT MOD F2F 14D: CPT

## 2023-08-29 RX ORDER — LOSARTAN POTASSIUM 100 MG/1
100 TABLET, FILM COATED ORAL
Refills: 0 | Status: COMPLETED | COMMUNITY
End: 2023-08-29

## 2023-08-31 NOTE — PHYSICAL EXAM
[No Acute Distress] : no acute distress [Well Nourished] : well nourished [Well Developed] : well developed [Supple] : supple [No Respiratory Distress] : no respiratory distress  [Clear to Auscultation] : lungs were clear to auscultation bilaterally [Normal Rate] : normal rate  [Regular Rhythm] : with a regular rhythm [Normal S1, S2] : normal S1 and S2 [No Edema] : there was no peripheral edema [Soft] : abdomen soft [Non Tender] : non-tender [Non-distended] : non-distended [Normal Bowel Sounds] : normal bowel sounds [No Joint Swelling] : no joint swelling [Grossly Normal Strength/Tone] : grossly normal strength/tone [Normal Gait] : normal gait [Speech Grossly Normal] : speech grossly normal [Normal Affect] : the affect was normal [Alert and Oriented x3] : oriented to person, place, and time [Normal Mood] : the mood was normal [de-identified] : Overweight male in stated age,  [[address] - Moderate Complexity requires multiple possible diagnoses and/or the management options, moderate complexity of the medical data (tests, etc.) to be reviewed, and moderate risk of significant complications, morbidity, and/or mortality as well as co] : Moderate Complexity

## 2023-08-31 NOTE — HISTORY OF PRESENT ILLNESS
[Post-hospitalization from ___ Hospital] : Post-hospitalization from [unfilled] Hospital [Admitted on: ___] : The patient was admitted on [unfilled] [Discharged on ___] : discharged on [unfilled] [Discharge Summary] : discharge summary [Discharge Med List] : discharge medication list [Med Reconciliation] : medication reconciliation has been completed [Patient Contacted By: ____] : and contacted by [unfilled] [FreeTextEntry2] : Pt had ERCP on 8/17/2023, he went home after the procedure, he developed N/V and abdominal pain a few hours later.  He went back to the hospital and found to have pancreatitis.  His symptoms improved with bowel rest, and he was recommended to have cholecystectomy.  Pt decided to have surgery later as he was concerned about a family who was terminally ill.  Pt also developed pain on his L leg the next day, he also had swelling on both legs.  He had venous duplex done and found to have R soleal DVT.  He was not placed on AC given that DVT was below the knee.  He took ASA 81 mg 4 tabs daily since discharged from the hospital.  Pt was off BP meds in the hospital, but resume them upon discharge.

## 2023-09-05 NOTE — DISCHARGE NOTE NURSING/CASE MANAGEMENT/SOCIAL WORK - NSDCCRTYPESERV_GEN_ALL_CORE_FT
----- Message from Johan Ying MD sent at 9/2/2023  9:49 AM EDT -----  Echo didn't change from before. Home Physical Therapy

## 2023-09-08 ENCOUNTER — APPOINTMENT (OUTPATIENT)
Dept: ULTRASOUND IMAGING | Facility: CLINIC | Age: 63
End: 2023-09-08
Payer: COMMERCIAL

## 2023-09-08 PROCEDURE — 93970 EXTREMITY STUDY: CPT

## 2023-09-21 ENCOUNTER — APPOINTMENT (OUTPATIENT)
Dept: GASTROENTEROLOGY | Facility: CLINIC | Age: 63
End: 2023-09-21

## 2023-10-05 ENCOUNTER — NON-APPOINTMENT (OUTPATIENT)
Age: 63
End: 2023-10-05

## 2023-10-19 ENCOUNTER — APPOINTMENT (OUTPATIENT)
Dept: VASCULAR SURGERY | Facility: CLINIC | Age: 63
End: 2023-10-19
Payer: COMMERCIAL

## 2023-10-19 ENCOUNTER — APPOINTMENT (OUTPATIENT)
Dept: INTERNAL MEDICINE | Facility: CLINIC | Age: 63
End: 2023-10-19

## 2023-10-19 VITALS — DIASTOLIC BLOOD PRESSURE: 79 MMHG | SYSTOLIC BLOOD PRESSURE: 127 MMHG | HEART RATE: 67 BPM

## 2023-10-19 VITALS — WEIGHT: 200 LBS | BODY MASS INDEX: 28 KG/M2 | HEIGHT: 71 IN

## 2023-10-19 PROCEDURE — 93971 EXTREMITY STUDY: CPT | Mod: RT

## 2023-10-19 PROCEDURE — 99204 OFFICE O/P NEW MOD 45 MIN: CPT

## 2023-10-26 ENCOUNTER — APPOINTMENT (OUTPATIENT)
Dept: SURGERY | Facility: CLINIC | Age: 63
End: 2023-10-26
Payer: COMMERCIAL

## 2023-10-26 VITALS
DIASTOLIC BLOOD PRESSURE: 73 MMHG | RESPIRATION RATE: 16 BRPM | HEART RATE: 64 BPM | BODY MASS INDEX: 28 KG/M2 | OXYGEN SATURATION: 99 % | HEIGHT: 71 IN | WEIGHT: 200 LBS | TEMPERATURE: 97.2 F | SYSTOLIC BLOOD PRESSURE: 131 MMHG

## 2023-10-26 PROCEDURE — 99204 OFFICE O/P NEW MOD 45 MIN: CPT

## 2023-10-30 ENCOUNTER — APPOINTMENT (OUTPATIENT)
Dept: INTERNAL MEDICINE | Facility: CLINIC | Age: 63
End: 2023-10-30
Payer: COMMERCIAL

## 2023-10-30 ENCOUNTER — OUTPATIENT (OUTPATIENT)
Dept: OUTPATIENT SERVICES | Facility: HOSPITAL | Age: 63
LOS: 1 days | End: 2023-10-30
Payer: COMMERCIAL

## 2023-10-30 VITALS
WEIGHT: 195.99 LBS | OXYGEN SATURATION: 96 % | TEMPERATURE: 98 F | HEIGHT: 71 IN | SYSTOLIC BLOOD PRESSURE: 130 MMHG | HEART RATE: 65 BPM | RESPIRATION RATE: 16 BRPM | DIASTOLIC BLOOD PRESSURE: 70 MMHG

## 2023-10-30 VITALS
TEMPERATURE: 97.3 F | SYSTOLIC BLOOD PRESSURE: 110 MMHG | HEIGHT: 71 IN | WEIGHT: 197 LBS | BODY MASS INDEX: 27.58 KG/M2 | HEART RATE: 64 BPM | OXYGEN SATURATION: 97 % | DIASTOLIC BLOOD PRESSURE: 60 MMHG

## 2023-10-30 VITALS — DIASTOLIC BLOOD PRESSURE: 68 MMHG | SYSTOLIC BLOOD PRESSURE: 118 MMHG

## 2023-10-30 DIAGNOSIS — G47.33 OBSTRUCTIVE SLEEP APNEA (ADULT) (PEDIATRIC): ICD-10-CM

## 2023-10-30 DIAGNOSIS — K42.9 UMBILICAL HERNIA WITHOUT OBSTRUCTION OR GANGRENE: ICD-10-CM

## 2023-10-30 DIAGNOSIS — Z98.890 OTHER SPECIFIED POSTPROCEDURAL STATES: Chronic | ICD-10-CM

## 2023-10-30 DIAGNOSIS — I82.409 ACUTE EMBOLISM AND THROMBOSIS OF UNSPECIFIED DEEP VEINS OF UNSPECIFIED LOWER EXTREMITY: ICD-10-CM

## 2023-10-30 DIAGNOSIS — K80.20 CALCULUS OF GALLBLADDER WITHOUT CHOLECYSTITIS WITHOUT OBSTRUCTION: ICD-10-CM

## 2023-10-30 DIAGNOSIS — Z01.818 ENCOUNTER FOR OTHER PREPROCEDURAL EXAMINATION: ICD-10-CM

## 2023-10-30 DIAGNOSIS — Z78.9 OTHER SPECIFIED HEALTH STATUS: Chronic | ICD-10-CM

## 2023-10-30 LAB
ALBUMIN SERPL ELPH-MCNC: 4.6 G/DL — SIGNIFICANT CHANGE UP (ref 3.3–5)
ALBUMIN SERPL ELPH-MCNC: 4.6 G/DL — SIGNIFICANT CHANGE UP (ref 3.3–5)
ALP SERPL-CCNC: 89 U/L — SIGNIFICANT CHANGE UP (ref 40–120)
ALP SERPL-CCNC: 89 U/L — SIGNIFICANT CHANGE UP (ref 40–120)
ALT FLD-CCNC: 58 U/L — HIGH (ref 10–45)
ALT FLD-CCNC: 58 U/L — HIGH (ref 10–45)
ANION GAP SERPL CALC-SCNC: 11 MMOL/L — SIGNIFICANT CHANGE UP (ref 5–17)
ANION GAP SERPL CALC-SCNC: 11 MMOL/L — SIGNIFICANT CHANGE UP (ref 5–17)
AST SERPL-CCNC: 27 U/L — SIGNIFICANT CHANGE UP (ref 10–40)
AST SERPL-CCNC: 27 U/L — SIGNIFICANT CHANGE UP (ref 10–40)
BILIRUB SERPL-MCNC: 0.4 MG/DL — SIGNIFICANT CHANGE UP (ref 0.2–1.2)
BILIRUB SERPL-MCNC: 0.4 MG/DL — SIGNIFICANT CHANGE UP (ref 0.2–1.2)
BUN SERPL-MCNC: 22 MG/DL — SIGNIFICANT CHANGE UP (ref 7–23)
BUN SERPL-MCNC: 22 MG/DL — SIGNIFICANT CHANGE UP (ref 7–23)
CALCIUM SERPL-MCNC: 9.6 MG/DL — SIGNIFICANT CHANGE UP (ref 8.4–10.5)
CALCIUM SERPL-MCNC: 9.6 MG/DL — SIGNIFICANT CHANGE UP (ref 8.4–10.5)
CHLORIDE SERPL-SCNC: 105 MMOL/L — SIGNIFICANT CHANGE UP (ref 96–108)
CHLORIDE SERPL-SCNC: 105 MMOL/L — SIGNIFICANT CHANGE UP (ref 96–108)
CO2 SERPL-SCNC: 25 MMOL/L — SIGNIFICANT CHANGE UP (ref 22–31)
CO2 SERPL-SCNC: 25 MMOL/L — SIGNIFICANT CHANGE UP (ref 22–31)
CREAT SERPL-MCNC: 1.17 MG/DL — SIGNIFICANT CHANGE UP (ref 0.5–1.3)
CREAT SERPL-MCNC: 1.17 MG/DL — SIGNIFICANT CHANGE UP (ref 0.5–1.3)
EGFR: 70 ML/MIN/1.73M2 — SIGNIFICANT CHANGE UP
EGFR: 70 ML/MIN/1.73M2 — SIGNIFICANT CHANGE UP
GLUCOSE SERPL-MCNC: 97 MG/DL — SIGNIFICANT CHANGE UP (ref 70–99)
GLUCOSE SERPL-MCNC: 97 MG/DL — SIGNIFICANT CHANGE UP (ref 70–99)
HCT VFR BLD CALC: 38.9 % — LOW (ref 39–50)
HCT VFR BLD CALC: 38.9 % — LOW (ref 39–50)
HGB BLD-MCNC: 12.9 G/DL — LOW (ref 13–17)
HGB BLD-MCNC: 12.9 G/DL — LOW (ref 13–17)
MCHC RBC-ENTMCNC: 29.9 PG — SIGNIFICANT CHANGE UP (ref 27–34)
MCHC RBC-ENTMCNC: 29.9 PG — SIGNIFICANT CHANGE UP (ref 27–34)
MCHC RBC-ENTMCNC: 33.2 GM/DL — SIGNIFICANT CHANGE UP (ref 32–36)
MCHC RBC-ENTMCNC: 33.2 GM/DL — SIGNIFICANT CHANGE UP (ref 32–36)
MCV RBC AUTO: 90 FL — SIGNIFICANT CHANGE UP (ref 80–100)
MCV RBC AUTO: 90 FL — SIGNIFICANT CHANGE UP (ref 80–100)
MRSA PCR RESULT.: SIGNIFICANT CHANGE UP
MRSA PCR RESULT.: SIGNIFICANT CHANGE UP
NRBC # BLD: 0 /100 WBCS — SIGNIFICANT CHANGE UP (ref 0–0)
NRBC # BLD: 0 /100 WBCS — SIGNIFICANT CHANGE UP (ref 0–0)
PLATELET # BLD AUTO: 272 K/UL — SIGNIFICANT CHANGE UP (ref 150–400)
PLATELET # BLD AUTO: 272 K/UL — SIGNIFICANT CHANGE UP (ref 150–400)
POTASSIUM SERPL-MCNC: 4.5 MMOL/L — SIGNIFICANT CHANGE UP (ref 3.5–5.3)
POTASSIUM SERPL-MCNC: 4.5 MMOL/L — SIGNIFICANT CHANGE UP (ref 3.5–5.3)
POTASSIUM SERPL-SCNC: 4.5 MMOL/L — SIGNIFICANT CHANGE UP (ref 3.5–5.3)
POTASSIUM SERPL-SCNC: 4.5 MMOL/L — SIGNIFICANT CHANGE UP (ref 3.5–5.3)
PROT SERPL-MCNC: 7.1 G/DL — SIGNIFICANT CHANGE UP (ref 6–8.3)
PROT SERPL-MCNC: 7.1 G/DL — SIGNIFICANT CHANGE UP (ref 6–8.3)
RBC # BLD: 4.32 M/UL — SIGNIFICANT CHANGE UP (ref 4.2–5.8)
RBC # BLD: 4.32 M/UL — SIGNIFICANT CHANGE UP (ref 4.2–5.8)
RBC # FLD: 12.7 % — SIGNIFICANT CHANGE UP (ref 10.3–14.5)
RBC # FLD: 12.7 % — SIGNIFICANT CHANGE UP (ref 10.3–14.5)
S AUREUS DNA NOSE QL NAA+PROBE: SIGNIFICANT CHANGE UP
S AUREUS DNA NOSE QL NAA+PROBE: SIGNIFICANT CHANGE UP
SODIUM SERPL-SCNC: 141 MMOL/L — SIGNIFICANT CHANGE UP (ref 135–145)
SODIUM SERPL-SCNC: 141 MMOL/L — SIGNIFICANT CHANGE UP (ref 135–145)
WBC # BLD: 6.65 K/UL — SIGNIFICANT CHANGE UP (ref 3.8–10.5)
WBC # BLD: 6.65 K/UL — SIGNIFICANT CHANGE UP (ref 3.8–10.5)
WBC # FLD AUTO: 6.65 K/UL — SIGNIFICANT CHANGE UP (ref 3.8–10.5)
WBC # FLD AUTO: 6.65 K/UL — SIGNIFICANT CHANGE UP (ref 3.8–10.5)

## 2023-10-30 PROCEDURE — 36415 COLL VENOUS BLD VENIPUNCTURE: CPT

## 2023-10-30 PROCEDURE — 87641 MR-STAPH DNA AMP PROBE: CPT

## 2023-10-30 PROCEDURE — 99214 OFFICE O/P EST MOD 30 MIN: CPT | Mod: 25

## 2023-10-30 PROCEDURE — 87640 STAPH A DNA AMP PROBE: CPT

## 2023-10-30 PROCEDURE — G0463: CPT

## 2023-10-30 PROCEDURE — 85027 COMPLETE CBC AUTOMATED: CPT

## 2023-10-30 PROCEDURE — 80053 COMPREHEN METABOLIC PANEL: CPT

## 2023-10-30 RX ORDER — LAMOTRIGINE 150 MG/1
150 TABLET ORAL TWICE DAILY
Refills: 0 | Status: ACTIVE | COMMUNITY
Start: 2019-12-30

## 2023-10-30 RX ORDER — CHLORHEXIDINE GLUCONATE 213 G/1000ML
1 SOLUTION TOPICAL ONCE
Refills: 0 | Status: DISCONTINUED | OUTPATIENT
Start: 2023-11-01 | End: 2023-11-15

## 2023-10-30 RX ORDER — LOSARTAN POTASSIUM 100 MG/1
0.5 TABLET, FILM COATED ORAL
Qty: 0 | Refills: 0 | DISCHARGE

## 2023-10-30 RX ORDER — CHLORHEXIDINE GLUCONATE 4 %
1000 LIQUID (ML) TOPICAL
Qty: 90 | Refills: 3 | Status: ACTIVE | COMMUNITY
Start: 2023-10-30

## 2023-10-30 NOTE — H&P PST ADULT - ASSESSMENT
DASI score: 5.7   DASIactivity: walks/ stairs/ house chores without cp/SOB limited due to right hip pain   loose teeth or dentures: denies   airway mp2

## 2023-10-30 NOTE — H&P PST ADULT - NSICDXPASTMEDICALHX_GEN_ALL_CORE_FT
PAST MEDICAL HISTORY:  KIRA (acute kidney injury)     Anxiety and depression     Asthma     Spivey esophagus     Bipolar disorder     Deep vein thrombosis (DVT)     Gall stones     GERD (gastroesophageal reflux disease)     High cholesterol     History of BPH     HLD (hyperlipidemia)     HTN (hypertension)     Lung nodule     PAUL (obstructive sleep apnea)     Pancreatitis     Transaminitis     Umbilical hernia      PAST MEDICAL HISTORY:  KIRA (acute kidney injury)     Anxiety and depression     Asthma     Spivey esophagus     Bipolar disorder     Deep vein thrombosis (DVT)     Diverticulum of duodenum     Gall stones     GERD (gastroesophageal reflux disease)     High cholesterol     History of BPH     HLD (hyperlipidemia)     HTN (hypertension)     Lung nodule     PAUL (obstructive sleep apnea)     Pancreatitis     Transaminitis     Umbilical hernia      PAST MEDICAL HISTORY:  KIRA (acute kidney injury)     Anxiety and depression     Asthma     Spivey esophagus     Bipolar disorder     Deep vein thrombosis (DVT)     Diverticulum of duodenum     Gall stones     GERD (gastroesophageal reflux disease)     High cholesterol     History of BPH     HLD (hyperlipidemia)     HTN (hypertension)     Lung nodule     OA (osteoarthritis)     PAUL (obstructive sleep apnea)     Pancreatitis     Transaminitis     Umbilical hernia

## 2023-10-30 NOTE — H&P PST ADULT - PROBLEM SELECTOR PLAN 1
laparoscopic cholecystectomy  PST labs send  preprocedure surgical scrub incentive spirometry instructions discussed    EKG on [facility] eval  continue am meds with small sips of water laparoscopic cholecystectomy  PST labs send  preprocedure surgical scrub incentive spirometry instructions discussed    EKG on [facility] eval  continue am meds with small sips of water  continue aspirin 81 mg for prophylaxis

## 2023-10-30 NOTE — H&P PST ADULT - WEIGHT IN KG
"           [facility]                                            Progress Note    Patient Name: Idalia Hinojosa  Date: 10/6/2022         Service Type: Individual      Session Start Time: 12:10 pm Session End Time: 12:45  pm  Session Length:  35 minutes    Session #: 71    Attendees: Client     Service Modality: Video visit: -       Provider verified identity through the following two step process.  Patient provided:  Patient is known previously to provider    Telemedicine Visit: The patient's condition can be safely assessed and treated via synchronous audio and visual telemedicine encounter.      Reason for Telemedicine Visit: Services only offered telehealth    Originating Site (Patient Location): Patient's home    Distant Site (Provider Location): Provider Remote Setting- Home Office    Consent:  The patient/guardian has verbally consented to: the potential risks and benefits of telemedicine (telephone visit) versus in person care; bill my insurance or make self-payment for services provided; and responsibility for payment of non-covered services.     Patient would like the video invitation sent by:  My Chart    Mode of Communication:  Video Conference via [employer],     As the provider I attest to compliance with applicable laws and regulations related to telemedicine.         Treatment Plan Last Reviewed: 08/04/22  PHQ-9/DAVID-7: 08/04/22      DATA  Interactive Complexity: No  Crisis: No        Progress Since Last Session (Related to Symptoms / Goals / Homework):   Symptoms:  She reported she has mostly been doing \"good\" - notes that since learning more information about next steps for Colia's treatment and transplant, with an anticipated time-line, she feels more \"antsy\" to prepare what she can, while also feeling like things are still so \"surreal\" and she's not quite sure how she feels.       Homework: Achieved / completed to satisfaction.  Was successful with being aware of her " 88.9 self-talk and expectations regarding work.  She has found she feels better about work, and less stressed, because she was able to find healthier and more realistic expectations for herself.          Episode of Care Goals: Satisfactory progress - ACTION (Actively working towards change); Intervened by reinforcing change plan / affirming steps taken.  Diagnostic assessment has been completed, treatment plan has been developed and patient has been making good progress on treatment goals.  The patient stated that her main goals for therapy would be to learn emotion regulation strategies (in particular, to help with when she is feeling upset/angry/frustrated/distressed).  The changes in her daily life due to COVID restrictions were a focus of our work together, and working through changes to routine as her daughter started school and she returned to work.  As she has been making progress, focus of treatment will shift to wellness planning (monitoring mood and symptoms, incorporating into routine what keeps her feeling well, review of triggers, how to manage set backs, etc.).  As her  has been diagnosed with cancer, treatment will also focus on providing her with support in managing the emotional distress and challenges related to this.       Current / Ongoing Stressors and Concerns:   Current:  is going through cancer treatment.  Daughter has been diagnosed with ADHD and they are connecting with individual and family support.     Ongoing:Managing daily routine which has contributed to some adjustment and parenting challenges, lack of effective emotion regulation strategies for managing daily frustrations and upsets and marital discord due to differences in parenting styles.       Treatment Objective(s) Addressed in This Session:     Identify strategies to help with caregiver stress  Identify what may be contributing to increase in stress at work - identify strategies to help decrease/manage stress  "    Identify how to practice and encourage having a flexible mindset when things don't go as anticipated  Learn strategy of \"radical acceptance\", benefits of strategy, and how to apply strategy     Continue to review/practice/reinforce:    Practice checking in on a regular basis, remind self to slow down when rushing, take deep breaths   Identify and enforce healthy emotional boundaries (define your role as spouse, not \"therapist\" or \"fixer\")  Identify factors to consider when having crucial conversations   Identify ways to practice and incorporate self-care into routine.   Learn and practice mindfulness strategies - redirect back to present moment, focus on the facts   Identify and enforce healthy emotional boundaries (it is ok to feel different than my spouse does, it is okay for him to have his feelings and to not feel like I have to fix or change how he is feeling)  Continue to practice awareness of your emotions and physical sensations, to help tune in to what you need  Identify opportunities to access support and help from others  Mindful awareness of thoughts, impact on your emotions, and recognize opportunities to redirect thoughts.  Practice re-directing thoughts to a more helpful perspective.    Practice balanced thinking to counteract polarized/all or nothing thinking.       Intervention:   CBTand solution-focused: Idalia processed how things have been going with balancing caregiving demands for her spouse, parenting her daughter, and working.  She reported it has been very helpful for her to be aware of the expectations and pressures she had been putting on herself for work, and allow herself to create more healthy and realistic expectations.  She processed recent info they have learned about Colia's treatment and what the next phase of his treatment will look like, and what this will require from her and others from a caregiver perspective.  She'll work on writing down her questions and concerns and " "bringing them to their next appointment, to help keep thoughts and worries from \"ping-ponging\" around her mind and to help her get more information and guidance.  She shared some of her feelings and experiences related to how her 's cancer diagnosis and treatment has impacted her emotionally.  Encouraged her to continue to \"feel the real\" and share in a supportive space - normalized the importance of her receiving support and self-care for all she is experiencing as a caregiver and spouse -        ASSESSMENT: Current Emotional / Mental Status (status of significant symptoms):   Risk status (Self / Other harm or suicidal ideation)   Patient denies current fears or concerns for personal safety.   Patient denies current or recent suicidal ideation or behaviors.    She continues to agree to use a safety plan should any safety concerns arise.  She also agrees to reach our to her spouse or a family member for help if needed, and/or access crisis/emergency resources.        Patientdenies current or recent homicidal ideation or behaviors.   Patient denies current or recent self injurious behavior or ideation.   Patient denies other safety concerns.   Patient reports there has been no change in risk factors since their last session.     Patient reports there has been no change in protective factors since their last session.     Recommended that patient call 911 or go to the local ED should there be a change in any of these risk factors.  She has previously been informed on how to contact [facility] crisis/COPE for urgent/crisis concerns 24/7.  Provided patient with crisis resources and she agrees to use safety plan should any safety concerns arise.      Professionals or agencies I can contact during a crisis:    Suicide Prevention Lifeline: 7-617-638-TALK (9317)    Crisis Text Line Service (available 24 hours a day, 7 days a week): Text [place] to 438953    Local Crisis Services: [facility] Crisis Services: " "[phone]    Call 911 or go to my nearest emergency department.        Appearance:   Appropriate    Eye Contact:   Good    Psychomotor Behavior: Normal    Attitude:   Cooperative    Orientation:   All   Speech    Rate / Production: Normal     Volume:  Normal    Mood:    Reports mood has been mostly good, though feels more \"antsy\" and worried since learning more about what her spouses next phase of treatment will look like, as well as an anticipated window/time-frame for this -    Affect:    Congruent with mood   Thought Content:  Clear    Thought Form:  Coherent  Logical    Insight:    Good      Medication Review:   No changes      Medication Compliance:   Yes      Changes in Health Issues:  None reported         Chemical Use Review:   Substance Use: Chemical use reviewed, no changes or active concerns identified.      Tobacco Use: No current tobacco use.      Diagnosis:  Anxiety disorder unspecified  Depression, unspecified             PLAN: (Patient Tasks / Therapist Tasks / Other)    1) Idalia identified the following goals: Spend time writing things down, allow myself time to get my thoughts out, write down questions I want to ask spouse's care team.      Continue great work you have been doing to be mindful of expectations you have for yourself, and creating realistic and healthy expectations.      Continue with: Practice flexible mindset and radical acceptance when things don't go as anticipated.    2) If there is a crisis situation, remember you are not alone and that there are people who can help you twenty-four hours a day, seven days a week.  Call [facility] ([facility]): [phone].      3) Follow-up visit is scheduled for 10/13 at 12:00 pm.  If urgent or crisis concerns arise prior to next scheduled appointment, please reach out to crisis resources, call 911, or go to the emergency department.      Cristy Wilkinson PsyD,   Licensed Psychologist  10/6/2022                    Previous " "skills and strategies to remind self of and to do as needed:    Practice \"STOP\" technique when you recognize yourself feeling angry   Be a  - what do you notice is happening when Kaylee feels upset?     Practice recognizing when feeling angry, and giving self permission to use calming and self-soothing strategies and take a break.   Look for the gray with thinking  slow down  take deep breaths  manage expectations of self and others.    Journal   Practice flexing \"flexiblity and creativity\" -   Continue the great work you are doing with your daily schedule.    Get outside every day.    Play your instrument.    Sing!    Continue with: \"It's just a thought\" - non-judgmental, observe, float down the river on a leaf, clouds in the gege, reframing unhelpful thoughts -   Keep working on being patient when things are tense around me.    Continue with observing/paying attention to warning signs and signals and give self persmission to slow down, especially during the morning routine with Kaylee.    Practice offerring Kaylee choices when appropriate.      Use \"my plan for success\" to help remind you of calming strategies to practice when feeling upset.    Practice decreasing overall vulnerability to emotion mind through getting adequate rest, nutrition, time for yourself, and physical activity.         Consider reading \"Fighting for your marriage\".      Technical Assistance for video visits:    Offer patient the website ([email or web address]) and/or phone number (657-210-8287) for video visit instructions/assistance if needed.                                             ____________________________________    Treatment Plan     Patient's Name: Idalia Hinojosa                        YOB: 1984     Date of Creation: 1/6/2020  Date Treatment Plan Last Reviewed/Revised: 8/4/22     DSM5 Diagnoses: 300.00 (F41.9) Unspecified Anxiety Disorder, depressive disorder, unspecified  Psychosocial / Contextual Factors: " strain in marital relationship, parenting challenges, adjustment challenges, 's cancer diagnosis  PROMIS (reviewed every 90 days):      Anticipated number of session for this episode of care: additional 15-20  Anticipation frequency of session: Weekly until symptoms stabilize, then can decrease the frequency of sessions to likely bi-weekly or once every three weeks  Anticipated Duration of each session: 38-52 minutes  Treatment plan will be reviewed in 90 days or when goals have been changed.         Referral / Collaboration:  We discussed a referral to a couples/marital therapist.  The patient is thinking about this and has talked with her  about the referral, but they are unsure if they would like to follow through at this time.       MeasurableTreatment Goal(s) related to diagnosis / functional impairment(s)  Goal 1: Patient will learn emotion regulation strategies to help when she is feeling upset/angry/frustrated/distressed    I will know I've met my goal when I would yell less, I would feel calmer, and I would not push others.  I would be less physical when I'm angry (e.g. wouldn't slam doors or hit things)        Objective #A (Patient Action)                          Patient will learn and practice at least 2 new emotion regulation strategies.  Status: Continued - Date(s):    8/4/22- Regularly practicing emotion regulation strategies to help manage increase in emotional distress related to spouse's cancer diagnosis        Intervention(s)  Therapist will provide psychoeducation on emotion regulation module from DBT and will assign patient homework to help reinforce skill development.     Objective #B  Patient will identify factors that increase vulnerability to emotion mind and identify ways to decrease vulnerability to emotion mind.  Status: Continued - Date(s):8/4/22 - routinely incorporating mindfulness and self-awareness strategies to increase attention and focus to factors that increase  vulnerability to emotion mind           Intervention(s)  Therapist will provide information on factors that increase vulnerabiliyt to emotion mind.     Objective #C  Patient will identify warning signs and signals that emotions are escalating and will learn ways to skillfully intervene early on.  Status: New - Date: 8/4/22 - in progress -            Intervention(s)  Therapist will help patient identify warning signs and signals, and will guide the patient in identifying skillful ways to intervene when exeriencing warning signs and signals.        Goal 2: Patient will learn new parenting strategies to help with managing parenting challenges.  Parent will work on improving relationship with her daughter and defining what she would like this relationship to look like.      I will know I've met my goal when I'm enjoying time with my daughter, teaching her new things, and not waiting for things to change       Objective #A (Patient Action)                          Status: Continued - Date(s):8/4/22     Patient will increase understanding of developmental norms for her daughter and ways to manage challenging behaviors.     Intervention(s)  Therapist will provide psychoeducation on developmental norms and parenting strategies.     Objective #B  Patient will spend time reflecting on her relationship with her daughter and defining what she would like this relaitonship to look like.                  Status: Continued - Date(s):8/4/22 - Making good progress -            Intervention(s)  Therapist will guide the patient in reflecting upon her relationship with her daughter and help her define ways to move towards what she vaues in her relationship with her daughter.     Objective #C  Patient will increase time spent on fun activity and play with her daughter.  Status: Continued - Date(s):8/4/22- has been enjoying time with daughter and devoting regular time to fun activities and play  "together           Intervention(s)  Therapist will guide the patient in identifying ways she can increase positive time with her daughter.  Therapist will also teach mindfulness strategies to help patient with being in the present moment when appropriate - .        Goal 3: Patiient will improve communication with her .      I will know I've met my goal when I feel less irritated with my  and communicate more openly with my .  I would like to be more assertive and less aggressive.   I would like to not avoid telling him things because I'm worried about his reaction or don't think he will react well.  I would like to be more present to the moment during times when this would be helpful.\"       Objective #A (Patient Action)                          Status: Continued - Date(s):8/4/22 - continues to work on this goal            Patient will learn and practice at least two new interpersonal effectiveness strategies.     Intervention(s)  Therapist will teach strategies from DBT module on interpersonal effectiveness, and will assign homework to help reinforce skill development.        Patient has reviewed and agreed to the above plan.        Cristy Wilkinson PsyD,   Licensed Psychologist  January 6, 2020, reviewed on 4/23/2020, reviewed 7/8/2020, reviewed 10/08/2020, reviewed 1/27/2021, reviewed 4/27/2021, 7/29/2021, 11/10/2021, 2/9/2022, 5/3/2022, 8/4/22                                                                                                                                                                                                                                                                [facility]                                            Progress Note    Patient Name: Idalia Hinojosa  Date: 8/31/2022           Service Type: Individual      Session Start Time: 12:06 pm Session End Time: 12:48 pm pm   Session Length:  42 minutes    Session " "#: 67    Attendees: Client     Service Modality: Video visit: -       Provider verified identity through the following two step process.  Patient provided:  Patient is known previously to provider    Telemedicine Visit: The patient's condition can be safely assessed and treated via synchronous audio and visual telemedicine encounter.      Reason for Telemedicine Visit: Services only offered telehealth    Originating Site (Patient Location): Patient's home    Distant Site (Provider Location): Provider Remote Setting- Home Office    Consent:  The patient/guardian has verbally consented to: the potential risks and benefits of telemedicine (telephone visit) versus in person care; bill my insurance or make self-payment for services provided; and responsibility for payment of non-covered services.     Patient would like the video invitation sent by:  My Chart    Mode of Communication:  Video Conference via [employer],     As the provider I attest to compliance with applicable laws and regulations related to telemedicine.         Treatment Plan Last Reviewed: 08/04/22  PHQ-9/DAVID-7: 08/04/22      DATA  Interactive Complexity: No  Crisis: No        Progress Since Last Session (Related to Symptoms / Goals / Homework):   Symptoms:  She reported that symptoms continue to remain improved - less anxious and depressed and feels better able to cope with challenges and stressors she is experiencing.   She reports the weekend was \"rough\" in terms of her  not feeling well physically from his treatment, and her daughter had some challenging behaviors, however, she was able to actively engage constructive coping strategies.        Homework: Achieved / completed to satisfaction.  Has been enjoying biking to work and cool summer mornings, went out on a date with her  and enjoyed this, and used healthy coping strategies to manage difficult situations.          Episode of Care Goals: Satisfactory progress - ACTION (Actively " "working towards change); Intervened by reinforcing change plan / affirming steps taken.  Diagnostic assessment has been completed, treatment plan has been developed and patient has been making good progress on treatment goals.  The patient stated that her main goals for therapy would be to learn emotion regulation strategies (in particular, to help with when she is feeling upset/angry/frustrated/distressed).  The changes in her daily life due to COVID restrictions were a focus of our work together, and working through changes to routine as her daughter started  and she returned to work.  As she has been making progress, focus of treatment will shift to wellness planning (monitoring mood and symptoms, incorporating into routine what keeps her feeling well, review of triggers, how to manage set backs, etc.).  As her  has been diagnosed with cancer, treatment will also focus on providing her with support in managing the emotional distress and challenges related to this.       Current / Ongoing Stressors and Concerns:   Current:  She reports the weekend was difficult; her  felt physically ill for about five days, and her daughter returned from time at her parents cabin and struggled with the transition.  She was able to identify the difficulties and solutions to address - which included changing up the environment, going outside, getting physical activity, and getting help from her parents.       Ongoing:Managing daily routine which has contributed to some adjustment and parenting challenges, lack of effective emotion regulation strategies for managing daily frustrations and upsets and marital discord due to differences in parenting styles.       Treatment Objective(s) Addressed in This Session:     Identify how to practice and encourage having a flexible mindset when things don't go as anticipated  Learn strategy of \"radical acceptance\", benefits of strategy, and how to apply strategy   " "    Continue to review/practice/reinforce:  Identify ways to practice and incorporate self-care into routine.   Learn and practice mindfulness strategies - redirect back to present moment, focus on the facts   Identify and enforce healthy emotional boundaries (it is ok to feel different than my spouse does, it is okay for him to have his feelings and to not feel like I have to fix or change how he is feeling)  Continue to practice awareness of your emotions and physical sensations, to help tune in to what you need  Identify opportunities to access support and help from others  Mindful awareness of thoughts, impact on your emotions, and recognize opportunities to redirect thoughts.  Practice re-directing thoughts to a more helpful perspective.    Practice balanced thinking to counteract polarized/all or nothing thinking.         Intervention:   CBT and DBT: Idalia identified wanting to work on strengthening her coping strategies for managing when things do not go as expected, and in particular responding with a flexible mindset.  We talked about being able to acknowledge and validate the emotions that can come along with things not going as anticipated, while also being able to practice \"radical acceptance\" of what is and the ability to choose a flexible mindset (and acknowledging that thoughts may not automatically go there - so the ability to pay attention to and recognize thinking, identify when cognitive distortions are present, and embrace the opportunity to choose a more flexible way of viewing the situation).      She also processed an upcoming change to working 5 days a week instead of 4, she reported she feels positive about this for multiple reasons - she enjoys work, it is a busy time of the year at work so she feels productive and busy when at work, and this will help bring in extra income (which will help when her  goes on medical leave for his treatments.  She identified the need to assess how " "she does with the increase in her work schedule and evaluate what she may need in terms of her self-care.        ASSESSMENT: Current Emotional / Mental Status (status of significant symptoms):   Risk status (Self / Other harm or suicidal ideation)   Patient denies current fears or concerns for personal safety.   Patient denies current or recent suicidal ideation or behaviors.    She continues to agree to use a safety plan should any safety concerns arise.  She also agrees to reach our to her spouse or a family member for help if needed, and/or access crisis/emergency resources.        Patientdenies current or recent homicidal ideation or behaviors.   Patient denies current or recent self injurious behavior or ideation.   Patient denies other safety concerns.   Patient Patient reports there has been no change in risk factors since their last session.     PatientPatient reports there has been no change in protective factors since their last session.     Recommended that patient call 911 or go to the local ED should there be a change in any of these risk factors.  She has previously been informed on how to contact [facility] crisis/COPE for urgent/crisis concerns 24/7.  Provided patient with crisis resources and she agrees to use safety plan should any safety concerns arise.      Professionals or agencies I can contact during a crisis:    Suicide Prevention Lifeline: 7-689-868-TALK (9868)    Crisis Text Line Service (available 24 hours a day, 7 days a week): Text [place] to 477153    Local Crisis Services: [facility]: [phone]    Call 911 or go to my nearest emergency department.        Appearance:   Appropriate    Eye Contact:   Good    Psychomotor Behavior: Normal    Attitude:   Cooperative    Orientation:   All   Speech    Rate / Production: Normal     Volume:  Normal    Mood:    Reports that she continues to feel \"better\" and that she is better able to manage stressors and difficulties that " "arise.     Affect:    Congruent with mood   Thought Content:  Clear    Thought Form:  Coherent  Logical    Insight:    Good      Medication Review:   No changes      Medication Compliance:   Yes      Changes in Health Issues:  None reported         Chemical Use Review:   Substance Use: Chemical use reviewed, no changes or active concerns identified.      Tobacco Use: No current tobacco use.      Diagnosis:  Anxiety disorder unspecified  Depression, unspecified             PLAN: (Patient Tasks / Therapist Tasks / Other)    1) Idalia identified the following goals: Thinking about how many hours are realistic to work, anything above would be \"bonus\" but \"I don't have to do it\".      Practice flexible mindset and radical acceptance when things don't go as anticipated.    2) If there is a crisis situation, remember you are not alone and that there are people who can help you twenty-four hours a day, seven days a week.  Call [facility] ([facility]): [phone].      3) Follow-up visit is scheduled for September 7th at 12:00 pm.  If urgent or crisis concerns arise prior to next scheduled appointment, please reach out to crisis resources, call 911, or go to the emergency department.      Cristy Wilkinson PsyD,   Licensed Psychologist  9/29/2022                    Previous skills and strategies to remind self of and to do as needed:    Practice \"STOP\" technique when you recognize yourself feeling angry   Be a  - what do you notice is happening when Kaylee feels upset?     Practice recognizing when feeling angry, and giving self permission to use calming and self-soothing strategies and take a break.   Look for the gray with thinking  slow down  take deep breaths  manage expectations of self and others.    Journal   Practice flexing \"flexiblity and creativity\" -   Continue the great work you are doing with your daily schedule.    Get outside every day.    Play your instrument.    Sing!    Continue " "with: \"It's just a thought\" - non-judgmental, observe, float down the river on a leaf, clouds in the gege, reframing unhelpful thoughts -   Keep working on being patient when things are tense around me.    Continue with observing/paying attention to warning signs and signals and give self persmission to slow down, especially during the morning routine with Kaylee.    Practice offerring Kaylee choices when appropriate.      Use \"my plan for success\" to help remind you of calming strategies to practice when feeling upset.    Practice decreasing overall vulnerability to emotion mind through getting adequate rest, nutrition, time for yourself, and physical activity.         Consider reading \"Fighting for your marriage\".      Technical Assistance for video visits:    Offer patient the website ([email or web address]) and/or phone number ([phone]) for video visit instructions/assistance if needed.                                             ____________________________________    Treatment Plan     Patient's Name: Idalia Hinojosa                        YOB: 1984     Date of Creation: 1/6/2020  Date Treatment Plan Last Reviewed/Revised: 8/4/22     DSM5 Diagnoses: 300.00 (F41.9) Unspecified Anxiety Disorder, depressive disorder, unspecified  Psychosocial / Contextual Factors: strain in marital relationship, parenting challenges, adjustment challenges, 's cancer diagnosis  PROMIS (reviewed every 90 days):      Anticipated number of session for this episode of care: additional 15-20  Anticipation frequency of session: Weekly until symptoms stabilize, then can decrease the frequency of sessions to likely bi-weekly or once every three weeks  Anticipated Duration of each session: 38-52 minutes  Treatment plan will be reviewed in 90 days or when goals have been changed.         Referral / Collaboration:  We discussed a referral to a couples/marital therapist.  The patient is thinking about this and has " talked with her  about the referral, but they are unsure if they would like to follow through at this time.       MeasurableTreatment Goal(s) related to diagnosis / functional impairment(s)  Goal 1: Patient will learn emotion regulation strategies to help when she is feeling upset/angry/frustrated/distressed    I will know I've met my goal when I would yell less, I would feel calmer, and I would not push others.  I would be less physical when I'm angry (e.g. wouldn't slam doors or hit things)        Objective #A (Patient Action)                          Patient will learn and practice at least 2 new emotion regulation strategies.  Status: Continued - Date(s):    8/4/22- Regularly practicing emotion regulation strategies to help manage increase in emotional distress related to spouse's cancer diagnosis        Intervention(s)  Therapist will provide psychoeducation on emotion regulation module from DBT and will assign patient homework to help reinforce skill development.     Objective #B  Patient will identify factors that increase vulnerability to emotion mind and identify ways to decrease vulnerability to emotion mind.  Status: Continued - Date(s):8/4/22 - routinely incorporating mindfulness and self-awareness strategies to increase attention and focus to factors that increase vulnerability to emotion mind           Intervention(s)  Therapist will provide information on factors that increase vulnerabiliyt to emotion mind.     Objective #C  Patient will identify warning signs and signals that emotions are escalating and will learn ways to skillfully intervene early on.  Status: New - Date: 8/4/22 - in progress -            Intervention(s)  Therapist will help patient identify warning signs and signals, and will guide the patient in identifying skillful ways to intervene when exeriencing warning signs and signals.        Goal 2: Patient will learn new parenting strategies to help with managing parenting  challenges.  Parent will work on improving relationship with her daughter and defining what she would like this relationship to look like.      I will know I've met my goal when I'm enjoying time with my daughter, teaching her new things, and not waiting for things to change       Objective #A (Patient Action)                          Status: Continued - Date(s):8/4/22     Patient will increase understanding of developmental norms for her daughter and ways to manage challenging behaviors.     Intervention(s)  Therapist will provide psychoeducation on developmental norms and parenting strategies.     Objective #B  Patient will spend time reflecting on her relationship with her daughter and defining what she would like this relaitonship to look like.                  Status: Continued - Date(s):8/4/22 - Making good progress -            Intervention(s)  Therapist will guide the patient in reflecting upon her relationship with her daughter and help her define ways to move towards what she vaues in her relationship with her daughter.     Objective #C  Patient will increase time spent on fun activity and play with her daughter.  Status: Continued - Date(s):8/4/22- has been enjoying time with daughter and devoting regular time to fun activities and play together           Intervention(s)  Therapist will guide the patient in identifying ways she can increase positive time with her daughter.  Therapist will also teach mindfulness strategies to help patient with being in the present moment when appropriate - .        Goal 3: Patiient will improve communication with her .      I will know I've met my goal when I feel less irritated with my  and communicate more openly with my .  I would like to be more assertive and less aggressive.   I would like to not avoid telling him things because I'm worried about his reaction or don't think he will react well.  I would like to be more present to the moment during  "times when this would be helpful.\"       Objective #A (Patient Action)                          Status: Continued - Date(s):8/4/22 - continues to work on this goal            Patient will learn and practice at least two new interpersonal effectiveness strategies.     Intervention(s)  Therapist will teach strategies from DBT module on interpersonal effectiveness, and will assign homework to help reinforce skill development.        Patient has reviewed and agreed to the above plan.        Cristy Wilkinson PsyD,   Licensed Psychologist  January 6, 2020, reviewed on 4/23/2020, reviewed 7/8/2020, reviewed 10/08/2020, reviewed 1/27/2021, reviewed 4/27/2021, 7/29/2021, 11/10/2021, 2/9/2022, 5/3/2022, 8/4/22                                                                                                                                                                                                                                                       Answers for HPI/ROS submitted by the patient on 9/7/2022  If you checked off any problems, how difficult have these problems made it for you to do your work, take care of things at home, or get along with other people?: Not difficult at all  PHQ9 TOTAL SCORE: 1      "

## 2023-10-30 NOTE — H&P PST ADULT - HISTORY OF PRESENT ILLNESS
62 year old  Male with PMHx of gallstones, HTN, HLD, PAUL ( can not tolerate cpap)Bipolar, GERD, Jhon's Esophagus, asthma, hx of 6/19/23 hospitalization for choledocholithiasis with 9mm CBD dilation on MRCP, recent hx of ERCP 8/11 for removal biliary sludge, 8/18/23-8/24/23 Gallstones status post cholangitis and pancreatitis planned for       *****Dr. Kirby Vascular note: "Vascular venous duplex study which shows a chronic gastrocnemius DVT. At this time no further intervention is necessary. Patient may stay on baby aspirin once daily for his other medical issues. I believe if necessary patient can safely undergo a cholecystectomy"  ****Patient stated having Umbilical hernia repair--not on the booking , send MRSA swab, emailed [facility] Booking and the surgeon  62 year old  Male with PMHx of HTN, HLD, PAUL ( can not tolerate cpap), Bipolar, GERD, Jhon's Esophagus, Diverticulum of duodenum ( large as per pt), Pancreatic cyst,  mild asthma, hx of 6/19/23 hospitalization for choledocholithiasis with 9mm CBD dilation on MRCP, ERCP 8/11/2023 for removal biliary sludge, 8/18/23-8/24/23 Gallstones status post cholangitis and pancreatitis planned for laparoscopic cholecystectomy on 11/1/2023       ****Patient stated having Umbilical hernia repair--not on the booking , send MRSA swab, emailed [facility] Booking and the surgeon   *****Dr. Kirby Vascular note: "Vascular venous duplex study which shows a chronic gastrocnemius DVT. At this time no further intervention is necessary. Patient may stay on baby aspirin once daily for his other medical issues. I believe if necessary patient can safely undergo a cholecystectomy"   62 year old  Male with PMHx of HTN, HLD, PAUL ( can not tolerate cpap), Bipolar, GERD, Jhon's Esophagus, Diverticulum of duodenum ( large as per pt), Pancreatic cyst,  OA of hip, mild asthma, hx of 6/19/23 hospitalization for choledocholithiasis with 9mm CBD dilation on MRCP, ERCP 8/11/2023 for removal biliary sludge, 8/18/23-8/24/23 [facility] hospitalization with gastrocnemius DVT/Gallstones (cholangitis and pancreatitis ) planned for laparoscopic cholecystectomy on 11/1/2023       ****Patient stated having Umbilical hernia repair--not on the booking , send MRSA swab, emailed [facility] Booking and the surgeon   *****Dr. Kirby Vascular note: "Vascular venous duplex study which shows a chronic gastrocnemius DVT. At this time no further intervention is necessary. Patient may stay on baby aspirin once daily for his other medical issues. I believe if necessary patient can safely undergo a cholecystectomy"

## 2023-10-31 ENCOUNTER — TRANSCRIPTION ENCOUNTER (OUTPATIENT)
Age: 63
End: 2023-10-31

## 2023-11-01 ENCOUNTER — TRANSCRIPTION ENCOUNTER (OUTPATIENT)
Age: 63
End: 2023-11-01

## 2023-11-01 ENCOUNTER — APPOINTMENT (OUTPATIENT)
Dept: SURGERY | Facility: HOSPITAL | Age: 63
End: 2023-11-01
Payer: COMMERCIAL

## 2023-11-01 ENCOUNTER — OUTPATIENT (OUTPATIENT)
Dept: OUTPATIENT SERVICES | Facility: HOSPITAL | Age: 63
LOS: 1 days | End: 2023-11-01
Payer: COMMERCIAL

## 2023-11-01 ENCOUNTER — RESULT REVIEW (OUTPATIENT)
Age: 63
End: 2023-11-01

## 2023-11-01 VITALS
TEMPERATURE: 98 F | WEIGHT: 195.99 LBS | SYSTOLIC BLOOD PRESSURE: 145 MMHG | RESPIRATION RATE: 18 BRPM | HEIGHT: 71 IN | OXYGEN SATURATION: 96 % | DIASTOLIC BLOOD PRESSURE: 75 MMHG | HEART RATE: 60 BPM

## 2023-11-01 VITALS
DIASTOLIC BLOOD PRESSURE: 72 MMHG | OXYGEN SATURATION: 100 % | RESPIRATION RATE: 16 BRPM | HEART RATE: 56 BPM | SYSTOLIC BLOOD PRESSURE: 157 MMHG

## 2023-11-01 DIAGNOSIS — Z98.890 OTHER SPECIFIED POSTPROCEDURAL STATES: Chronic | ICD-10-CM

## 2023-11-01 DIAGNOSIS — K80.20 CALCULUS OF GALLBLADDER WITHOUT CHOLECYSTITIS WITHOUT OBSTRUCTION: ICD-10-CM

## 2023-11-01 PROCEDURE — 88304 TISSUE EXAM BY PATHOLOGIST: CPT

## 2023-11-01 PROCEDURE — 47563 LAPARO CHOLECYSTECTOMY/GRAPH: CPT

## 2023-11-01 PROCEDURE — 86901 BLOOD TYPING SEROLOGIC RH(D): CPT

## 2023-11-01 PROCEDURE — 86900 BLOOD TYPING SEROLOGIC ABO: CPT

## 2023-11-01 PROCEDURE — 86850 RBC ANTIBODY SCREEN: CPT

## 2023-11-01 PROCEDURE — S2900: CPT

## 2023-11-01 PROCEDURE — 49591 RPR AA HRN 1ST < 3 CM RDC: CPT | Mod: 59

## 2023-11-01 PROCEDURE — C9399: CPT

## 2023-11-01 PROCEDURE — 88304 TISSUE EXAM BY PATHOLOGIST: CPT | Mod: 26

## 2023-11-01 PROCEDURE — C1889: CPT

## 2023-11-01 PROCEDURE — S2900 ROBOTIC SURGICAL SYSTEM: CPT | Mod: NC

## 2023-11-01 DEVICE — LIGATING CLIPS WECK HEMOLOK POLYMER LARGE (PURPLE) 6: Type: IMPLANTABLE DEVICE | Status: FUNCTIONAL

## 2023-11-01 DEVICE — SURGICEL 2 X 14": Type: IMPLANTABLE DEVICE | Status: FUNCTIONAL

## 2023-11-01 RX ORDER — LIDOCAINE HCL 20 MG/ML
0.2 VIAL (ML) INJECTION ONCE
Refills: 0 | Status: DISCONTINUED | OUTPATIENT
Start: 2023-11-01 | End: 2023-11-01

## 2023-11-01 RX ORDER — FENTANYL CITRATE 50 UG/ML
25 INJECTION INTRAVENOUS
Refills: 0 | Status: DISCONTINUED | OUTPATIENT
Start: 2023-11-01 | End: 2023-11-01

## 2023-11-01 RX ORDER — LOSARTAN POTASSIUM 100 MG/1
1 TABLET, FILM COATED ORAL
Refills: 0 | DISCHARGE

## 2023-11-01 RX ORDER — SODIUM CHLORIDE 9 MG/ML
1000 INJECTION, SOLUTION INTRAVENOUS
Refills: 0 | Status: DISCONTINUED | OUTPATIENT
Start: 2023-11-01 | End: 2023-11-15

## 2023-11-01 RX ORDER — CEFAZOLIN SODIUM 1 G
2000 VIAL (EA) INJECTION ONCE
Refills: 0 | Status: COMPLETED | OUTPATIENT
Start: 2023-11-01 | End: 2023-11-01

## 2023-11-01 RX ORDER — ROSUVASTATIN CALCIUM 5 MG/1
1 TABLET ORAL
Qty: 0 | Refills: 0 | DISCHARGE

## 2023-11-01 RX ORDER — FLUOXETINE HCL 10 MG
2 CAPSULE ORAL
Refills: 0 | DISCHARGE

## 2023-11-01 RX ORDER — FLUOXETINE HCL 10 MG
1 CAPSULE ORAL
Refills: 0 | DISCHARGE

## 2023-11-01 RX ORDER — ASPIRIN/CALCIUM CARB/MAGNESIUM 324 MG
1 TABLET ORAL
Qty: 0 | Refills: 0 | DISCHARGE

## 2023-11-01 RX ORDER — HYDROMORPHONE HYDROCHLORIDE 2 MG/ML
0.25 INJECTION INTRAMUSCULAR; INTRAVENOUS; SUBCUTANEOUS
Refills: 0 | Status: DISCONTINUED | OUTPATIENT
Start: 2023-11-01 | End: 2023-11-01

## 2023-11-01 RX ORDER — SODIUM CHLORIDE 9 MG/ML
3 INJECTION INTRAMUSCULAR; INTRAVENOUS; SUBCUTANEOUS EVERY 8 HOURS
Refills: 0 | Status: DISCONTINUED | OUTPATIENT
Start: 2023-11-01 | End: 2023-11-01

## 2023-11-01 RX ORDER — PANTOPRAZOLE SODIUM 20 MG/1
1 TABLET, DELAYED RELEASE ORAL
Qty: 0 | Refills: 0 | DISCHARGE

## 2023-11-01 NOTE — PRE-ANESTHESIA EVALUATION ADULT - NSANTHPMHFT_GEN_ALL_CORE
chart and consultant notes reviewed. no hx sig cv dz - states et  1 flight, limited by ortho pain - no cp/sob. leanna, non compliant. hx lucy, now resolved- crcl >> 50. mild asthma at clinical baseline. known LE dvt- plan per vasc is asa, no other intervention or w/u prior to OR.

## 2023-11-01 NOTE — ASU DISCHARGE PLAN (ADULT/PEDIATRIC) - CARE PROVIDER_API CALL
Gomez Sánchez  Surgery  [address] [place] [phone]  Phone: [phone]  Fax: [phone]  Established Patient  Follow Up Time: 2 weeks

## 2023-11-01 NOTE — ASU DISCHARGE PLAN (ADULT/PEDIATRIC) - ASU DC SPECIAL INSTRUCTIONSFT
Patient may shower tomorrow and have regular diet as tolerated.     Remove tegaderm dressing on 11/3, leave steristrips in place    PAtient may restart aspiring on 11/4    No heavy lifting for 4-6 weeks Patient may shower tomorrow and have regular diet as tolerated.     Remove tegaderm dressing on 11/3, leave steristrips in place    Patient may restart aspirin on 11/4    No heavy lifting for 4-6 weeks

## 2023-11-01 NOTE — ASU PATIENT PROFILE, ADULT - FALL HARM RISK - UNIVERSAL INTERVENTIONS
Bed in lowest position, wheels locked, appropriate side rails in place/Call bell, personal items and telephone in reach/Instruct patient to call for assistance before getting out of bed or chair/Non-slip footwear when patient is out of bed/[place] to call system/Physically safe environment - no spills, clutter or unnecessary equipment/Purposeful Proactive Rounding/Room/bathroom lighting operational, light cord in reach

## 2023-11-01 NOTE — ASU DISCHARGE PLAN (ADULT/PEDIATRIC) - NS MD DC FALL RISK RISK
For information on Fall & Injury Prevention, visit: [email or web address] OR  [email or web address] OR  https://www.cdc.gov/steadi/patient.html

## 2023-11-02 PROBLEM — K42.9 UMBILICAL HERNIA WITHOUT OBSTRUCTION OR GANGRENE: Chronic | Status: ACTIVE | Noted: 2023-10-30

## 2023-11-02 PROBLEM — M19.90 UNSPECIFIED OSTEOARTHRITIS, UNSPECIFIED SITE: Chronic | Status: ACTIVE | Noted: 2023-10-30

## 2023-11-02 PROBLEM — K57.10 DIVERTICULOSIS OF SMALL INTESTINE WITHOUT PERFORATION OR ABSCESS WITHOUT BLEEDING: Chronic | Status: ACTIVE | Noted: 2023-10-30

## 2023-11-02 PROBLEM — Z87.438 PERSONAL HISTORY OF OTHER DISEASES OF MALE GENITAL ORGANS: Chronic | Status: ACTIVE | Noted: 2023-10-30

## 2023-11-04 RX ORDER — ASPIRIN/CALCIUM CARB/MAGNESIUM 324 MG
1 TABLET ORAL
Qty: 30 | Refills: 0
Start: 2023-11-04

## 2023-11-08 LAB
SURGICAL PATHOLOGY STUDY: SIGNIFICANT CHANGE UP
SURGICAL PATHOLOGY STUDY: SIGNIFICANT CHANGE UP

## 2023-11-10 NOTE — HISTORY OF PRESENT ILLNESS
Chief Complaint   Patient presents with   • Cough     Started this morning. Sore throat.        SUBJECTIVE:  Fox is a 16 year old male who is seen for URI symptoms that have been present for couple of days.  They include cough is deep met with some discomfort, mild dyspnea general fatigue.  Denies fever.  History of reactive airway disease in the past for which he is placed on bronchodilator therapy.  Nonsmoker.  Works in an environment where there was lot of does protocols, inhalants, fiberglass.    History reviewed. No pertinent past medical history.    History reviewed. No pertinent family history.    Social History     Tobacco Use   Smoking Status Never   Smokeless Tobacco Never       ALLERGIES:  No Known Allergies    Current Outpatient Medications   Medication Sig Dispense Refill   • azithromycin (Zithromax Z-Bertrand) 250 MG tablet Take 2 tablets now then 1 tablet daily till gone. 6 tablet 0   • albuterol 108 (90 Base) MCG/ACT inhaler Inhale 2 puffs into the lungs every 4 hours as needed for Shortness of Breath or Wheezing. 1 each 0   • benzonatate (TESSALON PERLES) 100 MG capsule Take 1 capsule by mouth 3 times daily as needed for Cough. 20 capsule 0   • albuterol 108 (90 Base) MCG/ACT inhaler Inhale 2 puffs into the lungs every 4 hours as needed for Wheezing. 1 each 0     No current facility-administered medications for this visit.         OBJECTIVE:   Visit Vitals  /70   Pulse 79   Temp 98.6 °F (37 °C) (Temporal)   Resp 16   SpO2 99%     Well-appearing nontoxic male.  Ears are TMs that are clear intact the posterior pharynx is patent membranes are moist the neck is supple without adenopathy.  Lungs notes scattered expiratory wheeze and rhonchi present no crackles are present.  He is non labors again as noted above.  Skin is without visible rash present.    MDM/PLAN:  COVID testing is declined by patient which is reasonable.  Will not alter his course of treatment but he is counseled with regard to the  level contagiousness of viral illnesses.  We have long dialogue about the diagnosis of bronchitis.  Treatment includes bronchodilator therapy such as albuterol, Tessalon to assist with cough however largely usually are viral in nature.  Patient prefers antibiotic use.  Again counseling when into this.  We do elect to place him on antibiotic with azithromycin as prescribed.  No indication for chest x-ray and lupus symptoms for such short duration, minus fever and no crackles present.      ASSESSMENT:  1. Bronchitis   [FreeTextEntry1] : The patient has a history of short segment Spivey's esophagus.  He had previously been seen on 2023, at which time he was recovering from a passed CBD stone.  LFTs, amylase, and lipase were normal at that time.  The patient underwent EUS and ERCP on 2023 by Dr. Prasad.  EUS revealed sludge in the CBD and gallbladder with an adjacent duodenal diverticulum.  A subsequent ERCP was done with sphincterotomy and sweeping of the CBD with balloon extraction.  The patient went home but subsequently developed pain and was admitted with post ERCP pancreatitis.  The patient was also found to have a DVT in the hospital.  During the hospitalization, cholecystectomy was recommended.  However, as the patient's cousin is currently dying in hospice, the patient chose to leave the hospital to address these matters.  He was discharged on 2023 with a hemoglobin and hematocrit of 10.3 and 30.9 respectively.  LFTs were normal.  The patient's last lipase on  was 138.  The patient is eating and not vomiting.  He has bloating but no further abdominal pain.  He has 2 small bowel movements a day.   Note from 2023 - We reviewed the evaluations done since the patient's last visit on 2023.  Blood work from that day showed improved LFTs with AST and ALT at 12 and 31 respectively with an alkaline phosphatase of 164, , and total bilirubin of 0.3.  Amylase was normal at 101 but lipase was mildly elevated at 91.  CA 19-9 was normal and B12 was in the low normal range at 339.  The patient had MRI/MRCP on 2023.  This revealed a dilated CBD of 1.1 cm with a smooth tapering at the level of the ampulla thought to be consistent with a benign stricture.  The patient has gallstones.  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  He is on pantoprazole 40 mg a day for reflux and denies abdominal pain, heartburn, dysphagia, nausea, vomiting.  Bowel movements are normal without melena or bright red blood per rectum.   Note from 2023 - The patient has a history of a 2 cm hiatal hernia, grade 3 reflux esophagitis, and Spivey's esophagus in an irregular Z-line.  He now presents after hospitalization at [facility] from  to .  On , the patient awoke with abdominal pain, nausea, fever, and jaundice.  There was no vomiting.  LFTs were elevated.  Ultrasound done on  showed layering sludge.  CT scan done the same day showed minimal periportal edema as well as a small lung nodule which the patient states is known and followed.  Patient had worsening pain and had a CT angiogram on Jojo 15.  The patient then had an MRI/MRCP without contrast which showed a liver cyst or hemangioma, apparent 7 mm filling defect in the very distal common bile duct with upstream biliary dilatation to 9 mm consistent with a stone or mass, pericholecystic fluid/edema, questionable acute pancreatitis, trace ascites, a 6 mm nonspecific cystic lesion in the pancreatic head, and a nonspecific lesion in T10 for which a spinal MRI with and without contrast was recommended.  The patient was to have an ERCP but his pain resolved and his numbers improved.  He was then discharged on  with alkaline phosphatase 349, , , and total bilirubin 0.7.  The patient had previously been on pantoprazole 40 mg once a day but has been on twice a day since the hospital.  He no longer has abdominal pain.  He denies heartburn, dysphagia, nausea, vomiting.  He is having 2-3 solid bowel movements a day without melena or bright red blood per rectum.  His statin has been placed on hold.   Note from 2023 - We reviewed the patient's EGD performed on 2023.  The exam was significant for a 2 cm hiatal hernia with grade 3 reflux esophagitis and an irregular Z-line.  Biopsies showed intestinal metaplasia consistent with Spivey's esophagus.  The patient also has a large diverticulum in the second portion of the duodenum.  Patient is on pantoprazole 40 mg a day.  He feels much better with no heartburn, belching, nausea, vomiting, dysphagia, abdominal pain.  Bowel movements are normal without melena or bright red blood per rectum.   Note from 2023 - The patient is seen for the first time since he underwent colonoscopy on 2020.  The exam was normal other than internal and external hemorrhoids which are asymptomatic.  The patient now complains of heartburn and belching that occurs about twice a week over the past year.  He denies dysphagia, nausea, vomiting.  He notes abdominal bloating with associated discomfort.  He reports 1-3 solid bowel movements a day without melena or bright red blood per rectum.  The patient has had mild weight gain.  The patient has not been admitted to the hospital in the past year and denies any cardiac issues.  The patient has a family history of a brother with advanced colonic polyp.   Note from 2020 - The patient is a 59-year-old man whose brother had precancerous colonic polyp which could not be removed by colonoscopy.  Unfortunately, the brother recently  of COVID-19 before the polyp could be resected.  The patient feels well denying abdominal pain, diarrhea, constipation.  He has 2-3 solid bowel movements a day without melena or bright red blood per rectum.  He denies heartburn or dysphasia.  The patient's weight is stable.  The patient has not been admitted to the hospital in the past year and denies any cardiac issues.

## 2023-11-16 ENCOUNTER — APPOINTMENT (OUTPATIENT)
Dept: SURGERY | Facility: CLINIC | Age: 63
End: 2023-11-16
Payer: COMMERCIAL

## 2023-11-16 VITALS
TEMPERATURE: 97.8 F | SYSTOLIC BLOOD PRESSURE: 146 MMHG | OXYGEN SATURATION: 98 % | RESPIRATION RATE: 17 BRPM | DIASTOLIC BLOOD PRESSURE: 76 MMHG | HEART RATE: 65 BPM

## 2023-11-16 DIAGNOSIS — Z09 ENCOUNTER FOR FOLLOW-UP EXAMINATION AFTER COMPLETED TREATMENT FOR CONDITIONS OTHER THAN MALIGNANT NEOPLASM: ICD-10-CM

## 2023-11-16 PROCEDURE — 99024 POSTOP FOLLOW-UP VISIT: CPT

## 2023-11-19 ENCOUNTER — RX RENEWAL (OUTPATIENT)
Age: 63
End: 2023-11-19

## 2023-12-01 PROBLEM — K80.20 CALCULUS OF GALLBLADDER WITHOUT CHOLECYSTITIS WITHOUT OBSTRUCTION: Chronic | Status: ACTIVE | Noted: 2023-10-30

## 2023-12-01 PROBLEM — R91.1 SOLITARY PULMONARY NODULE: Chronic | Status: ACTIVE | Noted: 2023-10-30

## 2023-12-01 PROBLEM — K85.90 ACUTE PANCREATITIS WITHOUT NECROSIS OR INFECTION, UNSPECIFIED: Chronic | Status: ACTIVE | Noted: 2023-10-30

## 2023-12-01 PROBLEM — G47.33 OBSTRUCTIVE SLEEP APNEA (ADULT) (PEDIATRIC): Chronic | Status: ACTIVE | Noted: 2023-10-30

## 2023-12-01 PROBLEM — I82.409 ACUTE EMBOLISM AND THROMBOSIS OF UNSPECIFIED DEEP VEINS OF UNSPECIFIED LOWER EXTREMITY: Chronic | Status: ACTIVE | Noted: 2023-10-30

## 2024-02-05 LAB
25(OH)D3 SERPL-MCNC: 29.3 NG/ML
ALBUMIN SERPL ELPH-MCNC: 4.5 G/DL
ALP BLD-CCNC: 94 U/L
ALT SERPL-CCNC: 22 U/L
ANION GAP SERPL CALC-SCNC: 13 MMOL/L
APPEARANCE: CLEAR
AST SERPL-CCNC: 16 U/L
BASOPHILS # BLD AUTO: 0.04 K/UL
BASOPHILS NFR BLD AUTO: 0.4 %
BILIRUB SERPL-MCNC: 0.4 MG/DL
BILIRUBIN URINE: NEGATIVE
BLOOD URINE: NEGATIVE
BUN SERPL-MCNC: 26 MG/DL
CALCIUM SERPL-MCNC: 9.6 MG/DL
CHLORIDE SERPL-SCNC: 104 MMOL/L
CHOLEST SERPL-MCNC: 137 MG/DL
CO2 SERPL-SCNC: 24 MMOL/L
COLOR: YELLOW
CREAT SERPL-MCNC: 1.36 MG/DL
EGFR: 58 ML/MIN/1.73M2
EOSINOPHIL # BLD AUTO: 0.19 K/UL
EOSINOPHIL NFR BLD AUTO: 2 %
FOLATE SERPL-MCNC: 12.8 NG/ML
GLUCOSE QUALITATIVE U: NEGATIVE MG/DL
GLUCOSE SERPL-MCNC: 101 MG/DL
HCT VFR BLD CALC: 42.6 %
HDLC SERPL-MCNC: 47 MG/DL
HGB BLD-MCNC: 14 G/DL
IMM GRANULOCYTES NFR BLD AUTO: 0.2 %
KETONES URINE: NEGATIVE MG/DL
LDLC SERPL CALC-MCNC: 60 MG/DL
LEUKOCYTE ESTERASE URINE: NEGATIVE
LYMPHOCYTES # BLD AUTO: 1.47 K/UL
LYMPHOCYTES NFR BLD AUTO: 15.3 %
MAN DIFF?: NORMAL
MCHC RBC-ENTMCNC: 29.9 PG
MCHC RBC-ENTMCNC: 32.9 GM/DL
MCV RBC AUTO: 91 FL
MONOCYTES # BLD AUTO: 0.67 K/UL
MONOCYTES NFR BLD AUTO: 7 %
NEUTROPHILS # BLD AUTO: 7.2 K/UL
NEUTROPHILS NFR BLD AUTO: 75.1 %
NITRITE URINE: NEGATIVE
NONHDLC SERPL-MCNC: 90 MG/DL
PH URINE: 5.5
PLATELET # BLD AUTO: 297 K/UL
POTASSIUM SERPL-SCNC: 4.7 MMOL/L
PROT SERPL-MCNC: 6.9 G/DL
PROTEIN URINE: NEGATIVE MG/DL
PSA SERPL-MCNC: 0.55 NG/ML
RBC # BLD: 4.68 M/UL
RBC # FLD: 12.7 %
SODIUM SERPL-SCNC: 141 MMOL/L
SPECIFIC GRAVITY URINE: 1.02
TRIGL SERPL-MCNC: 184 MG/DL
URATE SERPL-MCNC: 5.3 MG/DL
UROBILINOGEN URINE: 0.2 MG/DL
VIT B12 SERPL-MCNC: 1071 PG/ML
WBC # FLD AUTO: 9.59 K/UL

## 2024-02-15 ENCOUNTER — NON-APPOINTMENT (OUTPATIENT)
Age: 64
End: 2024-02-15

## 2024-02-15 ENCOUNTER — APPOINTMENT (OUTPATIENT)
Dept: INTERNAL MEDICINE | Facility: CLINIC | Age: 64
End: 2024-02-15
Payer: MEDICARE

## 2024-02-15 VITALS
WEIGHT: 202 LBS | TEMPERATURE: 98 F | OXYGEN SATURATION: 98 % | DIASTOLIC BLOOD PRESSURE: 76 MMHG | HEART RATE: 73 BPM | SYSTOLIC BLOOD PRESSURE: 141 MMHG | HEIGHT: 71 IN | BODY MASS INDEX: 28.28 KG/M2

## 2024-02-15 VITALS — SYSTOLIC BLOOD PRESSURE: 132 MMHG | DIASTOLIC BLOOD PRESSURE: 78 MMHG

## 2024-02-15 DIAGNOSIS — Z00.00 ENCOUNTER FOR GENERAL ADULT MEDICAL EXAMINATION W/OUT ABNORMAL FINDINGS: ICD-10-CM

## 2024-02-15 DIAGNOSIS — E55.9 VITAMIN D DEFICIENCY, UNSPECIFIED: ICD-10-CM

## 2024-02-15 DIAGNOSIS — R14.0 ABDOMINAL DISTENSION (GASEOUS): ICD-10-CM

## 2024-02-15 DIAGNOSIS — M10.9 GOUT, UNSPECIFIED: ICD-10-CM

## 2024-02-15 DIAGNOSIS — Z11.59 ENCOUNTER FOR SCREENING FOR OTHER VIRAL DISEASES: ICD-10-CM

## 2024-02-15 DIAGNOSIS — Z01.818 ENCOUNTER FOR OTHER PREPROCEDURAL EXAMINATION: ICD-10-CM

## 2024-02-15 DIAGNOSIS — K91.89 OTHER POSTPROCEDURAL COMPLICATIONS AND DISORDERS OF DIGESTIVE SYSTEM: ICD-10-CM

## 2024-02-15 DIAGNOSIS — Z23 ENCOUNTER FOR IMMUNIZATION: ICD-10-CM

## 2024-02-15 DIAGNOSIS — K80.20 CALCULUS OF GALLBLADDER W/OUT CHOLECYSTITIS W/OUT OBSTRUCTION: ICD-10-CM

## 2024-02-15 DIAGNOSIS — K80.50 CALCULUS OF BILE DUCT W/OUT CHOLANGITIS OR CHOLECYSTITIS W/OUT OBSTRUCTION: ICD-10-CM

## 2024-02-15 DIAGNOSIS — J45.909 UNSPECIFIED ASTHMA, UNCOMPLICATED: ICD-10-CM

## 2024-02-15 DIAGNOSIS — K85.90 OTHER POSTPROCEDURAL COMPLICATIONS AND DISORDERS OF DIGESTIVE SYSTEM: ICD-10-CM

## 2024-02-15 DIAGNOSIS — Z80.42 FAMILY HISTORY OF MALIGNANT NEOPLASM OF PROSTATE: ICD-10-CM

## 2024-02-15 DIAGNOSIS — K83.8 OTHER SPECIFIED DISEASES OF BILIARY TRACT: ICD-10-CM

## 2024-02-15 DIAGNOSIS — E66.3 OVERWEIGHT: ICD-10-CM

## 2024-02-15 DIAGNOSIS — E78.5 HYPERLIPIDEMIA, UNSPECIFIED: ICD-10-CM

## 2024-02-15 PROCEDURE — 90677 PCV20 VACCINE IM: CPT

## 2024-02-15 PROCEDURE — G0402 INITIAL PREVENTIVE EXAM: CPT

## 2024-02-15 PROCEDURE — 82270 OCCULT BLOOD FECES: CPT

## 2024-02-15 PROCEDURE — G0009: CPT

## 2024-02-15 PROCEDURE — G0403: CPT

## 2024-02-15 NOTE — HISTORY OF PRESENT ILLNESS
[FreeTextEntry1] : Pt presented for PE.  Last PE was 3/2023. [de-identified] : Pt was c/o dyspepsia last year and GI eval noted hiatal hernia, reflux esophagitis and Spivey's esophagus.  However, despite treatment, symptoms worsen, he went to ED and found to have acute cholecystitis.  Pt was recommended to have cholecystectomy, but surgery delayed due to family issues.  He finally had lap jossie in 11/2023 and did well post op.  He was diagnosed with DVT in RLE with his hospitalization.  He is off AC after a few months and is now on low dose ASA.  He had R hip pain from OA.  He is likely going to need THR.  He will f/u with ortho.  He had COVID infection in 8/2022, it was mild and he recovered completely.  He had 3 doses of COVID vaccine.  He had Flu vaccine with allergist in 10/2023.

## 2024-02-15 NOTE — REVIEW OF SYSTEMS
[Dyspnea on Exertion] : dyspnea on exertion [Nocturia] : nocturia [Joint Pain] : joint pain [Joint Stiffness] : joint stiffness [Negative] : Heme/Lymph [Fever] : no fever [Chills] : no chills [Fatigue] : no fatigue [Recent Change In Weight] : ~T no recent weight change [Chest Pain] : no chest pain [Palpitations] : no palpitations [Lower Ext Edema] : no lower extremity edema [Shortness Of Breath] : no shortness of breath [Wheezing] : no wheezing [Cough] : no cough [Abdominal Pain] : no abdominal pain [Nausea] : no nausea [Constipation] : no constipation [Diarrhea] : no diarrhea [Vomiting] : no vomiting [Heartburn] : no heartburn [Melena] : no melena [Dysuria] : no dysuria [Incontinence] : no incontinence [Hematuria] : no hematuria [Muscle Pain] : no muscle pain [Back Pain] : no back pain [Joint Swelling] : no joint swelling [Itching] : no itching [Mole Changes] : no mole changes [Skin Rash] : no skin rash [Headache] : no headache [Dizziness] : no dizziness [Fainting] : no fainting [Unsteady Walk] : no ataxia [Insomnia] : no insomnia [Anxiety] : no anxiety [Depression] : no depression [Easy Bleeding] : no easy bleeding [Easy Bruising] : no easy bruising [Swollen Glands] : no swollen glands [FreeTextEntry3] : wears reading glasses, [FreeTextEntry6] : 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

## 2024-02-15 NOTE — ASSESSMENT
[FreeTextEntry1] : Pt may be due for repeat colonoscopy and ad vised to f/u with GI.  He was reminded to have routine eye exam, dental care and skin exam with dermatologist.

## 2024-02-15 NOTE — PHYSICAL EXAM
[No Acute Distress] : no acute distress [Well Nourished] : well nourished [Well Developed] : well developed [Normal Sclera/Conjunctiva] : normal sclera/conjunctiva [PERRL] : pupils equal round and reactive to light [EOMI] : extraocular movements intact [Normal Outer Ear/Nose] : the outer ears and nose were normal in appearance [Normal Oropharynx] : the oropharynx was normal [Normal TMs] : both tympanic membranes were normal [Normal Nasal Mucosa] : the nasal mucosa was normal [No JVD] : no jugular venous distention [No Lymphadenopathy] : no lymphadenopathy [Supple] : supple [No Respiratory Distress] : no respiratory distress  [Clear to Auscultation] : lungs were clear to auscultation bilaterally [Normal Rate] : normal rate  [Regular Rhythm] : with a regular rhythm [Normal S1, S2] : normal S1 and S2 [No Carotid Bruits] : no carotid bruits [Pedal Pulses Present] : the pedal pulses are present [No Edema] : there was no peripheral edema [No Extremity Clubbing/Cyanosis] : no extremity clubbing/cyanosis [Normal Appearance] : normal in appearance [No Masses] : no palpable masses [No Nipple Discharge] : no nipple discharge [No Axillary Lymphadenopathy] : no axillary lymphadenopathy [Soft] : abdomen soft [Non Tender] : non-tender [Non-distended] : non-distended [Normal Bowel Sounds] : normal bowel sounds [Normal Sphincter Tone] : normal sphincter tone [No Mass] : no mass [Prostate Enlargement] : the prostate was not enlarged [Prostate Tenderness] : the prostate was not tender [No Prostate Nodules] : no prostate nodules [Normal Supraclavicular Nodes] : no supraclavicular lymphadenopathy [Normal Axillary Nodes] : no axillary lymphadenopathy [Normal Posterior Cervical Nodes] : no posterior cervical lymphadenopathy [Normal Anterior Cervical Nodes] : no anterior cervical lymphadenopathy [No CVA Tenderness] : no CVA  tenderness [No Spinal Tenderness] : no spinal tenderness [No Joint Swelling] : no joint swelling [Grossly Normal Strength/Tone] : grossly normal strength/tone [No Rash] : no rash [Coordination Grossly Intact] : coordination grossly intact [No Focal Deficits] : no focal deficits [Normal Gait] : normal gait [Speech Grossly Normal] : speech grossly normal [Normal Affect] : the affect was normal [Alert and Oriented x3] : oriented to person, place, and time [Normal Mood] : the mood was normal [Stool Occult Blood] : stool negative for occult blood [de-identified] : Overweight male in stated age,  [de-identified] : No calf tenderness,  [de-identified] : R hip with mild liimitied ROM due to pain,

## 2024-02-15 NOTE — HEALTH RISK ASSESSMENT
[No] : In the past 12 months have you used drugs other than those required for medical reasons? No [No falls in past year] : Patient reported no falls in the past year [0] : 2) Feeling down, depressed, or hopeless: Not at all (0) [PHQ-2 Negative - No further assessment needed] : PHQ-2 Negative - No further assessment needed [HIV test declined] : HIV test declined [Hepatitis C test declined] : Hepatitis C test declined [None] : None [With Family] : lives with family [# of Members in Household ___] :  household currently consist of [unfilled] member(s) [Retired] : retired [Graduate School] : graduate school [] :  [# Of Children ___] : has [unfilled] children [Feels Safe at Home] : Feels safe at home [Fully functional (bathing, dressing, toileting, transferring, walking, feeding)] : Fully functional (bathing, dressing, toileting, transferring, walking, feeding) [Fully functional (using the telephone, shopping, preparing meals, housekeeping, doing laundry, using] : Fully functional and needs no help or supervision to perform IADLs (using the telephone, shopping, preparing meals, housekeeping, doing laundry, using transportation, managing medications and managing finances) [Smoke Detector] : smoke detector [Carbon Monoxide Detector] : carbon monoxide detector [Seat Belt] :  uses seat belt [With Patient/Caregiver] : , with patient/caregiver [Relationship: ___] : Relationship: [unfilled] [Never] : Never [Very Good] : ~his/her~ current health as very good [Yes] : Yes [1 or 2 (0 pts)] : 1 or 2 (0 points) [Never (0 pts)] : Never (0 points) [Little interest or pleasure doing things] : 1) Little interest or pleasure doing things [Feeling down, depressed, or hopeless] : 2) Feeling down, depressed, or hopeless [Audit-CScore] : 1 [de-identified] : sedentary, no formal exercise since FDC, some walking 2x per week, [[id number]] : 0 [EyeExamDate] : 04/21 [Change in mental status noted] : No change in mental status noted [Reports changes in hearing] : Reports no changes in hearing [Reports changes in vision] : Reports no changes in vision [Reports changes in dental health] : Reports no changes in dental health [ColonoscopyDate] : 07/20 [ColonoscopyComments] : EGD - 4/2023, ERCP - 8/2023 [de-identified] : Not able to walk long distances due to OA in R hip, [de-identified] : dentist - 2/2024 [AdvancecareDate] : 02/24

## 2024-03-19 ENCOUNTER — APPOINTMENT (OUTPATIENT)
Dept: INTERNAL MEDICINE | Facility: CLINIC | Age: 64
End: 2024-03-19
Payer: MEDICARE

## 2024-03-19 VITALS
WEIGHT: 202 LBS | HEART RATE: 67 BPM | BODY MASS INDEX: 28.28 KG/M2 | TEMPERATURE: 97.1 F | SYSTOLIC BLOOD PRESSURE: 134 MMHG | HEIGHT: 71 IN | OXYGEN SATURATION: 96 % | DIASTOLIC BLOOD PRESSURE: 70 MMHG

## 2024-03-19 VITALS — SYSTOLIC BLOOD PRESSURE: 124 MMHG | DIASTOLIC BLOOD PRESSURE: 72 MMHG

## 2024-03-19 DIAGNOSIS — M16.11 UNILATERAL PRIMARY OSTEOARTHRITIS, RIGHT HIP: ICD-10-CM

## 2024-03-19 DIAGNOSIS — G47.33 OBSTRUCTIVE SLEEP APNEA (ADULT) (PEDIATRIC): ICD-10-CM

## 2024-03-19 DIAGNOSIS — F31.9 BIPOLAR DISORDER, UNSPECIFIED: ICD-10-CM

## 2024-03-19 DIAGNOSIS — K22.70 BARRETT'S ESOPHAGUS W/OUT DYSPLASIA: ICD-10-CM

## 2024-03-19 DIAGNOSIS — Z87.898 PERSONAL HISTORY OF OTHER SPECIFIED CONDITIONS: ICD-10-CM

## 2024-03-19 DIAGNOSIS — I10 ESSENTIAL (PRIMARY) HYPERTENSION: ICD-10-CM

## 2024-03-19 DIAGNOSIS — R14.2 ERUCTATION: ICD-10-CM

## 2024-03-19 DIAGNOSIS — Z01.818 ENCOUNTER FOR OTHER PREPROCEDURAL EXAMINATION: ICD-10-CM

## 2024-03-19 DIAGNOSIS — I82.4Z1 ACUTE EMBOLISM AND THROMBOSIS OF UNSPECIFIED DEEP VEINS OF RIGHT DISTAL LOWER EXTREMITY: ICD-10-CM

## 2024-03-19 PROCEDURE — 99214 OFFICE O/P EST MOD 30 MIN: CPT

## 2024-03-19 PROCEDURE — G2211 COMPLEX E/M VISIT ADD ON: CPT

## 2024-03-19 RX ORDER — MULTIVIT-MIN/FOLIC/VIT K/LYCOP 400-300MCG
50 MCG TABLET ORAL DAILY
Refills: 3 | Status: ACTIVE | COMMUNITY

## 2024-03-19 RX ORDER — FLUOXETINE HYDROCHLORIDE 20 MG/1
20 CAPSULE ORAL DAILY
Refills: 0 | Status: ACTIVE | COMMUNITY

## 2024-03-19 NOTE — PHYSICAL EXAM
[No Acute Distress] : no acute distress [Well Nourished] : well nourished [Well Developed] : well developed [Normal Sclera/Conjunctiva] : normal sclera/conjunctiva [PERRL] : pupils equal round and reactive to light [EOMI] : extraocular movements intact [Normal Oropharynx] : the oropharynx was normal [Normal Outer Ear/Nose] : the outer ears and nose were normal in appearance [Normal TMs] : both tympanic membranes were normal [Normal Nasal Mucosa] : the nasal mucosa was normal [No JVD] : no jugular venous distention [No Lymphadenopathy] : no lymphadenopathy [Supple] : supple [No Respiratory Distress] : no respiratory distress  [Clear to Auscultation] : lungs were clear to auscultation bilaterally [Regular Rhythm] : with a regular rhythm [Normal Rate] : normal rate  [Normal S1, S2] : normal S1 and S2 [No Carotid Bruits] : no carotid bruits [Pedal Pulses Present] : the pedal pulses are present [No Edema] : there was no peripheral edema [No Extremity Clubbing/Cyanosis] : no extremity clubbing/cyanosis [Soft] : abdomen soft [Non Tender] : non-tender [Non-distended] : non-distended [Normal Bowel Sounds] : normal bowel sounds [Normal Sphincter Tone] : normal sphincter tone [No Mass] : no mass [Normal Supraclavicular Nodes] : no supraclavicular lymphadenopathy [Normal Axillary Nodes] : no axillary lymphadenopathy [Normal Posterior Cervical Nodes] : no posterior cervical lymphadenopathy [Normal Anterior Cervical Nodes] : no anterior cervical lymphadenopathy [No CVA Tenderness] : no CVA  tenderness [No Spinal Tenderness] : no spinal tenderness [No Joint Swelling] : no joint swelling [Grossly Normal Strength/Tone] : grossly normal strength/tone [No Rash] : no rash [Coordination Grossly Intact] : coordination grossly intact [No Focal Deficits] : no focal deficits [Normal Gait] : normal gait [Speech Grossly Normal] : speech grossly normal [Normal Affect] : the affect was normal [Alert and Oriented x3] : oriented to person, place, and time [Normal Mood] : the mood was normal [Stool Occult Blood] : stool negative for occult blood [de-identified] : Overweight male in stated age,  [de-identified] : No calf tenderness,  [de-identified] : R hip with mild liimitied ROM due to pain,

## 2024-03-19 NOTE — RESULTS/DATA
[] : results reviewed [ECG Reviewed] : reviewed [Ventricular Rate___] : ventricular rate is [unfilled] beats per minute [Sinus Bradycardia] : sinus bradycardia [ECG Intervals MT.] : MT interval is normal [Normal QRS] : the QRS is normal [ECG Axis] : the QRS axis is normal [No Interval Change] : no interval change [de-identified] : normal [de-identified] : normal PT/PTT [de-identified] : BUN/Cr - 32/1.35(H), SGPT - 43(H), SGOT - 15(N), the rest of CMP were normal,

## 2024-03-19 NOTE — HISTORY OF PRESENT ILLNESS
[No Pertinent Cardiac History] : no history of aortic stenosis, atrial fibrillation, coronary artery disease, recent myocardial infarction, or implantable device/pacemaker [Asthma] : asthma [Sleep Apnea] : sleep apnea [No Adverse Anesthesia Reaction] : no adverse anesthesia reaction in self or family member [(Patient denies any chest pain, claudication, dyspnea on exertion, orthopnea, palpitations or syncope)] : Patient denies any chest pain, claudication, dyspnea on exertion, orthopnea, palpitations or syncope [Moderate (4-6 METs)] : Moderate (4-6 METs) [Anti-Platelet Agents: _____] : Anti-Platelet Agents: [unfilled] [COPD] : no COPD [Smoker] : not a smoker [Chronic Anticoagulation] : no chronic anticoagulation [Chronic Kidney Disease] : no chronic kidney disease [Diabetes] : no diabetes [FreeTextEntry1] : THR on R [FreeTextEntry3] : David Boss [FreeTextEntry2] : 3/202/204, Wednesday [FreeTextEntry4] : Pt has OA for over 4 years, but progressively worsened.  He has pain with stairs, it hurts at night and affected his sleep, his gait is unsteady due to pain.  His leg gives out occ and he has to hold on to furniture even when he's walking at home.  So he finally decided to have surgery as this was recommended by orthopedic surgeon a couple of years ago.  He feels well w/o any new complaint.

## 2024-03-19 NOTE — ASSESSMENT
[Patient Optimized for Surgery] : Patient optimized for surgery [No Further Testing Recommended] : no further testing recommended [Continue anti-platelet treatment as is] : Continue current anti-platelet treatment [Continue medications as is] : Continue current medications [As per surgery] : as per surgery [FreeTextEntry6] : Pt will continue low dose ASA perioperatively. [FreeTextEntry7] : Pt will take Losartan, Pantoprazole, Lamotrigine, Lorazepam and Quetiapine with sips of water on the morning of surgery.

## 2024-03-19 NOTE — REVIEW OF SYSTEMS
[Dyspnea on Exertion] : dyspnea on exertion [Nocturia] : nocturia [Joint Pain] : joint pain [Joint Stiffness] : joint stiffness [Negative] : Heme/Lymph [Fever] : no fever [Fatigue] : no fatigue [Chills] : no chills [Recent Change In Weight] : ~T no recent weight change [Chest Pain] : no chest pain [Palpitations] : no palpitations [Lower Ext Edema] : no lower extremity edema [Shortness Of Breath] : no shortness of breath [Wheezing] : no wheezing [Cough] : no cough [Nausea] : no nausea [Abdominal Pain] : no abdominal pain [Constipation] : no constipation [Diarrhea] : no diarrhea [Vomiting] : no vomiting [Melena] : no melena [Heartburn] : no heartburn [Incontinence] : no incontinence [Dysuria] : no dysuria [Hematuria] : no hematuria [Muscle Pain] : no muscle pain [Back Pain] : no back pain [Joint Swelling] : no joint swelling [Mole Changes] : no mole changes [Itching] : no itching [Headache] : no headache [Skin Rash] : no skin rash [Dizziness] : no dizziness [Fainting] : no fainting [Unsteady Walk] : no ataxia [Insomnia] : no insomnia [Anxiety] : no anxiety [Easy Bleeding] : no easy bleeding [Depression] : no depression [Easy Bruising] : no easy bruising [Swollen Glands] : no swollen glands [FreeTextEntry3] : wears reading glasses, [FreeTextEntry7] : GERD is controlled on PPI, [FreeTextEntry6] : 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 [FreeTextEntry9] : R hip and L knee pain from OA,

## 2024-04-14 ENCOUNTER — RX RENEWAL (OUTPATIENT)
Age: 64
End: 2024-04-14

## 2024-04-14 RX ORDER — ROSUVASTATIN CALCIUM 20 MG/1
20 TABLET, FILM COATED ORAL DAILY
Qty: 90 | Refills: 3 | Status: ACTIVE | COMMUNITY
Start: 2022-02-24 | End: 1900-01-01

## 2024-04-15 ENCOUNTER — RX RENEWAL (OUTPATIENT)
Age: 64
End: 2024-04-15

## 2024-04-15 RX ORDER — PANTOPRAZOLE 40 MG/1
40 TABLET, DELAYED RELEASE ORAL
Qty: 90 | Refills: 1 | Status: ACTIVE | COMMUNITY
Start: 2023-04-28 | End: 1900-01-01

## 2024-05-21 RX ORDER — DOXAZOSIN 4 MG/1
4 TABLET ORAL
Qty: 90 | Refills: 3 | Status: ACTIVE | COMMUNITY
Start: 2018-01-31 | End: 1900-01-01

## 2024-05-21 RX ORDER — LOSARTAN POTASSIUM 100 MG/1
100 TABLET, FILM COATED ORAL
Qty: 90 | Refills: 3 | Status: ACTIVE | COMMUNITY
Start: 2017-09-19 | End: 1900-01-01

## 2024-11-06 ENCOUNTER — RX RENEWAL (OUTPATIENT)
Age: 64
End: 2024-11-06

## 2025-01-28 ENCOUNTER — APPOINTMENT (OUTPATIENT)
Dept: GASTROENTEROLOGY | Facility: CLINIC | Age: 65
End: 2025-01-28
Payer: MEDICARE

## 2025-01-28 VITALS
SYSTOLIC BLOOD PRESSURE: 144 MMHG | WEIGHT: 213 LBS | DIASTOLIC BLOOD PRESSURE: 82 MMHG | HEIGHT: 71 IN | TEMPERATURE: 96.4 F | OXYGEN SATURATION: 97 % | HEART RATE: 80 BPM | BODY MASS INDEX: 29.82 KG/M2

## 2025-01-28 DIAGNOSIS — K44.9 DIAPHRAGMATIC HERNIA W/OUT OBSTRUCTION OR GANGRENE: ICD-10-CM

## 2025-01-28 DIAGNOSIS — K22.70 BARRETT'S ESOPHAGUS W/OUT DYSPLASIA: ICD-10-CM

## 2025-01-28 DIAGNOSIS — K21.00 GASTRO-ESOPHAGEAL REFLUX DISEASE WITH ESOPHAGITIS, WITHOUT BLEEDING: ICD-10-CM

## 2025-01-28 DIAGNOSIS — K86.2 CYST OF PANCREAS: ICD-10-CM

## 2025-01-28 PROCEDURE — 99203 OFFICE O/P NEW LOW 30 MIN: CPT

## 2025-01-30 ENCOUNTER — APPOINTMENT (OUTPATIENT)
Dept: MRI IMAGING | Facility: CLINIC | Age: 65
End: 2025-01-30
Payer: MEDICARE

## 2025-01-30 PROCEDURE — 74183 MRI ABD W/O CNTR FLWD CNTR: CPT | Mod: TC

## 2025-02-20 ENCOUNTER — NON-APPOINTMENT (OUTPATIENT)
Age: 65
End: 2025-02-20

## 2025-02-20 ENCOUNTER — APPOINTMENT (OUTPATIENT)
Dept: INTERNAL MEDICINE | Facility: CLINIC | Age: 65
End: 2025-02-20
Payer: MEDICARE

## 2025-02-20 VITALS
SYSTOLIC BLOOD PRESSURE: 122 MMHG | TEMPERATURE: 97.7 F | HEIGHT: 71 IN | HEART RATE: 75 BPM | OXYGEN SATURATION: 94 % | DIASTOLIC BLOOD PRESSURE: 71 MMHG | BODY MASS INDEX: 30.1 KG/M2 | WEIGHT: 215 LBS

## 2025-02-20 VITALS — SYSTOLIC BLOOD PRESSURE: 130 MMHG | DIASTOLIC BLOOD PRESSURE: 70 MMHG

## 2025-02-20 DIAGNOSIS — E78.5 HYPERLIPIDEMIA, UNSPECIFIED: ICD-10-CM

## 2025-02-20 DIAGNOSIS — E66.3 OVERWEIGHT: ICD-10-CM

## 2025-02-20 DIAGNOSIS — I10 ESSENTIAL (PRIMARY) HYPERTENSION: ICD-10-CM

## 2025-02-20 DIAGNOSIS — F31.9 BIPOLAR DISORDER, UNSPECIFIED: ICD-10-CM

## 2025-02-20 DIAGNOSIS — E55.9 VITAMIN D DEFICIENCY, UNSPECIFIED: ICD-10-CM

## 2025-02-20 DIAGNOSIS — I82.4Z1 ACUTE EMBOLISM AND THROMBOSIS OF UNSPECIFIED DEEP VEINS OF RIGHT DISTAL LOWER EXTREMITY: ICD-10-CM

## 2025-02-20 DIAGNOSIS — Z00.00 ENCOUNTER FOR GENERAL ADULT MEDICAL EXAMINATION W/OUT ABNORMAL FINDINGS: ICD-10-CM

## 2025-02-20 DIAGNOSIS — K22.70 BARRETT'S ESOPHAGUS W/OUT DYSPLASIA: ICD-10-CM

## 2025-02-20 DIAGNOSIS — Z23 ENCOUNTER FOR IMMUNIZATION: ICD-10-CM

## 2025-02-20 DIAGNOSIS — G47.33 OBSTRUCTIVE SLEEP APNEA (ADULT) (PEDIATRIC): ICD-10-CM

## 2025-02-20 DIAGNOSIS — J45.909 UNSPECIFIED ASTHMA, UNCOMPLICATED: ICD-10-CM

## 2025-02-20 DIAGNOSIS — M72.2 PLANTAR FASCIAL FIBROMATOSIS: ICD-10-CM

## 2025-02-20 PROCEDURE — 82270 OCCULT BLOOD FECES: CPT

## 2025-02-20 PROCEDURE — 90715 TDAP VACCINE 7 YRS/> IM: CPT | Mod: GY

## 2025-02-20 PROCEDURE — 99214 OFFICE O/P EST MOD 30 MIN: CPT | Mod: 25

## 2025-02-20 PROCEDURE — 90471 IMMUNIZATION ADMIN: CPT

## 2025-02-20 PROCEDURE — G0438: CPT

## 2025-02-20 PROCEDURE — 93000 ELECTROCARDIOGRAM COMPLETE: CPT

## 2025-02-27 ENCOUNTER — RX RENEWAL (OUTPATIENT)
Age: 65
End: 2025-02-27

## 2025-03-06 NOTE — PROGRESS NOTE ADULT - ASSESSMENT
63 yo M with PMHx of HTN, HLD, Bipolar, GERD, Spivey's Esophagus Asthma, and Abnormal LFTs presented to the ED with 1x day hx of epigastric pain and new onset fever. In the ED, pt was found to be tachypneic, tachycardic, and febrile to 100.6. pt found to have elevated LFTs and Alk phos in outpatient workup since February. Dr. Ewing (PCP) attributed this to recent viral illness, but subsequent labs not checked.   Temp: 100.6, RR: 20, SpO2: 97% on RA    Labs:  WBC 11.71, Lactate 2.0 < 3.0, Cr 1.50, Alk phos 208, ,      Fever/Transaminitis, suspect GI source  - 6/15 RRT for severe epigastric pain, febrile to 103F  - started on zosyn  - admission cx NGTD, repeat pending  - CT w/o mesenteric ischemia  Recommendations:   - appreciate GI recs  - f/u pending cx  - c/w zosyn  - trend temps/WBC  - trend LFTs  - supportive care, pain control and additional management per primary team    Dr. Dede Pina covering weekend service  Infectious Diseases will continue to follow. Please call with any questions.   Briana Lopez M.D.  [facility] Division of Infectious Diseases [phone]   EP catheter removed from the right atrium.

## 2025-04-11 ENCOUNTER — RX RENEWAL (OUTPATIENT)
Age: 65
End: 2025-04-11

## 2025-04-24 ENCOUNTER — OFFICE (OUTPATIENT)
Dept: URBAN - METROPOLITAN AREA CLINIC 109 | Facility: CLINIC | Age: 65
Setting detail: OPHTHALMOLOGY
End: 2025-04-24
Payer: MEDICARE

## 2025-04-24 DIAGNOSIS — H35.371: ICD-10-CM

## 2025-04-24 DIAGNOSIS — H40.053: ICD-10-CM

## 2025-04-24 DIAGNOSIS — H25.13: ICD-10-CM

## 2025-04-24 PROCEDURE — 92004 COMPRE OPH EXAM NEW PT 1/>: CPT | Performed by: OPHTHALMOLOGY

## 2025-04-24 PROCEDURE — 92133 CPTRZD OPH DX IMG PST SGM ON: CPT | Performed by: OPHTHALMOLOGY

## 2025-04-24 ASSESSMENT — CONFRONTATIONAL VISUAL FIELD TEST (CVF)
OD_FINDINGS: FULL
OS_FINDINGS: FULL

## 2025-04-24 ASSESSMENT — KERATOMETRY
OD_K1POWER_DIOPTERS: 45.00
OS_AXISANGLE_DEGREES: 68
OD_K2POWER_DIOPTERS: 46.12
OS_K1POWER_DIOPTERS: 45.00
OD_AXISANGLE_DEGREES: 142
OS_K2POWER_DIOPTERS: 45.75

## 2025-04-24 ASSESSMENT — REFRACTION_MANIFEST
OS_SPHERE: +2.00
OS_VA1: 20/25+
OS_CYLINDER: -0.75
OD_SPHERE: +2.00
OD_SPHERE: +1.50
OS_SPHERE: +1.00
OD_VA1: 20/20-
OS_AXIS: 121
OD_CYLINDER: -0.50
OD_AXIS: 059

## 2025-04-24 ASSESSMENT — REFRACTION_AUTOREFRACTION
OD_AXIS: 059
OD_SPHERE: +1.50
OS_CYLINDER: -0.75
OD_CYLINDER: -0.50
OS_AXIS: 121
OS_SPHERE: +1.00

## 2025-04-24 ASSESSMENT — VISUAL ACUITY
OS_BCVA: 20/30
OD_BCVA: 20/30

## 2025-04-24 ASSESSMENT — TONOMETRY
OD_IOP_MMHG: 20
OS_IOP_MMHG: 17
OD_IOP_MMHG: 15

## 2025-04-24 ASSESSMENT — PACHYMETRY
OD_CT_UM: 523
OD_CT_CORRECTION: 1
OS_CT_UM: 513
OS_CT_CORRECTION: 2

## 2025-04-24 ASSESSMENT — REFRACTION_CURRENTRX
OS_SPHERE: +3.00
OD_OVR_VA: 20/
OS_OVR_VA: 20/
OD_SPHERE: +3.00

## 2025-06-14 ENCOUNTER — APPOINTMENT (OUTPATIENT)
Dept: MRI IMAGING | Facility: CLINIC | Age: 65
End: 2025-06-14

## 2025-06-14 PROCEDURE — A9585: CPT | Mod: JZ

## 2025-06-14 PROCEDURE — 73723 MRI JOINT LWR EXTR W/O&W/DYE: CPT | Mod: LT

## 2025-07-07 ENCOUNTER — RX RENEWAL (OUTPATIENT)
Age: 65
End: 2025-07-07

## 2025-08-28 ENCOUNTER — APPOINTMENT (OUTPATIENT)
Dept: INTERNAL MEDICINE | Facility: CLINIC | Age: 65
End: 2025-08-28

## (undated) DEVICE — POSITIONER FOAM HEAD CRADLE (PINK)

## (undated) DEVICE — CATH IV SAFE BC 22G X 1" (BLUE)

## (undated) DEVICE — XI ARM FORCEP PROGRASP 8MM

## (undated) DEVICE — SOL INJ NS 0.9% 500ML 2 PORT

## (undated) DEVICE — SYR ALLIANCE II INFLATION 60ML

## (undated) DEVICE — BITE BLOCK ADULT 20 X 27MM (GREEN)

## (undated) DEVICE — XI ARM SCISSOR MONO CURVED

## (undated) DEVICE — GLV 7 PROTEXIS (WHITE)

## (undated) DEVICE — BALLOON US ENDO

## (undated) DEVICE — XI ARM FORCEP FENESTRATED BIPOLAR 8MM

## (undated) DEVICE — VENODYNE/SCD SLEEVE CALF MEDIUM

## (undated) DEVICE — WARMING BLANKET LOWER ADULT

## (undated) DEVICE — XI SEAL UNIV 5- 8 MM

## (undated) DEVICE — SENSOR O2 FINGER ADULT

## (undated) DEVICE — TUBING SUCTION 20FT

## (undated) DEVICE — XI ENDOWRIST 12 - 8 MM CANNULA REDUCER

## (undated) DEVICE — SUT MONOCRYL 4-0 27" PS-2 UNDYED

## (undated) DEVICE — XI OBTURATOR OPTICAL BLADELESS 8MM

## (undated) DEVICE — PACK ROBOTIC LIJ

## (undated) DEVICE — XI 12MM AND STAPLER CANNULA SEAL

## (undated) DEVICE — POSITIONER FOAM EGG CRATE ULNAR 2PCS (PINK)

## (undated) DEVICE — SOL IRR POUR NS 0.9% 500ML

## (undated) DEVICE — D HELP - CLEARVIEW CLEARIFY SYSTEM

## (undated) DEVICE — POSITIONER PURPLE ARM ONE STEP (LARGE)

## (undated) DEVICE — TUBING STRYKEFLOW II SUCTION / IRRIGATOR

## (undated) DEVICE — SUCTION YANKAUER NO CONTROL VENT

## (undated) DEVICE — SUT VICRYL 0 27" UR-6

## (undated) DEVICE — CATH IV SAFE BC 20G X 1.16" (PINK)

## (undated) DEVICE — BLADE SCALPEL SAFETYLOCK #10

## (undated) DEVICE — WARMING BLANKET UPPER ADULT

## (undated) DEVICE — TUBING SUCTION CONN 6FT STERILE

## (undated) DEVICE — VENODYNE/SCD SLEEVE FOOT

## (undated) DEVICE — PACK IV START WITH CHG

## (undated) DEVICE — ENDOCATCH 10MM SPECIMEN POUCH

## (undated) DEVICE — TUBING IV SET GRAVITY 3Y 100" MACRO

## (undated) DEVICE — XI TIP COVER

## (undated) DEVICE — XI DRAPE ARM

## (undated) DEVICE — XI ARM CLIP APPLIER MEDIUM-LARGE

## (undated) DEVICE — FOLEY HOLDER STATLOCK 2 WAY ADULT

## (undated) DEVICE — TUBING TRUWAVE PRESSURE MALE/FEMALE 72"

## (undated) DEVICE — PREP CHLORAPREP HI-LITE ORANGE 26ML

## (undated) DEVICE — SOL IRR POUR H2O 250ML

## (undated) DEVICE — GLV 7.5 PROTEXIS (WHITE)

## (undated) DEVICE — XI DRAPE COLUMN